# Patient Record
Sex: FEMALE | Race: WHITE | NOT HISPANIC OR LATINO | Employment: FULL TIME | URBAN - METROPOLITAN AREA
[De-identification: names, ages, dates, MRNs, and addresses within clinical notes are randomized per-mention and may not be internally consistent; named-entity substitution may affect disease eponyms.]

---

## 2017-01-09 ENCOUNTER — OFFICE VISIT (OUTPATIENT)
Dept: ONCOLOGY | Facility: CLINIC | Age: 52
End: 2017-01-09

## 2017-01-09 ENCOUNTER — LAB (OUTPATIENT)
Dept: LAB | Facility: HOSPITAL | Age: 52
End: 2017-01-09

## 2017-01-09 ENCOUNTER — CLINICAL SUPPORT (OUTPATIENT)
Dept: CARDIOLOGY | Facility: CLINIC | Age: 52
End: 2017-01-09

## 2017-01-09 ENCOUNTER — APPOINTMENT (OUTPATIENT)
Dept: GENERAL RADIOLOGY | Facility: HOSPITAL | Age: 52
End: 2017-01-09

## 2017-01-09 ENCOUNTER — INFUSION (OUTPATIENT)
Dept: ONCOLOGY | Facility: HOSPITAL | Age: 52
End: 2017-01-09

## 2017-01-09 VITALS
TEMPERATURE: 99 F | WEIGHT: 133.6 LBS | DIASTOLIC BLOOD PRESSURE: 62 MMHG | HEIGHT: 64 IN | RESPIRATION RATE: 16 BRPM | SYSTOLIC BLOOD PRESSURE: 114 MMHG | HEART RATE: 105 BPM | BODY MASS INDEX: 22.81 KG/M2 | OXYGEN SATURATION: 100 %

## 2017-01-09 DIAGNOSIS — D70.1 CHEMOTHERAPY-INDUCED NEUTROPENIA (HCC): ICD-10-CM

## 2017-01-09 DIAGNOSIS — C78.7 METASTATIC CANCER TO LIVER (HCC): ICD-10-CM

## 2017-01-09 DIAGNOSIS — C78.7 RECTAL CANCER METASTASIZED TO LIVER (HCC): ICD-10-CM

## 2017-01-09 DIAGNOSIS — C20 RECTAL CANCER METASTASIZED TO LIVER (HCC): ICD-10-CM

## 2017-01-09 DIAGNOSIS — I67.1 CEREBRAL ARTERIAL ANEURYSM: ICD-10-CM

## 2017-01-09 DIAGNOSIS — R06.02 SOB (SHORTNESS OF BREATH): ICD-10-CM

## 2017-01-09 DIAGNOSIS — E88.09 HYPOALBUMINEMIA: ICD-10-CM

## 2017-01-09 DIAGNOSIS — C18.7 MALIGNANT NEOPLASM OF SIGMOID COLON (HCC): ICD-10-CM

## 2017-01-09 DIAGNOSIS — T45.1X5A CHEMOTHERAPY-INDUCED NEUTROPENIA (HCC): ICD-10-CM

## 2017-01-09 DIAGNOSIS — J90 PLEURAL EFFUSION, BILATERAL: ICD-10-CM

## 2017-01-09 DIAGNOSIS — C18.7 MALIGNANT NEOPLASM OF SIGMOID COLON (HCC): Primary | ICD-10-CM

## 2017-01-09 LAB
ALBUMIN SERPL-MCNC: 2.7 G/DL (ref 3.5–5.2)
ALBUMIN/GLOB SERPL: 0.9 G/DL (ref 1.1–2.4)
ALP SERPL-CCNC: 101 U/L (ref 38–116)
ALT SERPL W P-5'-P-CCNC: 33 U/L (ref 0–33)
ANION GAP SERPL CALCULATED.3IONS-SCNC: 9.5 MMOL/L
AST SERPL-CCNC: 32 U/L (ref 0–32)
BASOPHILS # BLD AUTO: 0.05 10*3/MM3 (ref 0–0.1)
BASOPHILS NFR BLD AUTO: 0.4 % (ref 0–1.1)
BILIRUB SERPL-MCNC: 0.9 MG/DL (ref 0.1–1.2)
BILIRUB UR QL STRIP: NEGATIVE
BUN BLD-MCNC: 7 MG/DL (ref 6–20)
BUN/CREAT SERPL: 10.4 (ref 7.3–30)
CALCIUM SPEC-SCNC: 8.2 MG/DL (ref 8.5–10.2)
CEA SERPL-MCNC: 6.94 NG/ML
CHLORIDE SERPL-SCNC: 99 MMOL/L (ref 98–107)
CLARITY UR: ABNORMAL
CO2 SERPL-SCNC: 24.5 MMOL/L (ref 22–29)
COLOR UR: ABNORMAL
CREAT BLD-MCNC: 0.67 MG/DL (ref 0.6–1.1)
DEPRECATED RDW RBC AUTO: 56.4 FL (ref 37–49)
EOSINOPHIL # BLD AUTO: 0.34 10*3/MM3 (ref 0–0.36)
EOSINOPHIL NFR BLD AUTO: 2.8 % (ref 1–5)
ERYTHROCYTE [DISTWIDTH] IN BLOOD BY AUTOMATED COUNT: 16.9 % (ref 11.7–14.5)
GFR SERPL CREATININE-BSD FRML MDRD: 92 ML/MIN/1.73
GLOBULIN UR ELPH-MCNC: 2.9 GM/DL (ref 1.8–3.5)
GLUCOSE BLD-MCNC: 114 MG/DL (ref 74–124)
GLUCOSE UR STRIP-MCNC: NEGATIVE MG/DL
HCT VFR BLD AUTO: 29.2 % (ref 34–45)
HGB BLD-MCNC: 9.1 G/DL (ref 11.5–14.9)
HGB UR QL STRIP.AUTO: ABNORMAL
IMM GRANULOCYTES # BLD: 0.19 10*3/MM3 (ref 0–0.03)
IMM GRANULOCYTES NFR BLD: 1.6 % (ref 0–0.5)
KETONES UR QL STRIP: NEGATIVE
LEUKOCYTE ESTERASE UR QL STRIP.AUTO: ABNORMAL
LYMPHOCYTES # BLD AUTO: 1.03 10*3/MM3 (ref 1–3.5)
LYMPHOCYTES NFR BLD AUTO: 8.5 % (ref 20–49)
MCH RBC QN AUTO: 29.3 PG (ref 27–33)
MCHC RBC AUTO-ENTMCNC: 31.2 G/DL (ref 32–35)
MCV RBC AUTO: 93.9 FL (ref 83–97)
MONOCYTES # BLD AUTO: 0.83 10*3/MM3 (ref 0.25–0.8)
MONOCYTES NFR BLD AUTO: 6.9 % (ref 4–12)
NEUTROPHILS # BLD AUTO: 9.66 10*3/MM3 (ref 1.5–7)
NEUTROPHILS NFR BLD AUTO: 79.8 % (ref 39–75)
NITRITE UR QL STRIP: NEGATIVE
NRBC BLD MANUAL-RTO: 0 /100 WBC (ref 0–0)
PH UR STRIP.AUTO: 7 [PH] (ref 4.5–8)
PLATELET # BLD AUTO: 235 10*3/MM3 (ref 150–375)
PMV BLD AUTO: 9.3 FL (ref 8.9–12.1)
POTASSIUM BLD-SCNC: 3.9 MMOL/L (ref 3.5–4.7)
PROT SERPL-MCNC: 5.6 G/DL (ref 6.3–8)
PROT UR QL STRIP: NEGATIVE
RBC # BLD AUTO: 3.11 10*6/MM3 (ref 3.9–5)
SODIUM BLD-SCNC: 133 MMOL/L (ref 134–145)
SP GR UR STRIP: 1.01 (ref 1–1.03)
UROBILINOGEN UR QL STRIP: ABNORMAL
WBC NRBC COR # BLD: 12.1 10*3/MM3 (ref 4–10)

## 2017-01-09 PROCEDURE — 99215 OFFICE O/P EST HI 40 MIN: CPT | Performed by: INTERNAL MEDICINE

## 2017-01-09 PROCEDURE — 93005 ELECTROCARDIOGRAM TRACING: CPT | Performed by: INTERNAL MEDICINE

## 2017-01-09 PROCEDURE — 81003 URINALYSIS AUTO W/O SCOPE: CPT | Performed by: INTERNAL MEDICINE

## 2017-01-09 PROCEDURE — 93000 ELECTROCARDIOGRAM COMPLETE: CPT | Performed by: INTERNAL MEDICINE

## 2017-01-09 PROCEDURE — 36415 COLL VENOUS BLD VENIPUNCTURE: CPT | Performed by: INTERNAL MEDICINE

## 2017-01-09 PROCEDURE — 80053 COMPREHEN METABOLIC PANEL: CPT | Performed by: INTERNAL MEDICINE

## 2017-01-09 PROCEDURE — 85025 COMPLETE CBC W/AUTO DIFF WBC: CPT | Performed by: INTERNAL MEDICINE

## 2017-01-09 PROCEDURE — 96374 THER/PROPH/DIAG INJ IV PUSH: CPT | Performed by: INTERNAL MEDICINE

## 2017-01-09 PROCEDURE — 25010000002 FUROSEMIDE PER 20 MG: Performed by: INTERNAL MEDICINE

## 2017-01-09 PROCEDURE — 82378 CARCINOEMBRYONIC ANTIGEN: CPT | Performed by: INTERNAL MEDICINE

## 2017-01-09 PROCEDURE — 71020 HC CHEST PA AND LATERAL: CPT

## 2017-01-09 RX ORDER — POTASSIUM CHLORIDE 750 MG/1
10 TABLET, EXTENDED RELEASE ORAL 2 TIMES DAILY
Qty: 40 TABLET | Refills: 0 | Status: SHIPPED | OUTPATIENT
Start: 2017-01-09 | End: 2017-01-25 | Stop reason: SDDI

## 2017-01-09 RX ORDER — FUROSEMIDE 20 MG/1
20 TABLET ORAL DAILY
Qty: 20 TABLET | Refills: 0 | Status: SHIPPED | OUTPATIENT
Start: 2017-01-09 | End: 2017-01-25 | Stop reason: SDDI

## 2017-01-09 RX ORDER — FUROSEMIDE 10 MG/ML
30 INJECTION INTRAMUSCULAR; INTRAVENOUS ONCE
Status: COMPLETED | OUTPATIENT
Start: 2017-01-09 | End: 2017-01-09

## 2017-01-09 RX ADMIN — FUROSEMIDE 30 MG: 10 INJECTION, SOLUTION INTRAMUSCULAR; INTRAVENOUS at 12:55

## 2017-01-09 NOTE — MR AVS SNAPSHOT
Isabel Florentino   1/9/2017 3:00 PM   Appointment    Dept Phone:  [phone]   Encounter #:  [id number]    Provider:  ADAMA DUMONT   Department:  [facility]                Your Full Care Plan              Today's Medication Changes          These changes are accurate as of: 1/9/17 11:59 PM.  If you have any questions, ask your nurse or doctor.               New Medication(s)Ordered:     furosemide 20 MG tablet   Commonly known as:  LASIX   Take 1 tablet by mouth Daily. TAKE IT AT BREAKFAST   Started by:  Zheng Johnson MD       potassium chloride 10 MEQ CR tablet   Commonly known as:  K-DUR,KLOR-CON   Take 1 tablet by mouth 2 (Two) Times a Day.   Started by:  Zheng Johnson MD            Where to Get Your Medications      These medications were sent to [facility] - [place] - [address] AT [employer] [facility] [address] - [phone] PH - [phone] FX  [address], [place] [address]     Phone:  [phone]     furosemide 20 MG tablet    potassium chloride 10 MEQ CR tablet                  Your Updated Medication List          This list is accurate as of: 1/9/17 11:59 PM.  Always use your most recent med list.                Biotin 5000 MCG capsule       cholecalciferol 1000 UNITS tablet   Commonly known as:  VITAMIN D3       FINACEA 15 % foam   Generic drug:  Azelaic Acid       furosemide 20 MG tablet   Commonly known as:  LASIX   Take 1 tablet by mouth Daily. TAKE IT AT BREAKFAST       OLANZapine 5 MG tablet   Commonly known as:  zyPREXA   Take 1 tablet by mouth Every Night.       ondansetron 4 MG tablet   Commonly known as:  ZOFRAN   Take 1 tablet by mouth Every 8 (Eight) Hours As Needed for nausea or vomiting.       potassium chloride 10 MEQ CR tablet   Commonly known as:  K-DUR,KLOR-CON   Take 1 tablet by mouth 2 (Two) Times a Day.       prochlorperazine 10 MG tablet   Commonly  known as:  COMPAZINE   Take 1 tablet by mouth every 6 (six) hours as needed for nausea or vomiting for up to 60 doses.               Medications to be Given to You by a Medical Professional     Due       Frequency    (none) heparin injection 500 Units  Every 8 Hours PRN    (none) heparin injection 500 Units  Every 8 Hours PRN      Instructions     None    Patient Instructions History      Upcoming Appointments     Visit Type Date Time Department    FOLLOW UP 2 UNIT 1/13/2017  3:40 PM [facility]    LAB 1/13/2017  3:10 PM  [facility] CBC LAB [facility]    FOLLOW UP 2 UNIT 1/25/2017  2:20 PM [facility]    LAB 1/25/2017  1:50 PM [facility]      MyChart Signup     Our records indicate that you have declined [facility] Spirationt signup. If you would like to sign up for Spirationt, please email [email or web address] or call [phone] to obtain an activation code.             Other Info from Your Visit           Your Appointments     Jan 25, 2017  1:50 PM EST   Lab with LAB CHAIR [facility] ([place])    [address] [place] [address]   [phone]            Jan 25, 2017  2:20 PM EST   FOLLOW UP with Zheng Johnson MD   [facility] IN BLOOD DISORDERS AND CANCER ([facility])    [address] [place] [address] [phone]              Allergies     No Known Allergies      Vital Signs     Smoking Status                   Never Smoker

## 2017-01-09 NOTE — PROGRESS NOTES
Subjective .     REASONS FOR FOLLOW UP:  Metastatic rectal cancer to the liver.  Initiated chemotherapy with FOLFOX 6 9/1/2016.     HISTORY OF PRESENT ILLNESS:  The patient is a 51 y.o. year old female with the above-mentioned history, who presents today for follow up for continuation of her care.   Since the previous visit the patient went to the [facility] in [place] where she had extensive surgery to remove areas of metastasis in the right lobe of the liver, also a small resection apparently in the left lobe of the liver. The patient had no surgery whatsoever in her colorectal tumor because according to the , and we are guarded in information, all of the tumors in the liver had scarred out and no visible metastasis was evident. The patient stayed in the hospital for almost 14 days with complications including the need for transfusion. She received DVT prophylaxis in the form of Heparin. She came home with 20 pounds extra weight and she has been feeling very short of breath. The patient also has had palpitations in the absence of any chest pain. No cough, no wheezing, no pleurisy, no hemoptysis, no pleuritic pain. She has had a low-grade fever. The appetite has been minimal. No nausea/vomiting. Normal bowel activity, normal urination, urine normal color. She has had significant swelling in both lower extremities. Also she has had orthopnea. She feels very fatigued and tired. She has not had any angina or chest pain, neither palpitations, but according to her  her heart rate was as high as 150 while being at the [facility].                Past Medical History   Diagnosis Date   • Anemia    • Colon cancer      NEW DIAGNOSIS   • Fatigue      secondary to chemotherapy    [place]   • History of transfusion      1999 AFTER TUBAL RUPTURE   • Hyperlipidemia    • Liver cancer    • Middle cerebral artery aneurysm      Past Surgical History    Procedure Laterality Date   • Craniotomy  2004, 2005     Cerebral aneurysm   • Laparoscopy for ectopic pregnancy  09/1997   • Sinus surgery     • Liver surgery       MASS REMOVED   • Colonoscopy       MAY 2016   • Pr insj tunneled cvc w/o subq port/ age 3 yr/> Right 8/26/2016     Procedure: MEDIPORT PLACEMENT ;  Surgeon: Praful Holden MD;  Location: [facility] OR;  Service: Vascular     Medications: The current medication list was reviewed in the EMR.    ALLERGIES:   No Known Allergies    SOCIAL HISTORY:       Social History   Substance Use Topics   • Smoking status: Never Smoker   • Smokeless tobacco: Never Used   • Alcohol use 3.0 oz/week     5 Glasses of wine per week      Comment: Social     FAMILY HISTORY:  Family History   Problem Relation Age of Onset   • Cerebral aneurysm Mother    • COPD Father        REVIEW OF SYSTEMS:  PAIN: See VITAL SIGNS below.   GENERAL: No change in appetite or weight, no fevers, chills or sweats.   SKIN: No rashes or nonhealing lesions. Pale no jaundice  HEME/LYMPH: SIGNIFICANT anemia,NO easy bruising, bleeding or swollen nodes.   EYES: No vision changes or diplopia.   ENT: No tinnitus, hearing loss, gum bleeding, epistaxis, hoarseness or dysphagia.   RESPIRATORY: DRY cough, MARKEDshortness of breath,NO hemoptysis SOME wheezing.   CVS: No chest pain,SIGNIFICANT  palpitations,AND orthopnea,AND dyspnea on exertionNO PND.   GI: POSTSURGICAL abdominal pain, NO nausea, vomiting, constipation, diarrhea, melena or hematochezia.   : No dysuria or hematuria. No abnormal vaginal discharge or bleeding.   MUSCULOSKELETAL: No bone pain or joint stiffness. TREMENDOUS EDEMA BOTH LE, 20 LB WEIGHT GAIN IN WATER  NEUROLOGICAL: No dizziness, global weakness, loss of consciousness or seizures.    PSYCHIATRIC: No increased nervousness, mood changes or depression.     Objective    Vitals:    01/09/17 1132   BP: 114/62   Pulse: 105   Resp: 16   Temp: 99 °F (37.2 °C)   TempSrc: Oral   SpO2:  "100%   Weight: 133 lb 9.6 oz (60.6 kg)   Height: 64.17\" (163 cm)   PainSc:   2   PainLoc: Comment: pain from surgery     Current Status 1/9/2017   ECOG score 0      PHYSICAL EXAM:    GENERAL:  Well-developed, well-nourished in no acute distress.   SKIN:  Warm, dry without rashes, purpura or petechiae.  HEAD:  Normocephalic.  EYES:  Pupils equal, round and reactive to light.  EOMs intact.  Conjunctivae normal.  EARS:  Hearing intact.  NOSE:  Septum midline.  No excoriations or nasal discharge.  MOUTH:  Tongue is well-papillated; no stomatitis or ulcers.  Buccal dryness noted. Lips normal.  THROAT:  Oropharynx without lesions or exudates.  NECK:  Supple with good range of motion; no thyromegaly or masses, no JVD.  LYMPHATICS:  No cervical, supraclavicular, axillary or inguinal adenopathy.  CHEST:  Lungs DECREASED BASAL SOUNDS to auscultation. , NO CRAKLES OR RUBS,Good airflow.  Benign Mediport present in the right chest. Two tiny incisions on the right intercostal space without sutures, no erythema or edema.  CARDIAC:  RAPID rate and rhythm without murmurs, rubs or gallops. Normal S1,S2.  ABDOMEN:  Soft, nontender with no organomegaly or masses. Bowel sounds present. SURGICAL SITE WELL HEALED  EXTREMITIES:  No clubbing, cyanosis 3+ edema. BOTH LEGS, NO TENDERNESS OR PALPABLE CORDS OR ERYTHEMA  NEUROLOGICAL:  Cranial Nerves II-XII grossly intact.  No focal neurological deficits.  PSYCHIATRIC:  Normal affect and mood.        RECENT LABS:  Lab Results   Component Value Date    WBC 12.10 (H) 01/09/2017    HGB 9.1 (L) 01/09/2017    HCT 29.2 (L) 01/09/2017    MCV 93.9 01/09/2017     01/09/2017     Lab Results   Component Value Date    GLUCOSE 114 01/09/2017    BUN 7 01/09/2017    CREATININE 0.67 01/09/2017    EGFRIFNONA 92 01/09/2017    EGFRIFAFRI  09/15/2016      Comment:      <15 Indicative of kidney failure.    BCR 10.4 01/09/2017    CO2 24.5 01/09/2017    CALCIUM 8.2 (L) 01/09/2017    ALBUMIN 2.70 (L) 01/09/2017 "    LABIL2 0.9 (L) 01/09/2017    AST 32 01/09/2017    ALT 33 01/09/2017       Status:  Final result Visible to patient:  No (Not Released) Dx:  Malignant neoplasm of sigmoid colon Order: [id number]             Ref Range & Units 1:00 PM   4mo ago        Color, UA Yellow, Straw Dark Yellow (A) Yellow      Appearance, UA Clear Cloudy (A) Clear      pH, UA 4.5 - 8.0 7.0 6.0R      Specific Gravity, UA 1.002 - 1.030 1.015 1.020R      Glucose, UA Negative Negative Negative      Ketones, UA Negative Negative Negative      Bilirubin, UA Negative Negative Negative      Blood, UA Negative Trace (A) Negative      Protein, UA Negative Negative Negative      Leuk Esterase, UA Negative Trace (A) Moderate (2+) (A)      Nitrite, UA Negative Negative Negative      Urobilinogen, UA 0.2 - 1.0 E.U./dL 0.2 E.U./dL 0.2 E.U./dLR     Resulting Agency   CBC LAB  [facility]          Specimen Collected: 01/09/17  1:00 PM Last Resulted: 01/09/17  1:16 PM                   Assessment/Plan      1. Metastatic rectal cancer to the liver,  KRAS negative, BRAF negative, MSI stable.  Treatment plan decided upon after discussion with attending physician at the [facility] in [place], [place], Dr. Cannon phone number [phone].  She did undergo laparoscopic resection of 2 liver metastases in the left lobe 8/19/2016.  Eventual plans for right hepatectomy according to [facility] along with removal of the primary tumor in the rectum.  Initiation of chemotherapy with FOLFOX-6 9/1/2016.  Right portal vein embolization at [facility] 11/14/2016.   Upon review on 01/09/2017 the patient’s shortness of breath is very worrisome in regard to fluid accumulation in the pleural spaces and this goes along with the clinical examination today. The anasarca that the patient has also is worrisome in regard to albuminuria or hypoalbuminemia and obviously this is worrisome as well. The shortness of breath also in somebody who has had large surgery for cancer in spite of  heparin prophylaxis at the [facility] [place] [facility] makes me to wonder about the possibility of pulmonary emboli.     The patient has a significant degree of anemia and again low albumin level. Her liver enzymes actually look very normal otherwise. This includes the SGPT, SGOT and the alkaline phosphatase as well as the bilirubin.    RECOMMENDATIONS:  1.  I have advised the patient to proceed with a chest x-ray today. I have personally reviewed this and the patient has bilateral pleural effusions right bigger than left, normal cardiac size. No evidence of congestive heart failure. No peribronchial coffing, no Kerley lines.  2.  I have advised the patient to proceed with an EKG. This is done just to be sure that there is no indirect signs of pulmonary emboli in an EKG. I have personally reviewed this showing sinus tachycardia rate of 100 with normal MN, normal QRS, normal QT. No acute STT changes and nothing to suggest pulmonary emboli, right bundle branch block or S1Q3T3 pattern.    3.  I have advised the patient to have a urinalysis. I want to be sure that she does not have proteinuria.   4.  We will obtain records from the [facility] [place] [facility] in [place] in order to review the pathology, the surgical description and so forth.   5.  The patient’s anemia needs to be watched. I think a lot of this is dilutional. Hopefully the hemoglobin will improve spontaneously once that the patient’s diuresis takes place.   6.  For her anasarca I advised her to have foods that are low in salt, continue increasing protein in her diet and she will receive in the office today Lasix 30 mg IV. Starting tomorrow the patient will do daily weights and she will monitor to be sure that she does not lose more than a pound a day. She will take 20 mg of Lasix every morning along with 20 mEq of potassium every morning.  7.  We will monitor the patient back on clinical grounds this Friday and we will repeat the CBC and the chemistry profile.  8.  The patient will not  receive any formal chemotherapy until we have further assessment in regard to how things go after all the discussion that took place today.  9.  Finally the patient’s oxygenation on room air at rest is 99%; after 40 feet of exercise remains 99%.                           Zheng Johnson MD  11/17/2016

## 2017-01-10 ENCOUNTER — TELEPHONE (OUTPATIENT)
Dept: ONCOLOGY | Facility: HOSPITAL | Age: 52
End: 2017-01-10

## 2017-01-10 NOTE — TELEPHONE ENCOUNTER
----- Message from Deedee Walden sent at 1/10/2017  4:17 PM EST -----  Contact: [phone]   Pt is taking potassium and med lasix is not urinating as much as she was expecting is carrying fluid.

## 2017-01-10 NOTE — TELEPHONE ENCOUNTER
"Patient was concerned that she was not urinating \" a lot\" like she had done after the IV lasix that she received in the office yesterday.  I addressed her concerns and explained that she should continue her current dose of lasix tablets and that if she has any weight increase tomorrow to call us back. Otherwise she will see Dr. Johnson in the office on Friday. Patient V/U  "

## 2017-01-13 ENCOUNTER — LAB (OUTPATIENT)
Dept: LAB | Facility: HOSPITAL | Age: 52
End: 2017-01-13

## 2017-01-13 ENCOUNTER — OFFICE VISIT (OUTPATIENT)
Dept: ONCOLOGY | Facility: CLINIC | Age: 52
End: 2017-01-13

## 2017-01-13 VITALS
SYSTOLIC BLOOD PRESSURE: 132 MMHG | BODY MASS INDEX: 21.72 KG/M2 | HEIGHT: 64 IN | TEMPERATURE: 97.5 F | WEIGHT: 127.2 LBS | RESPIRATION RATE: 14 BRPM | HEART RATE: 82 BPM | OXYGEN SATURATION: 99 % | DIASTOLIC BLOOD PRESSURE: 78 MMHG

## 2017-01-13 DIAGNOSIS — J90 PLEURAL EFFUSION, BILATERAL: ICD-10-CM

## 2017-01-13 DIAGNOSIS — C18.7 MALIGNANT NEOPLASM OF SIGMOID COLON (HCC): Primary | ICD-10-CM

## 2017-01-13 DIAGNOSIS — T45.1X5A CHEMOTHERAPY-INDUCED NEUTROPENIA (HCC): ICD-10-CM

## 2017-01-13 DIAGNOSIS — D70.1 CHEMOTHERAPY-INDUCED NEUTROPENIA (HCC): ICD-10-CM

## 2017-01-13 DIAGNOSIS — E88.09 HYPOALBUMINEMIA: ICD-10-CM

## 2017-01-13 DIAGNOSIS — C18.7 MALIGNANT NEOPLASM OF SIGMOID COLON (HCC): ICD-10-CM

## 2017-01-13 DIAGNOSIS — C78.7 METASTATIC CANCER TO LIVER (HCC): ICD-10-CM

## 2017-01-13 DIAGNOSIS — C78.7 RECTAL CANCER METASTASIZED TO LIVER (HCC): ICD-10-CM

## 2017-01-13 DIAGNOSIS — C20 RECTAL CANCER METASTASIZED TO LIVER (HCC): ICD-10-CM

## 2017-01-13 LAB
ALBUMIN SERPL-MCNC: 3 G/DL (ref 3.5–5.2)
ALBUMIN/GLOB SERPL: 0.8 G/DL (ref 1.1–2.4)
ALP SERPL-CCNC: 120 U/L (ref 38–116)
ALT SERPL W P-5'-P-CCNC: 23 U/L (ref 0–33)
ANION GAP SERPL CALCULATED.3IONS-SCNC: 12.1 MMOL/L
AST SERPL-CCNC: 32 U/L (ref 0–32)
BASOPHILS # BLD AUTO: 0.06 10*3/MM3 (ref 0–0.1)
BASOPHILS NFR BLD AUTO: 0.7 % (ref 0–1.1)
BILIRUB SERPL-MCNC: 0.5 MG/DL (ref 0.1–1.2)
BUN BLD-MCNC: 9 MG/DL (ref 6–20)
BUN/CREAT SERPL: 14.5 (ref 7.3–30)
CALCIUM SPEC-SCNC: 8.7 MG/DL (ref 8.5–10.2)
CHLORIDE SERPL-SCNC: 101 MMOL/L (ref 98–107)
CO2 SERPL-SCNC: 23.9 MMOL/L (ref 22–29)
CREAT BLD-MCNC: 0.62 MG/DL (ref 0.6–1.1)
DEPRECATED RDW RBC AUTO: 57.1 FL (ref 37–49)
EOSINOPHIL # BLD AUTO: 0.32 10*3/MM3 (ref 0–0.36)
EOSINOPHIL NFR BLD AUTO: 3.7 % (ref 1–5)
ERYTHROCYTE [DISTWIDTH] IN BLOOD BY AUTOMATED COUNT: 16.5 % (ref 11.7–14.5)
GFR SERPL CREATININE-BSD FRML MDRD: 101 ML/MIN/1.73
GLOBULIN UR ELPH-MCNC: 3.6 GM/DL (ref 1.8–3.5)
GLUCOSE BLD-MCNC: 121 MG/DL (ref 74–124)
HCT VFR BLD AUTO: 32.4 % (ref 34–45)
HGB BLD-MCNC: 9.9 G/DL (ref 11.5–14.9)
IMM GRANULOCYTES # BLD: 0.05 10*3/MM3 (ref 0–0.03)
IMM GRANULOCYTES NFR BLD: 0.6 % (ref 0–0.5)
LYMPHOCYTES # BLD AUTO: 1.1 10*3/MM3 (ref 1–3.5)
LYMPHOCYTES NFR BLD AUTO: 12.8 % (ref 20–49)
MCH RBC QN AUTO: 28.9 PG (ref 27–33)
MCHC RBC AUTO-ENTMCNC: 30.6 G/DL (ref 32–35)
MCV RBC AUTO: 94.7 FL (ref 83–97)
MONOCYTES # BLD AUTO: 0.66 10*3/MM3 (ref 0.25–0.8)
MONOCYTES NFR BLD AUTO: 7.7 % (ref 4–12)
NEUTROPHILS # BLD AUTO: 6.4 10*3/MM3 (ref 1.5–7)
NEUTROPHILS NFR BLD AUTO: 74.5 % (ref 39–75)
NRBC BLD MANUAL-RTO: 0 /100 WBC (ref 0–0)
PLATELET # BLD AUTO: 323 10*3/MM3 (ref 150–375)
PMV BLD AUTO: 9.8 FL (ref 8.9–12.1)
POTASSIUM BLD-SCNC: 4 MMOL/L (ref 3.5–4.7)
PROT SERPL-MCNC: 6.6 G/DL (ref 6.3–8)
RBC # BLD AUTO: 3.42 10*6/MM3 (ref 3.9–5)
SODIUM BLD-SCNC: 137 MMOL/L (ref 134–145)
WBC NRBC COR # BLD: 8.59 10*3/MM3 (ref 4–10)

## 2017-01-13 PROCEDURE — 85025 COMPLETE CBC W/AUTO DIFF WBC: CPT | Performed by: INTERNAL MEDICINE

## 2017-01-13 PROCEDURE — 99213 OFFICE O/P EST LOW 20 MIN: CPT | Performed by: INTERNAL MEDICINE

## 2017-01-13 PROCEDURE — 80053 COMPREHEN METABOLIC PANEL: CPT | Performed by: INTERNAL MEDICINE

## 2017-01-13 PROCEDURE — 36415 COLL VENOUS BLD VENIPUNCTURE: CPT | Performed by: INTERNAL MEDICINE

## 2017-01-13 NOTE — PROGRESS NOTES
Subjective .     REASONS FOR FOLLOW UP:  Metastatic rectal cancer to the liver.  Initiated chemotherapy with FOLFOX 6 9/1/2016.     HISTORY OF PRESENT ILLNESS:  The patient is a 51 y.o. year old female with the above-mentioned history, who presents today for follow up for continuation of her care.   Since the previous visit the patient went to the [facility] [place] [facility] in [place] where she had extensive surgery to remove areas of metastasis in the right lobe of the liver, also a small resection apparently in the left lobe of the liver. The patient had no surgery whatsoever in her colorectal tumor because according to the , and we are guarded in information, all of the tumors in the liver had scarred out and no visible metastasis was evident. The patient stayed in the hospital for almost 14 days with complications including the need for transfusion. She received DVT prophylaxis in the form of Heparin. She came home with 20 pounds extra weight and she has been feeling very short of breath. The patient also has had palpitations in the absence of any chest pain. No cough, no wheezing, no pleurisy, no hemoptysis, no pleuritic pain. She has had a low-grade fever. The appetite has been minimal. No nausea/vomiting. Normal bowel activity, normal urination, urine normal color. She has had significant swelling in both lower extremities. Also she has had orthopnea. She feels very fatigued and tired. She has not had any angina or chest pain, neither palpitations, but according to her  her heart rate was as high as 150 while being at the [facility].       On 01/13/2017 after followup a few days ago, the patient has been able to lose 5 pounds. She is having lesser degree of shortness of breath. She has lesser degree of palpitations and the swelling of the lower extremities has substantially improved. On the other hand, she has felt very impatient because of how slow she is recovering. I reminded her that the patient  had an extensive surgery for her liver metastasis and she will not recover as fast as she recovered from the other operations before. On the other hand, she is not having any fever. Her appetite is acceptable. Bowel function is fine. Urination is fine. Her surgical site continues healing slowly. She has some pain requiring occasional Tylenol.               Past Medical History   Diagnosis Date   • Anemia    • Colon cancer      NEW DIAGNOSIS   • Fatigue      secondary to chemotherapy    • H/O foreign travel 03/2016     [facility], [place]   • History of transfusion      1999 AFTER TUBAL RUPTURE   • Hyperlipidemia    • Liver cancer    • Middle cerebral artery aneurysm      Past Surgical History   Procedure Laterality Date   • Craniotomy  2004, 2005     Cerebral aneurysm   • Laparoscopy for ectopic pregnancy  09/1997   • Sinus surgery     • Liver surgery       MASS REMOVED   • Colonoscopy       MAY 2016   • Pr insj tunneled cvc w/o subq port/ age 3 yr/> Right 8/26/2016     Procedure: MEDIPORT PLACEMENT ;  Surgeon: Praful Holden MD;  Location: [facility];  Service: Vascular     Medications: The current medication list was reviewed in the EMR.    ALLERGIES:   No Known Allergies    SOCIAL HISTORY:       Social History   Substance Use Topics   • Smoking status: Never Smoker   • Smokeless tobacco: Never Used   • Alcohol use 3.0 oz/week     5 Glasses of wine per week      Comment: Social     FAMILY HISTORY:  Family History   Problem Relation Age of Onset   • Cerebral aneurysm Mother    • COPD Father        REVIEW OF SYSTEMS:  PAIN: See VITAL SIGNS below.   GENERAL: No change in appetite or weight, no fevers, chills or sweats.   SKIN: No rashes or nonhealing lesions. Pale no jaundice  HEME/LYMPH: SIGNIFICANT anemia,NO easy bruising, bleeding or swollen nodes.   EYES: No vision changes or diplopia.   ENT: No tinnitus, hearing loss, gum bleeding, epistaxis, hoarseness or dysphagia.   RESPIRATORY: NO  cough, IMPROVED shortness of breath,NO hemoptysis NO wheezing.   CVS: No chest pain,NO  palpitations, orthopnea,AND dyspnea on exertion, NO PND.   GI: POSTSURGICAL abdominal pain, NO nausea, vomiting, constipation, diarrhea, melena or hematochezia.   : No dysuria or hematuria. No abnormal vaginal discharge or bleeding.   MUSCULOSKELETAL: No bone pain or joint stiffness. IMPROVING EDEMA BOTH LE.  NEUROLOGICAL: No dizziness, global weakness, loss of consciousness or seizures.    PSYCHIATRIC: No increased nervousness, mood changes or depression.     Objective    There were no vitals filed for this visit.  Current Status 1/9/2017   ECOG score 0      PHYSICAL EXAM:    GENERAL:  Well-developed, well-nourished in no acute distress.   SKIN:  Warm, dry without rashes, purpura or petechiae.  HEAD:  Normocephalic.  EYES:  Pupils equal, round and reactive to light.  EOMs intact.  Conjunctivae normal.  EARS:  Hearing intact.  NOSE:  Septum midline.  No excoriations or nasal discharge.  MOUTH:  Tongue is well-papillated; no stomatitis or ulcers.  Buccal dryness noted. Lips normal.  THROAT:  Oropharynx without lesions or exudates.  NECK:  Supple with good range of motion; no thyromegaly or masses, no JVD.  LYMPHATICS:  No cervical, supraclavicular, axillary or inguinal adenopathy.  CHEST:  Lungs DECREASED BASAL SOUNDS, BETTER SOUND LEFT HEMITHORAX BASE , MINIMAL RESIDUAL EFFUSION ON R, to auscultation. , NO CRAKLES OR RUBS,Good airflow.  Benign Mediport present in the right chest. Two tiny incisions on the right intercostal space without sutures, no erythema or edema.  CARDIAC:  NORMAL rate and rhythm without murmurs, rubs or gallops. Normal S1,S2.  ABDOMEN:  Soft, nontender with no organomegaly or masses. Bowel sounds present. SURGICAL SITE WELL HEALED  EXTREMITIES:  No clubbing, cyanosis 1+ edema. BOTH LEGS, NO TENDERNESS OR PALPABLE CORDS OR ERYTHEMA  NEUROLOGICAL:  Cranial Nerves II-XII grossly intact.  No focal neurological  deficits.  PSYCHIATRIC:  Normal affect and mood.        RECENT LABS:  Lab Results   Component Value Date    WBC 12.10 (H) 01/09/2017    HGB 9.1 (L) 01/09/2017    HCT 29.2 (L) 01/09/2017    MCV 93.9 01/09/2017     01/09/2017     Lab Results   Component Value Date    GLUCOSE 114 01/09/2017    BUN 7 01/09/2017    CREATININE 0.67 01/09/2017    EGFRIFNONA 92 01/09/2017    EGFRIFAFRI  09/15/2016      Comment:      <15 Indicative of kidney failure.    BCR 10.4 01/09/2017    CO2 24.5 01/09/2017    CALCIUM 8.2 (L) 01/09/2017    ALBUMIN 2.70 (L) 01/09/2017    LABIL2 0.9 (L) 01/09/2017    AST 32 01/09/2017    ALT 33 01/09/2017     Component      Latest Ref Rng 9/29/2016 11/3/2016 12/1/2016 1/9/2017   CEA      ng/mL 157.60 67.46 32.11 6.94           Assessment/Plan      1. Metastatic rectal cancer to the liver,  KRAS negative, BRAF negative, MSI stable.  Treatment plan decided upon after discussion with attending physician at the [facility] in [place], [place], Dr. Cannon phone number [phone].  She did undergo laparoscopic resection of 2 liver metastases in the left lobe 8/19/2016.  Eventual plans for right hepatectomy according to [facility] along with removal of the primary tumor in the rectum.  Initiation of chemotherapy with FOLFOX-6 9/1/2016.  [facility]    I got the opportunity to review all the 60 pages information from [facility]. Most importantly the pathological analysis of the liver obtained by us from 12/29/2016 documents liver, segment II, excision: Dense fibrosis associated with aggregates of macrophages, hyalinization and focal granulomatous inflammation with fibrinoid necrosis; negative for adenocarcinoma. Liver, right lobe, hepatectomy: Metastatic colonic adenocarcinoma forming a 2.2 cm mass with approximately 60% necrosis, status post neoadjuvant therapy. Extensive embolic material is present within vessels, associated with marked degree of  granuloma formation and surgical resection margins are negative for tumor. Background nonneoplastic liver demonstrates patchy portal-based inflammation, embolic material with histiocytic and giant cell reaction. No evidence of primary chronic liver disease. Gallbladder cholecystectomy with no diagnostic abnormalities.     I reviewed the urinalysis during the previous visit that disclosed no proteinuria.     I reviewed her CBC today that shows an improvement in the hemoglobin to 9.9, stabilization of the white count and platelet count.     ASSESSMENT:  This patient has cancer as above, extensive surgery at the [facility], removing the right lobe of the liver for a metastatic disease. She developed hypoalbuminemia with anasarca, pleural effusion, ascites and significant swelling in her lower extremities that has been improving after Lasix was initiated a few days ago along with some potassium. The patient wants to get better in question of a few days. I pointed out to her and her  that is not possible. This surgery was very extensive and it will take her at least another 3 or 4 weeks to feel USP decent. I would not be surprised if when she returns back in 2 weeks, the anasarca has improved and the hypoalbuminemia will resolve. The hypoalbuminemia is probably a result of lack of nutrition but most importantly, lack of synthetic function by the liver after removal of the right lobe. I expect that with liver regeneration this will get better.     Obviously, under the present circumstances, the patient is not a candidate to receive any further chemotherapy. I will discuss with Dr. Cannon at the [facility], the plan of any other treatment for her at this time.     Overall, the patient is doing better. I asked her to not be impatient, to take her time to take care of herself and to be sure that she is spiritual to herself, to her  and to her son. 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 Hopefully that will be the case. Upon return in 2 weeks, I also plan to measure CBC chemistry and repeat a CEA level.     Finally, I gave her the report of her CEA. That is almost down to normal and if we account for the half-life of the CEA, I would expect that by the time of return she will have a normal CEA level. We will see.                          Zheng Johnson MD  11/17/2016

## 2017-01-13 NOTE — PROGRESS NOTES
Procedure     ECG 12 Lead  Date/Time: 1/9/2017 1:32 PM  Performed by: WILIAM CHATTERJEE JR  Authorized by: SUHAIL FERGUSON   Comparison: not compared with previous ECG   Previous ECG: no previous ECG available  Rhythm: sinus rhythm  BPM: 97  Clinical impression: normal ECG

## 2017-01-19 ENCOUNTER — TELEPHONE (OUTPATIENT)
Dept: ONCOLOGY | Facility: HOSPITAL | Age: 52
End: 2017-01-19

## 2017-01-19 NOTE — TELEPHONE ENCOUNTER
----- Message from Deedee Walden sent at 1/19/2017  1:16 PM EST -----  Contact: [phone]    Pt calling about coming off med was taking for fluid buildup but she said she is back to normal should she stop taking?

## 2017-01-19 NOTE — TELEPHONE ENCOUNTER
Patient called to see if she could stop taking her Lasix as her weight has returned to normal.  Per Leonor LOMBARDI ok to stop taking this as well as her potassium which was checked at her last visit. Patient V/U

## 2017-01-25 ENCOUNTER — LAB (OUTPATIENT)
Dept: LAB | Facility: HOSPITAL | Age: 52
End: 2017-01-25

## 2017-01-25 ENCOUNTER — OFFICE VISIT (OUTPATIENT)
Dept: ONCOLOGY | Facility: CLINIC | Age: 52
End: 2017-01-25

## 2017-01-25 VITALS
SYSTOLIC BLOOD PRESSURE: 102 MMHG | DIASTOLIC BLOOD PRESSURE: 62 MMHG | BODY MASS INDEX: 19.53 KG/M2 | HEIGHT: 64 IN | RESPIRATION RATE: 16 BRPM | WEIGHT: 114.4 LBS | HEART RATE: 80 BPM | TEMPERATURE: 99.1 F

## 2017-01-25 DIAGNOSIS — C78.7 RECTAL CANCER METASTASIZED TO LIVER (HCC): Primary | ICD-10-CM

## 2017-01-25 DIAGNOSIS — J90 PLEURAL EFFUSION, BILATERAL: ICD-10-CM

## 2017-01-25 DIAGNOSIS — E88.09 HYPOALBUMINEMIA: ICD-10-CM

## 2017-01-25 DIAGNOSIS — C18.7 MALIGNANT NEOPLASM OF SIGMOID COLON (HCC): ICD-10-CM

## 2017-01-25 DIAGNOSIS — C20 RECTAL CANCER METASTASIZED TO LIVER (HCC): Primary | ICD-10-CM

## 2017-01-25 DIAGNOSIS — C78.7 METASTATIC CANCER TO LIVER (HCC): ICD-10-CM

## 2017-01-25 DIAGNOSIS — C78.7 RECTAL CANCER METASTASIZED TO LIVER (HCC): ICD-10-CM

## 2017-01-25 DIAGNOSIS — C20 RECTAL CANCER METASTASIZED TO LIVER (HCC): ICD-10-CM

## 2017-01-25 LAB
ALBUMIN SERPL-MCNC: 3.6 G/DL (ref 3.5–5.2)
ALBUMIN/GLOB SERPL: 1 G/DL (ref 1.1–2.4)
ALP SERPL-CCNC: 143 U/L (ref 38–116)
ALT SERPL W P-5'-P-CCNC: 15 U/L (ref 0–33)
ANION GAP SERPL CALCULATED.3IONS-SCNC: 12.7 MMOL/L
AST SERPL-CCNC: 28 U/L (ref 0–32)
BASOPHILS # BLD AUTO: 0.04 10*3/MM3 (ref 0–0.1)
BASOPHILS NFR BLD AUTO: 0.6 % (ref 0–1.1)
BILIRUB SERPL-MCNC: 0.3 MG/DL (ref 0.1–1.2)
BUN BLD-MCNC: 10 MG/DL (ref 6–20)
BUN/CREAT SERPL: 16.9 (ref 7.3–30)
CALCIUM SPEC-SCNC: 9.4 MG/DL (ref 8.5–10.2)
CEA SERPL-MCNC: 6.43 NG/ML
CHLORIDE SERPL-SCNC: 104 MMOL/L (ref 98–107)
CO2 SERPL-SCNC: 24.3 MMOL/L (ref 22–29)
CREAT BLD-MCNC: 0.59 MG/DL (ref 0.6–1.1)
DEPRECATED RDW RBC AUTO: 50.6 FL (ref 37–49)
EOSINOPHIL # BLD AUTO: 0.13 10*3/MM3 (ref 0–0.36)
EOSINOPHIL NFR BLD AUTO: 2 % (ref 1–5)
ERYTHROCYTE [DISTWIDTH] IN BLOOD BY AUTOMATED COUNT: 15.3 % (ref 11.7–14.5)
GFR SERPL CREATININE-BSD FRML MDRD: 107 ML/MIN/1.73
GLOBULIN UR ELPH-MCNC: 3.6 GM/DL (ref 1.8–3.5)
GLUCOSE BLD-MCNC: 102 MG/DL (ref 74–124)
HCT VFR BLD AUTO: 37.3 % (ref 34–45)
HGB BLD-MCNC: 11.2 G/DL (ref 11.5–14.9)
IMM GRANULOCYTES # BLD: 0.02 10*3/MM3 (ref 0–0.03)
IMM GRANULOCYTES NFR BLD: 0.3 % (ref 0–0.5)
LYMPHOCYTES # BLD AUTO: 1.32 10*3/MM3 (ref 1–3.5)
LYMPHOCYTES NFR BLD AUTO: 20.2 % (ref 20–49)
MCH RBC QN AUTO: 27.3 PG (ref 27–33)
MCHC RBC AUTO-ENTMCNC: 30 G/DL (ref 32–35)
MCV RBC AUTO: 91 FL (ref 83–97)
MONOCYTES # BLD AUTO: 0.66 10*3/MM3 (ref 0.25–0.8)
MONOCYTES NFR BLD AUTO: 10.1 % (ref 4–12)
NEUTROPHILS # BLD AUTO: 4.38 10*3/MM3 (ref 1.5–7)
NEUTROPHILS NFR BLD AUTO: 66.8 % (ref 39–75)
NRBC BLD MANUAL-RTO: 0 /100 WBC (ref 0–0)
PLATELET # BLD AUTO: 390 10*3/MM3 (ref 150–375)
PMV BLD AUTO: 9.2 FL (ref 8.9–12.1)
POTASSIUM BLD-SCNC: 3.8 MMOL/L (ref 3.5–4.7)
PROT SERPL-MCNC: 7.2 G/DL (ref 6.3–8)
RBC # BLD AUTO: 4.1 10*6/MM3 (ref 3.9–5)
SODIUM BLD-SCNC: 141 MMOL/L (ref 134–145)
WBC NRBC COR # BLD: 6.55 10*3/MM3 (ref 4–10)

## 2017-01-25 PROCEDURE — 80053 COMPREHEN METABOLIC PANEL: CPT | Performed by: INTERNAL MEDICINE

## 2017-01-25 PROCEDURE — 36415 COLL VENOUS BLD VENIPUNCTURE: CPT | Performed by: INTERNAL MEDICINE

## 2017-01-25 PROCEDURE — 99214 OFFICE O/P EST MOD 30 MIN: CPT | Performed by: INTERNAL MEDICINE

## 2017-01-25 PROCEDURE — 85025 COMPLETE CBC W/AUTO DIFF WBC: CPT | Performed by: INTERNAL MEDICINE

## 2017-01-25 PROCEDURE — 82378 CARCINOEMBRYONIC ANTIGEN: CPT | Performed by: INTERNAL MEDICINE

## 2017-01-25 RX ORDER — OLANZAPINE 5 MG/1
2.5 TABLET ORAL NIGHTLY
Qty: 30 TABLET | Refills: 3 | Status: SHIPPED | OUTPATIENT
Start: 2017-01-25 | End: 2017-02-10

## 2017-01-25 NOTE — PROGRESS NOTES
Subjective .     REASONS FOR FOLLOW UP:  Metastatic rectal cancer to the liver.  Initiated chemotherapy with FOLFOX 6 9/1/2016.     HISTORY OF PRESENT ILLNESS:  The patient is a 51 y.o. year old female with the above-mentioned history, who presents today for follow up for continuation of her care.   Since the previous visit the patient went to the [facility] in [place] where she had extensive surgery to remove areas of metastasis in the right lobe of the liver, also a small resection apparently in the left lobe of the liver. The patient had no surgery whatsoever in her colorectal tumor because according to the , and we are guarded in information, all of the tumors in the liver had scarred out and no visible metastasis was evident. The patient stayed in the hospital for almost 14 days with complications including the need for transfusion. She received DVT prophylaxis in the form of Heparin. She came home with 20 pounds extra weight and she has been feeling very short of breath. The patient also has had palpitations in the absence of any chest pain. No cough, no wheezing, no pleurisy, no hemoptysis, no pleuritic pain. She has had a low-grade fever. The appetite has been minimal. No nausea/vomiting. Normal bowel activity, normal urination, urine normal color. She has had significant swelling in both lower extremities. Also she has had orthopnea. She feels very fatigued and tired. She has not had any angina or chest pain, neither palpitations, but according to her  her heart rate was as high as 150 while being at the [facility].       On 01/13/2017 after followup a few days ago, the patient has been able to lose 5 pounds. She is having lesser degree of shortness of breath. She has lesser degree of palpitations and the swelling of the lower extremities has substantially improved. On the other hand, she has felt very impatient because of how slow she is recovering. I reminded her that the patient  had an extensive surgery for her liver metastasis and she will not recover as fast as she recovered from the other operations before. On the other hand, she is not having any fever. Her appetite is acceptable. Bowel function is fine. Urination is fine. Her surgical site continues healing slowly. She has some pain requiring occasional Tylenol.          On 01/25/2017 the patient has stopped taking the water medication and the potassium because she has reached normal weight and resolution of her anasarca. She still has an element of shortness of breath upon exercise. She is very anxious and nervous. She is not eating too much and she is not sleeping well. For these reasons Zyprexa will be given to her at a dose of 2.5 mg every night.    We are going to go ahead and schedule CT scans of the chest, abdomen and pelvis and review her back in 2 weeks to see when she will be able to resume her plan of chemotherapy.     I placed a phone call to Dr. Cannon at the [facility] in [place], [place].               Past Medical History   Diagnosis Date   • Anemia    • Colon cancer      NEW DIAGNOSIS   • Fatigue      secondary to chemotherapy    • H/O foreign travel 03/2016     [email or web address] [place], [place]   • History of transfusion      1999 AFTER TUBAL RUPTURE   • Hyperlipidemia    • Liver cancer    • Middle cerebral artery aneurysm      Past Surgical History   Procedure Laterality Date   • Craniotomy  2004, 2005     Cerebral aneurysm   • Laparoscopy for ectopic pregnancy  09/1997   • Sinus surgery     • Liver surgery       MASS REMOVED   • Colonoscopy       MAY 2016   • Pr insj tunneled cvc w/o subq port/ age 3 yr/> Right 8/26/2016     Procedure: MEDIPORT PLACEMENT ;  Surgeon: Praful Holden MD;  Location: [facility];  Service: Vascular     Medications: The current medication list was reviewed in the EMR.    ALLERGIES:   No Known Allergies    SOCIAL HISTORY:       Social History   Substance Use Topics   •  "Smoking status: Never Smoker   • Smokeless tobacco: Never Used   • Alcohol use 3.0 oz/week     5 Glasses of wine per week      Comment: Social     FAMILY HISTORY:  Family History   Problem Relation Age of Onset   • Cerebral aneurysm Mother    • COPD Father        REVIEW OF SYSTEMS:  PAIN: See VITAL SIGNS below.   GENERAL: No change in appetite or weight, no fevers, chills or sweats.   SKIN: No rashes or nonhealing lesions. Pale no jaundice  HEME/LYMPH: SIGNIFICANT anemia,NO easy bruising, bleeding or swollen nodes.   EYES: No vision changes or diplopia.   ENT: No tinnitus, hearing loss, gum bleeding, epistaxis, hoarseness or dysphagia.   RESPIRATORY: NO cough, IMPROVED shortness of breath,NO hemoptysis NO wheezing.   CVS: No chest pain,NO  palpitations, orthopnea,AND dyspnea on exertion, NO PND.   GI: POSTSURGICAL abdominal pain, NO nausea, vomiting, constipation, diarrhea, melena or hematochezia.   : No dysuria or hematuria. No abnormal vaginal discharge or bleeding.   MUSCULOSKELETAL: No bone pain or joint stiffness. IMPROVING EDEMA BOTH LE.  NEUROLOGICAL: No dizziness, global weakness, loss of consciousness or seizures.    PSYCHIATRIC: No increased nervousness, mood changes or depression.     Objective    Vitals:    01/25/17 1418   BP: 102/62   Pulse: 80   Resp: 16   Temp: 99.1 °F (37.3 °C)   Weight: 114 lb 6.4 oz (51.9 kg)   Height: 64.17\" (163 cm)   PainSc: 0-No pain     Current Status 1/25/2017   ECOG score 0      PHYSICAL EXAM:    GENERAL:  Well-developed, well-nourished in no acute distress.   SKIN:  Warm, dry without rashes, purpura or petechiae.  HEAD:  Normocephalic.  EYES:  Pupils equal, round and reactive to light.  EOMs intact.  Conjunctivae normal.  EARS:  Hearing intact.  NOSE:  Septum midline.  No excoriations or nasal discharge.  MOUTH:  Tongue is well-papillated; no stomatitis or ulcers.  Buccal dryness noted. Lips normal.  THROAT:  Oropharynx without lesions or exudates.  NECK:  Supple with " good range of motion; no thyromegaly or masses, no JVD.  LYMPHATICS:  No cervical, supraclavicular, axillary or inguinal adenopathy.  CHEST:  Lungs DECREASED BASAL SOUNDS, BETTER SOUND LEFT HEMITHORAX BASE , MINIMAL RESIDUAL EFFUSION ON R, to auscultation. , NO CRAKLES OR RUBS,Good airflow.  Benign Mediport present in the right chest. Two tiny incisions on the right intercostal space without sutures, no erythema or edema.  CARDIAC:  NORMAL rate and rhythm without murmurs, rubs or gallops. Normal S1,S2.  ABDOMEN:  Soft, nontender with no organomegaly or masses. Bowel sounds present. SURGICAL SITE WELL HEALED  EXTREMITIES:  No clubbing, cyanosis NO edema. NO TENDERNESS OR PALPABLE CORDS OR ERYTHEMA  NEUROLOGICAL:  Cranial Nerves II-XII grossly intact.  No focal neurological deficits.  PSYCHIATRIC:  Normal affect and mood.        RECENT LABS:  Lab Results   Component Value Date    WBC 6.55 01/25/2017    HGB 11.2 (L) 01/25/2017    HCT 37.3 01/25/2017    MCV 91.0 01/25/2017     (H) 01/25/2017     Lab Results   Component Value Date    GLUCOSE 121 01/13/2017    BUN 9 01/13/2017    CREATININE 0.62 01/13/2017    EGFRIFNONA 101 01/13/2017    EGFRIFAFRI  09/15/2016      Comment:      <15 Indicative of kidney failure.    BCR 14.5 01/13/2017    CO2 23.9 01/13/2017    CALCIUM 8.7 01/13/2017    ALBUMIN 3.00 (L) 01/13/2017    LABIL2 0.8 (L) 01/13/2017    AST 32 01/13/2017    ALT 23 01/13/2017     Component      Latest Ref Rng 9/29/2016 11/3/2016 12/1/2016 1/9/2017   CEA      ng/mL 157.60 67.46 32.11 6.94           Assessment/Plan      1. Metastatic rectal cancer to the liver,  KRAS negative, BRAF negative, MSI stable.  Treatment plan decided upon after discussion with attending physician at the [facility] in [place], [place], Dr. Cannon phone number [phone].  She did undergo laparoscopic resection of 2 liver metastases in the left lobe 8/19/2016.  Eventual plans for right hepatectomy according to [facility] along  with removal of the primary tumor in the rectum.  Initiation of chemotherapy with FOLFOX-6 9/1/2016.  Right portal vein embolization at [facility] 11/14/2016.    I got the opportunity to review all the 60 pages information from [facility]. Most importantly the pathological analysis of the liver obtained by us from 12/29/2016 documents liver, segment II, excision: Dense fibrosis associated with aggregates of macrophages, hyalinization and focal granulomatous inflammation with fibrinoid necrosis; negative for adenocarcinoma. Liver, right lobe, hepatectomy: Metastatic colonic adenocarcinoma forming a 2.2 cm mass with approximately 60% necrosis, status post neoadjuvant therapy. Extensive embolic material is present within vessels, associated with marked degree of granuloma formation and surgical resection margins are negative for tumor. Background nonneoplastic liver demonstrates patchy portal-based inflammation, embolic material with histiocytic and giant cell reaction. No evidence of primary chronic liver disease. Gallbladder cholecystectomy with no diagnostic abnormalities.     I reviewed the urinalysis during the previous visit that disclosed no proteinuria.     I reviewed her CBC today that shows an improvement in the hemoglobin to 11.2, stabilization of the white count and platelet count.          The patient continues experiencing anorexia and mild nausea and I advised her to initiate some Pepcid Complete twice a day. She is very worried, she is becoming depressed, she is crying and she is not sleeping well. For these reasons, Zyprexa 2.5 mg a day will be initiated.     I advised her that the resolution of her anasarca is a sign that the liver has resumed synthetic function with increased production of albumin and an improving oncotic pressure and that way the anasarca is gone. The minimal pleural effusion on the right side is representation of extensive liver surgery, not surprising.    The patient will have a  CT scan of the chest, abdomen and pelvis in a few days. I will review her back in 2 weeks and then we will be timing when to resume her FOLFOX. For now she is not going to return to work.     I encouraged her to be more social because she is ruminating nostalgia and worries 24 hours a day. We had a lengthy discussion about 25 minutes about these issues in particular.                            Zheng Johnson MD  11/17/2016

## 2017-01-26 ENCOUNTER — TELEPHONE (OUTPATIENT)
Dept: ONCOLOGY | Facility: CLINIC | Age: 52
End: 2017-01-26

## 2017-01-26 NOTE — TELEPHONE ENCOUNTER
PHONE WITH PT LABS ARE BETTER ALBUMIN 3.6 CEA DOWN ANOTHER .5 , NOTHING TO CHANGE, SHE FEELS WELL.

## 2017-02-06 ENCOUNTER — HOSPITAL ENCOUNTER (OUTPATIENT)
Dept: PET IMAGING | Facility: HOSPITAL | Age: 52
Discharge: HOME | End: 2017-02-06
Attending: INTERNAL MEDICINE | Admitting: INTERNAL MEDICINE

## 2017-02-06 ENCOUNTER — INFUSION (OUTPATIENT)
Dept: ONCOLOGY | Facility: HOSPITAL | Age: 52
End: 2017-02-06

## 2017-02-06 DIAGNOSIS — C78.7 METASTATIC CANCER TO LIVER (HCC): ICD-10-CM

## 2017-02-06 DIAGNOSIS — C20 RECTAL CANCER METASTASIZED TO LIVER (HCC): Primary | ICD-10-CM

## 2017-02-06 DIAGNOSIS — C18.7 MALIGNANT NEOPLASM OF SIGMOID COLON (HCC): ICD-10-CM

## 2017-02-06 DIAGNOSIS — J90 PLEURAL EFFUSION, BILATERAL: ICD-10-CM

## 2017-02-06 DIAGNOSIS — E88.09 HYPOALBUMINEMIA: ICD-10-CM

## 2017-02-06 DIAGNOSIS — C78.7 RECTAL CANCER METASTASIZED TO LIVER (HCC): Primary | ICD-10-CM

## 2017-02-06 DIAGNOSIS — C78.7 RECTAL CANCER METASTASIZED TO LIVER (HCC): ICD-10-CM

## 2017-02-06 DIAGNOSIS — C20 RECTAL CANCER METASTASIZED TO LIVER (HCC): ICD-10-CM

## 2017-02-06 LAB
ALBUMIN SERPL-MCNC: 3.9 G/DL (ref 3.5–5.2)
ALBUMIN/GLOB SERPL: 1.1 G/DL (ref 1.1–2.4)
ALP SERPL-CCNC: 120 U/L (ref 38–116)
ALT SERPL W P-5'-P-CCNC: 13 U/L (ref 0–33)
ANION GAP SERPL CALCULATED.3IONS-SCNC: 13.4 MMOL/L
AST SERPL-CCNC: 25 U/L (ref 0–32)
BASOPHILS # BLD AUTO: 0.05 10*3/MM3 (ref 0–0.1)
BASOPHILS NFR BLD AUTO: 0.8 % (ref 0–1.1)
BILIRUB SERPL-MCNC: 0.2 MG/DL (ref 0.1–1.2)
BUN BLD-MCNC: 9 MG/DL (ref 6–20)
BUN/CREAT SERPL: 16.1 (ref 7.3–30)
CALCIUM SPEC-SCNC: 9.6 MG/DL (ref 8.5–10.2)
CEA SERPL-MCNC: 7.02 NG/ML
CHLORIDE SERPL-SCNC: 104 MMOL/L (ref 98–107)
CO2 SERPL-SCNC: 22.6 MMOL/L (ref 22–29)
CREAT BLD-MCNC: 0.56 MG/DL (ref 0.6–1.1)
CREAT BLDA-MCNC: 0.5 MG/DL (ref 0.6–1.3)
DEPRECATED RDW RBC AUTO: 47.2 FL (ref 37–49)
EOSINOPHIL # BLD AUTO: 0.15 10*3/MM3 (ref 0–0.36)
EOSINOPHIL NFR BLD AUTO: 2.4 % (ref 1–5)
ERYTHROCYTE [DISTWIDTH] IN BLOOD BY AUTOMATED COUNT: 14.6 % (ref 11.7–14.5)
GFR SERPL CREATININE-BSD FRML MDRD: 113 ML/MIN/1.73
GLOBULIN UR ELPH-MCNC: 3.5 GM/DL (ref 1.8–3.5)
GLUCOSE BLD-MCNC: 97 MG/DL (ref 74–124)
HCT VFR BLD AUTO: 34.6 % (ref 34–45)
HGB BLD-MCNC: 10.6 G/DL (ref 11.5–14.9)
IMM GRANULOCYTES # BLD: 0.03 10*3/MM3 (ref 0–0.03)
IMM GRANULOCYTES NFR BLD: 0.5 % (ref 0–0.5)
LYMPHOCYTES # BLD AUTO: 1.74 10*3/MM3 (ref 1–3.5)
LYMPHOCYTES NFR BLD AUTO: 27.3 % (ref 20–49)
MCH RBC QN AUTO: 27 PG (ref 27–33)
MCHC RBC AUTO-ENTMCNC: 30.6 G/DL (ref 32–35)
MCV RBC AUTO: 88.3 FL (ref 83–97)
MONOCYTES # BLD AUTO: 0.61 10*3/MM3 (ref 0.25–0.8)
MONOCYTES NFR BLD AUTO: 9.6 % (ref 4–12)
NEUTROPHILS # BLD AUTO: 3.8 10*3/MM3 (ref 1.5–7)
NEUTROPHILS NFR BLD AUTO: 59.4 % (ref 39–75)
NRBC BLD MANUAL-RTO: 0 /100 WBC (ref 0–0)
PLATELET # BLD AUTO: 254 10*3/MM3 (ref 150–375)
PMV BLD AUTO: 9.5 FL (ref 8.9–12.1)
POTASSIUM BLD-SCNC: 4.1 MMOL/L (ref 3.5–4.7)
PROT SERPL-MCNC: 7.4 G/DL (ref 6.3–8)
RBC # BLD AUTO: 3.92 10*6/MM3 (ref 3.9–5)
SODIUM BLD-SCNC: 140 MMOL/L (ref 134–145)
WBC NRBC COR # BLD: 6.38 10*3/MM3 (ref 4–10)

## 2017-02-06 PROCEDURE — 82378 CARCINOEMBRYONIC ANTIGEN: CPT | Performed by: INTERNAL MEDICINE

## 2017-02-06 PROCEDURE — 25510000001 DIATRIZOATE MEGLUMINE & SODIUM PER 1 ML: Performed by: INTERNAL MEDICINE

## 2017-02-06 PROCEDURE — 82565 ASSAY OF CREATININE: CPT

## 2017-02-06 PROCEDURE — 74177 CT ABD & PELVIS W/CONTRAST: CPT

## 2017-02-06 PROCEDURE — 0 IOPAMIDOL 61 % SOLUTION: Performed by: INTERNAL MEDICINE

## 2017-02-06 PROCEDURE — 71260 CT THORAX DX C+: CPT

## 2017-02-06 RX ORDER — SODIUM CHLORIDE 0.9 % (FLUSH) 0.9 %
10 SYRINGE (ML) INJECTION AS NEEDED
Status: CANCELLED | OUTPATIENT
Start: 2017-02-06

## 2017-02-06 RX ORDER — SODIUM CHLORIDE 0.9 % (FLUSH) 0.9 %
10 SYRINGE (ML) INJECTION AS NEEDED
Status: DISCONTINUED | OUTPATIENT
Start: 2017-02-06 | End: 2017-02-06 | Stop reason: HOSPADM

## 2017-02-06 RX ADMIN — Medication 10 ML: at 13:22

## 2017-02-06 RX ADMIN — IOPAMIDOL 85 ML: 612 INJECTION, SOLUTION INTRAVENOUS at 13:50

## 2017-02-06 RX ADMIN — DIATRIZOATE MEGLUMINE AND DIATRIZOATE SODIUM 30 ML: 660; 100 LIQUID ORAL; RECTAL at 12:30

## 2017-02-10 ENCOUNTER — APPOINTMENT (OUTPATIENT)
Dept: LAB | Facility: HOSPITAL | Age: 52
End: 2017-02-10

## 2017-02-10 ENCOUNTER — OFFICE VISIT (OUTPATIENT)
Dept: ONCOLOGY | Facility: CLINIC | Age: 52
End: 2017-02-10

## 2017-02-10 VITALS
DIASTOLIC BLOOD PRESSURE: 78 MMHG | WEIGHT: 113.2 LBS | TEMPERATURE: 97.9 F | HEIGHT: 64 IN | HEART RATE: 88 BPM | BODY MASS INDEX: 19.33 KG/M2 | OXYGEN SATURATION: 100 % | RESPIRATION RATE: 12 BRPM | SYSTOLIC BLOOD PRESSURE: 132 MMHG

## 2017-02-10 DIAGNOSIS — C20 RECTAL CANCER METASTASIZED TO LIVER (HCC): ICD-10-CM

## 2017-02-10 DIAGNOSIS — J90 PLEURAL EFFUSION, BILATERAL: ICD-10-CM

## 2017-02-10 DIAGNOSIS — C78.7 METASTATIC CANCER TO LIVER (HCC): ICD-10-CM

## 2017-02-10 DIAGNOSIS — C18.7 MALIGNANT NEOPLASM OF SIGMOID COLON (HCC): Primary | ICD-10-CM

## 2017-02-10 DIAGNOSIS — C78.7 RECTAL CANCER METASTASIZED TO LIVER (HCC): ICD-10-CM

## 2017-02-10 PROCEDURE — G0463 HOSPITAL OUTPT CLINIC VISIT: HCPCS | Performed by: INTERNAL MEDICINE

## 2017-02-10 PROCEDURE — 99214 OFFICE O/P EST MOD 30 MIN: CPT | Performed by: INTERNAL MEDICINE

## 2017-02-10 RX ORDER — PALONOSETRON 0.05 MG/ML
0.25 INJECTION, SOLUTION INTRAVENOUS ONCE
Status: CANCELLED | OUTPATIENT
Start: 2017-02-16

## 2017-02-10 RX ORDER — DEXTROSE MONOHYDRATE 50 MG/ML
250 INJECTION, SOLUTION INTRAVENOUS ONCE
Status: CANCELLED | OUTPATIENT
Start: 2017-02-16

## 2017-02-10 RX ORDER — FLUOROURACIL 50 MG/ML
400 INJECTION, SOLUTION INTRAVENOUS ONCE
Status: CANCELLED | OUTPATIENT
Start: 2017-02-16

## 2017-02-10 NOTE — PROGRESS NOTES
Subjective .     REASONS FOR FOLLOW UP:  Metastatic rectal cancer to the liver.  Initiated chemotherapy with FOLFOX 6 9/1/2016.     HISTORY OF PRESENT ILLNESS:  The patient is a 51 y.o. year old female with the above-mentioned history, who presents today for follow up for continuation of her care.   Since the previous visit the patient went to the [facility] [place] [facility] in [place] where she had extensive surgery to remove areas of metastasis in the right lobe of the liver, also a small resection apparently in the left lobe of the liver. The patient had no surgery whatsoever in her colorectal tumor because according to the , and we are guarded in information, all of the tumors in the liver had scarred out and no visible metastasis was evident. The patient stayed in the hospital for almost 14 days with complications including the need for transfusion. She received DVT prophylaxis in the form of Heparin. She came home with 20 pounds extra weight and she has been feeling very short of breath. The patient also has had palpitations in the absence of any chest pain. No cough, no wheezing, no pleurisy, no hemoptysis, no pleuritic pain. She has had a low-grade fever. The appetite has been minimal. No nausea/vomiting. Normal bowel activity, normal urination, urine normal color. She has had significant swelling in both lower extremities. Also she has had orthopnea. She feels very fatigued and tired. She has not had any angina or chest pain, neither palpitations, but according to her  her heart rate was as high as 150 while being at the [facility] [place] [facility].        On 02/10/2017 the patient actually was feeling better. Her appetite has improved but not normalized and she was still having some degree of shortness of breath upon exercise in absence of pleuritic pain or fever. Her bowel activity has normalized. Urination has normalized and most importantly she has complete resolution of her swelling. The patient was no longer  taking diuretics. We discussed with her the findings in the CT scan described below that includes a persistent right pleural effusion that will require drainage before she proceeds with further chemotherapy. We will plan to resume chemotherapy with FOLFOX in a week once that the thoracentesis is performed. Also the chemotherapy will require adjustment in regard to the oxaliplatin given the fact that the patient is now starting to develop peripheral neuropathy grade 1 in her feet. The total oxaliplatin dose will be 110 mg. The dose of 5-FU and leucovorin will remain the same. Given these facts she will be ready to proceed. Also from the thoracentesis fluid, we will collect a protein, an LDH cytology and a Gram stain.             Past Medical History   Diagnosis Date   • Anemia    • Colon cancer      NEW DIAGNOSIS   • Fatigue      secondary to chemotherapy    • H/O foreign travel 03/2016     [facility] [place], [place]   • History of transfusion      1999 AFTER TUBAL RUPTURE   • Hyperlipidemia    • Liver cancer    • Middle cerebral artery aneurysm      Past Surgical History   Procedure Laterality Date   • Craniotomy  2004, 2005     Cerebral aneurysm   • Laparoscopy for ectopic pregnancy  09/1997   • Sinus surgery     • Liver surgery       MASS REMOVED   • Colonoscopy       MAY 2016   • Pr insj tunneled cvc w/o subq port/ age 3 yr/> Right 8/26/2016     Procedure: MEDIPORT PLACEMENT ;  Surgeon: Praful Holden MD;  Location: [facility] OR;  Service: Vascular     Medications: The current medication list was reviewed in the EMR.    ALLERGIES:   No Known Allergies    SOCIAL HISTORY:       Social History   Substance Use Topics   • Smoking status: Never Smoker   • Smokeless tobacco: Never Used   • Alcohol use 3.0 oz/week     5 Glasses of wine per week      Comment: Social     FAMILY HISTORY:  Family History   Problem Relation Age of Onset   • Cerebral aneurysm Mother    • COPD Father        REVIEW OF  SYSTEMS:  PAIN: See VITAL SIGNS below.   GENERAL: No change in appetite or weight, no fevers, chills or sweats.   SKIN: No rashes or nonhealing lesions. Pale no jaundice  HEME/LYMPH: SIGNIFICANT anemia,NO easy bruising, bleeding or swollen nodes.   EYES: No vision changes or diplopia.   ENT: No tinnitus, hearing loss, gum bleeding, epistaxis, hoarseness or dysphagia.   RESPIRATORY: NO cough, IMPROVED shortness of breath,NO hemoptysis NO wheezing.   CVS: No chest pain,NO  palpitations, orthopnea,AND dyspnea on exertion, NO PND.   GI: POSTSURGICAL abdominal pain, NO nausea, vomiting, constipation, diarrhea, melena or hematochezia.   : No dysuria or hematuria. No abnormal vaginal discharge or bleeding.   MUSCULOSKELETAL: No bone pain or joint stiffness. IMPROVING EDEMA BOTH LE.  NEUROLOGICAL: No dizziness, global weakness, loss of consciousness or seizures.    PSYCHIATRIC: No increased nervousness, mood changes or depression.     Objective    There were no vitals filed for this visit.  Current Status 1/25/2017   ECOG score 0      PHYSICAL EXAM:    GENERAL:  Well-developed, well-nourished in no acute distress.   SKIN:  Warm, dry without rashes, purpura or petechiae.  HEAD:  Normocephalic.  EYES:  Pupils equal, round and reactive to light.  EOMs intact.  Conjunctivae normal.  EARS:  Hearing intact.  NOSE:  Septum midline.  No excoriations or nasal discharge.  MOUTH:  Tongue is well-papillated; no stomatitis or ulcers.  Buccal dryness noted. Lips normal.  THROAT:  Oropharynx without lesions or exudates.  NECK:  Supple with good range of motion; no thyromegaly or masses, no JVD.  LYMPHATICS:  No cervical, supraclavicular, axillary or inguinal adenopathy.  CHEST:  Lungs DECREASED BASAL SOUNDS, BETTER SOUND LEFT HEMITHORAX BASE , MINIMAL RESIDUAL EFFUSION ON R, to auscultation. , NO CRAKLES OR RUBS,Good airflow.  Benign Mediport present in the right chest. Two tiny incisions on the right intercostal space without sutures,  no erythema or edema.  CARDIAC:  NORMAL rate and rhythm without murmurs, rubs or gallops. Normal S1,S2.  ABDOMEN:  Soft, nontender with no organomegaly or masses. Bowel sounds present. SURGICAL SITE WELL HEALED  EXTREMITIES:  No clubbing, cyanosis NO edema. NO TENDERNESS OR PALPABLE CORDS OR ERYTHEMA  NEUROLOGICAL:  Cranial Nerves II-XII grossly intact.  No focal neurological deficits.  PSYCHIATRIC:  Normal affect and mood.        RECENT LABS:  Lab Results   Component Value Date    WBC 6.38 02/06/2017    HGB 10.6 (L) 02/06/2017    HCT 34.6 02/06/2017    MCV 88.3 02/06/2017     02/06/2017     Lab Results   Component Value Date    GLUCOSE 97 02/06/2017    BUN 9 02/06/2017    CREATININE 0.50 (L) 02/06/2017    EGFRIFNONA 113 02/06/2017    EGFRIFAFRI  09/15/2016      Comment:      <15 Indicative of kidney failure.    BCR 16.1 02/06/2017    CO2 22.6 02/06/2017    CALCIUM 9.6 02/06/2017    ALBUMIN 3.90 02/06/2017    LABIL2 1.1 02/06/2017    AST 25 02/06/2017    ALT 13 02/06/2017                     Component      Latest Ref Rng 9/29/2016 11/3/2016 12/1/2016 1/9/2017 1/25/2017   CEA      ng/mL 157.60 67.46 32.11 6.94 6.43     Component      Latest Ref Rng 2/6/2017   CEA      ng/mL 7.02     CHEST, ABDOMEN, AND PELVIS WITH IV CONTRAST      HISTORY: 51-year-old female with metastatic rectal cancer.      TECHNIQUE: 3 mm images were obtained through the chest, abdomen, and  pelvis after the administration of IV contrast. Compared with compared  with previous PET/CT from 07/28/2016 as well as an outside facility CT  of the abdomen and pelvis from 07/21/2016.      FINDINGS:      CHEST: There is a new moderate-sized right pleural effusion which layers  posteriorly to the lung apex. There is compressive atelectatic change at  the right lower lobe. There are no suspicious pulmonary opacities or  nodules. There is no lymphadenopathy within the chest. Right Mediport  catheter tip is within the SVC.      ABDOMEN/PELVIS: There are  postsurgical changes following right  hepatectomy. There is an approximately 7 x 5 cm seroma or postoperative  fluid at the surgical bed and there is a tiny amount of perihepatic  fluid as well. There has been an interval cholecystectomy as well. There  is no biliary dilatation. Spleen, pancreas, adrenals, and kidneys appear  unremarkable. The primary malignancy at the rectosigmoid colon appears  less masslike, but is difficult to characterize as the rectosigmoid  colon is collapsed, image 119. No new bowel abnormality is seen. Uterus  and right adnexa appear unremarkable. There is a 1.8 cm left ovarian  cyst, unchanged. There is no free fluid or lymphadenopathy. There are  mild abdominal aortic atherosclerotic changes without aneurysmal  dilatation.      IMPRESSION:  1. There are postsurgical changes following right hepatectomy. The  approximately 7.5 cm fluid collection at the surgical bed likely  represents a seroma or resolving fluid collection. In addition to a tiny  amount of perihepatic fluid, there is a moderate-sized right pleural  effusion.  2. The primary malignancy at the rectosigmoid colon appears less  masslike, but is difficult to characterize as described above.  3. Stable 1.8 cm left ovarian cyst.      This report was finalized on 2/8/2017 11:49 AM by Dr. Lana Bautista MD.        Assessment/Plan      1. Metastatic rectal cancer to the liver,  KRAS negative, BRAF negative, MSI stable.  Treatment plan decided upon after discussion with attending physician at the [facility] in [place], [place], Dr. Cannon phone number [phone].  She did undergo laparoscopic resection of 2 liver metastases in the left lobe 8/19/2016.  Eventual plans for right hepatectomy according to [facility] along with removal of the primary tumor in the rectum.  Initiation of chemotherapy with FOLFOX-6 9/1/2016.  Right portal vein embolization at [facility] 11/14/2016.    I got the opportunity to review all the 60 pages  information from [facility]. Most importantly the pathological analysis of the liver obtained by us from 12/29/2016 documents liver, segment II, excision: Dense fibrosis associated with aggregates of macrophages, hyalinization and focal granulomatous inflammation with fibrinoid necrosis; negative for adenocarcinoma. Liver, right lobe, hepatectomy: Metastatic colonic adenocarcinoma forming a 2.2 cm mass with approximately 60% necrosis, status post neoadjuvant therapy. Extensive embolic material is present within vessels, associated with marked degree of granuloma formation and surgical resection margins are negative for tumor. Background nonneoplastic liver demonstrates patchy portal-based inflammation, embolic material with histiocytic and giant cell reaction. No evidence of primary chronic liver disease. Gallbladder cholecystectomy with no diagnostic abnormalities.      I discussed with the patient and  today the fact that her CT scan still documents residual right pleural effusion. There is no pulmonary metastasis. There is further regeneration of the liver. There is an area of fluid collection in the liver that measures close to 8 cm in size that has no implications. There is no ascites. The rectosigmoid tumor is not visible.     Her CEA level is 7.     Her albumin is 3.9. Her alkaline phosphatase is 120, not surprising with the fluid collection in the liver area. Her bilirubin is normal. Her liver enzymes are normal.     RECOMMENDATIONS:   1. The patient has been advised to proceed with further chemotherapy with FOLFOX for another 2 cycles, in other words 4 more treatments. The dose of oxaliplatin will be decreased to 110 given the fact that she has now developed peripheral neuropathy grade 1 in her feet. The dose of 5-FU and leucovorin will remain the same.   2. The patient will proceed with an ultrasound-guided thoracentesis on the right. Fluid will be sent for Gram stain culture as well as protein and  LDH.     I doubt that this fluid has any implications as per surgical change. Hopefully once that this is gone her breathing will improve and she will have less difficulty with her walking. Also, I do not want the fluid to become a reservoir for chemotherapy medicines neither.     Therefore, she will be ready to proceed. She will return in 1, 3 and 5 weeks for chemotherapy and we will see her back in 5 weeks.     The patient will have a chemotherapy treatment, a CBC and a CMP and she will have a repeated CEA level with the 3rd cycle of treatment.     In regard to her rectosigmoid tumor, I think eventually she will need to have a visualization of this in order to decide how we are going to proceed in the long run in this regard in regard to consideration of resection through laparoscopy.                           Zheng Johnson MD  11/17/2016

## 2017-02-13 ENCOUNTER — APPOINTMENT (OUTPATIENT)
Dept: ONCOLOGY | Facility: CLINIC | Age: 52
End: 2017-02-13

## 2017-02-13 ENCOUNTER — APPOINTMENT (OUTPATIENT)
Dept: LAB | Facility: HOSPITAL | Age: 52
End: 2017-02-13

## 2017-02-14 ENCOUNTER — HOSPITAL ENCOUNTER (OUTPATIENT)
Dept: ULTRASOUND IMAGING | Facility: HOSPITAL | Age: 52
Discharge: HOME | End: 2017-02-14
Attending: INTERNAL MEDICINE | Admitting: INTERNAL MEDICINE

## 2017-02-14 VITALS
HEART RATE: 82 BPM | TEMPERATURE: 98.1 F | BODY MASS INDEX: 18.33 KG/M2 | OXYGEN SATURATION: 100 % | HEIGHT: 65 IN | DIASTOLIC BLOOD PRESSURE: 68 MMHG | SYSTOLIC BLOOD PRESSURE: 113 MMHG | WEIGHT: 110 LBS | RESPIRATION RATE: 18 BRPM

## 2017-02-14 DIAGNOSIS — J90 PLEURAL EFFUSION, BILATERAL: ICD-10-CM

## 2017-02-14 DIAGNOSIS — C18.7 MALIGNANT NEOPLASM OF SIGMOID COLON (HCC): ICD-10-CM

## 2017-02-14 DIAGNOSIS — C78.7 METASTATIC CANCER TO LIVER (HCC): ICD-10-CM

## 2017-02-14 LAB
INR PPP: 1 (ref 0.8–1.2)
PROTHROMBIN TIME: 11.9 SECONDS (ref 12.8–15.2)

## 2017-02-14 PROCEDURE — 76604 US EXAM CHEST: CPT

## 2017-02-16 ENCOUNTER — INFUSION (OUTPATIENT)
Dept: ONCOLOGY | Facility: HOSPITAL | Age: 52
End: 2017-02-16

## 2017-02-16 VITALS
TEMPERATURE: 98.4 F | HEART RATE: 88 BPM | WEIGHT: 112.4 LBS | SYSTOLIC BLOOD PRESSURE: 106 MMHG | DIASTOLIC BLOOD PRESSURE: 68 MMHG | BODY MASS INDEX: 18.7 KG/M2

## 2017-02-16 DIAGNOSIS — C78.7 METASTATIC CANCER TO LIVER (HCC): ICD-10-CM

## 2017-02-16 DIAGNOSIS — C20 RECTAL CANCER METASTASIZED TO LIVER (HCC): ICD-10-CM

## 2017-02-16 DIAGNOSIS — C78.7 RECTAL CANCER METASTASIZED TO LIVER (HCC): ICD-10-CM

## 2017-02-16 DIAGNOSIS — C78.7 RECTAL CANCER METASTASIZED TO LIVER (HCC): Primary | ICD-10-CM

## 2017-02-16 DIAGNOSIS — C20 RECTAL CANCER METASTASIZED TO LIVER (HCC): Primary | ICD-10-CM

## 2017-02-16 LAB
ALBUMIN SERPL-MCNC: 3.9 G/DL (ref 3.5–5.2)
ALBUMIN/GLOB SERPL: 1.2 G/DL (ref 1.1–2.4)
ALP SERPL-CCNC: 98 U/L (ref 38–116)
ALT SERPL W P-5'-P-CCNC: 13 U/L (ref 0–33)
ANION GAP SERPL CALCULATED.3IONS-SCNC: 10.8 MMOL/L
AST SERPL-CCNC: 24 U/L (ref 0–32)
BASOPHILS # BLD AUTO: 0.03 10*3/MM3 (ref 0–0.1)
BASOPHILS NFR BLD AUTO: 0.6 % (ref 0–1.1)
BILIRUB SERPL-MCNC: 0.2 MG/DL (ref 0.1–1.2)
BUN BLD-MCNC: 10 MG/DL (ref 6–20)
BUN/CREAT SERPL: 17.2 (ref 7.3–30)
CALCIUM SPEC-SCNC: 9.5 MG/DL (ref 8.5–10.2)
CHLORIDE SERPL-SCNC: 103 MMOL/L (ref 98–107)
CO2 SERPL-SCNC: 26.2 MMOL/L (ref 22–29)
CREAT BLD-MCNC: 0.58 MG/DL (ref 0.6–1.1)
DEPRECATED RDW RBC AUTO: 47.5 FL (ref 37–49)
EOSINOPHIL # BLD AUTO: 0.09 10*3/MM3 (ref 0–0.36)
EOSINOPHIL NFR BLD AUTO: 1.9 % (ref 1–5)
ERYTHROCYTE [DISTWIDTH] IN BLOOD BY AUTOMATED COUNT: 14.9 % (ref 11.7–14.5)
GFR SERPL CREATININE-BSD FRML MDRD: 109 ML/MIN/1.73
GLOBULIN UR ELPH-MCNC: 3.2 GM/DL (ref 1.8–3.5)
GLUCOSE BLD-MCNC: 119 MG/DL (ref 74–124)
HCT VFR BLD AUTO: 33 % (ref 34–45)
HGB BLD-MCNC: 10.2 G/DL (ref 11.5–14.9)
IMM GRANULOCYTES # BLD: 0.01 10*3/MM3 (ref 0–0.03)
IMM GRANULOCYTES NFR BLD: 0.2 % (ref 0–0.5)
LYMPHOCYTES # BLD AUTO: 1.36 10*3/MM3 (ref 1–3.5)
LYMPHOCYTES NFR BLD AUTO: 28.6 % (ref 20–49)
MCH RBC QN AUTO: 26.8 PG (ref 27–33)
MCHC RBC AUTO-ENTMCNC: 30.9 G/DL (ref 32–35)
MCV RBC AUTO: 86.6 FL (ref 83–97)
MONOCYTES # BLD AUTO: 0.51 10*3/MM3 (ref 0.25–0.8)
MONOCYTES NFR BLD AUTO: 10.7 % (ref 4–12)
NEUTROPHILS # BLD AUTO: 2.75 10*3/MM3 (ref 1.5–7)
NEUTROPHILS NFR BLD AUTO: 58 % (ref 39–75)
NRBC BLD MANUAL-RTO: 0 /100 WBC (ref 0–0)
PLATELET # BLD AUTO: 181 10*3/MM3 (ref 150–375)
PMV BLD AUTO: 10.2 FL (ref 8.9–12.1)
POTASSIUM BLD-SCNC: 4 MMOL/L (ref 3.5–4.7)
PROT SERPL-MCNC: 7.1 G/DL (ref 6.3–8)
RBC # BLD AUTO: 3.81 10*6/MM3 (ref 3.9–5)
SODIUM BLD-SCNC: 140 MMOL/L (ref 134–145)
WBC NRBC COR # BLD: 4.75 10*3/MM3 (ref 4–10)

## 2017-02-16 PROCEDURE — 25010000002 FLUOROURACIL PER 500 MG: Performed by: INTERNAL MEDICINE

## 2017-02-16 PROCEDURE — 80053 COMPREHEN METABOLIC PANEL: CPT | Performed by: INTERNAL MEDICINE

## 2017-02-16 PROCEDURE — 25010000002 PALONOSETRON PER 25 MCG: Performed by: INTERNAL MEDICINE

## 2017-02-16 PROCEDURE — 25010000002 DEXAMETHASONE SODIUM PHOSPHATE 10 MG/ML SOLUTION 1 ML VIAL: Performed by: INTERNAL MEDICINE

## 2017-02-16 PROCEDURE — 25010000002 LEUCOVORIN CALCIUM PER 50 MG: Performed by: INTERNAL MEDICINE

## 2017-02-16 PROCEDURE — 96366 THER/PROPH/DIAG IV INF ADDON: CPT | Performed by: INTERNAL MEDICINE

## 2017-02-16 PROCEDURE — 25010000002 OXALIPLATIN PER 0.5 MG: Performed by: INTERNAL MEDICINE

## 2017-02-16 PROCEDURE — 85025 COMPLETE CBC W/AUTO DIFF WBC: CPT | Performed by: INTERNAL MEDICINE

## 2017-02-16 PROCEDURE — 96375 TX/PRO/DX INJ NEW DRUG ADDON: CPT | Performed by: INTERNAL MEDICINE

## 2017-02-16 PROCEDURE — 96416 CHEMO PROLONG INFUSE W/PUMP: CPT | Performed by: INTERNAL MEDICINE

## 2017-02-16 PROCEDURE — 96411 CHEMO IV PUSH ADDL DRUG: CPT | Performed by: INTERNAL MEDICINE

## 2017-02-16 PROCEDURE — 96415 CHEMO IV INFUSION ADDL HR: CPT | Performed by: INTERNAL MEDICINE

## 2017-02-16 PROCEDURE — 96368 THER/DIAG CONCURRENT INF: CPT | Performed by: INTERNAL MEDICINE

## 2017-02-16 PROCEDURE — 96413 CHEMO IV INFUSION 1 HR: CPT | Performed by: INTERNAL MEDICINE

## 2017-02-16 RX ORDER — PALONOSETRON 0.05 MG/ML
0.25 INJECTION, SOLUTION INTRAVENOUS ONCE
Status: COMPLETED | OUTPATIENT
Start: 2017-02-16 | End: 2017-02-16

## 2017-02-16 RX ORDER — DEXTROSE MONOHYDRATE 50 MG/ML
250 INJECTION, SOLUTION INTRAVENOUS ONCE
Status: COMPLETED | OUTPATIENT
Start: 2017-02-16 | End: 2017-02-16

## 2017-02-16 RX ORDER — FLUOROURACIL 50 MG/ML
400 INJECTION, SOLUTION INTRAVENOUS ONCE
Status: COMPLETED | OUTPATIENT
Start: 2017-02-16 | End: 2017-02-16

## 2017-02-16 RX ADMIN — LEUCOVORIN CALCIUM 630 MG: 350 INJECTION, POWDER, LYOPHILIZED, FOR SOLUTION INTRAMUSCULAR; INTRAVENOUS at 15:32

## 2017-02-16 RX ADMIN — OXALIPLATIN 110 MG: 5 INJECTION, SOLUTION, CONCENTRATE INTRAVENOUS at 15:32

## 2017-02-16 RX ADMIN — FLUOROURACIL 3790 MG: 50 INJECTION, SOLUTION INTRAVENOUS at 17:35

## 2017-02-16 RX ADMIN — PALONOSETRON HYDROCHLORIDE 0.25 MG: 0.25 INJECTION INTRAVENOUS at 15:11

## 2017-02-16 RX ADMIN — FLUOROURACIL 630 MG: 50 INJECTION, SOLUTION INTRAVENOUS at 17:34

## 2017-02-16 RX ADMIN — DEXTROSE MONOHYDRATE 250 ML: 5 INJECTION, SOLUTION INTRAVENOUS at 15:11

## 2017-02-16 RX ADMIN — DEXAMETHASONE SODIUM PHOSPHATE 12 MG: 10 INJECTION, SOLUTION INTRAMUSCULAR; INTRAVENOUS at 15:11

## 2017-02-17 ENCOUNTER — TELEPHONE (OUTPATIENT)
Dept: ONCOLOGY | Facility: CLINIC | Age: 52
End: 2017-02-17

## 2017-02-17 NOTE — TELEPHONE ENCOUNTER
----- Message from Sowmya Saunders RN sent at 2/17/2017  3:13 PM EST -----  Patient was here late yesterday, she needs a 3pm unhook on [address] for Saturday 2/18/17.

## 2017-02-18 ENCOUNTER — INFUSION (OUTPATIENT)
Dept: ONCOLOGY | Facility: HOSPITAL | Age: 52
End: 2017-02-18

## 2017-02-18 VITALS
RESPIRATION RATE: 16 BRPM | HEART RATE: 69 BPM | SYSTOLIC BLOOD PRESSURE: 95 MMHG | TEMPERATURE: 97.3 F | DIASTOLIC BLOOD PRESSURE: 57 MMHG

## 2017-02-18 DIAGNOSIS — C78.7 METASTATIC CANCER TO LIVER (HCC): ICD-10-CM

## 2017-02-18 DIAGNOSIS — C78.7 RECTAL CANCER METASTASIZED TO LIVER (HCC): ICD-10-CM

## 2017-02-18 DIAGNOSIS — C18.7 MALIGNANT NEOPLASM OF SIGMOID COLON (HCC): Primary | ICD-10-CM

## 2017-02-18 DIAGNOSIS — C20 RECTAL CANCER METASTASIZED TO LIVER (HCC): ICD-10-CM

## 2017-02-18 PROCEDURE — G0463 HOSPITAL OUTPT CLINIC VISIT: HCPCS

## 2017-02-18 PROCEDURE — 25010000002 HEPARIN FLUSH (PORCINE) 100 UNIT/ML SOLUTION: Performed by: INTERNAL MEDICINE

## 2017-02-18 RX ORDER — SODIUM CHLORIDE 0.9 % (FLUSH) 0.9 %
10 SYRINGE (ML) INJECTION AS NEEDED
Status: DISCONTINUED | OUTPATIENT
Start: 2017-02-18 | End: 2017-02-18 | Stop reason: HOSPADM

## 2017-02-18 RX ORDER — SODIUM CHLORIDE 0.9 % (FLUSH) 0.9 %
10 SYRINGE (ML) INJECTION AS NEEDED
Status: CANCELLED | OUTPATIENT
Start: 2017-02-18

## 2017-02-18 RX ADMIN — Medication 500 UNITS: at 15:26

## 2017-02-18 RX ADMIN — Medication 10 ML: at 15:26

## 2017-02-18 NOTE — PROGRESS NOTES
Patient arrived at outpatient treatment room on [address] to have 5fu home infusion unhooked.  Pt stated that she didn't think ball was empty.  All clamps unclamped,  It didn't look like much has been infused.  Dr Ernst examined ball.  Total volume 234ml.  Dose 3790mg . Infusion device 5ml/hr.  D/c'd 5fu flushed with saline, heparin. Good blood return obtained.  No swelling tenderness at port needle insertion site.  Will have pharmacy check volume in mago now under agu[place] and advise what that total is.  I will leave a message with Heidi for Monday and advised patient to call office on Monday also.

## 2017-02-19 NOTE — PROGRESS NOTES
2/19/17  Discussed with pharmacy and Dr. Ernst.  Will return 5-FU to office in the morning for evaluation.  Medication and tubing intact in chemotherapy bag in refrig.  Will communicate to charge RN for 2/20/17, the need for chemo to go to CBC office in the morning.

## 2017-02-20 ENCOUNTER — INFUSION (OUTPATIENT)
Dept: ONCOLOGY | Facility: HOSPITAL | Age: 52
End: 2017-02-20

## 2017-02-20 ENCOUNTER — DOCUMENTATION (OUTPATIENT)
Dept: ONCOLOGY | Facility: CLINIC | Age: 52
End: 2017-02-20

## 2017-02-20 ENCOUNTER — TELEPHONE (OUTPATIENT)
Dept: ONCOLOGY | Facility: HOSPITAL | Age: 52
End: 2017-02-20

## 2017-02-20 VITALS
DIASTOLIC BLOOD PRESSURE: 73 MMHG | SYSTOLIC BLOOD PRESSURE: 134 MMHG | WEIGHT: 112 LBS | BODY MASS INDEX: 18.64 KG/M2 | HEART RATE: 77 BPM | TEMPERATURE: 98.2 F

## 2017-02-20 DIAGNOSIS — C78.7 METASTATIC CANCER TO LIVER (HCC): ICD-10-CM

## 2017-02-20 DIAGNOSIS — C20 RECTAL CANCER METASTASIZED TO LIVER (HCC): Primary | ICD-10-CM

## 2017-02-20 DIAGNOSIS — C78.7 RECTAL CANCER METASTASIZED TO LIVER (HCC): Primary | ICD-10-CM

## 2017-02-20 PROCEDURE — 25010000002 FLUOROURACIL PER 500 MG: Performed by: NURSE PRACTITIONER

## 2017-02-20 RX ADMIN — FLUOROURACIL 3693 MG: 50 INJECTION, SOLUTION INTRAVENOUS at 16:37

## 2017-02-20 NOTE — TELEPHONE ENCOUNTER
----- Message from Deedee Walden sent at 2/20/2017 10:38 AM EST -----  Contact: [phone]   Calling about infusion she had some questions .having issues since.    Pt states she was here for chemo on 2/16. She was scheduled to be unhooked on 2/18. When she presented to T the chemo ball was still full. She is asking what she needs to do from here. Called [facility] pharmacy, spoke with Leonor (pharmacist). Informed Dr. Johnson, he states that pt needs to complete the dose. Leonor states she will walk the chemo over to our office. Informed Samaria (pharmacist) to let me know once she knows how much medication was left in there.     Samaria in pharmacy states the pt only infused 97.2mg. Dr. Johnson wants pt to get remainder of the dose (3692.8mg). Pt called and will come in today to get hooked back up. Message sent to scheduling to make wilmar Gordon notified to place order.

## 2017-02-20 NOTE — PROGRESS NOTES
Per telephone message from ESTRELLITA Qiu RN at [address] [place] Patient arrived at outpatient treatment room on [address] to have 5fu home infusion unhooked. Pt stated that she didn't think ball was empty. All clamps unclamped, It didn't look like much has been infused. Dr Ernst examined ball. Total volume 234ml. Dose 3790mg . Infusion device 5ml/hr. D/c'd 5fu flushed with saline, heparin. Good blood return obtained. No swelling tenderness at port needle insertion site. Per THIAGO Sullivan RN Dr Montes order patient to return to [facility] today and receive 5FU home health infusion amount that was not received. Per pharmacy the amount is 3692.8 mg. Spoke with Jana pharmacist and the amount was rounded to 3693 mg. Orders placed under Cycle 8 Day 5

## 2017-02-20 NOTE — PROGRESS NOTES
Pt presents for 5FU hookup  Port accessed without diff  exc blood return noted  Port flushes without diff  5FU elastameric ball hooked up  Both clamps unclamped  Pt instructed to call if any problems  Pt advised to return to office on Wed at 2:30   Understanding noted

## 2017-02-21 DIAGNOSIS — C78.7 METASTATIC CANCER TO LIVER (HCC): ICD-10-CM

## 2017-02-21 DIAGNOSIS — C20 RECTAL CANCER METASTASIZED TO LIVER (HCC): ICD-10-CM

## 2017-02-21 DIAGNOSIS — C78.7 RECTAL CANCER METASTASIZED TO LIVER (HCC): ICD-10-CM

## 2017-02-21 RX ORDER — PALONOSETRON 0.05 MG/ML
0.25 INJECTION, SOLUTION INTRAVENOUS ONCE
Status: CANCELLED | OUTPATIENT
Start: 2017-03-09

## 2017-02-21 RX ORDER — DEXTROSE MONOHYDRATE 50 MG/ML
250 INJECTION, SOLUTION INTRAVENOUS ONCE
Status: CANCELLED | OUTPATIENT
Start: 2017-03-09

## 2017-02-21 RX ORDER — FLUOROURACIL 50 MG/ML
400 INJECTION, SOLUTION INTRAVENOUS ONCE
Status: CANCELLED | OUTPATIENT
Start: 2017-03-09

## 2017-02-22 ENCOUNTER — INFUSION (OUTPATIENT)
Dept: ONCOLOGY | Facility: HOSPITAL | Age: 52
End: 2017-02-22

## 2017-02-22 ENCOUNTER — DOCUMENTATION (OUTPATIENT)
Dept: ONCOLOGY | Facility: CLINIC | Age: 52
End: 2017-02-22

## 2017-02-22 ENCOUNTER — APPOINTMENT (OUTPATIENT)
Dept: ONCOLOGY | Facility: HOSPITAL | Age: 52
End: 2017-02-22

## 2017-02-22 DIAGNOSIS — C18.7 MALIGNANT NEOPLASM OF SIGMOID COLON (HCC): Primary | ICD-10-CM

## 2017-02-22 DIAGNOSIS — C20 RECTAL CANCER METASTASIZED TO LIVER (HCC): ICD-10-CM

## 2017-02-22 DIAGNOSIS — C78.7 METASTATIC CANCER TO LIVER (HCC): ICD-10-CM

## 2017-02-22 DIAGNOSIS — C78.7 RECTAL CANCER METASTASIZED TO LIVER (HCC): ICD-10-CM

## 2017-02-22 LAB
BASOPHILS # BLD AUTO: 0.02 10*3/MM3 (ref 0–0.1)
BASOPHILS NFR BLD AUTO: 0.6 % (ref 0–1.1)
DEPRECATED RDW RBC AUTO: 46.1 FL (ref 37–49)
EOSINOPHIL # BLD AUTO: 0.14 10*3/MM3 (ref 0–0.36)
EOSINOPHIL NFR BLD AUTO: 3.9 % (ref 1–5)
ERYTHROCYTE [DISTWIDTH] IN BLOOD BY AUTOMATED COUNT: 14.9 % (ref 11.7–14.5)
HCT VFR BLD AUTO: 32.9 % (ref 34–45)
HGB BLD-MCNC: 10.3 G/DL (ref 11.5–14.9)
IMM GRANULOCYTES # BLD: 0.01 10*3/MM3 (ref 0–0.03)
IMM GRANULOCYTES NFR BLD: 0.3 % (ref 0–0.5)
LYMPHOCYTES # BLD AUTO: 1.01 10*3/MM3 (ref 1–3.5)
LYMPHOCYTES NFR BLD AUTO: 28 % (ref 20–49)
MCH RBC QN AUTO: 26.8 PG (ref 27–33)
MCHC RBC AUTO-ENTMCNC: 31.3 G/DL (ref 32–35)
MCV RBC AUTO: 85.7 FL (ref 83–97)
MONOCYTES # BLD AUTO: 0.28 10*3/MM3 (ref 0.25–0.8)
MONOCYTES NFR BLD AUTO: 7.8 % (ref 4–12)
NEUTROPHILS # BLD AUTO: 2.15 10*3/MM3 (ref 1.5–7)
NEUTROPHILS NFR BLD AUTO: 59.4 % (ref 39–75)
NRBC BLD MANUAL-RTO: 0 /100 WBC (ref 0–0)
PLATELET # BLD AUTO: 138 10*3/MM3 (ref 150–375)
PMV BLD AUTO: 10.6 FL (ref 8.9–12.1)
RBC # BLD AUTO: 3.84 10*6/MM3 (ref 3.9–5)
WBC NRBC COR # BLD: 3.61 10*3/MM3 (ref 4–10)

## 2017-02-22 PROCEDURE — 25010000002 HEPARIN FLUSH (PORCINE) 100 UNIT/ML SOLUTION: Performed by: INTERNAL MEDICINE

## 2017-02-22 PROCEDURE — 36415 COLL VENOUS BLD VENIPUNCTURE: CPT

## 2017-02-22 PROCEDURE — 96523 IRRIG DRUG DELIVERY DEVICE: CPT

## 2017-02-22 PROCEDURE — 85025 COMPLETE CBC W/AUTO DIFF WBC: CPT

## 2017-02-22 RX ORDER — SODIUM CHLORIDE 0.9 % (FLUSH) 0.9 %
10 SYRINGE (ML) INJECTION AS NEEDED
Status: CANCELLED | OUTPATIENT
Start: 2017-02-22

## 2017-02-22 RX ADMIN — SODIUM CHLORIDE, PRESERVATIVE FREE 500 UNITS: 5 INJECTION INTRAVENOUS at 13:30

## 2017-02-22 NOTE — PROGRESS NOTES
Patient here for cbc and 5fu disconnect.  Infusion of 5fu complete. Patient voices no c/o at this time.  Cbc drawn via port and taken to lab.

## 2017-02-22 NOTE — PROGRESS NOTES
Per Dr Johnson due to a problem with patient's 5 FU from last week patient will not receive chemotherapy next week on 3/2/17 but will follow up with Dr Johnson and treatment on 3/15/17. Message sent to appointment desk to cancel appointment for 3/2/17.

## 2017-02-27 ENCOUNTER — TELEPHONE (OUTPATIENT)
Dept: ONCOLOGY | Facility: HOSPITAL | Age: 52
End: 2017-02-27

## 2017-02-27 NOTE — TELEPHONE ENCOUNTER
Pt calling because she has a knot by her incision site. She noticed it 3-4 days ago. It is hard to the touch. She denies redness or pain to the site. Reviewed this with Dr. Johnson. Per Dr. Johnson, pt can come in this week to have it looked at by NP or we can look at it when she comes in on 3/9 to see Amparo Fritz NP. Informed pt and she said she will just wait to see Amparo. Advised her to call if her symptoms worsen. She v/u.

## 2017-02-27 NOTE — TELEPHONE ENCOUNTER
----- Message from Jennifer Clark sent at 2/27/2017  2:07 PM EST -----   Pt has a knot where she had her surgery.            [phone]

## 2017-03-02 ENCOUNTER — APPOINTMENT (OUTPATIENT)
Dept: ONCOLOGY | Facility: HOSPITAL | Age: 52
End: 2017-03-02

## 2017-03-06 PROBLEM — Z45.2 FITTING AND ADJUSTMENT OF VASCULAR CATHETER: Status: ACTIVE | Noted: 2017-03-06

## 2017-03-09 ENCOUNTER — INFUSION (OUTPATIENT)
Dept: ONCOLOGY | Facility: HOSPITAL | Age: 52
End: 2017-03-09

## 2017-03-09 ENCOUNTER — OFFICE VISIT (OUTPATIENT)
Dept: ONCOLOGY | Facility: CLINIC | Age: 52
End: 2017-03-09

## 2017-03-09 VITALS
WEIGHT: 111.2 LBS | DIASTOLIC BLOOD PRESSURE: 64 MMHG | HEART RATE: 101 BPM | HEIGHT: 64 IN | OXYGEN SATURATION: 97 % | SYSTOLIC BLOOD PRESSURE: 122 MMHG | BODY MASS INDEX: 18.98 KG/M2 | RESPIRATION RATE: 14 BRPM | TEMPERATURE: 98.1 F

## 2017-03-09 DIAGNOSIS — C20 RECTAL CANCER METASTASIZED TO LIVER (HCC): Primary | ICD-10-CM

## 2017-03-09 DIAGNOSIS — C78.7 METASTATIC CANCER TO LIVER (HCC): ICD-10-CM

## 2017-03-09 DIAGNOSIS — C18.7 MALIGNANT NEOPLASM OF SIGMOID COLON (HCC): Primary | ICD-10-CM

## 2017-03-09 DIAGNOSIS — C20 RECTAL CANCER METASTASIZED TO LIVER (HCC): ICD-10-CM

## 2017-03-09 DIAGNOSIS — C78.7 RECTAL CANCER METASTASIZED TO LIVER (HCC): ICD-10-CM

## 2017-03-09 DIAGNOSIS — C78.7 RECTAL CANCER METASTASIZED TO LIVER (HCC): Primary | ICD-10-CM

## 2017-03-09 DIAGNOSIS — C18.7 MALIGNANT NEOPLASM OF SIGMOID COLON (HCC): ICD-10-CM

## 2017-03-09 LAB
ALBUMIN SERPL-MCNC: 3.9 G/DL (ref 3.5–5.2)
ALBUMIN/GLOB SERPL: 1.3 G/DL (ref 1.1–2.4)
ALP SERPL-CCNC: 92 U/L (ref 38–116)
ALT SERPL W P-5'-P-CCNC: 16 U/L (ref 0–33)
ANION GAP SERPL CALCULATED.3IONS-SCNC: 10.8 MMOL/L
AST SERPL-CCNC: 28 U/L (ref 0–32)
BASOPHILS # BLD AUTO: 0.06 10*3/MM3 (ref 0–0.1)
BASOPHILS NFR BLD AUTO: 1.4 % (ref 0–1.1)
BILIRUB SERPL-MCNC: 0.3 MG/DL (ref 0.1–1.2)
BUN BLD-MCNC: 10 MG/DL (ref 6–20)
BUN/CREAT SERPL: 16.7 (ref 7.3–30)
CALCIUM SPEC-SCNC: 9.4 MG/DL (ref 8.5–10.2)
CEA SERPL-MCNC: 13.23 NG/ML
CHLORIDE SERPL-SCNC: 102 MMOL/L (ref 98–107)
CO2 SERPL-SCNC: 24.2 MMOL/L (ref 22–29)
CREAT BLD-MCNC: 0.6 MG/DL (ref 0.6–1.1)
DEPRECATED RDW RBC AUTO: 47.4 FL (ref 37–49)
EOSINOPHIL # BLD AUTO: 0.14 10*3/MM3 (ref 0–0.36)
EOSINOPHIL NFR BLD AUTO: 3.2 % (ref 1–5)
ERYTHROCYTE [DISTWIDTH] IN BLOOD BY AUTOMATED COUNT: 15.8 % (ref 11.7–14.5)
GFR SERPL CREATININE-BSD FRML MDRD: 105 ML/MIN/1.73
GLOBULIN UR ELPH-MCNC: 3 GM/DL (ref 1.8–3.5)
GLUCOSE BLD-MCNC: 156 MG/DL (ref 74–124)
HCT VFR BLD AUTO: 33.9 % (ref 34–45)
HGB BLD-MCNC: 10.7 G/DL (ref 11.5–14.9)
IMM GRANULOCYTES # BLD: 0.01 10*3/MM3 (ref 0–0.03)
IMM GRANULOCYTES NFR BLD: 0.2 % (ref 0–0.5)
LYMPHOCYTES # BLD AUTO: 1.61 10*3/MM3 (ref 1–3.5)
LYMPHOCYTES NFR BLD AUTO: 36.8 % (ref 20–49)
MCH RBC QN AUTO: 26.5 PG (ref 27–33)
MCHC RBC AUTO-ENTMCNC: 31.6 G/DL (ref 32–35)
MCV RBC AUTO: 83.9 FL (ref 83–97)
MONOCYTES # BLD AUTO: 0.47 10*3/MM3 (ref 0.25–0.8)
MONOCYTES NFR BLD AUTO: 10.8 % (ref 4–12)
NEUTROPHILS # BLD AUTO: 2.08 10*3/MM3 (ref 1.5–7)
NEUTROPHILS NFR BLD AUTO: 47.6 % (ref 39–75)
NRBC BLD MANUAL-RTO: 0 /100 WBC (ref 0–0)
PLATELET # BLD AUTO: 181 10*3/MM3 (ref 150–375)
PMV BLD AUTO: 9.8 FL (ref 8.9–12.1)
POTASSIUM BLD-SCNC: 4 MMOL/L (ref 3.5–4.7)
PROT SERPL-MCNC: 6.9 G/DL (ref 6.3–8)
RBC # BLD AUTO: 4.04 10*6/MM3 (ref 3.9–5)
SODIUM BLD-SCNC: 137 MMOL/L (ref 134–145)
WBC NRBC COR # BLD: 4.37 10*3/MM3 (ref 4–10)

## 2017-03-09 PROCEDURE — 80053 COMPREHEN METABOLIC PANEL: CPT | Performed by: INTERNAL MEDICINE

## 2017-03-09 PROCEDURE — 99212-NC PR NO CHARGE CBC OFFICE OUTPATIENT VISIT 10 MINUTES: Performed by: NURSE PRACTITIONER

## 2017-03-09 PROCEDURE — 36415 COLL VENOUS BLD VENIPUNCTURE: CPT | Performed by: INTERNAL MEDICINE

## 2017-03-09 PROCEDURE — 96416 CHEMO PROLONG INFUSE W/PUMP: CPT | Performed by: NURSE PRACTITIONER

## 2017-03-09 PROCEDURE — 25010000002 FLUOROURACIL PER 500 MG: Performed by: INTERNAL MEDICINE

## 2017-03-09 PROCEDURE — 96411 CHEMO IV PUSH ADDL DRUG: CPT | Performed by: NURSE PRACTITIONER

## 2017-03-09 PROCEDURE — 96368 THER/DIAG CONCURRENT INF: CPT | Performed by: NURSE PRACTITIONER

## 2017-03-09 PROCEDURE — 96375 TX/PRO/DX INJ NEW DRUG ADDON: CPT | Performed by: NURSE PRACTITIONER

## 2017-03-09 PROCEDURE — 25010000002 LEUCOVORIN CALCIUM PER 50 MG: Performed by: INTERNAL MEDICINE

## 2017-03-09 PROCEDURE — 82378 CARCINOEMBRYONIC ANTIGEN: CPT | Performed by: INTERNAL MEDICINE

## 2017-03-09 PROCEDURE — 96413 CHEMO IV INFUSION 1 HR: CPT | Performed by: NURSE PRACTITIONER

## 2017-03-09 PROCEDURE — 25010000002 OXALIPLATIN PER 0.5 MG: Performed by: INTERNAL MEDICINE

## 2017-03-09 PROCEDURE — 25010000002 DEXAMETHASONE PER 1 MG: Performed by: INTERNAL MEDICINE

## 2017-03-09 PROCEDURE — 85025 COMPLETE CBC W/AUTO DIFF WBC: CPT | Performed by: INTERNAL MEDICINE

## 2017-03-09 PROCEDURE — 25010000002 PALONOSETRON PER 25 MCG: Performed by: INTERNAL MEDICINE

## 2017-03-09 PROCEDURE — 96415 CHEMO IV INFUSION ADDL HR: CPT | Performed by: NURSE PRACTITIONER

## 2017-03-09 RX ORDER — DEXTROSE MONOHYDRATE 50 MG/ML
250 INJECTION, SOLUTION INTRAVENOUS ONCE
Status: COMPLETED | OUTPATIENT
Start: 2017-03-09 | End: 2017-03-09

## 2017-03-09 RX ORDER — FLUOROURACIL 50 MG/ML
400 INJECTION, SOLUTION INTRAVENOUS ONCE
Status: COMPLETED | OUTPATIENT
Start: 2017-03-09 | End: 2017-03-09

## 2017-03-09 RX ORDER — PALONOSETRON 0.05 MG/ML
0.25 INJECTION, SOLUTION INTRAVENOUS ONCE
Status: COMPLETED | OUTPATIENT
Start: 2017-03-09 | End: 2017-03-09

## 2017-03-09 RX ADMIN — FLUOROURACIL 3790 MG: 50 INJECTION, SOLUTION INTRAVENOUS at 16:20

## 2017-03-09 RX ADMIN — DEXTROSE MONOHYDRATE 250 ML: 50 INJECTION, SOLUTION INTRAVENOUS at 13:40

## 2017-03-09 RX ADMIN — LEUCOVORIN CALCIUM 630 MG: 350 INJECTION, POWDER, LYOPHILIZED, FOR SOLUTION INTRAMUSCULAR; INTRAVENOUS at 14:14

## 2017-03-09 RX ADMIN — DEXAMETHASONE SODIUM PHOSPHATE 12 MG: 10 INJECTION INTRAMUSCULAR; INTRAVENOUS at 13:47

## 2017-03-09 RX ADMIN — OXALIPLATIN 110 MG: 5 INJECTION, SOLUTION, CONCENTRATE INTRAVENOUS at 14:14

## 2017-03-09 RX ADMIN — PALONOSETRON HYDROCHLORIDE 0.25 MG: 0.25 INJECTION INTRAVENOUS at 13:45

## 2017-03-09 RX ADMIN — FLUOROURACIL 630 MG: 50 INJECTION, SOLUTION INTRAVENOUS at 16:13

## 2017-03-09 NOTE — PROGRESS NOTES
Subjective .     REASONS FOR FOLLOW UP:  Metastatic rectal cancer to the liver.  Initiated chemotherapy with FOLFOX-6 9/1/2016.     HISTORY OF PRESENT ILLNESS:  The patient is a 51 y.o. year old female with the above-mentioned history, who presents today due for cycle 9 of therapy.  She just recently resumed treatment after going to [facility] in [place] where she had extensive surgery to remove areas of metastasis in the right lobe of the liver and also a small resection in the left lobe of the liver.  She was hospitalized for 2 weeks and did well overall with the exception of a 20 pound weight gain related to fluid overload and related orthopnea and fatigue.  Thankfully she has recovered from all of this now.    Unfortunately with most recent cycle 8 her infusional ball malfunctioned, instilling very little if any of 5-FU drug.  This was discovered when she went to [facility] to be unhooked.  She then spoke with our office the following Monday and additional drug was dispensed via new infusional ball and therefore her treatment was delayed for this cycle to allow for potential drop in blood counts.    She returns today feeling well with no new concerns.  We reviewed her blood work which is appropriate for treatment.  She continues to be anemic but this is stable with Hemoglobin 10.7.  She did feel discomfort in her abdomen following most recent chemotherapy, almost like internal surgical areas were inflamed .  This has all calmed down now and she denies any pain presently.      Past Medical History   Diagnosis Date   • Anemia    • Colon cancer      NEW DIAGNOSIS   • Fatigue      secondary to chemotherapy    • H/O foreign travel 03/2016     [facility], [place]   • History of transfusion      1999 AFTER TUBAL RUPTURE   • Hyperlipidemia    • Liver cancer    • Middle cerebral artery aneurysm      Past Surgical History   Procedure Laterality Date   • Craniotomy  2004, 2005     Cerebral  "aneurysm   • Laparoscopy for ectopic pregnancy  09/1997   • Sinus surgery     • Liver surgery       MASS REMOVED   • Colonoscopy       MAY 2016   • Pr insj tunneled cvc w/o subq port/ age 3 yr/> Right 8/26/2016     Procedure: MEDIPORT PLACEMENT ;  Surgeon: Praful Holden MD;  Location: [facility] MAIN OR;  Service: Vascular     Medications: The current medication list was reviewed in the EMR.    ALLERGIES:   No Known Allergies    SOCIAL HISTORY:       Social History   Substance Use Topics   • Smoking status: Never Smoker   • Smokeless tobacco: Never Used   • Alcohol use 3.0 oz/week     5 Glasses of wine per week      Comment: Social     FAMILY HISTORY:  Family History   Problem Relation Age of Onset   • Cerebral aneurysm Mother    • COPD Father        REVIEW OF SYSTEMS:  PAIN: See VITAL SIGNS below.   GENERAL: No change in appetite or weight, no fevers, chills or  sweats.   SKIN: No rashes or nonhealing lesions. Pale no jaundice  HEME/LYMPH: SIGNIFICANT anemia,NO easy bruising, bleeding or  swollen nodes.   EYES: No vision changes or diplopia.   ENT: No tinnitus, hearing loss, gum bleeding, epistaxis, hoarseness or dysphagia.   RESPIRATORY: NO cough, IMPROVED shortness of breath,NO hemoptysis NO wheezing.   CVS: No chest pain,NO  palpitations, orthopnea,AND dyspnea on exertion, NO PND.   GI: POSTSURGICAL abdominal pain (see HPI), NO nausea, vomiting, constipation, diarrhea, melena or hematochezia.   : No dysuria or hematuria. No abnormal vaginal discharge or bleeding.   MUSCULOSKELETAL: No bone pain or joint stiffness. IMPROVING EDEMA BOTH LE.  NEUROLOGICAL: No dizziness, global weakness, loss of consciousness or seizures.    PSYCHIATRIC: No increased nervousness, mood changes or  depression.     Objective    Vitals:    03/09/17 1306   BP: 122/64   Pulse: 101   Resp: 14   Temp: 98.1 °F (36.7 °C)   TempSrc: Oral   SpO2: 97%   Weight: 111 lb 3.2 oz (50.4 kg)   Height: 64.17\" (163 cm)   PainSc: 0-No pain "     Current Status 3/9/2017   ECOG score 0      PHYSICAL EXAM:    GENERAL:  Well-developed, well-nourished in no acute distress.   SKIN:  Warm, dry without rashes, purpura or petechiae.  HEAD:  Normocephalic.  EYES:  Pupils equal, round and reactive to light.  EOMs intact.  Conjunctivae normal.  EARS:  Hearing intact.  NOSE:  Septum midline.  No excoriations or nasal discharge.  MOUTH:  Tongue is well-papillated; no stomatitis or ulcers.  Buccal dryness noted. Lips normal.  THROAT:  Oropharynx without lesions or exudates.  NECK:  Supple with good range of motion; no thyromegaly or masses, no JVD.  LYMPHATICS:  No cervical, supraclavicular, axillary or inguinal adenopathy.  CHEST:  Lungs clear to auscultation bilaterally.    CARDIAC:  NORMAL rate and rhythm without murmurs, rubs or gallops. Normal S1,S2.  ABDOMEN:  Soft, nontender with no organomegaly or masses. Bowel sounds present. SURGICAL SITE WELL HEALED  EXTREMITIES:  No clubbing, cyanosis NO edema. NO TENDERNESS OR PALPABLE CORDS OR ERYTHEMA  NEUROLOGICAL:  Cranial Nerves II-XII grossly intact.  No focal neurological deficits.  PSYCHIATRIC:  Normal affect and mood.        RECENT LABS:  Lab Results   Component Value Date    WBC 4.37 03/09/2017    HGB 10.7 (L) 03/09/2017    HCT 33.9 (L) 03/09/2017    MCV 83.9 03/09/2017     03/09/2017     Lab Results   Component Value Date    GLUCOSE 156 (H) 03/09/2017    BUN 10 03/09/2017    CREATININE 0.60 03/09/2017    EGFRIFNONA 105 03/09/2017    EGFRIFAFRI  09/15/2016      Comment:      <15 Indicative of kidney failure.    BCR 16.7 03/09/2017    CO2 24.2 03/09/2017    CALCIUM 9.4 03/09/2017    ALBUMIN 3.90 03/09/2017    LABIL2 1.3 03/09/2017    AST 28 03/09/2017    ALT 16 03/09/2017                     Component      Latest Ref Rng 9/29/2016 11/3/2016 12/1/2016 1/9/2017 1/25/2017   CEA      ng/mL 157.60 67.46 32.11 6.94 6.43     Component      Latest Ref Rng 2/6/2017   CEA      ng/mL 7.02         Assessment/Plan       1. Metastatic rectal cancer to the liver,  KRAS negative, BRAF negative, MSI stable.  Treatment plan decided upon after discussion with attending physician at the [facility] in [place], [place], Dr. Cannon phone number [phone].  She did undergo laparoscopic resection of 2 liver metastases in the left lobe 8/19/2016.  Eventual plans for right hepatectomy according to [facility] along with removal of the primary tumor in the rectum.  Initiation of chemotherapy with FOLFOX-6 9/1/2016.  Right portal vein embolization at [facility] [place] 11/14/2016.    Patient resumed FOLFOX-6 little over 2 weeks ago (see history of present illness regarding delayed infusion).  She is due today for cycle #9.  She will proceed today as scheduled.  Another CEA today is pending.      Due to her slightly low normal white blood cell count, I have asked her to come back in 1 week for CBC with RN review.  She will otherwise return in 2 weeks for M.D. follow-up and further chemotherapy.  We will to monitor her counts closely and add growth factor support if necessary.    PLAN:   1.  CEA pending today.  2.  Proceed with cycle #9 of FOLFOX-6.    3.  Return in 1 week for CBC with RN review.  4.  Return in 2 weeks for M.D. follow-up and cycle #10 of FOLFOX-6.   5.  Per Dr. Johnson, in regard to her rectosigmoid tumor, eventually she will need to have a visualization of this in order to decide how we are going to proceed in the long run in this regard in regard to consideration of resection through laparoscopy.           Carmel Fritz, APRN  11/17/2016

## 2017-03-11 ENCOUNTER — INFUSION (OUTPATIENT)
Dept: ONCOLOGY | Facility: HOSPITAL | Age: 52
End: 2017-03-11

## 2017-03-11 VITALS — TEMPERATURE: 98.5 F | DIASTOLIC BLOOD PRESSURE: 60 MMHG | HEART RATE: 87 BPM | SYSTOLIC BLOOD PRESSURE: 110 MMHG

## 2017-03-11 DIAGNOSIS — C18.7 MALIGNANT NEOPLASM OF SIGMOID COLON (HCC): ICD-10-CM

## 2017-03-11 PROCEDURE — 96523 IRRIG DRUG DELIVERY DEVICE: CPT

## 2017-03-11 PROCEDURE — 25010000002 HEPARIN FLUSH (PORCINE) 100 UNIT/ML SOLUTION

## 2017-03-11 RX ORDER — HEPARIN SODIUM (PORCINE) LOCK FLUSH IV SOLN 100 UNIT/ML 100 UNIT/ML
5 SOLUTION INTRAVENOUS EVERY 8 HOURS PRN
Status: DISCONTINUED | OUTPATIENT
Start: 2017-03-11 | End: 2017-03-13

## 2017-03-11 RX ADMIN — HEPARIN SODIUM (PORCINE) LOCK FLUSH IV SOLN 100 UNIT/ML 500 UNITS: 100 SOLUTION at 08:25

## 2017-03-11 RX ADMIN — Medication 500 UNITS: at 08:25

## 2017-03-11 NOTE — PROGRESS NOTES
The patient called late last night around 11 PM indicating that her 5-FU infusion finished very early.  She was due to have her 5-FU disconnected at 3 PM today.  Therefore, this was an approximately 16 hour difference. She did have her port disconnected early this morning at [address].  I did contact the patient this evening to inquire about any potential early or severe side effects from 5-FU given the shortened infusion time and the patient denies any significant issues apart from fatigue which is fairly normal for her.  She was cautioned regarding onset of mucositis, nausea vomiting, diarrhea and the need to contact us immediately if any of those issues occur.  I have also asked her to return to the office on Monday 3/13/17 for a nurse practitioner assessment and we will check laboratory studies as well with a CBC, CMP.

## 2017-03-13 ENCOUNTER — OFFICE VISIT (OUTPATIENT)
Dept: ONCOLOGY | Facility: CLINIC | Age: 52
End: 2017-03-13

## 2017-03-13 ENCOUNTER — LAB (OUTPATIENT)
Dept: LAB | Facility: HOSPITAL | Age: 52
End: 2017-03-13

## 2017-03-13 ENCOUNTER — TELEPHONE (OUTPATIENT)
Dept: ONCOLOGY | Facility: CLINIC | Age: 52
End: 2017-03-13

## 2017-03-13 VITALS
RESPIRATION RATE: 12 BRPM | BODY MASS INDEX: 18.85 KG/M2 | SYSTOLIC BLOOD PRESSURE: 110 MMHG | OXYGEN SATURATION: 100 % | DIASTOLIC BLOOD PRESSURE: 72 MMHG | TEMPERATURE: 98.2 F | WEIGHT: 110.4 LBS | HEIGHT: 64 IN | HEART RATE: 81 BPM

## 2017-03-13 DIAGNOSIS — C78.7 METASTATIC CANCER TO LIVER (HCC): ICD-10-CM

## 2017-03-13 DIAGNOSIS — C78.7 RECTAL CANCER METASTASIZED TO LIVER (HCC): ICD-10-CM

## 2017-03-13 DIAGNOSIS — C18.7 MALIGNANT NEOPLASM OF SIGMOID COLON (HCC): ICD-10-CM

## 2017-03-13 DIAGNOSIS — C20 RECTAL CANCER METASTASIZED TO LIVER (HCC): ICD-10-CM

## 2017-03-13 LAB
ALBUMIN SERPL-MCNC: 4.1 G/DL (ref 3.5–5.2)
ALBUMIN/GLOB SERPL: 1.5 G/DL (ref 1.1–2.4)
ALP SERPL-CCNC: 91 U/L (ref 38–116)
ALT SERPL W P-5'-P-CCNC: 21 U/L (ref 0–33)
ANION GAP SERPL CALCULATED.3IONS-SCNC: 9.3 MMOL/L
AST SERPL-CCNC: 25 U/L (ref 0–32)
BASOPHILS # BLD AUTO: 0.04 10*3/MM3 (ref 0–0.1)
BASOPHILS NFR BLD AUTO: 1.2 % (ref 0–1.1)
BILIRUB SERPL-MCNC: <0.2 MG/DL (ref 0.1–1.2)
BUN BLD-MCNC: 13 MG/DL (ref 6–20)
BUN/CREAT SERPL: 22.8 (ref 7.3–30)
CALCIUM SPEC-SCNC: 9.2 MG/DL (ref 8.5–10.2)
CHLORIDE SERPL-SCNC: 103 MMOL/L (ref 98–107)
CO2 SERPL-SCNC: 27.7 MMOL/L (ref 22–29)
CREAT BLD-MCNC: 0.57 MG/DL (ref 0.6–1.1)
DEPRECATED RDW RBC AUTO: 49.4 FL (ref 37–49)
EOSINOPHIL # BLD AUTO: 0.32 10*3/MM3 (ref 0–0.36)
EOSINOPHIL NFR BLD AUTO: 9.7 % (ref 1–5)
ERYTHROCYTE [DISTWIDTH] IN BLOOD BY AUTOMATED COUNT: 15.9 % (ref 11.7–14.5)
GFR SERPL CREATININE-BSD FRML MDRD: 111 ML/MIN/1.73
GLOBULIN UR ELPH-MCNC: 2.8 GM/DL (ref 1.8–3.5)
GLUCOSE BLD-MCNC: 87 MG/DL (ref 74–124)
HCT VFR BLD AUTO: 39 % (ref 34–45)
HGB BLD-MCNC: 11.8 G/DL (ref 11.5–14.9)
IMM GRANULOCYTES # BLD: 0 10*3/MM3 (ref 0–0.03)
IMM GRANULOCYTES NFR BLD: 0 % (ref 0–0.5)
LYMPHOCYTES # BLD AUTO: 1.11 10*3/MM3 (ref 1–3.5)
LYMPHOCYTES NFR BLD AUTO: 33.6 % (ref 20–49)
MCH RBC QN AUTO: 26.1 PG (ref 27–33)
MCHC RBC AUTO-ENTMCNC: 30.3 G/DL (ref 32–35)
MCV RBC AUTO: 86.3 FL (ref 83–97)
MONOCYTES # BLD AUTO: 0.15 10*3/MM3 (ref 0.25–0.8)
MONOCYTES NFR BLD AUTO: 4.5 % (ref 4–12)
NEUTROPHILS # BLD AUTO: 1.68 10*3/MM3 (ref 1.5–7)
NEUTROPHILS NFR BLD AUTO: 51 % (ref 39–75)
NRBC BLD MANUAL-RTO: 0 /100 WBC (ref 0–0)
PLATELET # BLD AUTO: 187 10*3/MM3 (ref 150–375)
PMV BLD AUTO: 10.2 FL (ref 8.9–12.1)
POTASSIUM BLD-SCNC: 3.9 MMOL/L (ref 3.5–4.7)
PROT SERPL-MCNC: 6.9 G/DL (ref 6.3–8)
RBC # BLD AUTO: 4.52 10*6/MM3 (ref 3.9–5)
SODIUM BLD-SCNC: 140 MMOL/L (ref 134–145)
WBC NRBC COR # BLD: 3.3 10*3/MM3 (ref 4–10)

## 2017-03-13 PROCEDURE — 99213 OFFICE O/P EST LOW 20 MIN: CPT | Performed by: NURSE PRACTITIONER

## 2017-03-13 PROCEDURE — 85025 COMPLETE CBC W/AUTO DIFF WBC: CPT | Performed by: INTERNAL MEDICINE

## 2017-03-13 PROCEDURE — 80053 COMPREHEN METABOLIC PANEL: CPT | Performed by: INTERNAL MEDICINE

## 2017-03-13 PROCEDURE — 36415 COLL VENOUS BLD VENIPUNCTURE: CPT | Performed by: INTERNAL MEDICINE

## 2017-03-13 RX ORDER — ONDANSETRON 4 MG/1
TABLET, FILM COATED ORAL
COMMUNITY
Start: 2017-03-10 | End: 2017-12-26

## 2017-03-13 NOTE — PROGRESS NOTES
Subjective .     REASONS FOR FOLLOW UP:  Metastatic rectal cancer to the liver.  Initiated chemotherapy with FOLFOX-6 9/1/2016.     HISTORY OF PRESENT ILLNESS:  The patient is a 51 y.o. year old female with the above-mentioned history, who presents today for toxicity check, most recently receiving cycle 9 3/9/2017.  She just recently resumed treatment after going to [facility] in [place] where she had extensive surgery to remove areas of metastasis in the right lobe of the liver and also a small resection in the left lobe of the liver.  She was hospitalized for 2 weeks and did well overall with the exception of a 20 pound weight gain related to fluid overload and related orthopnea and fatigue.  Thankfully she has recovered from all of this now.    Unfortunately, with cycle 8 of treatment, the continuous infusion ball did malfunction, not infusing any of the patient's continuous 5-FU.  Following disconnect, the patient was reconnected to the appropriate dosing of 5-FU which then administered correctly.  The patient returned for cycle 9 of treatment 3/10/2017.  She called the office on Friday night reporting her 5-FU infusion was complete, unfortunately, this was 16 hours earlier than expected.  Due to the rapid infusion of her 5-FU, she was brought in today for toxicity check.  Thankfully, at this time, she is tolerating treatment well with the exception of fatigue.  She does note her fatigue is worse than previous cycles.  She denies any stomatitis, diarrhea, nausea.  Thankfully, she is not neutropenic today.  The patient is quite anxious, and expresses concern regarding future 5-FU infusions, as she has now had malfunction with both cycle 8 and cycle 9 of her continuous 5-FU infusion ball.  The patient states she did not manipulate the ball in any way, sitting or sleeping on it, or applying additional pressure.  She also states this ball was not leaking.      Past Medical History   Diagnosis Date   • Anemia     • Colon cancer      NEW DIAGNOSIS   • Fatigue      secondary to chemotherapy    • H/O foreign travel 03/2016     [place], [place]   • History of transfusion      1999 AFTER TUBAL RUPTURE   • Hyperlipidemia    • Liver cancer    • Middle cerebral artery aneurysm      Past Surgical History   Procedure Laterality Date   • Craniotomy  2004, 2005     Cerebral aneurysm   • Laparoscopy for ectopic pregnancy  09/1997   • Sinus surgery     • Liver surgery       MASS REMOVED   • Colonoscopy       MAY 2016   • Pr insj tunneled cvc w/o subq port/ age 3 yr/> Right 8/26/2016     Procedure: MEDIPORT PLACEMENT ;  Surgeon: Praful Holden MD;  Location: [facility] OR;  Service: Vascular     Medications: The current medication list was reviewed in the EMR.    ALLERGIES:   No Known Allergies    SOCIAL HISTORY:       Social History   Substance Use Topics   • Smoking status: Never Smoker   • Smokeless tobacco: Never Used   • Alcohol use 3.0 oz/week     5 Glasses of wine per week      Comment: Social     FAMILY HISTORY:  Family History   Problem Relation Age of Onset   • Cerebral aneurysm Mother    • COPD Father      I have reviewed the patient's medical history in detail and updated the computerized patient record.    REVIEW OF SYSTEMS:  PAIN: See VITAL SIGNS below.   GENERAL: No change in appetite or weight, no fevers, chills or sweats. +significant fatigue  SKIN: No rashes or nonhealing lesions.   HEME/LYMPH: No anemia, easy bruising, bleeding or swollen nodes.   EYES: No vision changes or diplopia.   ENT: No tinnitus, hearing loss, gum bleeding, epistaxis, hoarseness or dysphagia.   RESPIRATORY: No cough, shortness of breath, hemoptysis or wheezing.   CVS: No chest pain, palpitations, orthopnea, dyspnea on exertion or PND.   GI: No abdominal pain, nausea, vomiting, constipation, diarrhea, melena or hematochezia.   : No dysuria or hematuria. No abnormal vaginal discharge or bleeding.   MUSCULOSKELETAL: No  "bone pain or joint stiffness.   NEUROLOGICAL: No dizziness, global weakness, loss of consciousness or seizures.    PSYCHIATRIC: No increased nervousness, mood changes or depression.     Objective    Vitals:    03/13/17 1506   BP: 110/72   Pulse: 81   Resp: 12   Temp: 98.2 °F (36.8 °C)   TempSrc: Oral   SpO2: 100%   Weight: 110 lb 6.4 oz (50.1 kg)   Height: 64.17\" (163 cm)   PainSc: 0-No pain     Current Status 3/13/2017   ECOG score 0      PHYSICAL EXAM:    GENERAL:  Well-developed, well-nourished in no acute distress.   SKIN:  Warm, dry without rashes, purpura or petechiae.  HEAD:  Normocephalic.  EYES:  Pupils equal, round.  Conjunctivae normal.  EARS:  Hearing intact.  NOSE:  Septum midline.  No excoriations or nasal discharge.  MOUTH:  Tongue is well-papillated; no stomatitis or ulcers.  Lips normal.  THROAT:  Oropharynx without lesions or exudates.  CHEST:  Lungs clear to auscultation. Good airflow.  CARDIAC:  Regular rate and rhythm without murmurs, rubs or gallops. Normal S1,S2.  EXTREMITIES:  No clubbing, cyanosis or edema.  NEUROLOGICAL: No focal neurological deficits.  PSYCHIATRIC:  Normal affect and mood, anxious.      RECENT LABS:  Lab Results   Component Value Date    WBC 3.30 (L) 03/13/2017    HGB 11.8 03/13/2017    HCT 39.0 03/13/2017    MCV 86.3 03/13/2017     03/13/2017     Lab Results   Component Value Date    GLUCOSE 87 03/13/2017    BUN 13 03/13/2017    CREATININE 0.57 (L) 03/13/2017    EGFRIFNONA 111 03/13/2017    EGFRIFAFRI  09/15/2016      Comment:      <15 Indicative of kidney failure.    BCR 22.8 03/13/2017    CO2 27.7 03/13/2017    CALCIUM 9.2 03/13/2017    ALBUMIN 4.10 03/13/2017    LABIL2 1.5 03/13/2017    AST 25 03/13/2017    ALT 21 03/13/2017     Lab Results   Component Value Date    CEA 13.23 03/09/2017       Assessment/Plan      1. Metastatic rectal cancer to the liver,  KRAS negative, BRAF negative, MSI stable.  Treatment plan decided upon after discussion with attending " physician at the [facility] [place] [facility] in [place], [place], Dr. Cannon phone number [phone].  She did undergo laparoscopic resection of 2 liver metastases in the left lobe 8/19/2016.  Eventual plans for right hepatectomy according to [facility] [place] [facility] along with removal of the primary tumor in the rectum.  Initiation of chemotherapy with FOLFOX-6 9/1/2016.  Right portal vein embolization at [facility] 11/14/2016.    Patient resumed FOLFOX-6  2/16/2017 following extensive surgery to remove liver metastasis which was performed at [facility]. She most recently received cycle 9 3/9/2017. CEA slightly increased to 13, compared to previous of 7, therefore we will repeat when she returns in 2 weeks for review by Dr. Johnson.    2. Continuous 5-FU ball infusion malfunction with cycle #8 and cycle 9.  With cycle 8, the infusion was incomplete, if infusing any 5-FU at all.  The patient at disconnect had a full chemotherapy ball.  5-FU was redosed.  With cycle #9, the patient's infusion was complete 16 hours to early.  At this time, she is not experiencing toxicity with the exception of fatigue.  We will continue to monitor closely with repeat blood counts on Thursday.  Dr. Johnson is aware of these malfunctions, as is Nona Alves, nurse manager.    PLAN:   1.  Return Thursday, 3/16/2017 for CBC with nurse review.  2.  We've extensively reviewed potential side effects and toxicities including nausea and stomatitis.  The patient was instructed to call if home anti-emetic is not effective.  3.  Return 3/23/2017 for review by Dr. Johnson, in anticipation of her 10th cycle of FOLFOX 6. We will repeat CEA at that time  4.  Per Dr. Johnson, in regard to her rectosigmoid tumor, eventually she will need to have a visualization of this in order to decide how we are going to proceed in the long run in this regard in regard to consideration of resection through laparoscopy.      Leonor Elmore, APRN  03/13/2017

## 2017-03-13 NOTE — TELEPHONE ENCOUNTER
----- Message from Scooter Gao Jr., MD sent at 3/11/2017  5:59 PM EST -----  Regarding: NP visit for toxicity check  The patient's 5-FU infusion finished approximately 16 hours early.  Please see my note in the chart from today.  On Monday 3/13/17 the patient will need a nurse practitioner visit for toxicity check with a CBC, CMP.

## 2017-03-16 ENCOUNTER — APPOINTMENT (OUTPATIENT)
Dept: LAB | Facility: HOSPITAL | Age: 52
End: 2017-03-16

## 2017-03-16 ENCOUNTER — LAB (OUTPATIENT)
Dept: LAB | Facility: HOSPITAL | Age: 52
End: 2017-03-16

## 2017-03-16 ENCOUNTER — APPOINTMENT (OUTPATIENT)
Dept: ONCOLOGY | Facility: HOSPITAL | Age: 52
End: 2017-03-16

## 2017-03-16 ENCOUNTER — CLINICAL SUPPORT (OUTPATIENT)
Dept: ONCOLOGY | Facility: HOSPITAL | Age: 52
End: 2017-03-16

## 2017-03-16 DIAGNOSIS — C78.7 RECTAL CANCER METASTASIZED TO LIVER (HCC): Primary | ICD-10-CM

## 2017-03-16 DIAGNOSIS — C20 RECTAL CANCER METASTASIZED TO LIVER (HCC): Primary | ICD-10-CM

## 2017-03-16 LAB
BASOPHILS # BLD AUTO: 0.02 10*3/MM3 (ref 0–0.1)
BASOPHILS NFR BLD AUTO: 0.5 % (ref 0–1.1)
DEPRECATED RDW RBC AUTO: 47.8 FL (ref 37–49)
EOSINOPHIL # BLD AUTO: 0.26 10*3/MM3 (ref 0–0.36)
EOSINOPHIL NFR BLD AUTO: 7 % (ref 1–5)
ERYTHROCYTE [DISTWIDTH] IN BLOOD BY AUTOMATED COUNT: 15.8 % (ref 11.7–14.5)
HCT VFR BLD AUTO: 33.3 % (ref 34–45)
HGB BLD-MCNC: 10.4 G/DL (ref 11.5–14.9)
IMM GRANULOCYTES # BLD: 0.02 10*3/MM3 (ref 0–0.03)
IMM GRANULOCYTES NFR BLD: 0.5 % (ref 0–0.5)
LYMPHOCYTES # BLD AUTO: 1.38 10*3/MM3 (ref 1–3.5)
LYMPHOCYTES NFR BLD AUTO: 37.1 % (ref 20–49)
MCH RBC QN AUTO: 26.2 PG (ref 27–33)
MCHC RBC AUTO-ENTMCNC: 31.2 G/DL (ref 32–35)
MCV RBC AUTO: 83.9 FL (ref 83–97)
MONOCYTES # BLD AUTO: 0.44 10*3/MM3 (ref 0.25–0.8)
MONOCYTES NFR BLD AUTO: 11.8 % (ref 4–12)
NEUTROPHILS # BLD AUTO: 1.6 10*3/MM3 (ref 1.5–7)
NEUTROPHILS NFR BLD AUTO: 43.1 % (ref 39–75)
NRBC BLD MANUAL-RTO: 0 /100 WBC (ref 0–0)
PLATELET # BLD AUTO: 126 10*3/MM3 (ref 150–375)
PMV BLD AUTO: 10.3 FL (ref 8.9–12.1)
RBC # BLD AUTO: 3.97 10*6/MM3 (ref 3.9–5)
WBC NRBC COR # BLD: 3.72 10*3/MM3 (ref 4–10)

## 2017-03-16 PROCEDURE — 36416 COLLJ CAPILLARY BLOOD SPEC: CPT | Performed by: INTERNAL MEDICINE

## 2017-03-16 PROCEDURE — 85025 COMPLETE CBC W/AUTO DIFF WBC: CPT | Performed by: INTERNAL MEDICINE

## 2017-03-16 NOTE — PROGRESS NOTES
Pt here today for CBC and RN review.  CBC reviewed with pt and copy given to pt.    Hgb today 10.4, Plts 126k, ANC 1.60.  Pt denies having any fevers, chills or aches.  Pt states that she feels fine.  She felt more fatigued with this last infusion but feels better today.  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  Instructed pt to stay away from cold objects especially with feet and hands.  Pt will be back next week to see MD.  Instructed pt to inform MD of increase neuropathy and instructed pt to take Bcomplex to help with neuropathy which she can get over the counter. Pt verbalized understanding.    Reviewed appts with pt.

## 2017-03-23 ENCOUNTER — INFUSION (OUTPATIENT)
Dept: ONCOLOGY | Facility: HOSPITAL | Age: 52
End: 2017-03-23

## 2017-03-23 ENCOUNTER — OFFICE VISIT (OUTPATIENT)
Dept: ONCOLOGY | Facility: CLINIC | Age: 52
End: 2017-03-23

## 2017-03-23 VITALS
WEIGHT: 112.2 LBS | SYSTOLIC BLOOD PRESSURE: 114 MMHG | HEIGHT: 64 IN | TEMPERATURE: 98.2 F | DIASTOLIC BLOOD PRESSURE: 78 MMHG | HEART RATE: 88 BPM | BODY MASS INDEX: 19.15 KG/M2

## 2017-03-23 DIAGNOSIS — C78.7 METASTATIC CANCER TO LIVER (HCC): ICD-10-CM

## 2017-03-23 DIAGNOSIS — C78.7 RECTAL CANCER METASTASIZED TO LIVER (HCC): Primary | ICD-10-CM

## 2017-03-23 DIAGNOSIS — Z45.2 FITTING AND ADJUSTMENT OF VASCULAR CATHETER: Primary | ICD-10-CM

## 2017-03-23 DIAGNOSIS — C18.7 MALIGNANT NEOPLASM OF SIGMOID COLON (HCC): ICD-10-CM

## 2017-03-23 DIAGNOSIS — Z45.2 FITTING AND ADJUSTMENT OF VASCULAR CATHETER: ICD-10-CM

## 2017-03-23 DIAGNOSIS — C20 RECTAL CANCER METASTASIZED TO LIVER (HCC): Primary | ICD-10-CM

## 2017-03-23 DIAGNOSIS — C78.7 RECTAL CANCER METASTASIZED TO LIVER (HCC): ICD-10-CM

## 2017-03-23 DIAGNOSIS — C20 RECTAL CANCER METASTASIZED TO LIVER (HCC): ICD-10-CM

## 2017-03-23 DIAGNOSIS — J90 PLEURAL EFFUSION, BILATERAL: ICD-10-CM

## 2017-03-23 LAB
ALBUMIN SERPL-MCNC: 3.9 G/DL (ref 3.5–5.2)
ALBUMIN/GLOB SERPL: 1.4 G/DL (ref 1.1–2.4)
ALP SERPL-CCNC: 84 U/L (ref 38–116)
ALT SERPL W P-5'-P-CCNC: 14 U/L (ref 0–33)
ANION GAP SERPL CALCULATED.3IONS-SCNC: 8.3 MMOL/L
AST SERPL-CCNC: 25 U/L (ref 0–32)
BASOPHILS # BLD AUTO: 0.03 10*3/MM3 (ref 0–0.1)
BASOPHILS NFR BLD AUTO: 1.1 % (ref 0–1.1)
BILIRUB SERPL-MCNC: 0.3 MG/DL (ref 0.1–1.2)
BUN BLD-MCNC: 12 MG/DL (ref 6–20)
BUN/CREAT SERPL: 21.4 (ref 7.3–30)
CALCIUM SPEC-SCNC: 9.5 MG/DL (ref 8.5–10.2)
CEA SERPL-MCNC: 16.24 NG/ML
CHLORIDE SERPL-SCNC: 106 MMOL/L (ref 98–107)
CO2 SERPL-SCNC: 25.7 MMOL/L (ref 22–29)
CREAT BLD-MCNC: 0.56 MG/DL (ref 0.6–1.1)
DEPRECATED RDW RBC AUTO: 51.5 FL (ref 37–49)
EOSINOPHIL # BLD AUTO: 0.12 10*3/MM3 (ref 0–0.36)
EOSINOPHIL NFR BLD AUTO: 4.5 % (ref 1–5)
ERYTHROCYTE [DISTWIDTH] IN BLOOD BY AUTOMATED COUNT: 17.1 % (ref 11.7–14.5)
GFR SERPL CREATININE-BSD FRML MDRD: 113 ML/MIN/1.73
GLOBULIN UR ELPH-MCNC: 2.7 GM/DL (ref 1.8–3.5)
GLUCOSE BLD-MCNC: 98 MG/DL (ref 74–124)
HCT VFR BLD AUTO: 33 % (ref 34–45)
HGB BLD-MCNC: 10.3 G/DL (ref 11.5–14.9)
IMM GRANULOCYTES # BLD: 0 10*3/MM3 (ref 0–0.03)
IMM GRANULOCYTES NFR BLD: 0 % (ref 0–0.5)
LYMPHOCYTES # BLD AUTO: 0.97 10*3/MM3 (ref 1–3.5)
LYMPHOCYTES NFR BLD AUTO: 36.2 % (ref 20–49)
MCH RBC QN AUTO: 26.5 PG (ref 27–33)
MCHC RBC AUTO-ENTMCNC: 31.2 G/DL (ref 32–35)
MCV RBC AUTO: 84.8 FL (ref 83–97)
MONOCYTES # BLD AUTO: 0.55 10*3/MM3 (ref 0.25–0.8)
MONOCYTES NFR BLD AUTO: 20.5 % (ref 4–12)
NEUTROPHILS # BLD AUTO: 1.01 10*3/MM3 (ref 1.5–7)
NEUTROPHILS NFR BLD AUTO: 37.7 % (ref 39–75)
NRBC BLD MANUAL-RTO: 0 /100 WBC (ref 0–0)
PLATELET # BLD AUTO: 130 10*3/MM3 (ref 150–375)
PMV BLD AUTO: 9.5 FL (ref 8.9–12.1)
POTASSIUM BLD-SCNC: 4.3 MMOL/L (ref 3.5–4.7)
PROT SERPL-MCNC: 6.6 G/DL (ref 6.3–8)
RBC # BLD AUTO: 3.89 10*6/MM3 (ref 3.9–5)
SODIUM BLD-SCNC: 140 MMOL/L (ref 134–145)
WBC NRBC COR # BLD: 2.68 10*3/MM3 (ref 4–10)

## 2017-03-23 PROCEDURE — 25010000002 HEPARIN FLUSH (PORCINE) 100 UNIT/ML SOLUTION: Performed by: INTERNAL MEDICINE

## 2017-03-23 PROCEDURE — 80053 COMPREHEN METABOLIC PANEL: CPT | Performed by: NURSE PRACTITIONER

## 2017-03-23 PROCEDURE — 99214 OFFICE O/P EST MOD 30 MIN: CPT | Performed by: INTERNAL MEDICINE

## 2017-03-23 PROCEDURE — 25010000002 TBO-FILGRASTIM 300 MCG/0.5ML SOLUTION PREFILLED SYRINGE: Performed by: INTERNAL MEDICINE

## 2017-03-23 PROCEDURE — 96372 THER/PROPH/DIAG INJ SC/IM: CPT | Performed by: INTERNAL MEDICINE

## 2017-03-23 PROCEDURE — 82378 CARCINOEMBRYONIC ANTIGEN: CPT | Performed by: NURSE PRACTITIONER

## 2017-03-23 PROCEDURE — 85025 COMPLETE CBC W/AUTO DIFF WBC: CPT | Performed by: NURSE PRACTITIONER

## 2017-03-23 RX ORDER — SODIUM CHLORIDE 0.9 % (FLUSH) 0.9 %
10 SYRINGE (ML) INJECTION AS NEEDED
Status: CANCELLED | OUTPATIENT
Start: 2017-03-23

## 2017-03-23 RX ORDER — SODIUM CHLORIDE 0.9 % (FLUSH) 0.9 %
10 SYRINGE (ML) INJECTION AS NEEDED
Status: DISCONTINUED | OUTPATIENT
Start: 2017-03-23 | End: 2017-03-23 | Stop reason: HOSPADM

## 2017-03-23 RX ADMIN — TBO-FILGRASTIM 300 MCG: 300 INJECTION, SOLUTION SUBCUTANEOUS at 09:16

## 2017-03-23 RX ADMIN — SODIUM CHLORIDE, PRESERVATIVE FREE 500 UNITS: 5 INJECTION INTRAVENOUS at 09:01

## 2017-03-23 RX ADMIN — Medication 10 ML: at 09:00

## 2017-03-23 NOTE — PROGRESS NOTES
Subjective .     REASONS FOR FOLLOW UP:  Metastatic SIGMOID UPPER rectal cancer to the liver.  Initiated chemotherapy with FOLFOX-6 9/1/2016.     HISTORY OF PRESENT ILLNESS:  The patient is a 51 y.o. year old female with the above-mentioned history, who presents today due for cycle 9 of therapy.  She just recently resumed treatment after going to [facility] in [place] where she had extensive surgery to remove areas of metastasis in the right lobe of the liver and also a small resection in the left lobe of the liver.  She was hospitalized for 2 weeks and did well overall with the exception of a 20 pound weight gain related to fluid overload and related orthopnea and fatigue.  Thankfully she has recovered from all of this now.     Today the patient has a good level of energy, normal appetite, stable weight, no abdominal pain, no jaundice, no nausea, no vomiting, no diarrhea, minimal constipation after chemotherapy completion. She has no cough, no shortness of breath, no pleuritic pain. She has not had any swelling in her lower extremities. No fevers, no chills.              Past Medical History:   Diagnosis Date   • Anemia    • Colon cancer     NEW DIAGNOSIS   • Fatigue     secondary to chemotherapy    • H/O foreign travel 03/2016    [facility]   • History of transfusion     1999 AFTER TUBAL RUPTURE   • Hyperlipidemia    • Liver cancer    • Middle cerebral artery aneurysm      Past Surgical History:   Procedure Laterality Date   • COLONOSCOPY      MAY 2016   • CRANIOTOMY  2004, 2005    Cerebral aneurysm   • LAPAROSCOPY FOR ECTOPIC PREGNANCY  09/1997   • LIVER SURGERY      MASS REMOVED   • [place] INSJ TUNNELED CVC W/O SUBQ PORT/ AGE 3 YR/> Right 8/26/2016    Procedure: MEDIPORT PLACEMENT ;  Surgeon: Praful Holden MD;  Location: [facility];  Service: Vascular   • SINUS SURGERY       Medications: The current medication list was reviewed in the EMR.    ALLERGIES:   No Known  Allergies    SOCIAL HISTORY:       Social History   Substance Use Topics   • Smoking status: Never Smoker   • Smokeless tobacco: Never Used   • Alcohol use 3.0 oz/week     5 Glasses of wine per week      Comment: Social     FAMILY HISTORY:  Family History   Problem Relation Age of Onset   • Cerebral aneurysm Mother    • COPD Father        REVIEW OF SYSTEMS:  PAIN: See VITAL SIGNS below.   GENERAL: No change in appetite or weight, no fevers, chills or  sweats.   SKIN: No rashes or nonhealing lesions. Pale no jaundice  HEME/LYMPH: SIGNIFICANT anemia,NO easy bruising, bleeding or  swollen nodes.   EYES: No vision changes or diplopia.   ENT: No tinnitus, hearing loss, gum bleeding, epistaxis, hoarseness or dysphagia.   RESPIRATORY: NO cough, IMPROVED shortness of breath,NO hemoptysis NO wheezing.   CVS: No chest pain,NO  palpitations, orthopnea,AND dyspnea on exertion, NO PND.   GI: POSTSURGICAL abdominal pain (see HPI), NO nausea, vomiting, constipation, diarrhea, melena or hematochezia.   : No dysuria or hematuria. No abnormal vaginal discharge or bleeding.   MUSCULOSKELETAL: No bone pain or joint stiffness. RESOLVED EDEMA BOTH LE.  NEUROLOGICAL: No dizziness, global weakness, loss of consciousness or seizures.    PSYCHIATRIC: No increased nervousness, mood changes or  depression.     Objective    There were no vitals filed for this visit.  Current Status 3/13/2017   ECOG score 0      PHYSICAL EXAM:    GENERAL:  Well-developed, well-nourished in no acute distress.   SKIN:  Warm, dry without rashes, purpura or petechiae.  HEAD:  Normocephalic.  EYES:  Pupils equal, round and reactive to light.  EOMs intact.  Conjunctivae normal.  EARS:  Hearing intact.  NOSE:  Septum midline.  No excoriations or nasal discharge.  MOUTH:  Tongue is well-papillated; no stomatitis or ulcers.  Buccal dryness noted. Lips normal.  THROAT:  Oropharynx without lesions or exudates.  NECK:  Supple with good range of motion; no thyromegaly or  masses, no JVD.  LYMPHATICS:  No cervical, supraclavicular, axillary or inguinal adenopathy.  CHEST:  Lungs clear to auscultation bilaterally.    CARDIAC:  NORMAL rate and rhythm without murmurs, rubs or gallops. Normal S1,S2.  ABDOMEN:  Soft, nontender with no organomegaly or masses. Bowel sounds present. SURGICAL SITE WELL HEALED  EXTREMITIES:  No clubbing, cyanosis NO edema. NO TENDERNESS OR PALPABLE CORDS OR ERYTHEMA  NEUROLOGICAL:  Cranial Nerves II-XII grossly intact.  No focal neurological deficits.  PSYCHIATRIC:  Normal affect and mood.        RECENT LABS:  Lab Results   Component Value Date    WBC 2.68 (L) 03/23/2017    HGB 10.3 (L) 03/23/2017    HCT 33.0 (L) 03/23/2017    MCV 84.8 03/23/2017     (L) 03/23/2017     Lab Results   Component Value Date    GLUCOSE 87 03/13/2017    BUN 13 03/13/2017    CREATININE 0.57 (L) 03/13/2017    EGFRIFNONA 111 03/13/2017    EGFRIFAFRI  09/15/2016      Comment:      <15 Indicative of kidney failure.    BCR 22.8 03/13/2017    CO2 27.7 03/13/2017    CALCIUM 9.2 03/13/2017    ALBUMIN 4.10 03/13/2017    LABIL2 1.5 03/13/2017    AST 25 03/13/2017    ALT 21 03/13/2017     Component      Latest Ref Rng & Units 1/25/2017 2/6/2017 3/9/2017   CEA      ng/mL 6.43 7.02 13.23           Assessment/Plan      1. Metastatic rectal cancer to the liver,  KRAS negative, BRAF negative, MSI stable.     The patient has been treated with FOLFOX chemotherapy regimen and she has interruptions related to her surgery at [facility] around 12/2016. Later on we had malfunction in regard to the delivering system for 5FU. On one occasion no 5FU was not infused and second occasion was infused too fast. Today the patient has neutropenia with no fever. ANC 1000, low hemoglobin, minor low drop in the platelet count but most worrisome is the persistent rising of her CEA level to 13.2 on 03/09/2017. Clinically the patient continues having in my opinion a small pleural effusion on the right side,  otherwise she remains asymptomatic and the physical exam does not give me any light in regard to what the nature of the CEA elevation could be. Given the combination of neutropenia and the rising in the CEA, I would prefer for this patient to have the followin. We will put her chemotherapy FOLFOX on hold today.  2. She will receive 1 injection of Granix 300 mcg sq.  3. The patient will proceed with a CBC and CMP in 1 week.  4. She will be called at home with the report of the CEA level today.  5. She will proceed with a PET scan in days to be reviewed back in a week.  6. If the CEA level continues rising I think the next option will be administration of chemotherapy with FOLFIRI given the fact that now she is starting to develop permanent neuropathy in her feet. No neuropathy in her hands. Not only will this be a reason to switch but also raising the CEA level. Also raises the question in regard to the need to have another scope to figure out the status of her rectosigmoid tumor. So far she has no manifestations of this at this point.           Zheng Johnson MD  2016

## 2017-03-27 ENCOUNTER — HOSPITAL ENCOUNTER (OUTPATIENT)
Dept: PET IMAGING | Facility: HOSPITAL | Age: 52
Discharge: HOME | End: 2017-03-27
Attending: INTERNAL MEDICINE | Admitting: INTERNAL MEDICINE

## 2017-03-27 ENCOUNTER — HOSPITAL ENCOUNTER (OUTPATIENT)
Dept: PET IMAGING | Facility: HOSPITAL | Age: 52
Discharge: HOME | End: 2017-03-27
Attending: INTERNAL MEDICINE

## 2017-03-27 DIAGNOSIS — C18.7 MALIGNANT NEOPLASM OF SIGMOID COLON (HCC): ICD-10-CM

## 2017-03-27 DIAGNOSIS — C78.7 METASTATIC CANCER TO LIVER (HCC): ICD-10-CM

## 2017-03-27 DIAGNOSIS — J90 PLEURAL EFFUSION, BILATERAL: ICD-10-CM

## 2017-03-27 DIAGNOSIS — C20 RECTAL CANCER METASTASIZED TO LIVER (HCC): ICD-10-CM

## 2017-03-27 DIAGNOSIS — C78.7 RECTAL CANCER METASTASIZED TO LIVER (HCC): ICD-10-CM

## 2017-03-27 PROCEDURE — 0 FLUDEOXYGLUCOSE F18 SOLUTION: Performed by: INTERNAL MEDICINE

## 2017-03-27 PROCEDURE — 78815 PET IMAGE W/CT SKULL-THIGH: CPT

## 2017-03-27 PROCEDURE — A9552 F18 FDG: HCPCS | Performed by: INTERNAL MEDICINE

## 2017-03-27 RX ADMIN — FLUDEOXYGLUCOSE F18 1 DOSE: 300 INJECTION INTRAVENOUS at 12:45

## 2017-03-28 ENCOUNTER — TELEPHONE (OUTPATIENT)
Dept: ONCOLOGY | Facility: CLINIC | Age: 52
End: 2017-03-28

## 2017-03-30 ENCOUNTER — INFUSION (OUTPATIENT)
Dept: ONCOLOGY | Facility: HOSPITAL | Age: 52
End: 2017-03-30

## 2017-03-30 ENCOUNTER — OFFICE VISIT (OUTPATIENT)
Dept: ONCOLOGY | Facility: CLINIC | Age: 52
End: 2017-03-30

## 2017-03-30 ENCOUNTER — TELEPHONE (OUTPATIENT)
Dept: ONCOLOGY | Facility: CLINIC | Age: 52
End: 2017-03-30

## 2017-03-30 VITALS
DIASTOLIC BLOOD PRESSURE: 56 MMHG | TEMPERATURE: 98.5 F | HEART RATE: 90 BPM | OXYGEN SATURATION: 100 % | RESPIRATION RATE: 16 BRPM | SYSTOLIC BLOOD PRESSURE: 100 MMHG | WEIGHT: 111.4 LBS | BODY MASS INDEX: 19.02 KG/M2 | HEIGHT: 64 IN

## 2017-03-30 DIAGNOSIS — C78.7 RECTAL CANCER METASTASIZED TO LIVER (HCC): Primary | ICD-10-CM

## 2017-03-30 DIAGNOSIS — Z45.2 FITTING AND ADJUSTMENT OF VASCULAR CATHETER: ICD-10-CM

## 2017-03-30 DIAGNOSIS — C20 RECTAL CANCER METASTASIZED TO LIVER (HCC): Primary | ICD-10-CM

## 2017-03-30 DIAGNOSIS — C78.7 METASTATIC CANCER TO LIVER (HCC): ICD-10-CM

## 2017-03-30 DIAGNOSIS — C18.7 MALIGNANT NEOPLASM OF SIGMOID COLON (HCC): ICD-10-CM

## 2017-03-30 DIAGNOSIS — Z45.2 FITTING AND ADJUSTMENT OF VASCULAR CATHETER: Primary | ICD-10-CM

## 2017-03-30 DIAGNOSIS — J90 PLEURAL EFFUSION, BILATERAL: ICD-10-CM

## 2017-03-30 LAB
ALBUMIN SERPL-MCNC: 3.8 G/DL (ref 3.5–5.2)
ALBUMIN/GLOB SERPL: 1.4 G/DL (ref 1.1–2.4)
ALP SERPL-CCNC: 98 U/L (ref 38–116)
ALT SERPL W P-5'-P-CCNC: 15 U/L (ref 0–33)
ANION GAP SERPL CALCULATED.3IONS-SCNC: 13.9 MMOL/L
AST SERPL-CCNC: 25 U/L (ref 0–32)
BASOPHILS # BLD AUTO: 0.04 10*3/MM3 (ref 0–0.1)
BASOPHILS NFR BLD AUTO: 0.9 % (ref 0–1.1)
BILIRUB SERPL-MCNC: 0.2 MG/DL (ref 0.1–1.2)
BUN BLD-MCNC: 7 MG/DL (ref 6–20)
BUN/CREAT SERPL: 12.7 (ref 7.3–30)
CALCIUM SPEC-SCNC: 9.1 MG/DL (ref 8.5–10.2)
CHLORIDE SERPL-SCNC: 106 MMOL/L (ref 98–107)
CO2 SERPL-SCNC: 22.1 MMOL/L (ref 22–29)
CREAT BLD-MCNC: 0.55 MG/DL (ref 0.6–1.1)
DEPRECATED RDW RBC AUTO: 55.8 FL (ref 37–49)
EOSINOPHIL # BLD AUTO: 0.15 10*3/MM3 (ref 0–0.36)
EOSINOPHIL NFR BLD AUTO: 3.5 % (ref 1–5)
ERYTHROCYTE [DISTWIDTH] IN BLOOD BY AUTOMATED COUNT: 18.2 % (ref 11.7–14.5)
GFR SERPL CREATININE-BSD FRML MDRD: 116 ML/MIN/1.73
GLOBULIN UR ELPH-MCNC: 2.7 GM/DL (ref 1.8–3.5)
GLUCOSE BLD-MCNC: 132 MG/DL (ref 74–124)
HCT VFR BLD AUTO: 36.5 % (ref 34–45)
HGB BLD-MCNC: 11.5 G/DL (ref 11.5–14.9)
HOLD SPECIMEN: NORMAL
IMM GRANULOCYTES # BLD: 0.17 10*3/MM3 (ref 0–0.03)
IMM GRANULOCYTES NFR BLD: 4 % (ref 0–0.5)
LYMPHOCYTES # BLD AUTO: 1.5 10*3/MM3 (ref 1–3.5)
LYMPHOCYTES NFR BLD AUTO: 35.3 % (ref 20–49)
MCH RBC QN AUTO: 26.9 PG (ref 27–33)
MCHC RBC AUTO-ENTMCNC: 31.5 G/DL (ref 32–35)
MCV RBC AUTO: 85.5 FL (ref 83–97)
MONOCYTES # BLD AUTO: 1.19 10*3/MM3 (ref 0.25–0.8)
MONOCYTES NFR BLD AUTO: 28 % (ref 4–12)
NEUTROPHILS # BLD AUTO: 1.2 10*3/MM3 (ref 1.5–7)
NEUTROPHILS NFR BLD AUTO: 28.3 % (ref 39–75)
NRBC BLD MANUAL-RTO: 0 /100 WBC (ref 0–0)
PLATELET # BLD AUTO: 168 10*3/MM3 (ref 150–375)
PMV BLD AUTO: 9.9 FL (ref 8.9–12.1)
POTASSIUM BLD-SCNC: 3.9 MMOL/L (ref 3.5–4.7)
PROT SERPL-MCNC: 6.5 G/DL (ref 6.3–8)
RBC # BLD AUTO: 4.27 10*6/MM3 (ref 3.9–5)
SODIUM BLD-SCNC: 142 MMOL/L (ref 134–145)
WBC NRBC COR # BLD: 4.25 10*3/MM3 (ref 4–10)

## 2017-03-30 PROCEDURE — 80053 COMPREHEN METABOLIC PANEL: CPT | Performed by: INTERNAL MEDICINE

## 2017-03-30 PROCEDURE — 99214 OFFICE O/P EST MOD 30 MIN: CPT | Performed by: INTERNAL MEDICINE

## 2017-03-30 PROCEDURE — 25010000002 HEPARIN FLUSH (PORCINE) 100 UNIT/ML SOLUTION: Performed by: INTERNAL MEDICINE

## 2017-03-30 PROCEDURE — 85025 COMPLETE CBC W/AUTO DIFF WBC: CPT | Performed by: INTERNAL MEDICINE

## 2017-03-30 PROCEDURE — 96523 IRRIG DRUG DELIVERY DEVICE: CPT | Performed by: INTERNAL MEDICINE

## 2017-03-30 RX ORDER — SODIUM CHLORIDE 0.9 % (FLUSH) 0.9 %
10 SYRINGE (ML) INJECTION AS NEEDED
Status: CANCELLED | OUTPATIENT
Start: 2017-03-30

## 2017-03-30 RX ORDER — SODIUM CHLORIDE 0.9 % (FLUSH) 0.9 %
10 SYRINGE (ML) INJECTION AS NEEDED
Status: DISCONTINUED | OUTPATIENT
Start: 2017-03-30 | End: 2017-03-30 | Stop reason: HOSPADM

## 2017-03-30 RX ADMIN — Medication 10 ML: at 09:58

## 2017-03-30 RX ADMIN — SODIUM CHLORIDE, PRESERVATIVE FREE 500 UNITS: 5 INJECTION INTRAVENOUS at 09:58

## 2017-03-30 NOTE — TELEPHONE ENCOUNTER
----- Message from Sowmya Junior sent at 3/30/2017  1:21 PM EDT -----   Pt wants to know what MD's thoughts are about her having colon surgery here locally instead at [place]      [phone]    Patient saw Dr. Johnson today and wants to know what his opinion is of her having surgery locally rather than at [place].  She also wants to know if he has a recommendation of a local surgeon.  If he thinks [place] is the best option, she will do that.  Message sent to Dr. Johnson, since he is already gone for the day.

## 2017-03-30 NOTE — PROGRESS NOTES
Subjective .     REASONS FOR FOLLOW UP:  Metastatic SIGMOID UPPER rectal cancer to the liver.  Initiated chemotherapy with FOLFOX-6 9/1/2016.     HISTORY OF PRESENT ILLNESS:  The patient is a 51 y.o. year old female with the above-mentioned history,   The patient presents today to the office for further followup. Overall the patient is doing relatively well. She has recovered from neutropenia and in the interim she has undergone a PET scan because we have not been able to explain the persistent elevation of her CEA level. The PET scan has documented significant uptake in the tumor in the lower sigmoid with no uptake in the lungs or liver or any other site in her body. Obviously this raises the question if the patient needs to proceed at this time with removal of the primary tumor to minimize any potential for further seeding. The patient is asymptomatic in regard to the rectal tumor per se with no alteration in bowel activity, no passage of blood in the stool, no explosions, no abdominal pain, no bloating and no modification in her bowel habits. Her appetite is good, her energy level is back to normal and she has not had any mucositis. She has an element of fatigue associated with her last chemotherapy. She has some minimal sensory neuropathy in her feet grade 1 that does not amount to anything, neither interferes with normal performance.            Past Medical History:   Diagnosis Date   • Anemia    • Colon cancer     NEW DIAGNOSIS   • Fatigue     secondary to chemotherapy    • H/O foreign travel 03/2016    [facility]   • History of transfusion     1999 AFTER TUBAL RUPTURE   • Hyperlipidemia    • Liver cancer    • Middle cerebral artery aneurysm      Past Surgical History:   Procedure Laterality Date   • COLONOSCOPY      MAY 2016   • CRANIOTOMY  2004, 2005    Cerebral aneurysm   • LAPAROSCOPY FOR ECTOPIC PREGNANCY  09/1997   • LIVER SURGERY      MASS REMOVED   • [place] INSJ TUNNELED CVC W/O SUBQ  "PORT/ AGE 3 YR/> Right 8/26/2016    Procedure: MEDIPORT PLACEMENT ;  Surgeon: Praful Holden MD;  Location: [facility] OR;  Service: Vascular   • SINUS SURGERY       Medications: The current medication list was reviewed in the EMR.    ALLERGIES:   No Known Allergies    SOCIAL HISTORY:       Social History   Substance Use Topics   • Smoking status: Never Smoker   • Smokeless tobacco: Never Used   • Alcohol use 3.0 oz/week     5 Glasses of wine per week      Comment: Social     FAMILY HISTORY:  Family History   Problem Relation Age of Onset   • Cerebral aneurysm Mother    • COPD Father        REVIEW OF SYSTEMS:  PAIN: See VITAL SIGNS below.   GENERAL: No change in appetite or weight, no fevers, chills or  sweats.   SKIN: No rashes or nonhealing lesions. Pale no jaundice  HEME/LYMPH: SIGNIFICANT anemia,NO easy bruising, bleeding or  swollen nodes.   EYES: No vision changes or diplopia.   ENT: No tinnitus, hearing loss, gum bleeding, epistaxis, hoarseness or dysphagia.   RESPIRATORY: NO cough, IMPROVED shortness of breath,NO hemoptysis NO wheezing.   CVS: No chest pain,NO  palpitations, orthopnea,AND dyspnea on exertion, NO PND.   GI: POSTSURGICAL abdominal pain (see HPI), NO nausea, vomiting, constipation, diarrhea, melena or hematochezia.   : No dysuria or hematuria. No abnormal vaginal discharge or bleeding.   MUSCULOSKELETAL: No bone pain or joint stiffness. RESOLVED EDEMA BOTH LE.  NEUROLOGICAL: No dizziness, global weakness, loss of consciousness or seizures.    PSYCHIATRIC: No increased nervousness, mood changes or  depression.     Objective    Vitals:    03/30/17 0920   BP: 100/56   Pulse: 90   Resp: 16   Temp: 98.5 °F (36.9 °C)   TempSrc: Oral   SpO2: 100%   Weight: 111 lb 6.4 oz (50.5 kg)   Height: 64.17\" (163 cm)   PainSc: 0-No pain     Current Status 3/30/2017   ECOG score 0      PHYSICAL EXAM:    GENERAL:  Well-developed, well-nourished in no acute distress.   SKIN:  Warm, dry without rashes, " purpura or petechiae.  HEAD:  Normocephalic.  EYES:  Pupils equal, round and reactive to light.  EOMs intact.  Conjunctivae normal.  EARS:  Hearing intact.  NOSE:  Septum midline.  No excoriations or nasal discharge.  MOUTH:  Tongue is well-papillated; no stomatitis or ulcers.  Buccal dryness noted. Lips normal.  THROAT:  Oropharynx without lesions or exudates.  NECK:  Supple with good range of motion; no thyromegaly or masses, no JVD.  LYMPHATICS:  No cervical, supraclavicular, axillary or inguinal adenopathy.  CHEST:  Lungs clear to auscultation bilaterally.    CARDIAC:  NORMAL rate and rhythm without murmurs, rubs or gallops. Normal S1,S2.  ABDOMEN:  Soft, nontender with no organomegaly or masses. Bowel sounds present  .EXTREMITIES:  No clubbing, cyanosis NO edema.   NEUROLOGICAL:  Cranial Nerves II-XII grossly intact.  No focal neurological deficits.  PSYCHIATRIC:  Normal affect and mood.        RECENT LABS:  Lab Results   Component Value Date    WBC 4.25 03/30/2017    HGB 11.5 03/30/2017    HCT 36.5 03/30/2017    MCV 85.5 03/30/2017     03/30/2017     Lab Results   Component Value Date    GLUCOSE 98 03/23/2017    BUN 12 03/23/2017    CREATININE 0.56 (L) 03/23/2017    EGFRIFNONA 113 03/23/2017    EGFRIFAFRI  09/15/2016      Comment:      <15 Indicative of kidney failure.    BCR 21.4 03/23/2017    CO2 25.7 03/23/2017    CALCIUM 9.5 03/23/2017    ALBUMIN 3.90 03/23/2017    LABIL2 1.4 03/23/2017    AST 25 03/23/2017    ALT 14 03/23/2017     FDG PET SCAN FROM SKULL BASE TO MID THIGH WITH PET-CT FUSION      HISTORY: 51-year-old female with history of metastatic rectosigmoid  cancer to the liver, recent liver metastectomy, chemotherapy, rising CEA  level      TECHNIQUE: Serum glucose level is measured at 71 mg/dl at 12:42 PM. 6.7  mCi of F-18 FDG was administered at 12:45 PM and PET scanning was  initiated at 2:08 PM. Concurrent noncontrasted CT imaging was performed  over the same region for the purposes of  anatomic localization and  attenuation correction.      COMPARISON: Correlation is made with contrasted CT imaging of the chest,  abdomen pelvis 02/06/17. PET/CT imaging 07/28/2016 is reviewed.      FINDINGS: The previously noted hypermetabolic rectosigmoid tumor is  reidentified and appears to have decreased in bulk. The lesion however  remains significantly metabolically active. Maximal internal SUV is  measured at 15.3, essentially unchanged from previous imaging. There is  otherwise physiologic bowel activity. No new metabolically active lymph  nodes are identified within the abdomen or pelvis. There is anticipated  postoperative uptake at site of right and left liver resections. There  is some low-level activity along a right upper quadrant oblique  incision.      There is otherwise physiologic distribution of the radiopharmaceutical.  No new areas of abnormal uptake are identified to suggest new metastatic  disease. Limited CT imaging demonstrates a right-sided Mediport  terminating in the SVC. Previous CT imaging of 02/06/2017 demonstrated a  moderate size right pleural effusion which has since resolved. A fluid  collection within the superior right lobe of the liver at site of  resection is unchanged in size or configuration.      IMPRESSION:  Metabolically active tumor at the rectosigmoid level is  reidentified and appears to have decreased slightly in bulk although  remains fairly intensely metabolically active. There is essentially no  interval change in degree of uptake within this lesion compared with  prior PET/CT imaging of 07/28/2016. There is anticipated postoperative  uptake from right and left lobe liver resections. No new evidence for  metastatic disease is present. A right pleural effusion is resolved. A  fluid collection at site of right lobe liver resection is unchanged in  size or configuration.      This report was finalized on 3/28/2017 11:52 AM by Dr. Billy Alex MD.      Component       Latest Ref Rng & Units 12/1/2016 1/9/2017 1/25/2017 2/6/2017   CEA      ng/mL 32.11 6.94 6.43 7.02     Component      Latest Ref Rng & Units 3/9/2017 3/23/2017   CEA      ng/mL 13.23 16.24       Assessment/Plan      1. Metastatic rectal cancer to the liver,  KRAS negative, BRAF negative, MSI stable.     The patient has been treated with FOLFOX chemotherapy regimen and she has interruptions related to her surgery at [facility] around 12/2016. Later on we had malfunction in regard to the delivering system for 5FU. On one occasion no 5FU was not infused and second occasion was infused too fast. Today the patient has neutropenia with no fever. ANC 1000, low hemoglobin, minor low drop in the platelet count but most worrisome is the persistent rising of her CEA level to 13.2 on 03/09/2017.   I discussed with the patient and  today 03/30/2017 the fact that her tumor marker continues rising as stated above to 16.24 on 03/23/2017 with a PET scan that shows no areas of abnormal uptake in the liver, lymph nodes, lungs, bones but significant uptake in the lower sigmoid tumor. The SUV activity here is as high as it used to be when the patient was first diagnosed.     Given the fact that she has recovered from neutropenia her anemia is improving, and her platelet count has improved I advised the patient to postpone any further chemotherapy and probably discontinue FOLFOX altogether at this time. Not only is the treatment not effective to control the primary tumor she is starting to develop significant hematological toxicity and a grade 1 peripheral neuropathy.     I showed them the PET scan report, I showed them the PET scan pictures, and I showed them the tumor markers. I do believe that she needs to proceed with removal of the tumor in the colon and she wants [facility] to do this. Also she probably will require a new MRI of the liver when she goes to that facility. I gave her the disk for her PET scan and the CT  scans from December along with the tumor markers. We will go ahead and make a phone call to [facility] in [place] to proceed with appointment.     Tentative appointment to come back and see me in a month with a CBC, CMP, and CEA level.                  Zheng Johnson MD  11/17/2016

## 2017-03-31 ENCOUNTER — TELEPHONE (OUTPATIENT)
Dept: ONCOLOGY | Facility: CLINIC | Age: 52
End: 2017-03-31

## 2017-03-31 NOTE — TELEPHONE ENCOUNTER
Per Dr. Johnson, call pt and advise her that he spoke with Dr. Brenna Bautista and she can see pt on 4/12 or 4/14 and schedule her for surgery. Or she could go to the [facility]. He is fine with either option. Informed pt of this. She is still waiting to hear from [facility] and will call us back once she makes a decision. Will await pt's call.

## 2017-04-03 ENCOUNTER — TELEPHONE (OUTPATIENT)
Dept: ONCOLOGY | Facility: CLINIC | Age: 52
End: 2017-04-03

## 2017-04-03 ENCOUNTER — TELEPHONE (OUTPATIENT)
Dept: GENERAL RADIOLOGY | Facility: HOSPITAL | Age: 52
End: 2017-04-03

## 2017-04-03 DIAGNOSIS — C18.7 MALIGNANT NEOPLASM OF SIGMOID COLON (HCC): Primary | ICD-10-CM

## 2017-04-03 NOTE — TELEPHONE ENCOUNTER
Spoke with patient's . Previous message was incorrect. They have not decided to go with Dr Bautista, they actually wanted Dr Johnson opinion on Dr Schuler in [place]. They have heard good things about her. Informed them Dr Johnson is rounding this week but I will send him an email asking. I will get back to them once he responds. They v/u

## 2017-04-03 NOTE — TELEPHONE ENCOUNTER
----- Message from Leyla Caba RN sent at 4/3/2017  3:38 PM EDT -----  Dr Johnson spoke to Dr Brenna Bautista and apparently she told him she could see patient on 4/12 or 4/14 for surgery or prepare surgery if patient decided not to go to [facility]. Patient has decided not to go and wants Dr Bautista to put in. Can you call office and make sure this gets set up. I will put in referral orders.

## 2017-04-03 NOTE — TELEPHONE ENCOUNTER
----- Message from Deedee Walden sent at 4/3/2017  3:29 PM EDT -----  Contact: [phone] Amos   Calling about possibly having surgery done here in town on colon instead of having to travel to [facility]. With Dr. Bautista ?

## 2017-04-03 NOTE — TELEPHONE ENCOUNTER
Attempted to call patient back. No answer. LVM for them to call us back. Will go ahead and place referral for Dr Brenna Bautista and have scheduling call to get appt set up as discussed with Dr Johnson

## 2017-04-04 ENCOUNTER — TELEPHONE (OUTPATIENT)
Dept: ONCOLOGY | Facility: CLINIC | Age: 52
End: 2017-04-04

## 2017-04-04 ENCOUNTER — TELEPHONE (OUTPATIENT)
Dept: GENERAL RADIOLOGY | Facility: HOSPITAL | Age: 52
End: 2017-04-04

## 2017-04-04 DIAGNOSIS — C18.7 MALIGNANT NEOPLASM OF SIGMOID COLON (HCC): Primary | ICD-10-CM

## 2017-04-04 DIAGNOSIS — C20 RECTAL CANCER METASTASIZED TO LIVER (HCC): ICD-10-CM

## 2017-04-04 DIAGNOSIS — C78.7 RECTAL CANCER METASTASIZED TO LIVER (HCC): ICD-10-CM

## 2017-04-04 NOTE — TELEPHONE ENCOUNTER
Spoke with patient's  and let him know Dr Johnson is ok with patient going to Dr Schuler. Referral order placed.

## 2017-04-04 NOTE — TELEPHONE ENCOUNTER
Patient calling back again to see if Dr Johnson had responded. New inbasket message sent to Dr Johnson about Dr Schuler. Will let patient know once we have heard from him

## 2017-04-04 NOTE — TELEPHONE ENCOUNTER
Referral closed for Dr Bautista. We will place a new referral if patient decided to go with Dr Bautista. They are currently considering another physician that they have had experience with in the past.

## 2017-04-04 NOTE — TELEPHONE ENCOUNTER
----- Message from Deedee Walden sent at 4/4/2017  3:27 PM EDT -----  Contact: remy [phone]   Asking about progress with Dr. Schuelr has questions from yesterday's discussion.

## 2017-04-04 NOTE — TELEPHONE ENCOUNTER
----- Message from Deedee Walden sent at 4/4/2017  3:13 PM EDT -----  Contact: [phone] Sydney Bautista    Asking if pt wants to have surgery still with Dr. Bautista ? If so they can work pt in .

## 2017-04-04 NOTE — TELEPHONE ENCOUNTER
----- Message from Zheng Johnson MD sent at 4/4/2017  3:38 PM EDT -----  OK FOR ANIL   ----- Message -----     From: Leyla Caba RN     Sent: 4/4/2017   3:18 PM       To: Zheng Johnson MD    Pt calling to see if you know Dr Schuler -colorectal surgeon. They are considering going to her in [place] and want your opinion?   I know you already suggested Dr Bauitsta to them, however they think they would rather use Dr Schuler.

## 2017-04-04 NOTE — TELEPHONE ENCOUNTER
----- Message from Leyla Caba RN sent at 4/4/2017  3:45 PM EDT -----  Order for referral to Dr Ganga jacob. Please contact them to set up for patient. Pt would like to get scheduled asap and things have been postponed with them trying to make a decision on physician.

## 2017-04-13 ENCOUNTER — TELEPHONE (OUTPATIENT)
Dept: ONCOLOGY | Facility: CLINIC | Age: 52
End: 2017-04-13

## 2017-04-13 NOTE — TELEPHONE ENCOUNTER
----- Message from Jennifer Clark sent at 4/13/2017  9:27 AM EDT -----  Contact:    Pt  is calling to see if the doctor spoke to dr. Aguilar.    [facility]    [phone]      Patient states that they MD is trying to get a hold of Dr. Johnson, but Dr. Johnson is out of the office this week on vacation.  Explained that the MD could call the office phone number and ask to speak with MD who is covering for Dr. Johnson or he could wait until Monday to call for Dr. Johnson.  Patient v/u.

## 2017-04-14 ENCOUNTER — HOSPITAL ENCOUNTER (OUTPATIENT)
Dept: LAB | Facility: HOSPITAL | Age: 52
Setting detail: SPECIMEN
Discharge: HOME | End: 2017-04-14

## 2017-04-26 ENCOUNTER — TELEPHONE (OUTPATIENT)
Dept: ONCOLOGY | Facility: CLINIC | Age: 52
End: 2017-04-26

## 2017-04-26 NOTE — TELEPHONE ENCOUNTER
----- Message from Mabel Bill sent at 4/26/2017 10:18 AM EDT -----  Regarding: see note below  Per Dr Johnson please check and see if patient is coming 4/27 maybe at [facility]

## 2017-04-27 ENCOUNTER — LAB (OUTPATIENT)
Dept: LAB | Facility: HOSPITAL | Age: 52
End: 2017-04-27

## 2017-04-27 ENCOUNTER — OFFICE VISIT (OUTPATIENT)
Dept: ONCOLOGY | Facility: CLINIC | Age: 52
End: 2017-04-27

## 2017-04-27 VITALS
OXYGEN SATURATION: 100 % | WEIGHT: 109.9 LBS | RESPIRATION RATE: 16 BRPM | HEIGHT: 64 IN | SYSTOLIC BLOOD PRESSURE: 110 MMHG | TEMPERATURE: 98.1 F | DIASTOLIC BLOOD PRESSURE: 76 MMHG | HEART RATE: 84 BPM | BODY MASS INDEX: 18.76 KG/M2

## 2017-04-27 DIAGNOSIS — Z45.2 FITTING AND ADJUSTMENT OF VASCULAR CATHETER: ICD-10-CM

## 2017-04-27 DIAGNOSIS — C78.7 METASTATIC CANCER TO LIVER (HCC): ICD-10-CM

## 2017-04-27 DIAGNOSIS — C20 RECTAL CANCER METASTASIZED TO LIVER (HCC): Primary | ICD-10-CM

## 2017-04-27 DIAGNOSIS — C18.7 MALIGNANT NEOPLASM OF SIGMOID COLON (HCC): ICD-10-CM

## 2017-04-27 DIAGNOSIS — C78.7 RECTAL CANCER METASTASIZED TO LIVER (HCC): Primary | ICD-10-CM

## 2017-04-27 PROCEDURE — 99214 OFFICE O/P EST MOD 30 MIN: CPT | Performed by: INTERNAL MEDICINE

## 2017-04-27 NOTE — PROGRESS NOTES
Subjective .     REASONS FOR FOLLOW UP:  Metastatic SIGMOID UPPER rectal cancer to the liver.  Initiated chemotherapy with FOLFOX-6 9/1/2016.     HISTORY OF PRESENT ILLNESS:  The patient is a 51 y.o. year old female with the above-mentioned history,   The patient presents today to the office for further followup.    Since the previous visit the patient has finally decided to go to the [facility] to proceed with her surgery for her low sigmoid high rectal cancer. She went to [place] last week on Tuesday, she had surgery last Friday and she came home this Tuesday. She had a partial colectomy along with an ileostomy and she has a drain tube in the left lower quadrant of her abdomen. So far the patient has not had any fevers or chills. She has pain and discomfort from the drainage tube but she has not had any other new issues. Her appetite is variable. She is drinking liquids okay and her ileostomy is working well. She has no fevers, no chills. No abdominal distention. Urination is ongoing with normal volume, no hematuria and she has no rectal passage of any material at this time. She has no swelling in her lower extremities. She has no cough, no shortness of breath. Her port has not produced any difficulties or problems.    I had the opportunity to talk on the telephone also with Dr. Schuler who saw the patient for a second opinion before she went to [place].                   Past Medical History:   Diagnosis Date   • Anemia    • Colon cancer     NEW DIAGNOSIS   • Fatigue     secondary to chemotherapy    • H/O foreign travel 03/2016    formerly [facility], [place]   • History of transfusion     1999 AFTER TUBAL RUPTURE   • Hyperlipidemia    • Liver cancer    • Middle cerebral artery aneurysm      Past Surgical History:   Procedure Laterality Date   • COLONOSCOPY      MAY 2016   • CRANIOTOMY  2004, 2005    Cerebral aneurysm   • LAPAROSCOPY FOR ECTOPIC PREGNANCY  09/1997   • LIVER SURGERY      MASS REMOVED   • [place] INSJ  "TUNNELED CVC W/O SUBQ PORT/ AGE 3 YR/> Right 8/26/2016    Procedure: MEDIPORT PLACEMENT ;  Surgeon: Praful Holden MD;  Location: [facility];  Service: Vascular   • SINUS SURGERY       Medications: The current medication list was reviewed in the EMR.    ALLERGIES:   No Known Allergies    SOCIAL HISTORY:       Social History   Substance Use Topics   • Smoking status: Never Smoker   • Smokeless tobacco: Never Used   • Alcohol use 3.0 oz/week     5 Glasses of wine per week      Comment: Social     FAMILY HISTORY:  Family History   Problem Relation Age of Onset   • Cerebral aneurysm Mother    • COPD Father        REVIEW OF SYSTEMS:  PAIN: See VITAL SIGNS below.   GENERAL: No change in appetite or weight, no fevers, chills or  sweats.   SKIN: No rashes or nonhealing lesions. Pale no jaundice  HEME/LYMPH: SIGNIFICANT anemia,NO easy bruising, bleeding or  swollen nodes.   EYES: No vision changes or diplopia.   ENT: No tinnitus, hearing loss, gum bleeding, epistaxis, hoarseness or dysphagia.   RESPIRATORY: NO cough, IMPROVED shortness of breath,NO hemoptysis NO wheezing.   CVS: No chest pain,NO  palpitations, orthopnea,AND dyspnea on exertion, NO PND.   GI: POSTSURGICAL abdominal pain (see HPI), NO nausea, vomiting, constipation, diarrhea, melena or hematochezia.   : No dysuria or hematuria. No abnormal vaginal discharge or bleeding.   MUSCULOSKELETAL: No bone pain or joint stiffness..  NEUROLOGICAL: No dizziness, global weakness, loss of consciousness or seizures.    PSYCHIATRIC: No increased nervousness, mood changes or  depression.     Objective    Vitals:    04/27/17 1100   BP: 110/76   Pulse: 84   Resp: 16   Temp: 98.1 °F (36.7 °C)   SpO2: 100%   Weight: 109 lb 14.4 oz (49.9 kg)   Height: 64.17\" (163 cm)   PainSc: 1  Comment: pain from drain     Current Status 4/27/2017   ECOG score 1      PHYSICAL EXAM:    GENERAL:  Well-developed, well-nourished in no acute distress.   SKIN:  Warm, dry without rashes, " purpura or petechiae.  HEAD:  Normocephalic.  EYES:  Pupils equal, round and reactive to light.  EOMs intact.  Conjunctivae normal.  EARS:  Hearing intact.  NOSE:  Septum midline.  No excoriations or nasal discharge.  MOUTH:  Tongue is well-papillated; no stomatitis or ulcers.  Buccal dryness noted. Lips normal.  THROAT:  Oropharynx without lesions or exudates.  NECK:  Supple with good range of motion; no thyromegaly or masses, no JVD.  LYMPHATICS:  No cervical, supraclavicular, axillary or inguinal adenopathy.  CHEST:  Lungs clear to auscultation bilaterally.    CARDIAC:  NORMAL rate and rhythm without murmurs, rubs or gallops. Normal S1,S2.  ABDOMEN:  Soft, nontender with no organomegaly or masses. Bowel sounds present. Ileostomy rlq, drain tube llq, no distention , minimal pain, good bs  .EXTREMITIES:  No clubbing, cyanosis NO edema.   NEUROLOGICAL:  Cranial Nerves II-XII grossly intact.  No focal neurological deficits.  PSYCHIATRIC:  Normal affect and mood.        RECENT LABS:  Lab Results   Component Value Date    WBC 4.25 03/30/2017    HGB 11.5 03/30/2017    HCT 36.5 03/30/2017    MCV 85.5 03/30/2017     03/30/2017     Lab Results   Component Value Date    GLUCOSE 132 (H) 03/30/2017    BUN 7 03/30/2017    CREATININE 0.55 (L) 03/30/2017    EGFRIFNONA 116 03/30/2017    EGFRIFAFRI  09/15/2016      Comment:      <15 Indicative of kidney failure.    BCR 12.7 03/30/2017    CO2 22.1 03/30/2017    CALCIUM 9.1 03/30/2017    ALBUMIN 3.80 03/30/2017    LABIL2 1.4 03/30/2017    AST 25 03/30/2017    ALT 15 03/30/2017           Assessment/Plan      1. Metastatic rectal cancer to the liver,  KRAS negative, BRAF negative, MSI stable, NRAS negative.   The patient has been treated with FOLFOX chemotherapy regimen and she has interruptions related to her surgery at [facility] around 12/2016.   During the previous visit given the fact that the PET scan documented that the only site of disease activity was in the  rectosigmoid area we advised the patient to proceed with surgery. She was dubious about having the surgery in [place]. She was seen by Dr. Schuler with whom I have spoken on the telephone this week. She saw the patient and she thought because the tumor was still attached to surrounding tissues that she was going to need to have an ileostomy. The patient seems to not like to hear that kind of thing and she went to [place] last Tuesday and she proceeded with laparotomy and removal of the colon on Friday. She had an ileostomy and she had a drainage tube in the left lower quadrant. She returned to [place] this Tuesday 2 days ago. So far she has not had any complications from the surgery, no infections. The ileostomy is working well. The drain tube is giving her difficulties draining close to 100 cc a day. She was told by the surgeons that once that the fluid drainage by the tube is less than 75 cc a day this could be removed and I easily could perform this in the office.    On the other hand she wants to have the reversal of her ileostomy here in [place] instead of going back to [place]. She has an appointment to go back to [place] at some point in May. Also while in [place] she had a new MRI scan of the liver. We are in the process of finding the reports of this and also finding the reports of the pathological specimen from the colon.    Obviously under the present circumstances and being post-surgical less than a week the patient is not going to entertain any form of treatment for now. I made her an appointment to come back and see me in 3 weeks with a CBC, CMP, and CEA level and again will obtain the pathology report.    I will contact Dr. Schuler for the patient to see her and eventually to plan reversal of her ileostomy.    The patient was advised in regard to keep proper hydration depending on the amount of the output of the ileostomy. She is not requiring any pain medicine. She will give me a call next week and  once the drain decreases in the left lower quadrant I will be glad to remove this, which will be very simple.                  Zheng Johnson MD  11/17/2016

## 2017-05-01 ENCOUNTER — APPOINTMENT (OUTPATIENT)
Dept: LAB | Facility: HOSPITAL | Age: 52
End: 2017-05-01

## 2017-05-01 ENCOUNTER — OFFICE VISIT (OUTPATIENT)
Dept: ONCOLOGY | Facility: CLINIC | Age: 52
End: 2017-05-01

## 2017-05-01 VITALS
TEMPERATURE: 97.6 F | OXYGEN SATURATION: 100 % | WEIGHT: 110 LBS | RESPIRATION RATE: 16 BRPM | BODY MASS INDEX: 18.78 KG/M2 | HEART RATE: 80 BPM | SYSTOLIC BLOOD PRESSURE: 120 MMHG | HEIGHT: 64 IN | DIASTOLIC BLOOD PRESSURE: 64 MMHG

## 2017-05-01 DIAGNOSIS — C78.7 RECTAL CANCER METASTASIZED TO LIVER (HCC): Primary | ICD-10-CM

## 2017-05-01 DIAGNOSIS — C78.7 METASTATIC CANCER TO LIVER (HCC): ICD-10-CM

## 2017-05-01 DIAGNOSIS — C20 RECTAL CANCER METASTASIZED TO LIVER (HCC): Primary | ICD-10-CM

## 2017-05-01 PROCEDURE — G0463 HOSPITAL OUTPT CLINIC VISIT: HCPCS | Performed by: INTERNAL MEDICINE

## 2017-05-01 PROCEDURE — 99214 OFFICE O/P EST MOD 30 MIN: CPT | Performed by: INTERNAL MEDICINE

## 2017-05-16 ENCOUNTER — LAB (OUTPATIENT)
Dept: OTHER | Facility: HOSPITAL | Age: 52
End: 2017-05-16

## 2017-05-16 ENCOUNTER — INFUSION (OUTPATIENT)
Dept: ONCOLOGY | Facility: HOSPITAL | Age: 52
End: 2017-05-16

## 2017-05-16 ENCOUNTER — OFFICE VISIT (OUTPATIENT)
Dept: ONCOLOGY | Facility: CLINIC | Age: 52
End: 2017-05-16

## 2017-05-16 VITALS
WEIGHT: 113.6 LBS | BODY MASS INDEX: 19.39 KG/M2 | OXYGEN SATURATION: 100 % | SYSTOLIC BLOOD PRESSURE: 115 MMHG | TEMPERATURE: 98.6 F | RESPIRATION RATE: 16 BRPM | HEART RATE: 99 BPM | HEIGHT: 64 IN | DIASTOLIC BLOOD PRESSURE: 70 MMHG

## 2017-05-16 DIAGNOSIS — C78.7 RECTAL CANCER METASTASIZED TO LIVER (HCC): ICD-10-CM

## 2017-05-16 DIAGNOSIS — C20 RECTAL CANCER METASTASIZED TO LIVER (HCC): Primary | ICD-10-CM

## 2017-05-16 DIAGNOSIS — C20 RECTAL CANCER METASTASIZED TO LIVER (HCC): ICD-10-CM

## 2017-05-16 DIAGNOSIS — C18.7 MALIGNANT NEOPLASM OF SIGMOID COLON (HCC): ICD-10-CM

## 2017-05-16 DIAGNOSIS — Z45.2 FITTING AND ADJUSTMENT OF VASCULAR CATHETER: Primary | ICD-10-CM

## 2017-05-16 DIAGNOSIS — Z45.2 FITTING AND ADJUSTMENT OF VASCULAR CATHETER: ICD-10-CM

## 2017-05-16 DIAGNOSIS — C78.7 RECTAL CANCER METASTASIZED TO LIVER (HCC): Primary | ICD-10-CM

## 2017-05-16 DIAGNOSIS — C78.7 METASTATIC CANCER TO LIVER (HCC): ICD-10-CM

## 2017-05-16 LAB
ALBUMIN SERPL-MCNC: 4 G/DL (ref 3.5–5.2)
ALBUMIN/GLOB SERPL: 1.3 G/DL
ALP SERPL-CCNC: 98 U/L (ref 39–117)
ALT SERPL W P-5'-P-CCNC: 22 U/L (ref 1–33)
ANION GAP SERPL CALCULATED.3IONS-SCNC: 11.7 MMOL/L
AST SERPL-CCNC: 33 U/L (ref 1–32)
BASOPHILS # BLD AUTO: 0.04 10*3/MM3 (ref 0–0.2)
BASOPHILS NFR BLD AUTO: 0.6 % (ref 0–1.5)
BILIRUB SERPL-MCNC: 0.2 MG/DL (ref 0.1–1.2)
BUN BLD-MCNC: 11 MG/DL (ref 6–20)
BUN/CREAT SERPL: 16.7 (ref 7–25)
CALCIUM SPEC-SCNC: 9.8 MG/DL (ref 8.6–10.5)
CEA SERPL-MCNC: 3.75 NG/ML
CHLORIDE SERPL-SCNC: 103 MMOL/L (ref 98–107)
CO2 SERPL-SCNC: 27.3 MMOL/L (ref 22–29)
CREAT BLD-MCNC: 0.66 MG/DL (ref 0.57–1)
DEPRECATED RDW RBC AUTO: 50.5 FL (ref 37–54)
EOSINOPHIL # BLD AUTO: 0.26 10*3/MM3 (ref 0–0.7)
EOSINOPHIL NFR BLD AUTO: 3.8 % (ref 0.3–6.2)
ERYTHROCYTE [DISTWIDTH] IN BLOOD BY AUTOMATED COUNT: 15.9 % (ref 11.7–13)
GFR SERPL CREATININE-BSD FRML MDRD: 94 ML/MIN/1.73
GLOBULIN UR ELPH-MCNC: 3 GM/DL
GLUCOSE BLD-MCNC: 134 MG/DL (ref 65–99)
HCT VFR BLD AUTO: 32.7 % (ref 35.6–45.5)
HGB BLD-MCNC: 10.6 G/DL (ref 11.9–15.5)
IMM GRANULOCYTES # BLD: 0.02 10*3/MM3 (ref 0–0.03)
IMM GRANULOCYTES NFR BLD: 0.3 % (ref 0–0.5)
LYMPHOCYTES # BLD AUTO: 1.72 10*3/MM3 (ref 0.9–4.8)
LYMPHOCYTES NFR BLD AUTO: 25.2 % (ref 19.6–45.3)
MCH RBC QN AUTO: 27.9 PG (ref 26.9–32)
MCHC RBC AUTO-ENTMCNC: 32.4 G/DL (ref 32.4–36.3)
MCV RBC AUTO: 86.1 FL (ref 80.5–98.2)
MONOCYTES # BLD AUTO: 0.54 10*3/MM3 (ref 0.2–1.2)
MONOCYTES NFR BLD AUTO: 7.9 % (ref 5–12)
NEUTROPHILS # BLD AUTO: 4.24 10*3/MM3 (ref 1.9–8.1)
NEUTROPHILS NFR BLD AUTO: 62.2 % (ref 42.7–76)
NRBC BLD MANUAL-RTO: 0 /100 WBC (ref 0–0)
PLATELET # BLD AUTO: 260 10*3/MM3 (ref 140–500)
PMV BLD AUTO: 9.1 FL (ref 6–12)
POTASSIUM BLD-SCNC: 3.9 MMOL/L (ref 3.5–5.2)
PROT SERPL-MCNC: 7 G/DL (ref 6–8.5)
RBC # BLD AUTO: 3.8 10*6/MM3 (ref 3.9–5.2)
SODIUM BLD-SCNC: 142 MMOL/L (ref 136–145)
WBC NRBC COR # BLD: 6.82 10*3/MM3 (ref 4.5–10.7)

## 2017-05-16 PROCEDURE — 96523 IRRIG DRUG DELIVERY DEVICE: CPT | Performed by: INTERNAL MEDICINE

## 2017-05-16 PROCEDURE — 99213 OFFICE O/P EST LOW 20 MIN: CPT | Performed by: INTERNAL MEDICINE

## 2017-05-16 PROCEDURE — 36415 COLL VENOUS BLD VENIPUNCTURE: CPT

## 2017-05-16 PROCEDURE — 80053 COMPREHEN METABOLIC PANEL: CPT | Performed by: INTERNAL MEDICINE

## 2017-05-16 PROCEDURE — 25010000002 HEPARIN FLUSH (PORCINE) 100 UNIT/ML SOLUTION: Performed by: INTERNAL MEDICINE

## 2017-05-16 PROCEDURE — 85025 COMPLETE CBC W/AUTO DIFF WBC: CPT | Performed by: INTERNAL MEDICINE

## 2017-05-16 PROCEDURE — 82378 CARCINOEMBRYONIC ANTIGEN: CPT | Performed by: INTERNAL MEDICINE

## 2017-05-16 RX ORDER — DEXMETHYLPHENIDATE HYDROCHLORIDE 15 MG/1
CAPSULE, EXTENDED RELEASE ORAL
Refills: 0 | COMMUNITY
Start: 2017-05-10 | End: 2018-10-05 | Stop reason: DRUGHIGH

## 2017-05-16 RX ORDER — SODIUM CHLORIDE 0.9 % (FLUSH) 0.9 %
10 SYRINGE (ML) INJECTION AS NEEDED
Status: CANCELLED | OUTPATIENT
Start: 2017-05-16

## 2017-05-16 RX ORDER — SODIUM CHLORIDE 0.9 % (FLUSH) 0.9 %
10 SYRINGE (ML) INJECTION AS NEEDED
Status: DISCONTINUED | OUTPATIENT
Start: 2017-05-16 | End: 2017-05-16 | Stop reason: HOSPADM

## 2017-05-16 RX ADMIN — Medication 20 ML: at 15:08

## 2017-05-16 RX ADMIN — SODIUM CHLORIDE, PRESERVATIVE FREE 500 UNITS: 5 INJECTION INTRAVENOUS at 15:08

## 2017-05-23 ENCOUNTER — TELEPHONE (OUTPATIENT)
Dept: ONCOLOGY | Facility: CLINIC | Age: 52
End: 2017-05-23

## 2017-05-24 ENCOUNTER — TELEPHONE (OUTPATIENT)
Dept: ONCOLOGY | Facility: CLINIC | Age: 52
End: 2017-05-24

## 2017-05-25 ENCOUNTER — TELEPHONE (OUTPATIENT)
Dept: ONCOLOGY | Facility: CLINIC | Age: 52
End: 2017-05-25

## 2017-05-26 ENCOUNTER — TELEPHONE (OUTPATIENT)
Dept: ONCOLOGY | Facility: CLINIC | Age: 52
End: 2017-05-26

## 2017-05-31 ENCOUNTER — OFFICE VISIT (OUTPATIENT)
Dept: ONCOLOGY | Facility: CLINIC | Age: 52
End: 2017-05-31

## 2017-05-31 ENCOUNTER — INFUSION (OUTPATIENT)
Dept: ONCOLOGY | Facility: HOSPITAL | Age: 52
End: 2017-05-31

## 2017-05-31 VITALS
HEART RATE: 92 BPM | DIASTOLIC BLOOD PRESSURE: 70 MMHG | RESPIRATION RATE: 16 BRPM | SYSTOLIC BLOOD PRESSURE: 112 MMHG | WEIGHT: 112.2 LBS | HEIGHT: 64 IN | TEMPERATURE: 97.7 F | BODY MASS INDEX: 19.15 KG/M2

## 2017-05-31 DIAGNOSIS — C78.7 RECTAL CANCER METASTASIZED TO LIVER (HCC): Primary | ICD-10-CM

## 2017-05-31 DIAGNOSIS — C78.7 METASTATIC CANCER TO LIVER (HCC): ICD-10-CM

## 2017-05-31 DIAGNOSIS — Z45.2 FITTING AND ADJUSTMENT OF VASCULAR CATHETER: ICD-10-CM

## 2017-05-31 DIAGNOSIS — C20 RECTAL CANCER METASTASIZED TO LIVER (HCC): ICD-10-CM

## 2017-05-31 DIAGNOSIS — C78.7 RECTAL CANCER METASTASIZED TO LIVER (HCC): ICD-10-CM

## 2017-05-31 DIAGNOSIS — C20 RECTAL CANCER METASTASIZED TO LIVER (HCC): Primary | ICD-10-CM

## 2017-05-31 LAB
ALBUMIN SERPL-MCNC: 3.9 G/DL (ref 3.5–5.2)
ALBUMIN/GLOB SERPL: 1.3 G/DL (ref 1.1–2.4)
ALP SERPL-CCNC: 102 U/L (ref 38–116)
ALT SERPL W P-5'-P-CCNC: 20 U/L (ref 0–33)
ANION GAP SERPL CALCULATED.3IONS-SCNC: 13.7 MMOL/L
AST SERPL-CCNC: 29 U/L (ref 0–32)
BASOPHILS # BLD AUTO: 0.04 10*3/MM3 (ref 0–0.1)
BASOPHILS NFR BLD AUTO: 0.5 % (ref 0–1.1)
BILIRUB SERPL-MCNC: 0.3 MG/DL (ref 0.1–1.2)
BUN BLD-MCNC: 8 MG/DL (ref 6–20)
BUN/CREAT SERPL: 13.1 (ref 7.3–30)
CALCIUM SPEC-SCNC: 9.4 MG/DL (ref 8.5–10.2)
CEA SERPL-MCNC: 2.84 NG/ML
CHLORIDE SERPL-SCNC: 102 MMOL/L (ref 98–107)
CO2 SERPL-SCNC: 23.3 MMOL/L (ref 22–29)
CREAT BLD-MCNC: 0.61 MG/DL (ref 0.6–1.1)
DEPRECATED RDW RBC AUTO: 45.9 FL (ref 37–49)
EOSINOPHIL # BLD AUTO: 0.17 10*3/MM3 (ref 0–0.36)
EOSINOPHIL NFR BLD AUTO: 2.1 % (ref 1–5)
ERYTHROCYTE [DISTWIDTH] IN BLOOD BY AUTOMATED COUNT: 14.4 % (ref 11.7–14.5)
GFR SERPL CREATININE-BSD FRML MDRD: 103 ML/MIN/1.73
GLOBULIN UR ELPH-MCNC: 3 GM/DL (ref 1.8–3.5)
GLUCOSE BLD-MCNC: 115 MG/DL (ref 74–124)
HCT VFR BLD AUTO: 36.5 % (ref 34–45)
HGB BLD-MCNC: 11.8 G/DL (ref 11.5–14.9)
IMM GRANULOCYTES # BLD: 0.03 10*3/MM3 (ref 0–0.03)
IMM GRANULOCYTES NFR BLD: 0.4 % (ref 0–0.5)
LYMPHOCYTES # BLD AUTO: 1.49 10*3/MM3 (ref 1–3.5)
LYMPHOCYTES NFR BLD AUTO: 18.1 % (ref 20–49)
MCH RBC QN AUTO: 28.6 PG (ref 27–33)
MCHC RBC AUTO-ENTMCNC: 32.3 G/DL (ref 32–35)
MCV RBC AUTO: 88.6 FL (ref 83–97)
MONOCYTES # BLD AUTO: 0.62 10*3/MM3 (ref 0.25–0.8)
MONOCYTES NFR BLD AUTO: 7.5 % (ref 4–12)
NEUTROPHILS # BLD AUTO: 5.89 10*3/MM3 (ref 1.5–7)
NEUTROPHILS NFR BLD AUTO: 71.4 % (ref 39–75)
NRBC BLD MANUAL-RTO: 0 /100 WBC (ref 0–0)
PLATELET # BLD AUTO: 228 10*3/MM3 (ref 150–375)
PMV BLD AUTO: 9.4 FL (ref 8.9–12.1)
POTASSIUM BLD-SCNC: 4.1 MMOL/L (ref 3.5–4.7)
PROT SERPL-MCNC: 6.9 G/DL (ref 6.3–8)
RBC # BLD AUTO: 4.12 10*6/MM3 (ref 3.9–5)
SODIUM BLD-SCNC: 139 MMOL/L (ref 134–145)
WBC NRBC COR # BLD: 8.24 10*3/MM3 (ref 4–10)

## 2017-05-31 PROCEDURE — 96368 THER/DIAG CONCURRENT INF: CPT | Performed by: INTERNAL MEDICINE

## 2017-05-31 PROCEDURE — 96375 TX/PRO/DX INJ NEW DRUG ADDON: CPT | Performed by: INTERNAL MEDICINE

## 2017-05-31 PROCEDURE — 96416 CHEMO PROLONG INFUSE W/PUMP: CPT | Performed by: INTERNAL MEDICINE

## 2017-05-31 PROCEDURE — 80053 COMPREHEN METABOLIC PANEL: CPT

## 2017-05-31 PROCEDURE — 25010000002 FLUOROURACIL PER 500 MG: Performed by: INTERNAL MEDICINE

## 2017-05-31 PROCEDURE — 25010000002 LEUCOVORIN CALCIUM PER 50 MG: Performed by: INTERNAL MEDICINE

## 2017-05-31 PROCEDURE — 82378 CARCINOEMBRYONIC ANTIGEN: CPT | Performed by: INTERNAL MEDICINE

## 2017-05-31 PROCEDURE — 96413 CHEMO IV INFUSION 1 HR: CPT | Performed by: INTERNAL MEDICINE

## 2017-05-31 PROCEDURE — 25010000002 PALONOSETRON PER 25 MCG: Performed by: INTERNAL MEDICINE

## 2017-05-31 PROCEDURE — 25010000002 DEXAMETHASONE SODIUM PHOSPHATE 10 MG/ML SOLUTION 1 ML VIAL: Performed by: INTERNAL MEDICINE

## 2017-05-31 PROCEDURE — 85025 COMPLETE CBC W/AUTO DIFF WBC: CPT

## 2017-05-31 PROCEDURE — 25010000002 OXALIPLATIN PER 0.5 MG: Performed by: INTERNAL MEDICINE

## 2017-05-31 PROCEDURE — 99214 OFFICE O/P EST MOD 30 MIN: CPT | Performed by: INTERNAL MEDICINE

## 2017-05-31 PROCEDURE — 96411 CHEMO IV PUSH ADDL DRUG: CPT | Performed by: INTERNAL MEDICINE

## 2017-05-31 PROCEDURE — 96415 CHEMO IV INFUSION ADDL HR: CPT | Performed by: INTERNAL MEDICINE

## 2017-05-31 RX ORDER — DEXTROSE MONOHYDRATE 50 MG/ML
250 INJECTION, SOLUTION INTRAVENOUS ONCE
Status: CANCELLED | OUTPATIENT
Start: 2017-05-31

## 2017-05-31 RX ORDER — DEXTROSE MONOHYDRATE 50 MG/ML
250 INJECTION, SOLUTION INTRAVENOUS ONCE
Status: COMPLETED | OUTPATIENT
Start: 2017-05-31 | End: 2017-05-31

## 2017-05-31 RX ORDER — PALONOSETRON 0.05 MG/ML
0.25 INJECTION, SOLUTION INTRAVENOUS ONCE
Status: COMPLETED | OUTPATIENT
Start: 2017-05-31 | End: 2017-05-31

## 2017-05-31 RX ORDER — FLUOROURACIL 50 MG/ML
400 INJECTION, SOLUTION INTRAVENOUS ONCE
Status: CANCELLED | OUTPATIENT
Start: 2017-05-31

## 2017-05-31 RX ORDER — PALONOSETRON 0.05 MG/ML
0.25 INJECTION, SOLUTION INTRAVENOUS ONCE
Status: CANCELLED | OUTPATIENT
Start: 2017-05-31

## 2017-05-31 RX ORDER — FLUOROURACIL 50 MG/ML
400 INJECTION, SOLUTION INTRAVENOUS ONCE
Status: COMPLETED | OUTPATIENT
Start: 2017-05-31 | End: 2017-05-31

## 2017-05-31 RX ADMIN — DEXTROSE MONOHYDRATE 250 ML: 5 INJECTION, SOLUTION INTRAVENOUS at 11:52

## 2017-05-31 RX ADMIN — OXALIPLATIN 110 MG: 5 INJECTION, SOLUTION, CONCENTRATE INTRAVENOUS at 12:23

## 2017-05-31 RX ADMIN — FLUOROURACIL 3790 MG: 50 INJECTION, SOLUTION INTRAVENOUS at 14:21

## 2017-05-31 RX ADMIN — FLUOROURACIL 630 MG: 50 INJECTION, SOLUTION INTRAVENOUS at 14:21

## 2017-05-31 RX ADMIN — PALONOSETRON HYDROCHLORIDE 0.25 MG: 0.25 INJECTION INTRAVENOUS at 11:52

## 2017-05-31 RX ADMIN — LEUCOVORIN CALCIUM 630 MG: 350 INJECTION, POWDER, LYOPHILIZED, FOR SOLUTION INTRAMUSCULAR; INTRAVENOUS at 12:23

## 2017-05-31 RX ADMIN — DEXAMETHASONE SODIUM PHOSPHATE 12 MG: 10 INJECTION, SOLUTION INTRAMUSCULAR; INTRAVENOUS at 11:52

## 2017-06-01 ENCOUNTER — APPOINTMENT (OUTPATIENT)
Dept: ONCOLOGY | Facility: HOSPITAL | Age: 52
End: 2017-06-01

## 2017-06-01 ENCOUNTER — DOCUMENTATION (OUTPATIENT)
Dept: ONCOLOGY | Facility: CLINIC | Age: 52
End: 2017-06-01

## 2017-06-01 ENCOUNTER — APPOINTMENT (OUTPATIENT)
Dept: ONCOLOGY | Facility: CLINIC | Age: 52
End: 2017-06-01

## 2017-06-01 NOTE — PROGRESS NOTES
Pt presents to office concerned that her 5FU CIIV ball was infusing too fast.  Ball appeared to be half way thru.  Had Linda in pharm look at ball.  Her opinion was that it was infusing correctly.  Pt advised to observe and come in if she had any concerns at all.  Port site wnl.  Understanding noted

## 2017-06-02 ENCOUNTER — INFUSION (OUTPATIENT)
Dept: ONCOLOGY | Facility: HOSPITAL | Age: 52
End: 2017-06-02

## 2017-06-02 VITALS — BODY MASS INDEX: 19.57 KG/M2 | WEIGHT: 114.6 LBS

## 2017-06-02 DIAGNOSIS — Z45.2 FITTING AND ADJUSTMENT OF VASCULAR CATHETER: Primary | ICD-10-CM

## 2017-06-02 PROCEDURE — 96523 IRRIG DRUG DELIVERY DEVICE: CPT | Performed by: INTERNAL MEDICINE

## 2017-06-02 PROCEDURE — 25010000002 HEPARIN FLUSH (PORCINE) 100 UNIT/ML SOLUTION: Performed by: INTERNAL MEDICINE

## 2017-06-02 RX ORDER — SODIUM CHLORIDE 0.9 % (FLUSH) 0.9 %
10 SYRINGE (ML) INJECTION AS NEEDED
Status: CANCELLED | OUTPATIENT
Start: 2017-06-02

## 2017-06-02 RX ORDER — SODIUM CHLORIDE 0.9 % (FLUSH) 0.9 %
10 SYRINGE (ML) INJECTION AS NEEDED
Status: DISCONTINUED | OUTPATIENT
Start: 2017-06-02 | End: 2017-06-02 | Stop reason: HOSPADM

## 2017-06-02 RX ADMIN — Medication 10 ML: at 11:15

## 2017-06-02 RX ADMIN — SODIUM CHLORIDE, PRESERVATIVE FREE 500 UNITS: 5 INJECTION INTRAVENOUS at 11:15

## 2017-06-06 ENCOUNTER — DOCUMENTATION (OUTPATIENT)
Dept: ONCOLOGY | Facility: CLINIC | Age: 52
End: 2017-06-06

## 2017-06-06 NOTE — CODE DOCUMENTATION
Called patient regarding her status due to 5FU infusion ball going in prior to estimated time, she states she is concerned as to why this is occurring to her, RN questioned her regarding daily activity, patient states she does shower and leaves the Infusion ball on the outside of shower, she worked outside at times, but not is direct sunlight as she should not do that due to chemotherapy. There is a flow restrictor taped to the patient's skin (as that is needed for best results), advised her to keep this regulator dry and out of the shower water. She states she thinks she feels worse when this occurs, her symptoms are battling nausea, dizziness, don't want to get out of bed and the slightest feel that she might develop mouth sores, she denies mouth sores and diarrhea (most common side effects of 5FU). Advised her also that with any chemo, the more she receives the more the body notices it, and generally recovers over the next couple weeks. She advised RN that she would let her  know of our conversion. Advised her I would contact [facility] or [facility] for suggestions. Suggestion from a Pharmacist from [facility] was to not tape the regulator and have her wear it on the outside of her clothes as long as the temperatures outside are warmer.

## 2017-06-12 ENCOUNTER — DOCUMENTATION (OUTPATIENT)
Dept: ONCOLOGY | Facility: CLINIC | Age: 52
End: 2017-06-12

## 2017-06-12 ENCOUNTER — OFFICE VISIT (OUTPATIENT)
Dept: ONCOLOGY | Facility: CLINIC | Age: 52
End: 2017-06-12

## 2017-06-12 ENCOUNTER — INFUSION (OUTPATIENT)
Dept: ONCOLOGY | Facility: HOSPITAL | Age: 52
End: 2017-06-12

## 2017-06-12 VITALS
HEIGHT: 64 IN | TEMPERATURE: 99 F | HEART RATE: 86 BPM | DIASTOLIC BLOOD PRESSURE: 70 MMHG | BODY MASS INDEX: 19.53 KG/M2 | WEIGHT: 114.4 LBS | RESPIRATION RATE: 16 BRPM | SYSTOLIC BLOOD PRESSURE: 112 MMHG

## 2017-06-12 DIAGNOSIS — Z45.2 FITTING AND ADJUSTMENT OF VASCULAR CATHETER: ICD-10-CM

## 2017-06-12 DIAGNOSIS — C78.7 METASTATIC CANCER TO LIVER (HCC): ICD-10-CM

## 2017-06-12 DIAGNOSIS — C78.7 RECTAL CANCER METASTASIZED TO LIVER (HCC): Primary | ICD-10-CM

## 2017-06-12 DIAGNOSIS — C20 RECTAL CANCER METASTASIZED TO LIVER (HCC): Primary | ICD-10-CM

## 2017-06-12 LAB
ALBUMIN SERPL-MCNC: 3.5 G/DL (ref 3.5–5.2)
ALBUMIN/GLOB SERPL: 1.2 G/DL (ref 1.1–2.4)
ALP SERPL-CCNC: 107 U/L (ref 38–116)
ALT SERPL W P-5'-P-CCNC: 16 U/L (ref 0–33)
ANION GAP SERPL CALCULATED.3IONS-SCNC: 12.3 MMOL/L
AST SERPL-CCNC: 24 U/L (ref 0–32)
BASOPHILS # BLD AUTO: 0.03 10*3/MM3 (ref 0–0.1)
BASOPHILS NFR BLD AUTO: 0.4 % (ref 0–1.1)
BILIRUB SERPL-MCNC: 0.2 MG/DL (ref 0.1–1.2)
BUN BLD-MCNC: 10 MG/DL (ref 6–20)
BUN/CREAT SERPL: 17.9 (ref 7.3–30)
CALCIUM SPEC-SCNC: 9 MG/DL (ref 8.5–10.2)
CHLORIDE SERPL-SCNC: 102 MMOL/L (ref 98–107)
CO2 SERPL-SCNC: 24.7 MMOL/L (ref 22–29)
CREAT BLD-MCNC: 0.56 MG/DL (ref 0.6–1.1)
DEPRECATED RDW RBC AUTO: 41.6 FL (ref 37–49)
EOSINOPHIL # BLD AUTO: 0.1 10*3/MM3 (ref 0–0.36)
EOSINOPHIL NFR BLD AUTO: 1.3 % (ref 1–5)
ERYTHROCYTE [DISTWIDTH] IN BLOOD BY AUTOMATED COUNT: 13.2 % (ref 11.7–14.5)
GFR SERPL CREATININE-BSD FRML MDRD: 113 ML/MIN/1.73
GLOBULIN UR ELPH-MCNC: 3 GM/DL (ref 1.8–3.5)
GLUCOSE BLD-MCNC: 145 MG/DL (ref 74–124)
HCT VFR BLD AUTO: 33.5 % (ref 34–45)
HGB BLD-MCNC: 11.1 G/DL (ref 11.5–14.9)
IMM GRANULOCYTES # BLD: 0.02 10*3/MM3 (ref 0–0.03)
IMM GRANULOCYTES NFR BLD: 0.3 % (ref 0–0.5)
LYMPHOCYTES # BLD AUTO: 1.23 10*3/MM3 (ref 1–3.5)
LYMPHOCYTES NFR BLD AUTO: 16.6 % (ref 20–49)
MCH RBC QN AUTO: 29.1 PG (ref 27–33)
MCHC RBC AUTO-ENTMCNC: 33.1 G/DL (ref 32–35)
MCV RBC AUTO: 87.9 FL (ref 83–97)
MONOCYTES # BLD AUTO: 0.57 10*3/MM3 (ref 0.25–0.8)
MONOCYTES NFR BLD AUTO: 7.7 % (ref 4–12)
NEUTROPHILS # BLD AUTO: 5.48 10*3/MM3 (ref 1.5–7)
NEUTROPHILS NFR BLD AUTO: 73.7 % (ref 39–75)
NRBC BLD MANUAL-RTO: 0 /100 WBC (ref 0–0)
PLATELET # BLD AUTO: 215 10*3/MM3 (ref 150–375)
PMV BLD AUTO: 9.1 FL (ref 8.9–12.1)
POTASSIUM BLD-SCNC: 3.9 MMOL/L (ref 3.5–4.7)
PROT SERPL-MCNC: 6.5 G/DL (ref 6.3–8)
RBC # BLD AUTO: 3.81 10*6/MM3 (ref 3.9–5)
SODIUM BLD-SCNC: 139 MMOL/L (ref 134–145)
WBC NRBC COR # BLD: 7.43 10*3/MM3 (ref 4–10)

## 2017-06-12 PROCEDURE — 25010000002 HEPARIN FLUSH (PORCINE) 100 UNIT/ML SOLUTION: Performed by: INTERNAL MEDICINE

## 2017-06-12 PROCEDURE — 85025 COMPLETE CBC W/AUTO DIFF WBC: CPT | Performed by: INTERNAL MEDICINE

## 2017-06-12 PROCEDURE — 80053 COMPREHEN METABOLIC PANEL: CPT | Performed by: INTERNAL MEDICINE

## 2017-06-12 PROCEDURE — 36415 COLL VENOUS BLD VENIPUNCTURE: CPT | Performed by: INTERNAL MEDICINE

## 2017-06-12 PROCEDURE — 99214 OFFICE O/P EST MOD 30 MIN: CPT | Performed by: INTERNAL MEDICINE

## 2017-06-12 PROCEDURE — 96523 IRRIG DRUG DELIVERY DEVICE: CPT | Performed by: INTERNAL MEDICINE

## 2017-06-12 PROCEDURE — 82378 CARCINOEMBRYONIC ANTIGEN: CPT | Performed by: INTERNAL MEDICINE

## 2017-06-12 RX ORDER — SODIUM CHLORIDE 0.9 % (FLUSH) 0.9 %
10 SYRINGE (ML) INJECTION AS NEEDED
Status: DISCONTINUED | OUTPATIENT
Start: 2017-06-12 | End: 2017-06-12 | Stop reason: HOSPADM

## 2017-06-12 RX ORDER — SODIUM CHLORIDE 0.9 % (FLUSH) 0.9 %
10 SYRINGE (ML) INJECTION AS NEEDED
Status: CANCELLED | OUTPATIENT
Start: 2017-06-12

## 2017-06-12 RX ORDER — CAPECITABINE 500 MG/1
TABLET, FILM COATED ORAL
Qty: 98 TABLET | Refills: 1 | Status: SHIPPED | OUTPATIENT
Start: 2017-06-12 | End: 2017-09-08 | Stop reason: SDUPTHER

## 2017-06-12 RX ORDER — AMOXICILLIN 500 MG/1
CAPSULE ORAL
COMMUNITY
Start: 2017-06-09 | End: 2017-07-17

## 2017-06-12 RX ADMIN — SODIUM CHLORIDE, PRESERVATIVE FREE 500 UNITS: 5 INJECTION INTRAVENOUS at 14:32

## 2017-06-12 RX ADMIN — Medication 10 ML: at 14:31

## 2017-06-12 NOTE — PROGRESS NOTES
"Rec call from Haley at the scheduling desk about \"pills\" for pt. I asked that Haley notify pt that I will investiate then call pt.   At the time of the call, I was not aware of any pills for pt. I have since rec a staff message from Dr Johnson-See below    MD Erika Guerra        Cc: Nona Alves RN                     She needs to start xeloda next Monday  2000 mg in am 1500 mg in pm 7 days on 7 days off, delay oxalolaltin for next week and iv every 2 weeks thereafter, no more 5 fu  And leucovorin       I attempted to go speak with pt but she had already left the scheduling desk. I will work on getting this for pt.    Andrzej DURAN submitted to [employer] via [employer]  "

## 2017-06-12 NOTE — PROGRESS NOTES
Subjective .     REASONS FOR FOLLOW UP:  Metastatic SIGMOID UPPER rectal cancer to the liver.  Initiated chemotherapy with FOLFOX-6 9/1/2016.     HISTORY OF PRESENT ILLNESS:  The patient is a 51 y.o. year old female with the above-mentioned history,      The patient is here today to discuss options of therapy in regard to an incident that the patient developed during the infusion of the FOLFOX regimen where the bulb that was supposed to deliver the 5FU was infused very quickly. This triggered complications in the patient including vertigo because of high concentration of the 5FU penetrating to the central nervous system. Also the patient had developed some mucositis, some nausea and some increased diarrhea. Most of these symptoms have subsided now. The patient also has developed a hernia of the small intestine into her ileostomy bag and a lot of large portion of her small intestine is hanging out into her abdominal wall inside of her ostomy bag. This is not pain neither is bleeding but is an inconvenience for the patient. She also has been seen by Dr. Schuler in preparation for reversal of her ileostomy at some point on 07/11/2017.     Please review the changes in her chemotherapy regimen based on the information available about the rapid infuser issue that we have discussed.                     Past Medical History:   Diagnosis Date   • Anemia    • Colon cancer     NEW DIAGNOSIS   • Fatigue     secondary to chemotherapy    • H/O foreign travel 03/2016    [facility] [place], [place]   • Hepatic metastases 10/2016    partial right hepatectomy [facility] 10/16   • History of transfusion     1999 AFTER TUBAL RUPTURE   • Hyperlipidemia    • Liver cancer    • Middle cerebral artery aneurysm    • Status post partial colectomy 04/2017    [facility]     Past Surgical History:   Procedure Laterality Date   • COLONOSCOPY      MAY 2016   • CRANIOTOMY  2004, 2005    Cerebral aneurysm   • LAPAROSCOPY FOR ECTOPIC  "PREGNANCY  09/1997   • LIVER SURGERY      MASS REMOVED   • CT INSJ TUNNELED CVC W/O SUBQ PORT/ AGE 3 YR/> Right 8/26/2016    Procedure: MEDIPORT PLACEMENT ;  Surgeon: Praful Holden MD;  Location: [facility] OR;  Service: Vascular   • SINUS SURGERY       Medications: The current medication list was reviewed in the EMR.    ALLERGIES:   No Known Allergies    SOCIAL HISTORY:       Social History   Substance Use Topics   • Smoking status: Never Smoker   • Smokeless tobacco: Never Used   • Alcohol use 3.0 oz/week     5 Glasses of wine per week      Comment: Social     FAMILY HISTORY:  Family History   Problem Relation Age of Onset   • Cerebral aneurysm Mother    • COPD Father        REVIEW OF SYSTEMS:  PAIN: See VITAL SIGNS below.   GENERAL: No change in appetite or weight, no fevers, chills or  sweats.   SKIN: No rashes or nonhealing lesions. Pale no jaundice  HEME/LYMPH: no anemia,NO easy bruising, bleeding or  swollen nodes.   EYES: No vision changes or diplopia.   ENT: No tinnitus, hearing loss, gum bleeding, epistaxis, hoarseness or dysphagia.   RESPIRATORY: NO cough,  shortness of breath,NO hemoptysis NO wheezing.   CVS: No chest pain,NO  palpitations, orthopnea, dyspnea on exertion, .   GI: POSTSURGICAL abdominal pain (see HPI), NO nausea, vomiting, constipation, diarrhea, melena or hematochezia.   : No dysuria or hematuria. No abnormal vaginal discharge or bleeding.   MUSCULOSKELETAL: No bone pain or joint stiffness..  NEUROLOGICAL: No dizziness, global weakness, loss of consciousness or seizures.    PSYCHIATRIC: No increased nervousness, mood changes or  depression.     Objective    Vitals:    06/12/17 1433   BP: 112/70   Pulse: 86   Resp: 16   Temp: 99 °F (37.2 °C)   Weight: 114 lb 6.4 oz (51.9 kg)   Height: 64.17\" (163 cm)   PainSc: 0-No pain     Current Status 5/31/2017   ECOG score 0      PHYSICAL EXAM:    GENERAL:  Well-developed, well-nourished in no acute distress.   SKIN:  Warm, dry without " rashes, purpura or petechiae.  HEAD:  Normocephalic.  EYES:  Pupils equal, round and reactive to light.  EOMs intact.  Conjunctivae normal.  EARS:  Hearing intact.  NOSE:  Septum midline.  No excoriations or nasal discharge.  MOUTH:  Tongue is well-papillated; no stomatitis or ulcers.  Buccal dryness noted. Lips normal.  THROAT:  Oropharynx without lesions or exudates.  NECK:  Supple with good range of motion; no thyromegaly or masses, no JVD.  LYMPHATICS:  No cervical, supraclavicular, axillary or inguinal adenopathy.  CHEST:  Lungs clear to auscultation bilaterally.    CARDIAC:  NORMAL rate and rhythm without murmurs, rubs or gallops. Normal S1,S2.  ABDOMEN:  Soft, nontender with no organomegaly or masses. Bowel sounds present. Ileostomy rlq, no distention , minimal pain, good bs, no rebound or distention.  .EXTREMITIES:  No clubbing, cyanosis NO edema.   NEUROLOGICAL:  Cranial Nerves II-XII grossly intact.  No focal neurological deficits.  PSYCHIATRIC:  Normal affect and mood.        RECENT LABS:  Lab Results   Component Value Date    WBC 7.43 06/12/2017    HGB 11.1 (L) 06/12/2017    HCT 33.5 (L) 06/12/2017    MCV 87.9 06/12/2017     06/12/2017     Lab Results   Component Value Date    GLUCOSE 115 05/31/2017    BUN 8 05/31/2017    CREATININE 0.61 05/31/2017    EGFRIFNONA 103 05/31/2017    EGFRIFAFRI  09/15/2016      Comment:      <15 Indicative of kidney failure.    BCR 13.1 05/31/2017    CO2 23.3 05/31/2017    CALCIUM 9.4 05/31/2017    ALBUMIN 3.90 05/31/2017    LABIL2 1.3 05/31/2017    AST 29 05/31/2017    ALT 20 05/31/2017       Component      Latest Ref Rng & Units 2/6/2017 3/9/2017 3/23/2017 5/16/2017   CEA      ng/mL 7.02 13.23 16.24 3.75       Assessment/Plan      1. Metastatic rectal cancer to the liver,  KRAS negative, BRAF negative, MSI stable, NRAS negative.      After the previous visit we discussed with the patient and  and after talking with 3 different physicians about her case how to  proceed with further therapy after she had the rectal tumor removed. We advised her to continue FOLFOX chemotherapy and she developed a problem of very rapid infusion of 5FU in question of 30 hours. This triggered significant vertigo, sensation of nausea, mucositis, diffuse abdominal pain and she felt last weekend that she had the worst hangover that she ever had not because she was drinking alcohol just because of the effect of the rapid infusion of the medication. This is the second episode that she has had with this. Even though we have reviewed the technical capabilities of the device and all of the consequences in regard to the infusion we believe that she will continue having problems if we continue using this and in the long run we can produce a major toxicity in her. Given this fact we have advised the patient the followin. We will discontinue the FOLFOX regimen at this time.  2. The patient will initiate oxaliplatin at the dose that she was receiving before every 2 weeks with next dose planned for next week.   3. The patient will take 2000 mg of Xeloda in the morning and 1500 mg of Xeloda in the evening for 7 days on, 7 days off and she will take the Xeloda the week that she will receive the oxaliplatin.   4. Side effects of Xeloda include photosensitivity, mucositis, diarrhea, anemia, leukopenia and thrombocytopenia among others.  5. The patient has developed an extrusion of the ileum through the ileostomy opening in the abdominal wall. I worked with this anatomical structure for almost 20 minutes to try to revert this back into the abdominal cavity and once that it goes back into the abdominal cavity any kind of effort pushes it out again. This per se has no functional implications neither is producing pain but it is very bothersome for the patient because she feels that she has a piece of the intestine hanging out and she feels uncomfortable with this. There is no solution to tell her the truth in  this regard besides waiting for the time of the reversal of her ileostomy that will happen on 07/11/2017 by Dr. Schuler. Therefore the patient is aware of this issue. Therefore we will go ahead and make an appointment for her to return and continue her treatment in 1 week and hopefully she will be able to initiate her Xeloda at this dose this coming Monday. Obviously this will obviate the need for the infuser that obviously will be discontinued altogether.                   Zheng Johnson MD

## 2017-06-13 LAB — CEA SERPL-MCNC: 2.46 NG/ML

## 2017-06-13 NOTE — PROGRESS NOTES
Dr Johnson saw patient on 6/12/17 due to previous problems with 5FU infusor, will change to oral Xeloda (copy and paste message from Dr. Johnson: She needs to start xeloda next Monday  2000 mg in am 1500 mg in pm 7 days on 7 days off, delay oxalolaltin for next week and iv every 2 weeks thereafter, no more 5 fu  And leucovorin).   5FU and Leucovorin removed from treatment plan. Xeloda prescription printed for Erika to order from pharmacy,

## 2017-06-14 ENCOUNTER — APPOINTMENT (OUTPATIENT)
Dept: ONCOLOGY | Facility: HOSPITAL | Age: 52
End: 2017-06-14

## 2017-06-14 ENCOUNTER — DOCUMENTATION (OUTPATIENT)
Dept: ONCOLOGY | Facility: CLINIC | Age: 52
End: 2017-06-14

## 2017-06-14 NOTE — PROGRESS NOTES
[employer] [phone] was contacted to check the status of Capecitabine PA that was submitted on 6/12/17. I spoke to Slava and they have rec the request and it is still in the review process. The process can take up to 72 hours from the time they rec the request.

## 2017-06-15 ENCOUNTER — DOCUMENTATION (OUTPATIENT)
Dept: ONCOLOGY | Facility: CLINIC | Age: 52
End: 2017-06-15

## 2017-06-15 NOTE — PROGRESS NOTES
I contacted [facility] [phone] to check the capecitabine pa status again. I spoke to Deedee who first stated that she didn't see any PA but when I told her what the automated status check relayed to me she stated she did see the PA and it is in processing. She was going to transfer me to the specialty PA dept. I was transferred and put back in the loop to check status. I was again given the automated status check. I selected the option to speak to a rep and was connected with Opal. I inquired on the PA status and she took the information needed to access the account. She stated it is in review status. She stated when it is completed on line it is not put in for urgent request but she stated I was able to do an urgent request verbally. I opted for this and once the information was given to her, she stated the plan is requesting brand be dispensed and for the brand, a PA was not required. I expressed my frustration as I could have been told this earlier or even at the time of PA submission. I inquired on what pts copay would be and she gave me an estimate based on Tiering. Xeloda is a tier 1 with the plan and Tier 1 copays are $25 through mail order. I will be submitting the Rx (for brand) to [facility] [employer] [facility].

## 2017-06-19 ENCOUNTER — APPOINTMENT (OUTPATIENT)
Dept: ONCOLOGY | Facility: HOSPITAL | Age: 52
End: 2017-06-19

## 2017-06-19 ENCOUNTER — TELEPHONE (OUTPATIENT)
Dept: ONCOLOGY | Facility: CLINIC | Age: 52
End: 2017-06-19

## 2017-06-19 RX ORDER — DEXTROSE MONOHYDRATE 50 MG/ML
250 INJECTION, SOLUTION INTRAVENOUS ONCE
Status: CANCELLED | OUTPATIENT
Start: 2017-07-25

## 2017-06-19 RX ORDER — PALONOSETRON 0.05 MG/ML
0.25 INJECTION, SOLUTION INTRAVENOUS ONCE
Status: CANCELLED | OUTPATIENT
Start: 2017-07-25

## 2017-06-20 ENCOUNTER — TELEPHONE (OUTPATIENT)
Dept: ONCOLOGY | Facility: CLINIC | Age: 52
End: 2017-06-20

## 2017-06-20 NOTE — TELEPHONE ENCOUNTER
Pt. States she missed dr. Johnson call earlier.  Hasn't heard back from the surgeon about doing the reversal surgery.  Didn't get her treatment yesterday.  Needs to s/w dr. Johnson.  Staff message sent to him to call pt.

## 2017-06-21 ENCOUNTER — TELEPHONE (OUTPATIENT)
Dept: ONCOLOGY | Facility: CLINIC | Age: 52
End: 2017-06-21

## 2017-06-21 NOTE — TELEPHONE ENCOUNTER
PT WILL HAVE THE CORRECTION AND REVERSAL OF ILEOSTOMY Monday NEXT WEEK, I ASKED HER TO HOLD ANY CHEMOTHERAPY FOR 3 WEEKS UNTIL HEALING TAKES PLACE, SHE GOT THE XELODA, SHE IS NOT TAKING IT AS INSTRUCTED

## 2017-06-28 ENCOUNTER — APPOINTMENT (OUTPATIENT)
Dept: ONCOLOGY | Facility: HOSPITAL | Age: 52
End: 2017-06-28

## 2017-06-30 ENCOUNTER — DOCUMENTATION (OUTPATIENT)
Dept: ONCOLOGY | Facility: CLINIC | Age: 52
End: 2017-06-30

## 2017-06-30 NOTE — PROGRESS NOTES
I attempted to call the patient earlier to discuss treatment plan.  After review of Dr. Johnson most recent notes, it is my understanding she was scheduled to undergo ileostomy reversal on Monday, 6/26/2017.  We will delay the resumption of chemotherapy including Xeloda and oxaliplatin approximately 3 weeks after surgery to allow recovery.  At this time, the patient is scheduled for Monday, 7/3/2017, though this appointment should be canceled, as it is not adequate time to recover from surgery. I did leave a voicemail for the patient to call me back.

## 2017-07-03 ENCOUNTER — APPOINTMENT (OUTPATIENT)
Dept: ONCOLOGY | Facility: HOSPITAL | Age: 52
End: 2017-07-03

## 2017-07-03 ENCOUNTER — APPOINTMENT (OUTPATIENT)
Dept: ONCOLOGY | Facility: CLINIC | Age: 52
End: 2017-07-03

## 2017-07-11 ENCOUNTER — TELEPHONE (OUTPATIENT)
Dept: ONCOLOGY | Facility: CLINIC | Age: 52
End: 2017-07-11

## 2017-07-11 NOTE — TELEPHONE ENCOUNTER
Patient left a voicemail for triage RN asking to speak with Tonya LOMBARDI.  Attempted to return call but no answer, left voicemail that Tonya NP off today and that I would ask her to call tomorrow if possible but to please return call to triage RN if this was an urgent matter.

## 2017-07-12 ENCOUNTER — APPOINTMENT (OUTPATIENT)
Dept: ONCOLOGY | Facility: HOSPITAL | Age: 52
End: 2017-07-12

## 2017-07-12 ENCOUNTER — APPOINTMENT (OUTPATIENT)
Dept: LAB | Facility: HOSPITAL | Age: 52
End: 2017-07-12

## 2017-07-12 ENCOUNTER — APPOINTMENT (OUTPATIENT)
Dept: ONCOLOGY | Facility: CLINIC | Age: 52
End: 2017-07-12

## 2017-07-17 ENCOUNTER — INFUSION (OUTPATIENT)
Dept: ONCOLOGY | Facility: HOSPITAL | Age: 52
End: 2017-07-17

## 2017-07-17 ENCOUNTER — TELEPHONE (OUTPATIENT)
Dept: ONCOLOGY | Facility: CLINIC | Age: 52
End: 2017-07-17

## 2017-07-17 ENCOUNTER — APPOINTMENT (OUTPATIENT)
Dept: ONCOLOGY | Facility: HOSPITAL | Age: 52
End: 2017-07-17

## 2017-07-17 ENCOUNTER — OFFICE VISIT (OUTPATIENT)
Dept: ONCOLOGY | Facility: CLINIC | Age: 52
End: 2017-07-17

## 2017-07-17 VITALS
TEMPERATURE: 98 F | WEIGHT: 114.7 LBS | HEIGHT: 64 IN | OXYGEN SATURATION: 100 % | BODY MASS INDEX: 19.58 KG/M2 | DIASTOLIC BLOOD PRESSURE: 62 MMHG | RESPIRATION RATE: 14 BRPM | SYSTOLIC BLOOD PRESSURE: 110 MMHG | HEART RATE: 72 BPM

## 2017-07-17 DIAGNOSIS — C20 RECTAL CANCER METASTASIZED TO LIVER (HCC): Primary | ICD-10-CM

## 2017-07-17 DIAGNOSIS — C78.7 METASTATIC CANCER TO LIVER (HCC): ICD-10-CM

## 2017-07-17 DIAGNOSIS — C78.7 RECTAL CANCER METASTASIZED TO LIVER (HCC): Primary | ICD-10-CM

## 2017-07-17 DIAGNOSIS — G62.0 NEUROPATHY DUE TO CHEMOTHERAPEUTIC DRUG (HCC): ICD-10-CM

## 2017-07-17 DIAGNOSIS — T45.1X5A NEUROPATHY DUE TO CHEMOTHERAPEUTIC DRUG (HCC): ICD-10-CM

## 2017-07-17 DIAGNOSIS — Z45.2 FITTING AND ADJUSTMENT OF VASCULAR CATHETER: ICD-10-CM

## 2017-07-17 LAB
ALBUMIN SERPL-MCNC: 3.9 G/DL (ref 3.5–5.2)
ALBUMIN/GLOB SERPL: 1.3 G/DL (ref 1.1–2.4)
ALP SERPL-CCNC: 77 U/L (ref 38–116)
ALT SERPL W P-5'-P-CCNC: 14 U/L (ref 0–33)
ANION GAP SERPL CALCULATED.3IONS-SCNC: 11.5 MMOL/L
AST SERPL-CCNC: 23 U/L (ref 0–32)
BASOPHILS # BLD AUTO: 0.02 10*3/MM3 (ref 0–0.1)
BASOPHILS NFR BLD AUTO: 0.4 % (ref 0–1.1)
BILIRUB SERPL-MCNC: 0.4 MG/DL (ref 0.1–1.2)
BUN BLD-MCNC: 11 MG/DL (ref 6–20)
BUN/CREAT SERPL: 17.5 (ref 7.3–30)
CALCIUM SPEC-SCNC: 9.5 MG/DL (ref 8.5–10.2)
CEA SERPL-MCNC: 2.07 NG/ML
CHLORIDE SERPL-SCNC: 107 MMOL/L (ref 98–107)
CO2 SERPL-SCNC: 23.5 MMOL/L (ref 22–29)
CREAT BLD-MCNC: 0.63 MG/DL (ref 0.6–1.1)
DEPRECATED RDW RBC AUTO: 47.4 FL (ref 37–49)
EOSINOPHIL # BLD AUTO: 0.28 10*3/MM3 (ref 0–0.36)
EOSINOPHIL NFR BLD AUTO: 5.4 % (ref 1–5)
ERYTHROCYTE [DISTWIDTH] IN BLOOD BY AUTOMATED COUNT: 14.4 % (ref 11.7–14.5)
GFR SERPL CREATININE-BSD FRML MDRD: 99 ML/MIN/1.73
GLOBULIN UR ELPH-MCNC: 3.1 GM/DL (ref 1.8–3.5)
GLUCOSE BLD-MCNC: 108 MG/DL (ref 74–124)
HCT VFR BLD AUTO: 34.7 % (ref 34–45)
HGB BLD-MCNC: 10.8 G/DL (ref 11.5–14.9)
IMM GRANULOCYTES # BLD: 0.01 10*3/MM3 (ref 0–0.03)
IMM GRANULOCYTES NFR BLD: 0.2 % (ref 0–0.5)
LYMPHOCYTES # BLD AUTO: 1.47 10*3/MM3 (ref 1–3.5)
LYMPHOCYTES NFR BLD AUTO: 28.5 % (ref 20–49)
MCH RBC QN AUTO: 27.9 PG (ref 27–33)
MCHC RBC AUTO-ENTMCNC: 31.1 G/DL (ref 32–35)
MCV RBC AUTO: 89.7 FL (ref 83–97)
MONOCYTES # BLD AUTO: 0.55 10*3/MM3 (ref 0.25–0.8)
MONOCYTES NFR BLD AUTO: 10.7 % (ref 4–12)
NEUTROPHILS # BLD AUTO: 2.82 10*3/MM3 (ref 1.5–7)
NEUTROPHILS NFR BLD AUTO: 54.8 % (ref 39–75)
NRBC BLD MANUAL-RTO: 0 /100 WBC (ref 0–0)
PLATELET # BLD AUTO: 225 10*3/MM3 (ref 150–375)
PMV BLD AUTO: 9.9 FL (ref 8.9–12.1)
POTASSIUM BLD-SCNC: 4 MMOL/L (ref 3.5–4.7)
PROT SERPL-MCNC: 7 G/DL (ref 6.3–8)
RBC # BLD AUTO: 3.87 10*6/MM3 (ref 3.9–5)
SODIUM BLD-SCNC: 142 MMOL/L (ref 134–145)
WBC NRBC COR # BLD: 5.15 10*3/MM3 (ref 4–10)

## 2017-07-17 PROCEDURE — 80053 COMPREHEN METABOLIC PANEL: CPT | Performed by: NURSE PRACTITIONER

## 2017-07-17 PROCEDURE — 99213 OFFICE O/P EST LOW 20 MIN: CPT | Performed by: NURSE PRACTITIONER

## 2017-07-17 PROCEDURE — 85025 COMPLETE CBC W/AUTO DIFF WBC: CPT | Performed by: NURSE PRACTITIONER

## 2017-07-17 PROCEDURE — 25010000002 HEPARIN FLUSH (PORCINE) 100 UNIT/ML SOLUTION: Performed by: INTERNAL MEDICINE

## 2017-07-17 PROCEDURE — 82378 CARCINOEMBRYONIC ANTIGEN: CPT | Performed by: INTERNAL MEDICINE

## 2017-07-17 PROCEDURE — 96523 IRRIG DRUG DELIVERY DEVICE: CPT | Performed by: NURSE PRACTITIONER

## 2017-07-17 RX ORDER — SODIUM CHLORIDE 0.9 % (FLUSH) 0.9 %
10 SYRINGE (ML) INJECTION AS NEEDED
Status: CANCELLED | OUTPATIENT
Start: 2017-07-17

## 2017-07-17 RX ORDER — SODIUM CHLORIDE 0.9 % (FLUSH) 0.9 %
10 SYRINGE (ML) INJECTION AS NEEDED
Status: DISCONTINUED | OUTPATIENT
Start: 2017-07-17 | End: 2017-07-17 | Stop reason: HOSPADM

## 2017-07-17 RX ADMIN — SODIUM CHLORIDE, PRESERVATIVE FREE 500 UNITS: 5 INJECTION INTRAVENOUS at 09:16

## 2017-07-17 RX ADMIN — Medication 10 ML: at 09:16

## 2017-07-17 NOTE — PROGRESS NOTES
Subjective .     REASONS FOR FOLLOW UP:  Metastatic SIGMOID UPPER rectal cancer to the liver.  Initiated chemotherapy with FOLFOX-6 9/1/2016.     HISTORY OF PRESENT ILLNESS:  The patient is a 51 y.o. year old female with the above-mentioned history, here today for scheduled follow-up, and lab review.  3 weeks ago, Monday, 6/26/2017, the patient underwent ileostomy reversal by Dr. Schuler.  Prior to surgery, plans were made to discontinue FOLFOX, and proceed with Xeloda and oxaliplatin.  The patient is previously struggled with infusional 5-FU, infusing to fast, or not at all.  It was felt best to discontinue infusions and proceed with Xeloda rather.  This plan was delayed, as the patient was having significant pain.  Surgery was successful, the patient continues to recover well.  She reports immediately following surgery her bowels were loose.  Thankfully, they have the slowed.  She does require an occasional stool softener.  She denies further abdominal pain.  She continues to recover in regards to her energy and appetite.  She is scheduled to follow-up with Dr. Schuler Thursday.  She denies any blood in her stool.  She denies any new pain.    Past Medical History:   Diagnosis Date   • Anemia    • Colon cancer     NEW DIAGNOSIS   • Fatigue     secondary to chemotherapy    • H/O foreign travel 03/2016    [facility], [place]   • Hepatic metastases 10/2016    partial right hepatectomy [place] 10/16   • History of transfusion     1999 AFTER TUBAL RUPTURE   • Hyperlipidemia    • Liver cancer    • Middle cerebral artery aneurysm    • Status post partial colectomy 04/2017    [facility]     Past Surgical History:   Procedure Laterality Date   • COLONOSCOPY      MAY 2016   • CRANIOTOMY  2004, 2005    Cerebral aneurysm   • LAPAROSCOPY FOR ECTOPIC PREGNANCY  09/1997   • LIVER SURGERY      MASS REMOVED   • [place] INSJ TUNNELED CVC W/O SUBQ PORT/ AGE 3 YR/> Right 8/26/2016    Procedure: MEDIPORT  "PLACEMENT ;  Surgeon: Praful Holden MD;  Location: [facility];  Service: Vascular   • SINUS SURGERY       Medications: The current medication list was reviewed in the EMR.    ALLERGIES:   No Known Allergies    SOCIAL HISTORY:       Social History   Substance Use Topics   • Smoking status: Never Smoker   • Smokeless tobacco: Never Used   • Alcohol use 3.0 oz/week     5 Glasses of wine per week      Comment: Social     FAMILY HISTORY:  Family History   Problem Relation Age of Onset   • Cerebral aneurysm Mother    • COPD Father      I have reviewed the patient's medical history in detail and updated the computerized patient record.    REVIEW OF SYSTEMS:  PAIN: See VITAL SIGNS below.   GENERAL: No change in appetite or weight, no fevers, chills or  sweats.   SKIN: No rashes or nonhealing lesions. Pale no jaundice  HEME/LYMPH: no anemia,No easy bruising, bleeding or  swollen nodes.   EYES: No vision changes or diplopia.   ENT: No tinnitus, hearing loss, gum bleeding, epistaxis, hoarseness or dysphagia.   RESPIRATORY: No cough,  shortness of breath,No hemoptysis No wheezing.   CVS: No chest pain, no palpitations, orthopnea, dyspnea on exertion, .   GI: No nausea, vomiting, constipation, diarrhea, melena or hematochezia.   : No dysuria or hematuria. No abnormal vaginal discharge or bleeding.   MUSCULOSKELETAL: No bone pain or joint stiffness..  NEUROLOGICAL: No dizziness, global weakness, loss of consciousness or seizures.    PSYCHIATRIC: No increased nervousness, mood changes or  depression.     Objective    Vitals:    07/17/17 0926   BP: 110/62   Pulse: 72   Resp: 14   Temp: 98 °F (36.7 °C)   TempSrc: Oral   SpO2: 100%   Weight: 114 lb 11.2 oz (52 kg)   Height: 64.17\" (163 cm)   PainSc: 0-No pain     Current Status 7/17/2017   ECOG score 0      PHYSICAL EXAM:    GENERAL:  Well-developed, well-nourished in no acute distress.   SKIN:  Warm, dry without rashes, purpura or petechiae.  HEAD:  " Normocephalic.  EYES:  Pupils equal, round and reactive to light.  EOMs intact.  Conjunctivae normal.  EARS:  Hearing intact.  NOSE:  Septum midline.  No excoriations or nasal discharge.  CHEST:  Lungs clear to auscultation bilaterally.    CARDIAC:  NORMAL rate and rhythm without murmurs, rubs or gallops. Normal S1,S2.  ABDOMEN:  Soft, nontender with no organomegaly or masses. Bowel sounds present.  Surgical scar is well healed.   EXTREMITIES:  No clubbing, cyanosis no edema.   NEUROLOGICAL:  Cranial Nerves II-XII grossly intact.  No focal neurological deficits.  PSYCHIATRIC:  Normal affect and mood.      RECENT LABS:  Lab Results   Component Value Date    WBC 5.15 07/17/2017    HGB 10.8 (L) 07/17/2017    HCT 34.7 07/17/2017    MCV 89.7 07/17/2017     07/17/2017     Lab Results   Component Value Date    GLUCOSE 108 07/17/2017    BUN 11 07/17/2017    CREATININE 0.63 07/17/2017    EGFRIFNONA 99 07/17/2017    EGFRIFAFRI  09/15/2016      Comment:      <15 Indicative of kidney failure.    BCR 17.5 07/17/2017    CO2 23.5 07/17/2017    CALCIUM 9.5 07/17/2017    ALBUMIN 3.90 07/17/2017    LABIL2 1.3 07/17/2017    AST 23 07/17/2017    ALT 14 07/17/2017     CEA Pending.    Assessment/Plan      1. Metastatic rectal cancer to the liver,  KRAS negative, BRAF negative, MSI stable, NRAS negative. Most recently treated with FOLFOX.  Plans to discontinue infusional 5-FU switching to oral Xeloda, 2000 g in the morning, 1500 mg at night, 7 days on and 7 days off.  Initiation of Xeloda was delayed to allow for reversal of ileostomy.  The patient underwent surgery 6/26/2017.  We will resume Xeloda today, with oxaliplatin infusion on Thursday, 7/20/2017.    2.  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  Previously dosed reduced in December 2016.  The patient did note improvement in her neuropathy during her treatment break and surgery.  We will dose reduce oxaliplatin to 100 mg in hopes she tolerates this better.    PLAN:  1. Initiate  Xeloda today, 2000mg in the morning, 1500mg at night, 7 days on, 7 days off.  2. Follow up with Dr. Schuler Thursday, 7/20/2017 for surgical follow up.  3. Return Thursday for Oxaliplatin infusion. Dose reduction to 100mg due to previous neuropathy.  4. Return 2 weeks from Thursday for MD review with Dr. Johnson, and next Oxaliplatin infusion.   5. CEA pending.    Leonor Elmore, APRN  07/17/2017

## 2017-07-20 ENCOUNTER — APPOINTMENT (OUTPATIENT)
Dept: ONCOLOGY | Facility: HOSPITAL | Age: 52
End: 2017-07-20

## 2017-07-20 NOTE — PROGRESS NOTES
The patient called the office today.  She did follow up with her surgeon earlier this morning, who wishes to delay the initiation of oxaliplatin a few additional days to allow further recovery.  She was originally scheduled to resume oxaliplatin today, 7/20/2017.  Instead, he wishes her to resume on Tuesday, 7/25/2017.  Her infusion schedule will be adjusted to accommodate.  Along with oxaliplatin, the patient is to take Xeloda 7 days on, 7 days off.  In anticipation of treatment today, she did initiate her Xeloda on Monday, 7/17/2017.  Seeing that we will not treat her today, she was injected to discontinue Xeloda the remainder of this week.  She will resume Xeloda at current dosing on Tuesday, 7/25/2017.  She will take this medication 7 days on, 7 days off as previously instructed.  She verbalized understanding.  Scheduling notified of the change.

## 2017-07-24 DIAGNOSIS — C78.7 RECTAL CANCER METASTASIZED TO LIVER (HCC): Primary | ICD-10-CM

## 2017-07-24 DIAGNOSIS — C20 RECTAL CANCER METASTASIZED TO LIVER (HCC): Primary | ICD-10-CM

## 2017-07-25 ENCOUNTER — INFUSION (OUTPATIENT)
Dept: ONCOLOGY | Facility: HOSPITAL | Age: 52
End: 2017-07-25

## 2017-07-25 VITALS
BODY MASS INDEX: 20.01 KG/M2 | SYSTOLIC BLOOD PRESSURE: 124 MMHG | HEART RATE: 86 BPM | WEIGHT: 117.2 LBS | DIASTOLIC BLOOD PRESSURE: 83 MMHG

## 2017-07-25 DIAGNOSIS — C20 RECTAL CANCER METASTASIZED TO LIVER (HCC): ICD-10-CM

## 2017-07-25 DIAGNOSIS — C78.7 METASTATIC CANCER TO LIVER (HCC): ICD-10-CM

## 2017-07-25 DIAGNOSIS — C78.7 RECTAL CANCER METASTASIZED TO LIVER (HCC): Primary | ICD-10-CM

## 2017-07-25 DIAGNOSIS — C78.7 RECTAL CANCER METASTASIZED TO LIVER (HCC): ICD-10-CM

## 2017-07-25 DIAGNOSIS — C20 RECTAL CANCER METASTASIZED TO LIVER (HCC): Primary | ICD-10-CM

## 2017-07-25 LAB
ALBUMIN SERPL-MCNC: 3.8 G/DL (ref 3.5–5.2)
ALBUMIN/GLOB SERPL: 1.4 G/DL (ref 1.1–2.4)
ALP SERPL-CCNC: 76 U/L (ref 38–116)
ALT SERPL W P-5'-P-CCNC: 12 U/L (ref 0–33)
ANION GAP SERPL CALCULATED.3IONS-SCNC: 12.4 MMOL/L
AST SERPL-CCNC: 26 U/L (ref 0–32)
BASOPHILS # BLD AUTO: 0.03 10*3/MM3 (ref 0–0.1)
BASOPHILS NFR BLD AUTO: 0.5 % (ref 0–1.1)
BILIRUB SERPL-MCNC: 0.2 MG/DL (ref 0.1–1.2)
BUN BLD-MCNC: 11 MG/DL (ref 6–20)
BUN/CREAT SERPL: 17.5 (ref 7.3–30)
CALCIUM SPEC-SCNC: 9 MG/DL (ref 8.5–10.2)
CHLORIDE SERPL-SCNC: 104 MMOL/L (ref 98–107)
CO2 SERPL-SCNC: 24.6 MMOL/L (ref 22–29)
CREAT BLD-MCNC: 0.63 MG/DL (ref 0.6–1.1)
DEPRECATED RDW RBC AUTO: 47.2 FL (ref 37–49)
EOSINOPHIL # BLD AUTO: 0.12 10*3/MM3 (ref 0–0.36)
EOSINOPHIL NFR BLD AUTO: 2 % (ref 1–5)
ERYTHROCYTE [DISTWIDTH] IN BLOOD BY AUTOMATED COUNT: 14.6 % (ref 11.7–14.5)
GFR SERPL CREATININE-BSD FRML MDRD: 99 ML/MIN/1.73
GLOBULIN UR ELPH-MCNC: 2.7 GM/DL (ref 1.8–3.5)
GLUCOSE BLD-MCNC: 112 MG/DL (ref 74–124)
HCT VFR BLD AUTO: 34.8 % (ref 34–45)
HGB BLD-MCNC: 11 G/DL (ref 11.5–14.9)
IMM GRANULOCYTES # BLD: 0.01 10*3/MM3 (ref 0–0.03)
IMM GRANULOCYTES NFR BLD: 0.2 % (ref 0–0.5)
LYMPHOCYTES # BLD AUTO: 1.39 10*3/MM3 (ref 1–3.5)
LYMPHOCYTES NFR BLD AUTO: 23.4 % (ref 20–49)
MCH RBC QN AUTO: 28.3 PG (ref 27–33)
MCHC RBC AUTO-ENTMCNC: 31.6 G/DL (ref 32–35)
MCV RBC AUTO: 89.5 FL (ref 83–97)
MONOCYTES # BLD AUTO: 0.59 10*3/MM3 (ref 0.25–0.8)
MONOCYTES NFR BLD AUTO: 9.9 % (ref 4–12)
NEUTROPHILS # BLD AUTO: 3.79 10*3/MM3 (ref 1.5–7)
NEUTROPHILS NFR BLD AUTO: 64 % (ref 39–75)
NRBC BLD MANUAL-RTO: 0 /100 WBC (ref 0–0)
PLATELET # BLD AUTO: 224 10*3/MM3 (ref 150–375)
PMV BLD AUTO: 10.2 FL (ref 8.9–12.1)
POTASSIUM BLD-SCNC: 4.2 MMOL/L (ref 3.5–4.7)
PROT SERPL-MCNC: 6.5 G/DL (ref 6.3–8)
RBC # BLD AUTO: 3.89 10*6/MM3 (ref 3.9–5)
SODIUM BLD-SCNC: 141 MMOL/L (ref 134–145)
WBC NRBC COR # BLD: 5.93 10*3/MM3 (ref 4–10)

## 2017-07-25 PROCEDURE — 96415 CHEMO IV INFUSION ADDL HR: CPT | Performed by: INTERNAL MEDICINE

## 2017-07-25 PROCEDURE — 96413 CHEMO IV INFUSION 1 HR: CPT | Performed by: INTERNAL MEDICINE

## 2017-07-25 PROCEDURE — 85025 COMPLETE CBC W/AUTO DIFF WBC: CPT | Performed by: INTERNAL MEDICINE

## 2017-07-25 PROCEDURE — 80053 COMPREHEN METABOLIC PANEL: CPT | Performed by: INTERNAL MEDICINE

## 2017-07-25 PROCEDURE — 96375 TX/PRO/DX INJ NEW DRUG ADDON: CPT | Performed by: INTERNAL MEDICINE

## 2017-07-25 PROCEDURE — 25010000002 PALONOSETRON PER 25 MCG: Performed by: INTERNAL MEDICINE

## 2017-07-25 PROCEDURE — 25010000002 DEXAMETHASONE PER 1 MG: Performed by: INTERNAL MEDICINE

## 2017-07-25 PROCEDURE — 36415 COLL VENOUS BLD VENIPUNCTURE: CPT | Performed by: INTERNAL MEDICINE

## 2017-07-25 PROCEDURE — 25010000002 OXALIPLATIN PER 0.5 MG: Performed by: NURSE PRACTITIONER

## 2017-07-25 RX ORDER — PALONOSETRON 0.05 MG/ML
0.25 INJECTION, SOLUTION INTRAVENOUS ONCE
Status: COMPLETED | OUTPATIENT
Start: 2017-07-25 | End: 2017-07-25

## 2017-07-25 RX ORDER — DEXTROSE MONOHYDRATE 50 MG/ML
250 INJECTION, SOLUTION INTRAVENOUS ONCE
Status: COMPLETED | OUTPATIENT
Start: 2017-07-25 | End: 2017-07-25

## 2017-07-25 RX ADMIN — DEXTROSE MONOHYDRATE 250 ML: 5 INJECTION, SOLUTION INTRAVENOUS at 13:43

## 2017-07-25 RX ADMIN — PALONOSETRON HYDROCHLORIDE 0.25 MG: 0.25 INJECTION INTRAVENOUS at 13:43

## 2017-07-25 RX ADMIN — DEXAMETHASONE SODIUM PHOSPHATE 12 MG: 10 INJECTION INTRAMUSCULAR; INTRAVENOUS at 13:43

## 2017-07-25 RX ADMIN — OXALIPLATIN 100 MG: 100 INJECTION, SOLUTION, CONCENTRATE INTRAVENOUS at 14:05

## 2017-07-26 ENCOUNTER — TELEPHONE (OUTPATIENT)
Dept: ONCOLOGY | Facility: HOSPITAL | Age: 52
End: 2017-07-26

## 2017-07-26 NOTE — TELEPHONE ENCOUNTER
Pt calling to see what CEA level was from yesterdays labs. Informed her that only CBC and CMP were drawn yesterday. Reviewed those labs with her along with CEA on 7/17/17. Pt V/U.

## 2017-07-31 DIAGNOSIS — I67.1 CEREBRAL ANEURYSM, NONRUPTURED: Primary | ICD-10-CM

## 2017-08-02 ENCOUNTER — APPOINTMENT (OUTPATIENT)
Dept: ONCOLOGY | Facility: HOSPITAL | Age: 52
End: 2017-08-02

## 2017-08-02 ENCOUNTER — APPOINTMENT (OUTPATIENT)
Dept: ONCOLOGY | Facility: CLINIC | Age: 52
End: 2017-08-02

## 2017-08-03 ENCOUNTER — TELEPHONE (OUTPATIENT)
Dept: ONCOLOGY | Facility: CLINIC | Age: 52
End: 2017-08-03

## 2017-08-03 DIAGNOSIS — C78.7 METASTATIC CANCER TO LIVER (HCC): ICD-10-CM

## 2017-08-03 DIAGNOSIS — C78.7 RECTAL CANCER METASTASIZED TO LIVER (HCC): ICD-10-CM

## 2017-08-03 DIAGNOSIS — C20 RECTAL CANCER METASTASIZED TO LIVER (HCC): ICD-10-CM

## 2017-08-03 NOTE — TELEPHONE ENCOUNTER
Patient calling about when to restart her xeloda.  Reviewed with Tonya LOMBARDI, patient should wait until 8-8 before starting to make sure her counts are stable.  Patient v/u.

## 2017-08-08 ENCOUNTER — INFUSION (OUTPATIENT)
Dept: ONCOLOGY | Facility: HOSPITAL | Age: 52
End: 2017-08-08

## 2017-08-08 ENCOUNTER — OFFICE VISIT (OUTPATIENT)
Dept: ONCOLOGY | Facility: CLINIC | Age: 52
End: 2017-08-08

## 2017-08-08 VITALS
BODY MASS INDEX: 20.18 KG/M2 | TEMPERATURE: 97.7 F | SYSTOLIC BLOOD PRESSURE: 118 MMHG | DIASTOLIC BLOOD PRESSURE: 72 MMHG | WEIGHT: 118.2 LBS | HEIGHT: 64 IN | RESPIRATION RATE: 12 BRPM | HEART RATE: 82 BPM | OXYGEN SATURATION: 100 %

## 2017-08-08 DIAGNOSIS — D63.0 ANEMIA IN NEOPLASTIC DISEASE: ICD-10-CM

## 2017-08-08 DIAGNOSIS — C78.7 METASTATIC CANCER TO LIVER (HCC): ICD-10-CM

## 2017-08-08 DIAGNOSIS — C78.7 RECTAL CANCER METASTASIZED TO LIVER (HCC): Primary | ICD-10-CM

## 2017-08-08 DIAGNOSIS — G62.0 PERIPHERAL NEUROPATHY DUE TO CHEMOTHERAPY (HCC): ICD-10-CM

## 2017-08-08 DIAGNOSIS — T45.1X5A PERIPHERAL NEUROPATHY DUE TO CHEMOTHERAPY (HCC): ICD-10-CM

## 2017-08-08 DIAGNOSIS — C20 RECTAL CANCER METASTASIZED TO LIVER (HCC): Primary | ICD-10-CM

## 2017-08-08 DIAGNOSIS — Z45.2 FITTING AND ADJUSTMENT OF VASCULAR CATHETER: ICD-10-CM

## 2017-08-08 LAB
ALBUMIN SERPL-MCNC: 3.7 G/DL (ref 3.5–5.2)
ALBUMIN/GLOB SERPL: 1.4 G/DL (ref 1.1–2.4)
ALP SERPL-CCNC: 71 U/L (ref 38–116)
ALT SERPL W P-5'-P-CCNC: 12 U/L (ref 0–33)
ANION GAP SERPL CALCULATED.3IONS-SCNC: 14.2 MMOL/L
AST SERPL-CCNC: 24 U/L (ref 0–32)
BASOPHILS # BLD AUTO: 0.04 10*3/MM3 (ref 0–0.1)
BASOPHILS NFR BLD AUTO: 0.8 % (ref 0–1.1)
BILIRUB SERPL-MCNC: 0.2 MG/DL (ref 0.1–1.2)
BUN BLD-MCNC: 10 MG/DL (ref 6–20)
BUN/CREAT SERPL: 18.2 (ref 7.3–30)
CALCIUM SPEC-SCNC: 8.9 MG/DL (ref 8.5–10.2)
CHLORIDE SERPL-SCNC: 103 MMOL/L (ref 98–107)
CO2 SERPL-SCNC: 23.8 MMOL/L (ref 22–29)
CREAT BLD-MCNC: 0.55 MG/DL (ref 0.6–1.1)
DEPRECATED RDW RBC AUTO: 49.1 FL (ref 37–49)
EOSINOPHIL # BLD AUTO: 0.13 10*3/MM3 (ref 0–0.36)
EOSINOPHIL NFR BLD AUTO: 2.6 % (ref 1–5)
ERYTHROCYTE [DISTWIDTH] IN BLOOD BY AUTOMATED COUNT: 16 % (ref 11.7–14.5)
FERRITIN SERPL-MCNC: 34.9 NG/ML (ref 11–207)
FOLATE SERPL-MCNC: 19.53 NG/ML (ref 4.78–24.2)
GFR SERPL CREATININE-BSD FRML MDRD: 116 ML/MIN/1.73
GLOBULIN UR ELPH-MCNC: 2.6 GM/DL (ref 1.8–3.5)
GLUCOSE BLD-MCNC: 106 MG/DL (ref 74–124)
HCT VFR BLD AUTO: 32.8 % (ref 34–45)
HGB BLD-MCNC: 10.4 G/DL (ref 11.5–14.9)
HOLD SPECIMEN: NORMAL
IMM GRANULOCYTES # BLD: 0.02 10*3/MM3 (ref 0–0.03)
IMM GRANULOCYTES NFR BLD: 0.4 % (ref 0–0.5)
IRON 24H UR-MRATE: 59 MCG/DL (ref 37–145)
IRON SATN MFR SERPL: 16 % (ref 14–48)
LYMPHOCYTES # BLD AUTO: 1.33 10*3/MM3 (ref 1–3.5)
LYMPHOCYTES NFR BLD AUTO: 26.7 % (ref 20–49)
MCH RBC QN AUTO: 28.3 PG (ref 27–33)
MCHC RBC AUTO-ENTMCNC: 31.7 G/DL (ref 32–35)
MCV RBC AUTO: 89.4 FL (ref 83–97)
MONOCYTES # BLD AUTO: 0.83 10*3/MM3 (ref 0.25–0.8)
MONOCYTES NFR BLD AUTO: 16.7 % (ref 4–12)
NEUTROPHILS # BLD AUTO: 2.63 10*3/MM3 (ref 1.5–7)
NEUTROPHILS NFR BLD AUTO: 52.8 % (ref 39–75)
NRBC BLD MANUAL-RTO: 0 /100 WBC (ref 0–0)
PLATELET # BLD AUTO: 209 10*3/MM3 (ref 150–375)
PMV BLD AUTO: 9.7 FL (ref 8.9–12.1)
POTASSIUM BLD-SCNC: 4.2 MMOL/L (ref 3.5–4.7)
PROT SERPL-MCNC: 6.3 G/DL (ref 6.3–8)
RBC # BLD AUTO: 3.67 10*6/MM3 (ref 3.9–5)
SODIUM BLD-SCNC: 141 MMOL/L (ref 134–145)
TIBC SERPL-MCNC: 368 MCG/DL (ref 249–505)
TRANSFERRIN SERPL-MCNC: 263 MG/DL (ref 200–360)
VIT B12 BLD-MCNC: 571 PG/ML (ref 250–999)
WBC NRBC COR # BLD: 4.98 10*3/MM3 (ref 4–10)

## 2017-08-08 PROCEDURE — 84466 ASSAY OF TRANSFERRIN: CPT | Performed by: INTERNAL MEDICINE

## 2017-08-08 PROCEDURE — 82607 VITAMIN B-12: CPT

## 2017-08-08 PROCEDURE — 99214 OFFICE O/P EST MOD 30 MIN: CPT | Performed by: INTERNAL MEDICINE

## 2017-08-08 PROCEDURE — 25010000002 OXALIPLATIN PER 0.5 MG: Performed by: INTERNAL MEDICINE

## 2017-08-08 PROCEDURE — 96415 CHEMO IV INFUSION ADDL HR: CPT | Performed by: INTERNAL MEDICINE

## 2017-08-08 PROCEDURE — 96413 CHEMO IV INFUSION 1 HR: CPT | Performed by: INTERNAL MEDICINE

## 2017-08-08 PROCEDURE — 80053 COMPREHEN METABOLIC PANEL: CPT | Performed by: INTERNAL MEDICINE

## 2017-08-08 PROCEDURE — 82746 ASSAY OF FOLIC ACID SERUM: CPT

## 2017-08-08 PROCEDURE — 25010000002 DEXAMETHASONE PER 1 MG: Performed by: INTERNAL MEDICINE

## 2017-08-08 PROCEDURE — 83540 ASSAY OF IRON: CPT | Performed by: INTERNAL MEDICINE

## 2017-08-08 PROCEDURE — 85025 COMPLETE CBC W/AUTO DIFF WBC: CPT | Performed by: INTERNAL MEDICINE

## 2017-08-08 PROCEDURE — 96375 TX/PRO/DX INJ NEW DRUG ADDON: CPT | Performed by: INTERNAL MEDICINE

## 2017-08-08 PROCEDURE — 36415 COLL VENOUS BLD VENIPUNCTURE: CPT | Performed by: INTERNAL MEDICINE

## 2017-08-08 PROCEDURE — 82728 ASSAY OF FERRITIN: CPT | Performed by: INTERNAL MEDICINE

## 2017-08-08 PROCEDURE — 25010000002 PALONOSETRON PER 25 MCG: Performed by: INTERNAL MEDICINE

## 2017-08-08 RX ORDER — PALONOSETRON 0.05 MG/ML
0.25 INJECTION, SOLUTION INTRAVENOUS ONCE
Status: CANCELLED | OUTPATIENT
Start: 2017-08-08

## 2017-08-08 RX ORDER — DEXTROSE MONOHYDRATE 50 MG/ML
250 INJECTION, SOLUTION INTRAVENOUS ONCE
Status: COMPLETED | OUTPATIENT
Start: 2017-08-08 | End: 2017-08-08

## 2017-08-08 RX ORDER — PALONOSETRON 0.05 MG/ML
0.25 INJECTION, SOLUTION INTRAVENOUS ONCE
Status: COMPLETED | OUTPATIENT
Start: 2017-08-08 | End: 2017-08-08

## 2017-08-08 RX ORDER — DEXTROSE MONOHYDRATE 50 MG/ML
250 INJECTION, SOLUTION INTRAVENOUS ONCE
Status: CANCELLED | OUTPATIENT
Start: 2017-08-08

## 2017-08-08 RX ADMIN — DEXTROSE MONOHYDRATE 250 ML: 5 INJECTION, SOLUTION INTRAVENOUS at 09:08

## 2017-08-08 RX ADMIN — DEXAMETHASONE SODIUM PHOSPHATE 12 MG: 10 INJECTION INTRAMUSCULAR; INTRAVENOUS at 09:11

## 2017-08-08 RX ADMIN — OXALIPLATIN 100 MG: 100 INJECTION, SOLUTION, CONCENTRATE INTRAVENOUS at 09:33

## 2017-08-08 RX ADMIN — PALONOSETRON HYDROCHLORIDE 0.25 MG: 0.25 INJECTION INTRAVENOUS at 09:09

## 2017-08-08 NOTE — PROGRESS NOTES
Subjective .     REASONS FOR FOLLOW UP:  Metastatic SIGMOID UPPER rectal cancer to the liver.  Initiated chemotherapy with FOLFOX-6 9/1/2016.     HISTORY OF PRESENT ILLNESS:  The patient is a 51 y.o. year old female with the above-mentioned history,        This patient is here today to continue her plan of take adjuvant chemotherapy after she has had rectal cancer removed at the [facility].  At this time she is undergoing chemotherapy with oxaliplatin on every 2 weeks and Xeloda 1 week on 1 week off taken this a lot of the week that she receives oxaloplatinum.  Far.  The patient has require adjustment in the dose of the abnormal medication given the development of grade 1 peripheral neuropathy in her feet that has no functional consequences to her.  She still has sciatica after the closure.  Ileostomy by general surgery a significant difficulty with identification sometimes loose sometimes constipation about slowly she things that she is recovery.  Her appetite and weight are stable she has normal urination and she has no cancer-related pain with abnormal performance is status 0.  She denies any cough or sputum production or shortness of breath.  She has not had any side effects of the Xeloda so far light mucositis diarrhea.  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              Past Medical History:   Diagnosis Date   • Anemia    • Colon cancer     NEW DIAGNOSIS   • Fatigue     secondary to chemotherapy    • H/O foreign travel 03/2016    [place], [place]   • Hepatic metastases 10/2016    partial right hepatectomy [place] 10/16   • History of transfusion     1999 AFTER TUBAL RUPTURE   • Hyperlipidemia    • Liver cancer    • Middle cerebral artery aneurysm    • Status post partial colectomy 04/2017    [facility]     Past Surgical History:   Procedure Laterality Date   • COLONOSCOPY       MAY 2016   • CRANIOTOMY  2004, 2005    Cerebral aneurysm   • LAPAROSCOPY FOR ECTOPIC PREGNANCY  09/1997   • LIVER SURGERY      MASS REMOVED   • CA INSJ TUNNELED CVC W/O SUBQ PORT/ AGE 3 YR/> Right 8/26/2016    Procedure: MEDIPORT PLACEMENT ;  Surgeon: Praful Holden MD;  Location: [facility] MAIN OR;  Service: Vascular   • SINUS SURGERY       Medications: The current medication list was reviewed in the EMR.    ALLERGIES:   No Known Allergies    SOCIAL HISTORY:       Social History   Substance Use Topics   • Smoking status: Never Smoker   • Smokeless tobacco: Never Used   • Alcohol use 3.0 oz/week     5 Glasses of wine per week      Comment: Social     FAMILY HISTORY:  Family History   Problem Relation Age of Onset   • Cerebral aneurysm Mother    • COPD Father        REVIEW OF SYSTEMS:  PAIN: See VITAL SIGNS below.   GENERAL: No change in appetite or weight, no fevers, chills or  sweats.   SKIN: No rashes or nonhealing lesions. Pale no jaundice  HEME/LYMPH: no anemia,NO easy bruising, bleeding or  swollen nodes.   EYES: No vision changes or diplopia.   ENT: No tinnitus, hearing loss, gum bleeding, epistaxis, hoarseness or dysphagia.   RESPIRATORY: NO cough,  shortness of breath,NO hemoptysis NO wheezing.   CVS: No chest pain,NO  palpitations, orthopnea, dyspnea on exertion, .   GI: POSTSURGICAL abdominal pain (see HPI), NO nausea, vomiting, constipation, diarrhea, melena or hematochezia.   : No dysuria or hematuria. No abnormal vaginal discharge or bleeding.   MUSCULOSKELETAL: No bone pain or joint stiffness..  NEUROLOGICAL: No dizziness, global weakness, loss of consciousness or seizures.    PSYCHIATRIC: No increased nervousness, mood changes or  depression.     Objective    There were no vitals filed for this visit.  Current Status 7/17/2017   ECOG score 0      PHYSICAL EXAM:    GENERAL:  Well-developed, well-nourished in no acute distress.   SKIN:  Warm, dry without rashes, purpura or petechiae.  HEAD:   Normocephalic.  EYES:  Pupils equal, round and reactive to light.  EOMs intact.  Conjunctivae normal.  EARS:  Hearing intact.  NOSE:  Septum midline.  No excoriations or nasal discharge.  MOUTH:  Tongue is well-papillated; no stomatitis or ulcers.  Buccal dryness noted. Lips normal.  THROAT:  Oropharynx without lesions or exudates.  NECK:  Supple with good range of motion; no thyromegaly or masses, no JVD.  LYMPHATICS:  No cervical, supraclavicular, axillary or inguinal adenopathy.  CHEST:  Lungs clear to auscultation bilaterally.    CARDIAC:  NORMAL rate and rhythm without murmurs, rubs or gallops. Normal S1,S2.  ABDOMEN:  Soft, nontender with no organomegaly or masses. Bowel sounds present. Ileostomy scar rlq, no distention , minimal pain, good bs, no rebound or distention.  .EXTREMITIES:  No clubbing, cyanosis NO edema.   NEUROLOGICAL:  Cranial Nerves II-XII grossly intact.  No focal neurological deficits.grade 1 neuropathy feet.  PSYCHIATRIC:  Normal affect and mood.        RECENT LABS:  Lab Results   Component Value Date    WBC 5.93 07/25/2017    HGB 11.0 (L) 07/25/2017    HCT 34.8 07/25/2017    MCV 89.5 07/25/2017     07/25/2017     Lab Results   Component Value Date    GLUCOSE 112 07/25/2017    BUN 11 07/25/2017    CREATININE 0.63 07/25/2017    EGFRIFNONA 99 07/25/2017    EGFRIFAFRI  09/15/2016      Comment:      <15 Indicative of kidney failure.    BCR 17.5 07/25/2017    CO2 24.6 07/25/2017    CALCIUM 9.0 07/25/2017    ALBUMIN 3.80 07/25/2017    LABIL2 1.4 07/25/2017    AST 26 07/25/2017    ALT 12 07/25/2017       Component      Latest Ref Rng & Units 5/31/2017 6/12/2017 7/17/2017           9:53 AM  2:06 PM  8:58 AM   CEA      ng/mL 2.84 2.46 2.07         Assessment/Plan      1. Metastatic rectal cancer to the liver,  KRAS negative, BRAF negative, MSI stable, NRAS negative.      The patient has had closure of her ileostomy A and O on the weekend before 4 July she had a delay in regarding initiation of  her adjuvant therapy a but now she is completing her second cycle this week.  The raises the question for how much longer she needs to continue doing these and I advised her to continue at least to complete her to a more cycles besides the one today and reassess her disease at that time.  I also asked her to strongly consider long-term Xeloda therapy given the high risk of recurrence that she has given her liver metastases that even though have been removed this still makes me to worry and to wonder.  He has developed a grade 1 peripheral neuropathy in his feet that that has no functional consequences .  He is now permanent and is not getting any worse.  Now she has developed also component of anemia  is related to her chemotherapy treatment.  Her diet is appropriate she has gained weight and she has no obvious passage of blood in the stools on the genitourinary tract.  Me the need for mammogram pelvic examination and a CT angiogram of the brain given his history of cerebral aneurysm and I advised her that we can do all these things at the same time including a PET scan and a CT scan angiogram of the brain at that point.  I now I don't in think she needs to have a mammogram and I don't think patient is to pursue pelvic examination.  He never had any abnormalities in Pap smear and she had never abnormalities in her mammograms or self   Breast examination.  Therefore we will proceed with  therapy for her today continued Xeloda at the same dose and the oxaliplatin, the same dose with the same sequence of events. if Abnormality is found in her laboratory parameters today that included a ferritin item TIBC B12 and folic acid levels she will be called at home.  Discussed with her this stability of her CEA level at 2.07 on 17 July.  It is a good sign that her cancer is now finally in remission.  Seen by a nurse practitioner in 2 weeks come back for the next infusion into in 4 weeks and I will see her back in 6  prasanna.             Zheng Johnson MD

## 2017-08-10 ENCOUNTER — TELEPHONE (OUTPATIENT)
Dept: NEUROSURGERY | Facility: CLINIC | Age: 52
End: 2017-08-10

## 2017-08-10 NOTE — TELEPHONE ENCOUNTER
Patient is due 4Y recall for aneurysm with repeat CTA head. Letter mailed on 7-31, order in. Old records scanned. Old imaging ready to load.     Please contact patient to follow-up - see if she is ready to schedule.

## 2017-08-10 NOTE — TELEPHONE ENCOUNTER
Pt is under continuing care with Oncologist Dr Zheng Pisano @ [facility].  She will be finishing her last round of chemo and he intends to order a CT in about a month and has stated he will add a head CT to the order so she will only have to go once for this.  I advised Pt that as soon as that is scheduled to call our office so that we can schedule her recall FU with our office after the imaging is done.    If this is not okay, please call to let her know. [phone]

## 2017-08-14 RX ORDER — CHLORAL HYDRATE 500 MG
1000 CAPSULE ORAL
COMMUNITY
End: 2019-07-26

## 2017-08-22 ENCOUNTER — APPOINTMENT (OUTPATIENT)
Dept: ONCOLOGY | Facility: HOSPITAL | Age: 52
End: 2017-08-22

## 2017-08-22 ENCOUNTER — APPOINTMENT (OUTPATIENT)
Dept: ONCOLOGY | Facility: CLINIC | Age: 52
End: 2017-08-22

## 2017-08-24 ENCOUNTER — INFUSION (OUTPATIENT)
Dept: ONCOLOGY | Facility: HOSPITAL | Age: 52
End: 2017-08-24

## 2017-08-24 ENCOUNTER — OFFICE VISIT (OUTPATIENT)
Dept: ONCOLOGY | Facility: CLINIC | Age: 52
End: 2017-08-24

## 2017-08-24 VITALS
SYSTOLIC BLOOD PRESSURE: 122 MMHG | DIASTOLIC BLOOD PRESSURE: 60 MMHG | HEIGHT: 64 IN | WEIGHT: 116.6 LBS | BODY MASS INDEX: 19.91 KG/M2 | TEMPERATURE: 97.9 F | RESPIRATION RATE: 16 BRPM | HEART RATE: 72 BPM | OXYGEN SATURATION: 100 %

## 2017-08-24 DIAGNOSIS — T45.1X5A PERIPHERAL NEUROPATHY DUE TO CHEMOTHERAPY (HCC): ICD-10-CM

## 2017-08-24 DIAGNOSIS — C78.7 METASTATIC CANCER TO LIVER (HCC): ICD-10-CM

## 2017-08-24 DIAGNOSIS — C78.7 RECTAL CANCER METASTASIZED TO LIVER (HCC): Primary | ICD-10-CM

## 2017-08-24 DIAGNOSIS — G62.0 PERIPHERAL NEUROPATHY DUE TO CHEMOTHERAPY (HCC): ICD-10-CM

## 2017-08-24 DIAGNOSIS — C20 RECTAL CANCER METASTASIZED TO LIVER (HCC): Primary | ICD-10-CM

## 2017-08-24 LAB
ALBUMIN SERPL-MCNC: 4.1 G/DL (ref 3.5–5.2)
ALBUMIN/GLOB SERPL: 1.6 G/DL (ref 1.1–2.4)
ALP SERPL-CCNC: 74 U/L (ref 38–116)
ALT SERPL W P-5'-P-CCNC: 17 U/L (ref 0–33)
ANION GAP SERPL CALCULATED.3IONS-SCNC: 11.6 MMOL/L
AST SERPL-CCNC: 31 U/L (ref 0–32)
BASOPHILS # BLD AUTO: 0.02 10*3/MM3 (ref 0–0.1)
BASOPHILS NFR BLD AUTO: 0.4 % (ref 0–1.1)
BILIRUB SERPL-MCNC: 0.4 MG/DL (ref 0.1–1.2)
BUN BLD-MCNC: 14 MG/DL (ref 6–20)
BUN/CREAT SERPL: 23.7 (ref 7.3–30)
CALCIUM SPEC-SCNC: 9.3 MG/DL (ref 8.5–10.2)
CHLORIDE SERPL-SCNC: 105 MMOL/L (ref 98–107)
CO2 SERPL-SCNC: 26.4 MMOL/L (ref 22–29)
CREAT BLD-MCNC: 0.59 MG/DL (ref 0.6–1.1)
DEPRECATED RDW RBC AUTO: 52.2 FL (ref 37–49)
EOSINOPHIL # BLD AUTO: 0.14 10*3/MM3 (ref 0–0.36)
EOSINOPHIL NFR BLD AUTO: 2.9 % (ref 1–5)
ERYTHROCYTE [DISTWIDTH] IN BLOOD BY AUTOMATED COUNT: 17.5 % (ref 11.7–14.5)
GFR SERPL CREATININE-BSD FRML MDRD: 107 ML/MIN/1.73
GLOBULIN UR ELPH-MCNC: 2.6 GM/DL (ref 1.8–3.5)
GLUCOSE BLD-MCNC: 99 MG/DL (ref 74–124)
HCT VFR BLD AUTO: 33.1 % (ref 34–45)
HGB BLD-MCNC: 10.8 G/DL (ref 11.5–14.9)
HOLD SPECIMEN: NORMAL
IMM GRANULOCYTES # BLD: 0.02 10*3/MM3 (ref 0–0.03)
IMM GRANULOCYTES NFR BLD: 0.4 % (ref 0–0.5)
LYMPHOCYTES # BLD AUTO: 0.97 10*3/MM3 (ref 1–3.5)
LYMPHOCYTES NFR BLD AUTO: 20 % (ref 20–49)
MCH RBC QN AUTO: 28.6 PG (ref 27–33)
MCHC RBC AUTO-ENTMCNC: 32.6 G/DL (ref 32–35)
MCV RBC AUTO: 87.8 FL (ref 83–97)
MONOCYTES # BLD AUTO: 0.64 10*3/MM3 (ref 0.25–0.8)
MONOCYTES NFR BLD AUTO: 13.2 % (ref 4–12)
NEUTROPHILS # BLD AUTO: 3.07 10*3/MM3 (ref 1.5–7)
NEUTROPHILS NFR BLD AUTO: 63.1 % (ref 39–75)
NRBC BLD MANUAL-RTO: 0 /100 WBC (ref 0–0)
PLATELET # BLD AUTO: 174 10*3/MM3 (ref 150–375)
PMV BLD AUTO: 9.9 FL (ref 8.9–12.1)
POTASSIUM BLD-SCNC: 4.2 MMOL/L (ref 3.5–4.7)
PROT SERPL-MCNC: 6.7 G/DL (ref 6.3–8)
RBC # BLD AUTO: 3.77 10*6/MM3 (ref 3.9–5)
SODIUM BLD-SCNC: 143 MMOL/L (ref 134–145)
WBC NRBC COR # BLD: 4.86 10*3/MM3 (ref 4–10)

## 2017-08-24 PROCEDURE — 96415 CHEMO IV INFUSION ADDL HR: CPT | Performed by: NURSE PRACTITIONER

## 2017-08-24 PROCEDURE — 25010000002 PALONOSETRON PER 25 MCG: Performed by: NURSE PRACTITIONER

## 2017-08-24 PROCEDURE — 25010000002 OXALIPLATIN PER 0.5 MG: Performed by: NURSE PRACTITIONER

## 2017-08-24 PROCEDURE — 96413 CHEMO IV INFUSION 1 HR: CPT | Performed by: NURSE PRACTITIONER

## 2017-08-24 PROCEDURE — 25010000002 DEXAMETHASONE PER 1 MG: Performed by: NURSE PRACTITIONER

## 2017-08-24 PROCEDURE — 80053 COMPREHEN METABOLIC PANEL: CPT

## 2017-08-24 PROCEDURE — 85025 COMPLETE CBC W/AUTO DIFF WBC: CPT

## 2017-08-24 PROCEDURE — 99212-NC PR NO CHARGE CBC OFFICE OUTPATIENT VISIT 10 MINUTES: Performed by: NURSE PRACTITIONER

## 2017-08-24 PROCEDURE — 96375 TX/PRO/DX INJ NEW DRUG ADDON: CPT | Performed by: NURSE PRACTITIONER

## 2017-08-24 RX ORDER — DEXTROSE MONOHYDRATE 50 MG/ML
250 INJECTION, SOLUTION INTRAVENOUS ONCE
Status: CANCELLED | OUTPATIENT
Start: 2017-08-24

## 2017-08-24 RX ORDER — PALONOSETRON 0.05 MG/ML
0.25 INJECTION, SOLUTION INTRAVENOUS ONCE
Status: COMPLETED | OUTPATIENT
Start: 2017-08-24 | End: 2017-08-24

## 2017-08-24 RX ORDER — DEXTROSE MONOHYDRATE 50 MG/ML
250 INJECTION, SOLUTION INTRAVENOUS ONCE
Status: COMPLETED | OUTPATIENT
Start: 2017-08-24 | End: 2017-08-24

## 2017-08-24 RX ORDER — PALONOSETRON 0.05 MG/ML
0.25 INJECTION, SOLUTION INTRAVENOUS ONCE
Status: CANCELLED | OUTPATIENT
Start: 2017-08-24

## 2017-08-24 RX ADMIN — OXALIPLATIN 100 MG: 100 INJECTION, SOLUTION INTRAVENOUS at 14:28

## 2017-08-24 RX ADMIN — DEXAMETHASONE SODIUM PHOSPHATE 12 MG: 10 INJECTION INTRAMUSCULAR; INTRAVENOUS at 14:06

## 2017-08-24 RX ADMIN — PALONOSETRON HYDROCHLORIDE 0.25 MG: 0.25 INJECTION INTRAVENOUS at 14:06

## 2017-08-24 RX ADMIN — DEXTROSE MONOHYDRATE 250 ML: 5 INJECTION, SOLUTION INTRAVENOUS at 14:06

## 2017-08-24 NOTE — PROGRESS NOTES
REASONS FOR FOLLOW UP:  Metastatic SIGMOID UPPER rectal cancer to the liver.      HISTORY OF PRESENT ILLNESS:  The patient is a 51 y.o. year old female with the above-mentioned history, here today for scheduled follow-up, and due for cycle 14 of CapOX therapy.  Specifically she is taking Xeloda 2000 mg in the morning, 1500 mg in the evening for 7 days on, 7 days off.  Her oxaliplatin dose has also been reduced to 100 mg IV each treatment due to previous neuropathy issues.  Her neuropathy remains stable.    The patient is a  and just recently went back to school and is thus experiencing more fatigue from working long days and continuing on chemotherapy.    She states today that she is under the impression she will receive a total of 15 cycles, thus 2 more, before completion.  She will likely undergo repeat scans through the [facility] as they have been dictating this piece.    She denies other concerns today.    Past Medical History:   Diagnosis Date   • ADD (attention deficit disorder)    • Anemia    • Colon cancer     NEW DIAGNOSIS   • Fatigue     secondary to chemotherapy    • H/O foreign travel 03/2016    [place], [place]   • Hepatic metastases 10/2016    partial right hepatectomy [place] 10/16   • History of transfusion     1999 AFTER TUBAL RUPTURE   • Hyperlipidemia    • Liver cancer    • Middle cerebral artery aneurysm    • Status post partial colectomy 04/2017    [facility]     Past Surgical History:   Procedure Laterality Date   • COLONOSCOPY      MAY 2016   • CRANIOTOMY  2004, 2005    Cerebral aneurysm   • LAPAROSCOPY FOR ECTOPIC PREGNANCY  09/1997   • LIVER SURGERY      MASS REMOVED   • [place] INSJ TUNNELED CVC W/O SUBQ PORT/ AGE 3 YR/> Right 8/26/2016    Procedure: MEDIPORT PLACEMENT ;  Surgeon: Praful Holden MD;  Location: [facility];  Service: Vascular   • SINUS SURGERY       Medications: The current medication list was reviewed in the  "EMR.    ALLERGIES:   No Known Allergies    SOCIAL HISTORY:       Social History   Substance Use Topics   • Smoking status: Never Smoker   • Smokeless tobacco: Never Used   • Alcohol use 3.0 oz/week     5 Glasses of wine per week      Comment: Social     FAMILY HISTORY:  Family History   Problem Relation Age of Onset   • Cerebral aneurysm Mother    • COPD Father      I have reviewed the patient's medical history in detail and updated the computerized patient record.    REVIEW OF SYSTEMS:  PAIN: See VITAL SIGNS below.   GENERAL: See HPI. No change in appetite or weight, no  fevers, chills or sweats.   SKIN: No rashes or nonhealing lesions. Pale no jaundice  HEME/LYMPH: no anemia,No easy bruising, bleeding or  swollen nodes.   EYES: No vision changes or diplopia.   ENT: No tinnitus, hearing loss, gum bleeding, epistaxis, hoarseness or dysphagia.   RESPIRATORY: No cough,  shortness of breath,No hemoptysis No wheezing.   CVS: No chest pain, no palpitations, orthopnea, dyspnea on exertion, .   GI: See HPI. No nausea, vomiting, constipation, diarrhea, melena or hematochezia.   : No dysuria or hematuria. No abnormal vaginal discharge or bleeding.   MUSCULOSKELETAL: No bone pain or joint stiffness..  NEUROLOGICAL: No dizziness, global weakness, loss of consciousness or seizures.    PSYCHIATRIC: No increased nervousness, mood changes or  depression.     Objective    Vitals:    08/24/17 1324   BP: 122/60   Pulse: 72   Resp: 16   Temp: 97.9 °F (36.6 °C)   SpO2: 100%   Weight: 116 lb 9.6 oz (52.9 kg)   Height: 64.17\" (163 cm)   PainSc: 0-No pain     Current Status 8/24/2017   ECOG score 0      PHYSICAL EXAM:    GENERAL:  Well-developed, well-nourished in no acute distress.   SKIN:  Warm, dry without rashes, purpura or petechiae.  HEAD:  Normocephalic.  EYES:  Pupils equal, round and reactive to light.  EOMs intact.  Conjunctivae normal.  EARS:  Hearing intact.  NOSE:  Septum midline.  No excoriations or nasal " discharge.  CHEST:  Lungs clear to auscultation bilaterally.    CARDIAC:  NORMAL rate and rhythm without murmurs, rubs or gallops. Normal S1,S2.  ABDOMEN:  Soft, nontender with no organomegaly or masses. Bowel sounds present.  Surgical scars well healed.   EXTREMITIES:  No clubbing, cyanosis no edema.   NEUROLOGICAL:  Cranial Nerves II-XII grossly intact.  No focal neurological deficits.  PSYCHIATRIC:  Normal affect and mood.      RECENT LABS:  Lab Results   Component Value Date    WBC 4.86 08/24/2017    HGB 10.8 (L) 08/24/2017    HCT 33.1 (L) 08/24/2017    MCV 87.8 08/24/2017     08/24/2017     Lab Results   Component Value Date    GLUCOSE 99 08/24/2017    BUN 14 08/24/2017    CREATININE 0.59 (L) 08/24/2017    EGFRIFNONA 107 08/24/2017    EGFRIFAFRI  09/15/2016      Comment:      <15 Indicative of kidney failure.    BCR 23.7 08/24/2017    CO2 26.4 08/24/2017    CALCIUM 9.3 08/24/2017    ALBUMIN 4.10 08/24/2017    LABIL2 1.6 08/24/2017    AST 31 08/24/2017    ALT 17 08/24/2017     CEA Pending.    Assessment/Plan      1. Metastatic rectal cancer to the liver,  KRAS negative, BRAF negative, MSI stable, NRAS negative. Surgically disease free after liver resection, primary rectal tumor removal, and now recent reversal of ileostomy in July (thus treatment break through [place] [id number]). Now continuing on CapOX with Xeloda, 2000 g in the morning, 1500 mg at night, 7 days on and 7 days off.  She is due for cycle 13 CapOX today. She is scheduled to return in 2 weeks for treatment only and in 4 weeks for f/u with Dr. Johnson and what she expects to be her final cycle of CapOX #15. She expects after that to undergo repeat scans, likely through [facility]. CEA remains normal.    2.  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  Previously dosed reduced in December 2016.  The patient did note improvement in her neuropathy during her treatment break and surgery.  We have now reduced her oxaliplatin to 100 mg.    PLAN:  1. Proceed with  cycle 13 CapOX today, specifically Oxaliplatin 100mg IV and Xeloda 2000mg in the morning, 1500mg at night, 7 days on, 7 days off.  2. Return in 2 weeks for cycle 14 therapy.  3. Return in 4 weeks for MD f/u, CEA level, CBC, CMP, and C15 of therapy. Again, the patient expects this to be her last chemo treatment. She also expects f/u scans, likely to be done at [place], thereafter.     Carmel Fritz, APRN  07/17/2017

## 2017-08-30 ENCOUNTER — TELEPHONE (OUTPATIENT)
Dept: GENERAL RADIOLOGY | Facility: HOSPITAL | Age: 52
End: 2017-08-30

## 2017-08-30 ENCOUNTER — TELEPHONE (OUTPATIENT)
Dept: ONCOLOGY | Facility: CLINIC | Age: 52
End: 2017-08-30

## 2017-08-30 NOTE — TELEPHONE ENCOUNTER
Looks like she is to follow-up with oncology 9-20-17. While the last oncology note indicates that further imaging (pertaining to what they are treating) will be done a [facility], she still needs FU here with repeat CTA head as previously noted.     Please contact patient to see if we can arrange these important follow-up (after her visit with Dr Johnson).

## 2017-08-30 NOTE — TELEPHONE ENCOUNTER
Pt called stated that with her last treatment on 8/24 she has developed more problems.  Pt states that she has lost feeling in her toes but this is not new for her but she now has tingling in pads of feet and this is painful for pt and feels like she is walking on rocks.  Pt stated that her fingers feel swollen and are throbbing and are causing her discomfort.  Pt is concerned because she is scheduled for treatment on 9/5.    Reviewed above symptoms with Leonor OTTO and orders received for pt to take a Bcomplex once a day and add pt to see NP on 9/5.      Called pt and reviewed with her above information and pt stated that she was on a B complex but had stopped taking it but will restart taking med.  Instructed her that scheduling will call her to add NP visit to her already scheduled appt on 9/5 and she will be assessed.  Pt v/u.     Note sent to scheduling to add pt to NP schedule for 9/5 and call pt with updated times.

## 2017-08-30 NOTE — TELEPHONE ENCOUNTER
----- Message from Kimberlyn Amezquita RN sent at 8/30/2017  4:28 PM EDT -----  Please add pt to see NP on 9/5.  Place pt on Amparo Fritz's schedule because she has seen pt before.  Pt is already on schedule for CBC and Chemo infusion. Keep these scheduled but pt will need to see NP after CBC drawn.  Please call pt and let her know if times have changed.

## 2017-09-05 ENCOUNTER — APPOINTMENT (OUTPATIENT)
Dept: ONCOLOGY | Facility: HOSPITAL | Age: 52
End: 2017-09-05

## 2017-09-07 ENCOUNTER — INFUSION (OUTPATIENT)
Dept: ONCOLOGY | Facility: HOSPITAL | Age: 52
End: 2017-09-07

## 2017-09-07 ENCOUNTER — OFFICE VISIT (OUTPATIENT)
Dept: ONCOLOGY | Facility: CLINIC | Age: 52
End: 2017-09-07

## 2017-09-07 ENCOUNTER — APPOINTMENT (OUTPATIENT)
Dept: ONCOLOGY | Facility: HOSPITAL | Age: 52
End: 2017-09-07

## 2017-09-07 VITALS
SYSTOLIC BLOOD PRESSURE: 120 MMHG | HEIGHT: 64 IN | BODY MASS INDEX: 19.46 KG/M2 | DIASTOLIC BLOOD PRESSURE: 72 MMHG | WEIGHT: 114 LBS | HEART RATE: 94 BPM | RESPIRATION RATE: 16 BRPM | OXYGEN SATURATION: 100 % | TEMPERATURE: 98.4 F

## 2017-09-07 DIAGNOSIS — Z45.2 FITTING AND ADJUSTMENT OF VASCULAR CATHETER: ICD-10-CM

## 2017-09-07 DIAGNOSIS — C20 RECTAL CANCER METASTASIZED TO LIVER (HCC): Primary | ICD-10-CM

## 2017-09-07 DIAGNOSIS — G62.0 PERIPHERAL NEUROPATHY DUE TO CHEMOTHERAPY (HCC): ICD-10-CM

## 2017-09-07 DIAGNOSIS — C78.7 RECTAL CANCER METASTASIZED TO LIVER (HCC): Primary | ICD-10-CM

## 2017-09-07 DIAGNOSIS — C78.7 METASTATIC CANCER TO LIVER (HCC): ICD-10-CM

## 2017-09-07 DIAGNOSIS — T45.1X5A PERIPHERAL NEUROPATHY DUE TO CHEMOTHERAPY (HCC): ICD-10-CM

## 2017-09-07 LAB
ALBUMIN SERPL-MCNC: 3.8 G/DL (ref 3.5–5.2)
ALBUMIN/GLOB SERPL: 1.4 G/DL (ref 1.1–2.4)
ALP SERPL-CCNC: 81 U/L (ref 38–116)
ALT SERPL W P-5'-P-CCNC: 30 U/L (ref 0–33)
ANION GAP SERPL CALCULATED.3IONS-SCNC: 12.8 MMOL/L
AST SERPL-CCNC: 40 U/L (ref 0–32)
BASOPHILS # BLD AUTO: 0.04 10*3/MM3 (ref 0–0.1)
BASOPHILS NFR BLD AUTO: 0.7 % (ref 0–1.1)
BILIRUB SERPL-MCNC: 0.5 MG/DL (ref 0.1–1.2)
BUN BLD-MCNC: 11 MG/DL (ref 6–20)
BUN/CREAT SERPL: 18.3 (ref 7.3–30)
CALCIUM SPEC-SCNC: 9.4 MG/DL (ref 8.5–10.2)
CEA SERPL-MCNC: 2.16 NG/ML
CHLORIDE SERPL-SCNC: 102 MMOL/L (ref 98–107)
CO2 SERPL-SCNC: 25.2 MMOL/L (ref 22–29)
CREAT BLD-MCNC: 0.6 MG/DL (ref 0.6–1.1)
DEPRECATED RDW RBC AUTO: 52.7 FL (ref 37–49)
EOSINOPHIL # BLD AUTO: 0.13 10*3/MM3 (ref 0–0.36)
EOSINOPHIL NFR BLD AUTO: 2.3 % (ref 1–5)
ERYTHROCYTE [DISTWIDTH] IN BLOOD BY AUTOMATED COUNT: 17.4 % (ref 11.7–14.5)
GFR SERPL CREATININE-BSD FRML MDRD: 105 ML/MIN/1.73
GLOBULIN UR ELPH-MCNC: 2.7 GM/DL (ref 1.8–3.5)
GLUCOSE BLD-MCNC: 107 MG/DL (ref 74–124)
HCT VFR BLD AUTO: 30.4 % (ref 34–45)
HGB BLD-MCNC: 10.2 G/DL (ref 11.5–14.9)
IMM GRANULOCYTES # BLD: 0.02 10*3/MM3 (ref 0–0.03)
IMM GRANULOCYTES NFR BLD: 0.4 % (ref 0–0.5)
LYMPHOCYTES # BLD AUTO: 1.29 10*3/MM3 (ref 1–3.5)
LYMPHOCYTES NFR BLD AUTO: 22.9 % (ref 20–49)
MCH RBC QN AUTO: 28.7 PG (ref 27–33)
MCHC RBC AUTO-ENTMCNC: 33.6 G/DL (ref 32–35)
MCV RBC AUTO: 85.6 FL (ref 83–97)
MONOCYTES # BLD AUTO: 0.7 10*3/MM3 (ref 0.25–0.8)
MONOCYTES NFR BLD AUTO: 12.4 % (ref 4–12)
NEUTROPHILS # BLD AUTO: 3.46 10*3/MM3 (ref 1.5–7)
NEUTROPHILS NFR BLD AUTO: 61.3 % (ref 39–75)
NRBC BLD MANUAL-RTO: 0 /100 WBC (ref 0–0)
PLATELET # BLD AUTO: 155 10*3/MM3 (ref 150–375)
PMV BLD AUTO: 9.4 FL (ref 8.9–12.1)
POTASSIUM BLD-SCNC: 3.1 MMOL/L (ref 3.5–4.7)
PROT SERPL-MCNC: 6.5 G/DL (ref 6.3–8)
RBC # BLD AUTO: 3.55 10*6/MM3 (ref 3.9–5)
SODIUM BLD-SCNC: 140 MMOL/L (ref 134–145)
WBC NRBC COR # BLD: 5.64 10*3/MM3 (ref 4–10)

## 2017-09-07 PROCEDURE — 85025 COMPLETE CBC W/AUTO DIFF WBC: CPT

## 2017-09-07 PROCEDURE — 80053 COMPREHEN METABOLIC PANEL: CPT | Performed by: INTERNAL MEDICINE

## 2017-09-07 PROCEDURE — 96523 IRRIG DRUG DELIVERY DEVICE: CPT | Performed by: NURSE PRACTITIONER

## 2017-09-07 PROCEDURE — 99214 OFFICE O/P EST MOD 30 MIN: CPT | Performed by: NURSE PRACTITIONER

## 2017-09-07 PROCEDURE — 82378 CARCINOEMBRYONIC ANTIGEN: CPT | Performed by: INTERNAL MEDICINE

## 2017-09-07 PROCEDURE — 25010000002 HEPARIN FLUSH (PORCINE) 100 UNIT/ML SOLUTION: Performed by: INTERNAL MEDICINE

## 2017-09-07 PROCEDURE — 36415 COLL VENOUS BLD VENIPUNCTURE: CPT | Performed by: INTERNAL MEDICINE

## 2017-09-07 RX ORDER — SODIUM CHLORIDE 0.9 % (FLUSH) 0.9 %
10 SYRINGE (ML) INJECTION AS NEEDED
Status: DISCONTINUED | OUTPATIENT
Start: 2017-09-07 | End: 2021-04-16

## 2017-09-07 RX ORDER — SODIUM CHLORIDE 0.9 % (FLUSH) 0.9 %
10 SYRINGE (ML) INJECTION AS NEEDED
Status: CANCELLED | OUTPATIENT
Start: 2017-09-07

## 2017-09-07 RX ADMIN — Medication 10 ML: at 14:37

## 2017-09-07 RX ADMIN — SODIUM CHLORIDE, PRESERVATIVE FREE 500 UNITS: 5 INJECTION INTRAVENOUS at 14:38

## 2017-09-07 NOTE — PROGRESS NOTES
"  REASONS FOR FOLLOW UP:  Metastatic SIGMOID UPPER rectal cancer to the liver, resected, with KIRIT.    HISTORY OF PRESENT ILLNESS:  The patient is a 51 y.o. year old female with the above-mentioned history, here today for follow-up, and due for cycle 14 of CapOX therapy.  Specifically she is taking Xeloda 2000 mg in the morning, 1500 mg in the evening for 7 days on, 7 days off.  Her oxaliplatin dose has been reduced a total of 30% since initiating therapy last year due to neuropathy.    Unfortunately the patient is noting more difficulty after her last treatment, noting her fingers felt like \"sausages\" and the bottoms of her feet feel like there is tape stuck to them.  She feels the neuropathy is definitely worse.  The patient also had more trouble with feeling completely wiped out and even loopy at times.  This was out of character for her typically after treatment    I discussed this with Dr. Torre in Dr. Johnson's absence in considering this is to be the patient's final scheduled chemotherapy, we will drop oxaliplatin for this final cycle and have her take Xeloda alone.    The patient is ecstatic at this and thankful to be done with IV therapy.  She did have questions about several medications as well as long-term follow-up.  Otherwise she denies further concerns today.    Past Medical History:   Diagnosis Date   • ADD (attention deficit disorder)    • Anemia    • Colon cancer     NEW DIAGNOSIS   • Fatigue     secondary to chemotherapy    • H/O foreign travel 03/2016    [facility] [place]   • Hepatic metastases 10/2016    partial right hepatectomy [place] 10/16   • History of transfusion     1999 AFTER TUBAL RUPTURE   • Hyperlipidemia    • Liver cancer    • Middle cerebral artery aneurysm    • Status post partial colectomy 04/2017    [facility]     Past Surgical History:   Procedure Laterality Date   • COLONOSCOPY      MAY 2016   • CRANIOTOMY  2004, 2005    Cerebral aneurysm   • LAPAROSCOPY " "FOR ECTOPIC PREGNANCY  09/1997   • LIVER SURGERY      MASS REMOVED   • [place] INSJ TUNNELED CVC W/O SUBQ PORT/ AGE 3 YR/> Right 8/26/2016    Procedure: MEDIPORT PLACEMENT ;  Surgeon: Praful Holden MD;  Location: [facility];  Service: Vascular   • SINUS SURGERY       Medications: The current medication list was reviewed in the EMR.    ALLERGIES:   No Known Allergies    SOCIAL HISTORY:       Social History   Substance Use Topics   • Smoking status: Never Smoker   • Smokeless tobacco: Never Used   • Alcohol use 3.0 oz/week     5 Glasses of wine per week      Comment: Social     FAMILY HISTORY:  Family History   Problem Relation Age of Onset   • Cerebral aneurysm Mother    • COPD Father      I have reviewed the patient's medical history in detail and updated the computerized patient record.    REVIEW OF SYSTEMS:  PAIN: See VITAL SIGNS below.   GENERAL: See HPI. No change in appetite or weight, no  fevers, chills or sweats.   SKIN: No rashes or nonhealing lesions. Pale no jaundice  HEME/LYMPH: no anemia,No easy bruising, bleeding or  swollen nodes.   EYES: No vision changes or diplopia.   ENT: No tinnitus, hearing loss, gum bleeding, epistaxis, hoarseness or dysphagia.   RESPIRATORY: No cough,  shortness of breath,No hemoptysis No wheezing.   CVS: No chest pain, no palpitations, orthopnea, dyspnea on exertion, .   GI: See HPI. No nausea, vomiting, constipation, diarrhea, melena or hematochezia.   : No dysuria or hematuria. No abnormal vaginal discharge or bleeding.   MUSCULOSKELETAL: No bone pain or joint stiffness..  NEUROLOGICAL: See history of present illness.    PSYCHIATRIC: No increased nervousness, mood changes or  depression.     Objective    Vitals:    09/07/17 1400   BP: 120/72   Pulse: 94   Resp: 16   Temp: 98.4 °F (36.9 °C)   SpO2: 100%   Weight: 114 lb (51.7 kg)   Height: 64.17\" (163 cm)   PainSc: 0-No pain     Current Status 9/7/2017   ECOG score 0      PHYSICAL EXAM:    GENERAL:  " Well-developed, well-nourished in no acute distress.   SKIN:  Warm, dry without rashes, purpura or petechiae.  HEAD:  Normocephalic.  EYES:  Pupils equal, round and reactive to light.  EOMs intact.  Conjunctivae normal.  EARS:  Hearing intact.  NOSE:  Septum midline.  No excoriations or nasal discharge.  CHEST:  Lungs clear to auscultation bilaterally.    CARDIAC:  NORMAL rate and rhythm without murmurs, rubs or gallops. Normal S1,S2.  ABDOMEN:  Soft, nontender with no organomegaly or masses. Bowel sounds present.  Surgical scars well healed.   EXTREMITIES:  No clubbing, cyanosis no edema.   NEUROLOGICAL:  Cranial Nerves II-XII grossly intact.  No focal neurological deficits.  PSYCHIATRIC:  Normal affect and mood.      RECENT LABS:  Lab Results   Component Value Date    WBC 5.64 09/07/2017    HGB 10.2 (L) 09/07/2017    HCT 30.4 (L) 09/07/2017    MCV 85.6 09/07/2017     09/07/2017     Lab Results   Component Value Date    GLUCOSE 107 09/07/2017    BUN 11 09/07/2017    CREATININE 0.60 09/07/2017    EGFRIFNONA 105 09/07/2017    EGFRIFAFRI  09/15/2016      Comment:      <15 Indicative of kidney failure.    BCR 18.3 09/07/2017    CO2 25.2 09/07/2017    CALCIUM 9.4 09/07/2017    ALBUMIN 3.80 09/07/2017    LABIL2 1.4 09/07/2017    AST 40 (H) 09/07/2017    ALT 30 09/07/2017     CEA Pending.    Assessment/Plan      1. Metastatic rectal cancer to the liver,  KRAS negative, BRAF negative, MSI stable, NRAS negative. Surgically disease free after liver resection, primary rectal tumor removal, and now recent reversal of ileostomy in July (thus treatment break through Summer [address]0463). Now continuing on CapOX with Xeloda, 2000 g in the morning, 1500 mg at night, 7 days on and 7 days off.  She is due for cycle 14 CapOX today.    However the patient is having worsening neuropathy and considering we have [place] reduce her oxaliplatin 30% over the course of therapy, I discussed her case with Dr. Torre.  We will discontinue  oxaliplatin at this time and have the patient just take her dose of Xeloda for this cycle.    She will otherwise follow up with Dr. Johnson in 2 weeks for general review.  The patient is not expecting to receive any further IV chemotherapy.  She states that she is expecting follow-up scans at [facility] most likely. CEA has been normal, pending today.  She is also asking about when her Mediport might come out.  I encouraged her to discuss all this with Dr. Johnson in follow-up.    2.  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  As noted, we will discontinue Oxaliplatin at this time. She has received a total of 13 cycles of this drug, at full dose for 7 cycles, 20% reduced for another 3 cycles, and another 10% reduction for 3 more cycles.     PLAN:  1. Patient will take just Xeloda 2000mg in the morning, 1500mg at night, 7 days on, 7 days off.  2. Return in 2 weeks for MD f/u, CBC, CMP, and general review. Again, the patient expects that today is her last treatment cycle. She also expects f/u scans, likely to be done at [place], thereafter.     Carmel Fritz, APRN  07/17/2017

## 2017-09-08 ENCOUNTER — DOCUMENTATION (OUTPATIENT)
Dept: ONCOLOGY | Facility: CLINIC | Age: 52
End: 2017-09-08

## 2017-09-08 RX ORDER — CAPECITABINE 500 MG/1
TABLET, FILM COATED ORAL
Qty: 98 TABLET | Refills: 2 | Status: SHIPPED | OUTPATIENT
Start: 2017-09-08 | End: 2017-09-20 | Stop reason: DRUGHIGH

## 2017-09-08 NOTE — PROGRESS NOTES
Rec VM from [facility] asking for new rx for pts Xeloda. Per 9/7/17 office note from Amparo HARO, NP-Pt will take Xeloda 2000 mg in the AM and 1500 mg in the PM for 7 days on then 7 days off.    I have escribed a new rx to [facility]

## 2017-09-13 ENCOUNTER — TELEPHONE (OUTPATIENT)
Dept: ONCOLOGY | Facility: HOSPITAL | Age: 52
End: 2017-09-13

## 2017-09-13 NOTE — TELEPHONE ENCOUNTER
Pt calling back. Spoke with her she states that her neuropathy pain is worse than it has ever been today. Both feet are painful and even her fingertips. She also states that they are red. Spoke with Dr. Johnson and per his order. Pt to stop taking the Xeloda. Use a lot of cream and biofreeze. Informed pt and she V/U.

## 2017-09-13 NOTE — TELEPHONE ENCOUNTER
Pt left message of triage VM. Stating that she is only on Xeloda now (D/c'd oxaliplatin) and she is having really bad neuropathy in her feet. Its the worse it has ever been. Attempted to call pt back. L/M to call us back.

## 2017-09-18 DIAGNOSIS — C20 RECTAL CANCER METASTASIZED TO LIVER (HCC): Primary | ICD-10-CM

## 2017-09-18 DIAGNOSIS — C78.7 RECTAL CANCER METASTASIZED TO LIVER (HCC): Primary | ICD-10-CM

## 2017-09-20 ENCOUNTER — OFFICE VISIT (OUTPATIENT)
Dept: ONCOLOGY | Facility: CLINIC | Age: 52
End: 2017-09-20

## 2017-09-20 ENCOUNTER — DOCUMENTATION (OUTPATIENT)
Dept: ONCOLOGY | Facility: CLINIC | Age: 52
End: 2017-09-20

## 2017-09-20 ENCOUNTER — APPOINTMENT (OUTPATIENT)
Dept: ONCOLOGY | Facility: HOSPITAL | Age: 52
End: 2017-09-20

## 2017-09-20 ENCOUNTER — INFUSION (OUTPATIENT)
Dept: ONCOLOGY | Facility: HOSPITAL | Age: 52
End: 2017-09-20

## 2017-09-20 VITALS
TEMPERATURE: 98.5 F | RESPIRATION RATE: 18 BRPM | HEART RATE: 82 BPM | OXYGEN SATURATION: 100 % | HEIGHT: 64 IN | BODY MASS INDEX: 19.77 KG/M2 | WEIGHT: 115.8 LBS | DIASTOLIC BLOOD PRESSURE: 62 MMHG | SYSTOLIC BLOOD PRESSURE: 118 MMHG

## 2017-09-20 DIAGNOSIS — C78.7 METASTATIC CANCER TO LIVER (HCC): ICD-10-CM

## 2017-09-20 DIAGNOSIS — Z45.2 FITTING AND ADJUSTMENT OF VASCULAR CATHETER: ICD-10-CM

## 2017-09-20 DIAGNOSIS — D63.0 ANEMIA IN NEOPLASTIC DISEASE: ICD-10-CM

## 2017-09-20 DIAGNOSIS — C78.7 RECTAL CANCER METASTASIZED TO LIVER (HCC): Primary | ICD-10-CM

## 2017-09-20 DIAGNOSIS — G62.0 PERIPHERAL NEUROPATHY DUE TO CHEMOTHERAPY (HCC): ICD-10-CM

## 2017-09-20 DIAGNOSIS — C20 RECTAL CANCER METASTASIZED TO LIVER (HCC): Primary | ICD-10-CM

## 2017-09-20 DIAGNOSIS — T45.1X5A PERIPHERAL NEUROPATHY DUE TO CHEMOTHERAPY (HCC): ICD-10-CM

## 2017-09-20 LAB
ALBUMIN SERPL-MCNC: 3.9 G/DL (ref 3.5–5.2)
ALBUMIN/GLOB SERPL: 1.3 G/DL (ref 1.1–2.4)
ALP SERPL-CCNC: 86 U/L (ref 38–116)
ALT SERPL W P-5'-P-CCNC: 14 U/L (ref 0–33)
ANION GAP SERPL CALCULATED.3IONS-SCNC: 12.8 MMOL/L
AST SERPL-CCNC: 28 U/L (ref 0–32)
BASOPHILS # BLD AUTO: 0.03 10*3/MM3 (ref 0–0.1)
BASOPHILS NFR BLD AUTO: 0.6 % (ref 0–1.1)
BILIRUB SERPL-MCNC: 0.4 MG/DL (ref 0.1–1.2)
BUN BLD-MCNC: 12 MG/DL (ref 6–20)
BUN/CREAT SERPL: 19.7 (ref 7.3–30)
CALCIUM SPEC-SCNC: 9.3 MG/DL (ref 8.5–10.2)
CHLORIDE SERPL-SCNC: 106 MMOL/L (ref 98–107)
CO2 SERPL-SCNC: 23.2 MMOL/L (ref 22–29)
CREAT BLD-MCNC: 0.61 MG/DL (ref 0.6–1.1)
DEPRECATED RDW RBC AUTO: 61.1 FL (ref 37–49)
EOSINOPHIL # BLD AUTO: 0.23 10*3/MM3 (ref 0–0.36)
EOSINOPHIL NFR BLD AUTO: 4.9 % (ref 1–5)
ERYTHROCYTE [DISTWIDTH] IN BLOOD BY AUTOMATED COUNT: 19.3 % (ref 11.7–14.5)
GFR SERPL CREATININE-BSD FRML MDRD: 102 ML/MIN/1.73
GLOBULIN UR ELPH-MCNC: 2.9 GM/DL (ref 1.8–3.5)
GLUCOSE BLD-MCNC: 114 MG/DL (ref 74–124)
HCT VFR BLD AUTO: 35 % (ref 34–45)
HGB BLD-MCNC: 11.4 G/DL (ref 11.5–14.9)
HOLD SPECIMEN: NORMAL
IMM GRANULOCYTES # BLD: 0.03 10*3/MM3 (ref 0–0.03)
IMM GRANULOCYTES NFR BLD: 0.6 % (ref 0–0.5)
LYMPHOCYTES # BLD AUTO: 1.25 10*3/MM3 (ref 1–3.5)
LYMPHOCYTES NFR BLD AUTO: 26.4 % (ref 20–49)
MCH RBC QN AUTO: 29.8 PG (ref 27–33)
MCHC RBC AUTO-ENTMCNC: 32.6 G/DL (ref 32–35)
MCV RBC AUTO: 91.4 FL (ref 83–97)
MONOCYTES # BLD AUTO: 0.72 10*3/MM3 (ref 0.25–0.8)
MONOCYTES NFR BLD AUTO: 15.2 % (ref 4–12)
NEUTROPHILS # BLD AUTO: 2.47 10*3/MM3 (ref 1.5–7)
NEUTROPHILS NFR BLD AUTO: 52.3 % (ref 39–75)
NRBC BLD MANUAL-RTO: 0 /100 WBC (ref 0–0)
PLATELET # BLD AUTO: 208 10*3/MM3 (ref 150–375)
PMV BLD AUTO: 9.6 FL (ref 8.9–12.1)
POTASSIUM BLD-SCNC: 4.4 MMOL/L (ref 3.5–4.7)
PROT SERPL-MCNC: 6.8 G/DL (ref 6.3–8)
RBC # BLD AUTO: 3.83 10*6/MM3 (ref 3.9–5)
SODIUM BLD-SCNC: 142 MMOL/L (ref 134–145)
WBC NRBC COR # BLD: 4.73 10*3/MM3 (ref 4–10)

## 2017-09-20 PROCEDURE — 99213 OFFICE O/P EST LOW 20 MIN: CPT | Performed by: INTERNAL MEDICINE

## 2017-09-20 PROCEDURE — 85025 COMPLETE CBC W/AUTO DIFF WBC: CPT | Performed by: INTERNAL MEDICINE

## 2017-09-20 PROCEDURE — 80053 COMPREHEN METABOLIC PANEL: CPT | Performed by: INTERNAL MEDICINE

## 2017-09-20 PROCEDURE — 96523 IRRIG DRUG DELIVERY DEVICE: CPT | Performed by: INTERNAL MEDICINE

## 2017-09-20 PROCEDURE — 25010000002 HEPARIN FLUSH (PORCINE) 100 UNIT/ML SOLUTION: Performed by: INTERNAL MEDICINE

## 2017-09-20 RX ORDER — SODIUM CHLORIDE 0.9 % (FLUSH) 0.9 %
10 SYRINGE (ML) INJECTION AS NEEDED
Status: DISCONTINUED | OUTPATIENT
Start: 2017-09-20 | End: 2017-09-20 | Stop reason: HOSPADM

## 2017-09-20 RX ORDER — CAPECITABINE 500 MG/1
TABLET, FILM COATED ORAL
Qty: 70 TABLET | Refills: 3 | Status: SHIPPED | OUTPATIENT
Start: 2017-09-20 | End: 2017-12-26

## 2017-09-20 RX ORDER — SODIUM CHLORIDE 0.9 % (FLUSH) 0.9 %
10 SYRINGE (ML) INJECTION AS NEEDED
Status: CANCELLED | OUTPATIENT
Start: 2017-09-20

## 2017-09-20 RX ADMIN — SODIUM CHLORIDE, PRESERVATIVE FREE 500 UNITS: 5 INJECTION INTRAVENOUS at 07:56

## 2017-09-20 RX ADMIN — Medication 10 ML: at 07:56

## 2017-09-20 NOTE — PROGRESS NOTES
Subjective .     REASONS FOR FOLLOW UP:  Metastatic SIGMOID UPPER rectal cancer to the liver.  Initiated chemotherapy with FOLFOX-6 9/1/2016.     HISTORY OF PRESENT ILLNESS:  The patient is a 52 y.o. year old female with the above-mentioned history,        This patient is here today after she called the office last week stating that she has continued with the Xeloda and she developed significant chest pressures, mucositis, red hands with tenderness in her fingers and feet. She was having profuse diarrhea.  She was advised to stop the Xeloda at that point and the symptoms then have disappeared and improved altogether.  Obviously, the patient has continued having symptoms related to peripheral neuropathy induced by Eloxatin and this medicine also was discontinued from her care plan before.  The question has been raised in regard how to continue her treatment in the background of metastatic cancer to the liver and removal of her rectosigmoid tumor.  Obviously, we would like for her to remain on Xeloda and what I have suggested to her today is hold off on more treatment for 2 more weeks, then adjust the dose of Xeloda to 3 tablets in the morning and 2 in the evening for 7 days and if she develops any other problems I think we will be done with this medicine as well.  I suggested to pursue a PET scan in 4 weeks.      Overall, the patient as stated feels better, her appetite is good, her weight is stable, her bowel function has returned to normality, she has no cancer-related pain and her neuropathy in her fingers and toes remains ongoing with no pain at this time, no difficulty walking, and no alteration in actions or ability to function with her hands.               Past Medical History:   Diagnosis Date   • ADD (attention deficit disorder)    • Anemia    • Colon cancer     NEW DIAGNOSIS   • Fatigue     secondary to chemotherapy    • H/O foreign travel 03/2016    [place]   • Hepatic metastases  10/2016    partial right hepatectomy [place] 10/16   • History of transfusion     1999 AFTER TUBAL RUPTURE   • Hyperlipidemia    • Liver cancer    • Middle cerebral artery aneurysm    • Status post partial colectomy 04/2017    [facility]     Past Surgical History:   Procedure Laterality Date   • COLONOSCOPY      MAY 2016   • CRANIOTOMY  2004, 2005    Cerebral aneurysm   • LAPAROSCOPY FOR ECTOPIC PREGNANCY  09/1997   • LIVER SURGERY      MASS REMOVED   • [place] INSJ TUNNELED CVC W/O SUBQ PORT/ AGE 3 YR/> Right 8/26/2016    Procedure: MEDIPORT PLACEMENT ;  Surgeon: Praful Holden MD;  Location: [facility];  Service: Vascular   • SINUS SURGERY       Medications: The current medication list was reviewed in the EMR.    ALLERGIES:   No Known Allergies    SOCIAL HISTORY:       Social History   Substance Use Topics   • Smoking status: Never Smoker   • Smokeless tobacco: Never Used   • Alcohol use 3.0 oz/week     5 Glasses of wine per week      Comment: Social     FAMILY HISTORY:  Family History   Problem Relation Age of Onset   • Cerebral aneurysm Mother    • COPD Father        REVIEW OF SYSTEMS:  PAIN: See VITAL SIGNS below.   GENERAL: No change in appetite or weight, no fevers, chills or  sweats.   SKIN: No rashes or nonhealing lesions. Pale no jaundice  HEME/LYMPH: no anemia,NO easy bruising, bleeding or  swollen nodes.   EYES: No vision changes or diplopia.   ENT: No tinnitus, hearing loss, gum bleeding, epistaxis, hoarseness or dysphagia.   RESPIRATORY: NO cough,  shortness of breath,NO hemoptysis NO wheezing.   CVS: No chest pain,NO  palpitations, orthopnea, dyspnea on exertion, .   GI:  NO nausea, vomiting, constipation, stated diarrhea, melena or hematochezia.   : No dysuria or hematuria. No abnormal vaginal discharge or bleeding.   MUSCULOSKELETAL: No bone pain or joint stiffness..hand redness feet redness  NEUROLOGICAL: No dizziness, global weakness, loss of consciousness or seizures.  "Neuropathy grade I tips of fingers and toes   PSYCHIATRIC: No increased nervousness, mood changes or  depression.     Objective    Vitals:    09/20/17 0759   BP: 118/62   Pulse: 82   Resp: 18   Temp: 98.5 °F (36.9 °C)   TempSrc: Oral   SpO2: 100%   Weight: 115 lb 12.8 oz (52.5 kg)   Height: 64.17\" (163 cm)   PainSc: 0-No pain     Current Status 9/20/2017   ECOG score 0      PHYSICAL EXAM:    GENERAL:  Well-developed, well-nourished in no acute distress.   SKIN:  Warm, dry without rashes, purpura or petechiae.  HEAD:  Normocephalic.  EYES:  Pupils equal, round and reactive to light.  EOMs intact.  Conjunctivae normal.  EARS:  Hearing intact.  NOSE:  Septum midline.  No excoriations or nasal discharge.  MOUTH:  Tongue is well-papillated; no stomatitis or ulcers.  Buccal dryness noted. Lips normal.  THROAT:  Oropharynx without lesions or exudates.  NECK:  Supple with good range of motion; no thyromegaly or masses, no JVD.  LYMPHATICS:  No cervical, supraclavicular, axillary or inguinal adenopathy.  CHEST:  Lungs clear to auscultation bilaterally.    CARDIAC:  NORMAL rate and rhythm without murmurs, rubs or gallops. Normal S1,S2.  ABDOMEN:  Soft, nontender with no organomegaly or masses. Bowel sounds present. Ileostomy scar rlq, no distention , minimal pain, good bs, no rebound or distention.  .EXTREMITIES:  No clubbing, cyanosis NO edema. No palmoplantar erythema  NEUROLOGICAL:  Cranial Nerves II-XII grossly intact.  No focal neurological deficits.grade 1 neuropathy feet and tips of fingers, no motor deficit  PSYCHIATRIC:  Normal affect and mood.        RECENT LABS:  Lab Results   Component Value Date    WBC 4.73 09/20/2017    HGB 11.4 (L) 09/20/2017    HCT 35.0 09/20/2017    MCV 91.4 09/20/2017     09/20/2017     Lab Results   Component Value Date    GLUCOSE 107 09/07/2017    BUN 11 09/07/2017    CREATININE 0.60 09/07/2017    EGFRIFNONA 105 09/07/2017    EGFRIFAFRI  09/15/2016      Comment:      <15 Indicative " of kidney failure.    BCR 18.3 09/07/2017    CO2 25.2 09/07/2017    CALCIUM 9.4 09/07/2017    ALBUMIN 3.80 09/07/2017    LABIL2 1.4 09/07/2017    AST 40 (H) 09/07/2017    ALT 30 09/07/2017     Component      Latest Ref Rng & Units 3/23/2017 5/16/2017 5/31/2017 6/12/2017           8:14 AM  2:22 PM  9:53 AM  2:06 PM   CEA      ng/mL 16.24 3.75 2.84 2.46     Component      Latest Ref Rng & Units 7/17/2017 9/7/2017           8:58 AM  1:48 PM   CEA      ng/mL 2.07 2.16         Assessment/Plan      1. Metastatic rectal cancer to the liver,  KRAS negative, BRAF negative, MSI stable, NRAS negative.      The patient was undergoing adjuvant single-agent therapy with Xeloda and taking 4 tablets in the morning and 3 in the evening and the dose had to be stopped last week with profuse diarrhea, palmar/plantar erythema, chest pressure, and mucositis.  All these symptoms are improved.  The Xeloda made her peripheral neuropathy symptoms in the tips of her fingers and toes worse.  Now she is back to baseline neuropathy.  She feels better overall.  The hematological parameters are stable, her CEA level is stable, and physical examination is otherwise unchanged with the exception of her neuropathy.  She has not developed any abdominal issues otherwise and her scars are healing fine from multiple surgeries in the abdominal wall.  No hernia formation.      RECOMMENDATIONS:  1.  Patient will remain on B Complex vitamin 1 tablet twice a day to see if this has an impact on her peripheral neuropathy symptoms.  2.  She will take 2 weeks holiday from Xeloda and thereafter she will start the medicine at a dose of 3 tablets in the morning and 2 in the evening 7 days on, 7 days off.  If she has any further problems with the Xeloda I think we are done with this medicine as well and her chemotherapy plan we will discontinue altogether.    3.  I asked her to have a PET scan in 4 weeks along with a CEA level, CBC, CMP and tumor markers.    4.  I  will review her back I 5 weeks.    5.  Patient will call if she has any other issues of concerns.              Zheng Johnson MD

## 2017-09-20 NOTE — PROGRESS NOTES
Staff message rec from Dr Johnson about pts Xeloda. See below.    MD Erika Guerra                     She will restart xeloda in 2 weeks 3 in am 2 in pm for 7 days on 7 days off tk       New rx was escribed to [facility] to reflect new dosing.

## 2017-10-19 ENCOUNTER — APPOINTMENT (OUTPATIENT)
Dept: PET IMAGING | Facility: HOSPITAL | Age: 52
End: 2017-10-19
Attending: INTERNAL MEDICINE

## 2017-10-19 ENCOUNTER — HOSPITAL ENCOUNTER (OUTPATIENT)
Dept: PET IMAGING | Facility: HOSPITAL | Age: 52
Discharge: HOME | End: 2017-10-19
Attending: INTERNAL MEDICINE

## 2017-10-23 ENCOUNTER — TELEPHONE (OUTPATIENT)
Dept: GENERAL RADIOLOGY | Facility: HOSPITAL | Age: 52
End: 2017-10-23

## 2017-10-23 ENCOUNTER — APPOINTMENT (OUTPATIENT)
Dept: PET IMAGING | Facility: HOSPITAL | Age: 52
End: 2017-10-23
Attending: INTERNAL MEDICINE

## 2017-10-23 ENCOUNTER — HOSPITAL ENCOUNTER (OUTPATIENT)
Dept: PET IMAGING | Facility: HOSPITAL | Age: 52
End: 2017-10-23
Attending: INTERNAL MEDICINE

## 2017-10-23 NOTE — TELEPHONE ENCOUNTER
----- Message from Eduard Vidal sent at 10/23/2017  9:20 AM EDT -----  Regarding: PET No show  Patient was a no show for her PET today.  I called late Friday to verify apptment but only got her voicemail.

## 2017-10-24 ENCOUNTER — OFFICE VISIT (OUTPATIENT)
Dept: OBSTETRICS AND GYNECOLOGY | Facility: CLINIC | Age: 52
End: 2017-10-24

## 2017-10-24 VITALS — DIASTOLIC BLOOD PRESSURE: 70 MMHG | SYSTOLIC BLOOD PRESSURE: 120 MMHG

## 2017-10-24 DIAGNOSIS — N95.2 VAGINITIS, ATROPHIC: Primary | ICD-10-CM

## 2017-10-24 PROCEDURE — 99212 OFFICE O/P EST SF 10 MIN: CPT | Performed by: OBSTETRICS & GYNECOLOGY

## 2017-10-24 RX ORDER — ESTRADIOL 0.1 MG/G
CREAM VAGINAL
Qty: 42.5 G | Refills: 3 | Status: SHIPPED | OUTPATIENT
Start: 2017-10-24 | End: 2019-06-25 | Stop reason: SDUPTHER

## 2017-10-24 NOTE — PROGRESS NOTES
Subjective   Isabel Florentino is a 52 y.o. female here today to discuss painful intercourse..     History of Present Illness-patient has gone through menopause and most annoying symptom at present is painful intercourse and vaginal dryness.    The following portions of the patient's history were reviewed and updated as appropriate: allergies, current medications, past family history, past medical history, past social history, past surgical history and problem list.    Review of Systems   Constitutional: Negative.    Gastrointestinal: Negative.    Genitourinary:        Vaginal dryness and pain with intercourse.   Neurological: Negative.    Psychiatric/Behavioral: Negative.        Objective   Physical Exam   Constitutional: She is oriented to person, place, and time. She appears well-developed and well-nourished.   HENT:   Head: Normocephalic and atraumatic.   Eyes: Pupils are equal, round, and reactive to light.   Pulmonary/Chest: Effort normal.   Neurological: She is alert and oriented to person, place, and time. She has normal reflexes.   Psychiatric: She has a normal mood and affect. Her behavior is normal. Judgment and thought content normal.   Nursing note and vitals reviewed.      Assessment/Plan   Isabel was seen today for gynecologic exam and menopause.    Diagnoses and all orders for this visit:    Vaginitis, atrophic    Other orders  -     estradiol (ESTRACE VAGINAL) 0.1 MG/GM vaginal cream; Amount the size of a blueberry at  Bedtime 2 times per week.       Treatment options discussed and a sample and prescription given for Estrace vaginal cream.

## 2017-10-26 ENCOUNTER — APPOINTMENT (OUTPATIENT)
Dept: ONCOLOGY | Facility: HOSPITAL | Age: 52
End: 2017-10-26

## 2017-10-26 ENCOUNTER — APPOINTMENT (OUTPATIENT)
Dept: ONCOLOGY | Facility: CLINIC | Age: 52
End: 2017-10-26

## 2017-11-09 ENCOUNTER — TELEPHONE (OUTPATIENT)
Dept: ONCOLOGY | Facility: HOSPITAL | Age: 52
End: 2017-11-09

## 2017-11-09 RX ORDER — SODIUM CHLORIDE 0.9 % (FLUSH) 0.9 %
10 SYRINGE (ML) INJECTION AS NEEDED
Status: CANCELLED | OUTPATIENT
Start: 2017-11-10

## 2017-11-09 NOTE — TELEPHONE ENCOUNTER
rec'd voicemail from pt asking about when she should have port flushed.  She stated she has appt in December to see Dr Johnson and also for port flush.  I returned her call and got her voicemail.  I left message stating if it has been more than 6 weeks since last flushed it would be good to have port flushed soon.  Asked if she would call back for appt if she needs to have flush sooner.

## 2017-11-10 ENCOUNTER — INFUSION (OUTPATIENT)
Dept: ONCOLOGY | Facility: HOSPITAL | Age: 52
End: 2017-11-10

## 2017-11-10 DIAGNOSIS — Z45.2 FITTING AND ADJUSTMENT OF VASCULAR CATHETER: ICD-10-CM

## 2017-11-10 DIAGNOSIS — C78.7 RECTAL CANCER METASTASIZED TO LIVER (HCC): Primary | ICD-10-CM

## 2017-11-10 DIAGNOSIS — D63.0 ANEMIA IN NEOPLASTIC DISEASE: ICD-10-CM

## 2017-11-10 DIAGNOSIS — C78.7 METASTATIC CANCER TO LIVER (HCC): ICD-10-CM

## 2017-11-10 DIAGNOSIS — C20 RECTAL CANCER METASTASIZED TO LIVER (HCC): Primary | ICD-10-CM

## 2017-11-10 LAB
ALBUMIN SERPL-MCNC: 4.1 G/DL (ref 3.5–5.2)
ALBUMIN/GLOB SERPL: 1.4 G/DL (ref 1.1–2.4)
ALP SERPL-CCNC: 75 U/L (ref 38–116)
ALT SERPL W P-5'-P-CCNC: 14 U/L (ref 0–33)
ANION GAP SERPL CALCULATED.3IONS-SCNC: 10.6 MMOL/L
AST SERPL-CCNC: 27 U/L (ref 0–32)
BASOPHILS # BLD AUTO: 0.03 10*3/MM3 (ref 0–0.1)
BASOPHILS NFR BLD AUTO: 0.4 % (ref 0–1.1)
BILIRUB SERPL-MCNC: 0.2 MG/DL (ref 0.1–1.2)
BUN BLD-MCNC: 14 MG/DL (ref 6–20)
BUN/CREAT SERPL: 20 (ref 7.3–30)
CALCIUM SPEC-SCNC: 9.5 MG/DL (ref 8.5–10.2)
CEA SERPL-MCNC: 1.79 NG/ML
CHLORIDE SERPL-SCNC: 104 MMOL/L (ref 98–107)
CO2 SERPL-SCNC: 24.4 MMOL/L (ref 22–29)
CREAT BLD-MCNC: 0.7 MG/DL (ref 0.6–1.1)
DEPRECATED RDW RBC AUTO: 58.3 FL (ref 37–49)
EOSINOPHIL # BLD AUTO: 0.11 10*3/MM3 (ref 0–0.36)
EOSINOPHIL NFR BLD AUTO: 1.5 % (ref 1–5)
ERYTHROCYTE [DISTWIDTH] IN BLOOD BY AUTOMATED COUNT: 16.9 % (ref 11.7–14.5)
GFR SERPL CREATININE-BSD FRML MDRD: 87 ML/MIN/1.73
GLOBULIN UR ELPH-MCNC: 2.9 GM/DL (ref 1.8–3.5)
GLUCOSE BLD-MCNC: 109 MG/DL (ref 74–124)
HCT VFR BLD AUTO: 34.4 % (ref 34–45)
HGB BLD-MCNC: 11.2 G/DL (ref 11.5–14.9)
IMM GRANULOCYTES # BLD: 0.03 10*3/MM3 (ref 0–0.03)
IMM GRANULOCYTES NFR BLD: 0.4 % (ref 0–0.5)
LYMPHOCYTES # BLD AUTO: 1.44 10*3/MM3 (ref 1–3.5)
LYMPHOCYTES NFR BLD AUTO: 19.3 % (ref 20–49)
MCH RBC QN AUTO: 31.4 PG (ref 27–33)
MCHC RBC AUTO-ENTMCNC: 32.6 G/DL (ref 32–35)
MCV RBC AUTO: 96.4 FL (ref 83–97)
MONOCYTES # BLD AUTO: 0.89 10*3/MM3 (ref 0.25–0.8)
MONOCYTES NFR BLD AUTO: 11.9 % (ref 4–12)
NEUTROPHILS # BLD AUTO: 4.98 10*3/MM3 (ref 1.5–7)
NEUTROPHILS NFR BLD AUTO: 66.5 % (ref 39–75)
NRBC BLD MANUAL-RTO: 0 /100 WBC (ref 0–0)
PLATELET # BLD AUTO: 186 10*3/MM3 (ref 150–375)
PMV BLD AUTO: 9.7 FL (ref 8.9–12.1)
POTASSIUM BLD-SCNC: 4.2 MMOL/L (ref 3.5–4.7)
PROT SERPL-MCNC: 7 G/DL (ref 6.3–8)
RBC # BLD AUTO: 3.57 10*6/MM3 (ref 3.9–5)
SODIUM BLD-SCNC: 139 MMOL/L (ref 134–145)
WBC NRBC COR # BLD: 7.48 10*3/MM3 (ref 4–10)

## 2017-11-10 PROCEDURE — 85025 COMPLETE CBC W/AUTO DIFF WBC: CPT | Performed by: INTERNAL MEDICINE

## 2017-11-10 PROCEDURE — 96523 IRRIG DRUG DELIVERY DEVICE: CPT | Performed by: INTERNAL MEDICINE

## 2017-11-10 PROCEDURE — 36415 COLL VENOUS BLD VENIPUNCTURE: CPT | Performed by: INTERNAL MEDICINE

## 2017-11-10 PROCEDURE — 80053 COMPREHEN METABOLIC PANEL: CPT | Performed by: INTERNAL MEDICINE

## 2017-11-10 PROCEDURE — 25010000002 HEPARIN FLUSH (PORCINE) 100 UNIT/ML SOLUTION: Performed by: INTERNAL MEDICINE

## 2017-11-10 PROCEDURE — 82378 CARCINOEMBRYONIC ANTIGEN: CPT | Performed by: INTERNAL MEDICINE

## 2017-11-10 RX ORDER — SODIUM CHLORIDE 0.9 % (FLUSH) 0.9 %
10 SYRINGE (ML) INJECTION AS NEEDED
Status: CANCELLED | OUTPATIENT
Start: 2017-11-10

## 2017-11-10 RX ORDER — SODIUM CHLORIDE 0.9 % (FLUSH) 0.9 %
10 SYRINGE (ML) INJECTION AS NEEDED
Status: DISCONTINUED | OUTPATIENT
Start: 2017-11-10 | End: 2017-11-10 | Stop reason: HOSPADM

## 2017-11-10 RX ADMIN — Medication 10 ML: at 16:25

## 2017-11-10 RX ADMIN — SODIUM CHLORIDE, PRESERVATIVE FREE 500 UNITS: 5 INJECTION INTRAVENOUS at 16:26

## 2017-11-13 ENCOUNTER — TELEPHONE (OUTPATIENT)
Dept: ONCOLOGY | Facility: HOSPITAL | Age: 52
End: 2017-11-13

## 2017-12-06 ENCOUNTER — TELEPHONE (OUTPATIENT)
Dept: ONCOLOGY | Facility: HOSPITAL | Age: 52
End: 2017-12-06

## 2017-12-06 NOTE — TELEPHONE ENCOUNTER
Pt calling from [facility] saying they do not have orders to do a PET scan. She states she is getting CTs and MRIs. Per Dr. Johnson note pt was supposed to have PET scan in November but pt states she always has all the scans done at [place] while she is there. Informed pt that I can fax Dr. Johnson last office note stating he recommends a PET scan. Pt states she will ask the nurse's at the facility to see what she needs to do.

## 2017-12-08 ENCOUNTER — APPOINTMENT (OUTPATIENT)
Dept: ONCOLOGY | Facility: HOSPITAL | Age: 52
End: 2017-12-08

## 2017-12-08 ENCOUNTER — APPOINTMENT (OUTPATIENT)
Dept: ONCOLOGY | Facility: CLINIC | Age: 52
End: 2017-12-08

## 2017-12-20 ENCOUNTER — TELEPHONE (OUTPATIENT)
Dept: ONCOLOGY | Facility: HOSPITAL | Age: 52
End: 2017-12-20

## 2017-12-20 NOTE — TELEPHONE ENCOUNTER
Patient called stating that her oncologist at [facility] took her off her Xeloda. States there was nothing in her body so therefor she didn't need to be on Xeloda. Also they did a CT scan and MRI while there. Pt wondering why a PET scan wasn't done like  ordered. Pt wondering if she should really be off xeloda since  is the one who orders it, and also if he wants a PET scan to go along with MRI/CT or if those are enough. Msg sent to . Reviewed with Leonor LOMBARDI also. She agrees  needs to answer this but would have pt stay off Xeloda for now since [facility] told her to. Pt v/u with these recommendations and will wait for  response when he is back next week.

## 2017-12-26 ENCOUNTER — TELEPHONE (OUTPATIENT)
Dept: ONCOLOGY | Facility: HOSPITAL | Age: 52
End: 2017-12-26

## 2017-12-26 ENCOUNTER — OFFICE VISIT (OUTPATIENT)
Dept: OBSTETRICS AND GYNECOLOGY | Facility: CLINIC | Age: 52
End: 2017-12-26

## 2017-12-26 VITALS
BODY MASS INDEX: 21.68 KG/M2 | SYSTOLIC BLOOD PRESSURE: 110 MMHG | DIASTOLIC BLOOD PRESSURE: 60 MMHG | WEIGHT: 127 LBS | HEIGHT: 64 IN

## 2017-12-26 DIAGNOSIS — Z01.419 WELL WOMAN EXAM WITH ROUTINE GYNECOLOGICAL EXAM: Primary | ICD-10-CM

## 2017-12-26 PROCEDURE — 99396 PREV VISIT EST AGE 40-64: CPT | Performed by: OBSTETRICS & GYNECOLOGY

## 2017-12-26 NOTE — PROGRESS NOTES
Drew Florentino is a 52 y.o. female is here today as a self referral for annual.    History of Present Illness-here today for annual exam and checkup.    The following portions of the patient's history were reviewed and updated as appropriate: allergies, current medications, past family history, past medical history, past social history, past surgical history and problem list.    Review of Systems   Constitutional: Negative.    HENT: Negative.    Eyes: Negative.    Respiratory: Negative.    Cardiovascular: Negative.    Gastrointestinal: Negative.    Endocrine: Negative.    Genitourinary: Negative.    Musculoskeletal: Negative.    Skin: Negative.    Allergic/Immunologic: Negative.    Neurological: Negative.    Hematological: Negative.    Psychiatric/Behavioral: Negative.        Objective   Physical Exam   Constitutional: She is oriented to person, place, and time. She appears well-developed and well-nourished.   HENT:   Head: Normocephalic and atraumatic.   Nose: Nose normal.   Eyes: Conjunctivae and EOM are normal. Pupils are equal, round, and reactive to light.   Neck: Normal range of motion. Neck supple. No thyromegaly present.   Cardiovascular: Normal rate, regular rhythm, normal heart sounds and intact distal pulses.  Exam reveals no gallop.    No murmur heard.  Pulmonary/Chest: Effort normal and breath sounds normal. No respiratory distress. She has no wheezes. She exhibits no mass, no tenderness, no swelling and no retraction. Right breast exhibits no inverted nipple, no mass, no nipple discharge, no skin change and no tenderness. Left breast exhibits no inverted nipple, no mass, no nipple discharge, no skin change and no tenderness.   Abdominal: Soft. Bowel sounds are normal. She exhibits no distension and no mass. There is no tenderness.   Genitourinary: Rectum normal, vagina normal and uterus normal. There is no rash, tenderness, lesion or injury on the right labia. There is no rash,  tenderness, lesion or injury on the left labia. Uterus is not enlarged and not tender. Cervix exhibits no motion tenderness and no discharge. Right adnexum displays no mass, no tenderness and no fullness. Left adnexum displays no mass, no tenderness and no fullness.   Musculoskeletal: Normal range of motion. She exhibits no edema, tenderness or deformity.   Neurological: She is alert and oriented to person, place, and time.   Skin: Skin is warm and dry.   Psychiatric: She has a normal mood and affect. Her behavior is normal. Judgment and thought content normal.   Nursing note and vitals reviewed.        Assessment/Plan   Problems Addressed this Visit     None      Visit Diagnoses     Well woman exam with routine gynecological exam    -  Primary    Relevant Orders    IGP,rfx Aptima HPV All Pth - ThinPrep Vial, Cervix      Pap smear and mammogram today.

## 2017-12-26 NOTE — TELEPHONE ENCOUNTER
----- Message from Zheng Johnson MD sent at 12/26/2017  1:12 PM EST -----  Mri and ct are ok she must have fatuma with me at some point next week  ----- Message -----     From: Salud Dennis RN     Sent: 12/20/2017   3:24 PM       To: Zheng Johnson MD    Patient called stating that she had scans done at [facility], but was MRI and CT. No PET was done. She was wondering if you still wanted PET scan and why they didn't do that since you wanted it. Also, they took her off her Xeloda. Said she had nothing in her body and did not need to be on it. So she is currently off and wanted your opinion on if she should really be stopping or or not. She knows you arent back until next week and I reviewed with Leonor LOMBARDI who said for her to cont to stay off until we hear from you. Thanks!        Called and notified pt. msg sent to scheduling to get her appnt moved to next week.

## 2017-12-27 ENCOUNTER — HOSPITAL ENCOUNTER (OUTPATIENT)
Dept: CT IMAGING | Facility: HOSPITAL | Age: 52
Discharge: HOME | End: 2017-12-27
Admitting: NURSE PRACTITIONER

## 2017-12-27 DIAGNOSIS — I67.1 CEREBRAL ANEURYSM, NONRUPTURED: ICD-10-CM

## 2017-12-27 LAB
CONV .: NORMAL
CREAT BLDA-MCNC: 0.5 MG/DL (ref 0.6–1.3)
CYTOLOGIST CVX/VAG CYTO: NORMAL
CYTOLOGY CVX/VAG DOC THIN PREP: NORMAL
DX ICD CODE: NORMAL
HIV 1 & 2 AB SER-IMP: NORMAL
OTHER STN SPEC: NORMAL
PATH REPORT.FINAL DX SPEC: NORMAL
STAT OF ADQ CVX/VAG CYTO-IMP: NORMAL

## 2017-12-27 PROCEDURE — 82565 ASSAY OF CREATININE: CPT

## 2017-12-27 PROCEDURE — 0 IOPAMIDOL 61 % SOLUTION: Performed by: NURSE PRACTITIONER

## 2017-12-27 PROCEDURE — 70496 CT ANGIOGRAPHY HEAD: CPT

## 2017-12-27 RX ADMIN — IOPAMIDOL 95 ML: 612 INJECTION, SOLUTION INTRAVENOUS at 13:37

## 2017-12-28 ENCOUNTER — TELEPHONE (OUTPATIENT)
Dept: GENERAL RADIOLOGY | Facility: HOSPITAL | Age: 52
End: 2017-12-28

## 2017-12-28 NOTE — TELEPHONE ENCOUNTER
----- Message from Salud Dennis RN sent at 12/26/2017  1:21 PM EST -----  Zheng Johnson MD  [facility]                   Mri and ct are ok she must have fatuma with me at some point next week      #i already talked to pt about mri and CT. Must see Alex next week#

## 2018-01-04 ENCOUNTER — OFFICE VISIT (OUTPATIENT)
Dept: ONCOLOGY | Facility: CLINIC | Age: 53
End: 2018-01-04

## 2018-01-04 ENCOUNTER — LAB (OUTPATIENT)
Dept: LAB | Facility: HOSPITAL | Age: 53
End: 2018-01-04

## 2018-01-04 VITALS
TEMPERATURE: 98.2 F | OXYGEN SATURATION: 99 % | BODY MASS INDEX: 22.26 KG/M2 | HEIGHT: 64 IN | WEIGHT: 130.4 LBS | SYSTOLIC BLOOD PRESSURE: 124 MMHG | HEART RATE: 76 BPM | DIASTOLIC BLOOD PRESSURE: 74 MMHG | RESPIRATION RATE: 12 BRPM

## 2018-01-04 DIAGNOSIS — C78.7 RECTAL CANCER METASTASIZED TO LIVER (HCC): Primary | ICD-10-CM

## 2018-01-04 DIAGNOSIS — D63.0 ANEMIA IN NEOPLASTIC DISEASE: ICD-10-CM

## 2018-01-04 DIAGNOSIS — C78.7 METASTATIC CANCER TO LIVER (HCC): ICD-10-CM

## 2018-01-04 DIAGNOSIS — C20 RECTAL CANCER METASTASIZED TO LIVER (HCC): Primary | ICD-10-CM

## 2018-01-04 LAB
BASOPHILS # BLD AUTO: 0.05 10*3/MM3 (ref 0–0.1)
BASOPHILS NFR BLD AUTO: 0.9 % (ref 0–1.1)
DEPRECATED RDW RBC AUTO: 48.9 FL (ref 37–49)
EOSINOPHIL # BLD AUTO: 0.15 10*3/MM3 (ref 0–0.36)
EOSINOPHIL NFR BLD AUTO: 2.6 % (ref 1–5)
ERYTHROCYTE [DISTWIDTH] IN BLOOD BY AUTOMATED COUNT: 14.3 % (ref 11.7–14.5)
HCT VFR BLD AUTO: 40.9 % (ref 34–45)
HGB BLD-MCNC: 13.7 G/DL (ref 11.5–14.9)
IMM GRANULOCYTES # BLD: 0.02 10*3/MM3 (ref 0–0.03)
IMM GRANULOCYTES NFR BLD: 0.3 % (ref 0–0.5)
LYMPHOCYTES # BLD AUTO: 1.59 10*3/MM3 (ref 1–3.5)
LYMPHOCYTES NFR BLD AUTO: 27.2 % (ref 20–49)
MCH RBC QN AUTO: 31.4 PG (ref 27–33)
MCHC RBC AUTO-ENTMCNC: 33.5 G/DL (ref 32–35)
MCV RBC AUTO: 93.8 FL (ref 83–97)
MONOCYTES # BLD AUTO: 0.86 10*3/MM3 (ref 0.25–0.8)
MONOCYTES NFR BLD AUTO: 14.7 % (ref 4–12)
NEUTROPHILS # BLD AUTO: 3.17 10*3/MM3 (ref 1.5–7)
NEUTROPHILS NFR BLD AUTO: 54.3 % (ref 39–75)
NRBC BLD MANUAL-RTO: 0 /100 WBC (ref 0–0)
PLATELET # BLD AUTO: 202 10*3/MM3 (ref 150–375)
PMV BLD AUTO: 10.1 FL (ref 8.9–12.1)
RBC # BLD AUTO: 4.36 10*6/MM3 (ref 3.9–5)
WBC NRBC COR # BLD: 5.84 10*3/MM3 (ref 4–10)

## 2018-01-04 PROCEDURE — 36416 COLLJ CAPILLARY BLOOD SPEC: CPT | Performed by: INTERNAL MEDICINE

## 2018-01-04 PROCEDURE — 85025 COMPLETE CBC W/AUTO DIFF WBC: CPT | Performed by: INTERNAL MEDICINE

## 2018-01-04 PROCEDURE — 99214 OFFICE O/P EST MOD 30 MIN: CPT | Performed by: INTERNAL MEDICINE

## 2018-01-04 NOTE — PROGRESS NOTES
Subjective .     REASONS FOR FOLLOW UP:  Metastatic SIGMOID UPPER rectal cancer to the liver.  Initiated chemotherapy with FOLFOX-6 9/1/2016.     HISTORY OF PRESENT ILLNESS:  The patient is a 52 y.o. year old female with the above-mentioned history,She is here today after 3 months absence from the office when she was at that time receiving therapy with Xeloda adjuvantly after completion of multiple surgeries in the background of upper rectal sigmoid cancer metastatic to the liver. The patient had at least 2 liver surgeries to remove metastasis and ablation and the primary surgery in the colorectal area to remove the primary tumor. Xeloda was causing significant side effects including palmar plantar erythema, diarrhea, mucositis and she finally decided in September that she was done with the medication. Since then the patient has been at the [facility] and information has been requested in regard what is found. The patient states that she has been feeling fine. She has not had any problems with appetite or activity, normal bowel activity, normal urination, no abdominal pain, no skeletal pain, no cardiorespiratory symptomatology. She still has minimal residual peripheral neuropathy grade 1 from FOLFOX chemotherapy that is not giving her any functional limitations. The patient is back into normal activities at home and at work with no limitations.                      Past Medical History:   Diagnosis Date   • ADD (attention deficit disorder)    • Anemia    • Colon cancer     NEW DIAGNOSIS   • Drug therapy    • Fatigue     secondary to chemotherapy    • H/O foreign travel 03/2016    [place], [place]   • Hepatic metastases 10/2016    partial right hepatectomy [facility] 10/16   • History of transfusion     1999 AFTER TUBAL RUPTURE   • Hyperlipidemia    • Liver cancer    • Middle cerebral artery aneurysm    • Status post partial colectomy 04/2017    [facility]     Past Surgical History:    Procedure Laterality Date   • AUGMENTATION MAMMAPLASTY     • COLONOSCOPY      MAY 2016   • CRANIOTOMY  2004, 2005    Cerebral aneurysm   • LAPAROSCOPY FOR ECTOPIC PREGNANCY  09/1997   • LIVER SURGERY      MASS REMOVED   • CT INSJ TUNNELED CVC W/O SUBQ PORT/ AGE 3 YR/> Right 8/26/2016    Procedure: MEDIPORT PLACEMENT ;  Surgeon: Praful Holden MD;  Location: [facility];  Service: Vascular   • SINUS SURGERY       Medications: The current medication list was reviewed in the EMR.    ALLERGIES:   No Known Allergies    SOCIAL HISTORY:       Social History   Substance Use Topics   • Smoking status: Never Smoker   • Smokeless tobacco: Never Used   • Alcohol use 3.0 oz/week     5 Glasses of wine per week      Comment: Social     FAMILY HISTORY:  Family History   Problem Relation Age of Onset   • Cerebral aneurysm Mother    • COPD Father        REVIEW OF SYSTEMS:  PAIN: See VITAL SIGNS below.   GENERAL: No change in appetite or weight, no fevers, chills or  sweats.   SKIN: No rashes or nonhealing lesions. Pale no jaundice  HEME/LYMPH: no anemia,NO easy bruising, bleeding or  swollen nodes.   EYES: No vision changes or diplopia.   ENT: No tinnitus, hearing loss, gum bleeding, epistaxis, hoarseness or dysphagia.   RESPIRATORY: NO cough,  shortness of breath,NO hemoptysis NO wheezing.   CVS: No chest pain,NO  palpitations, orthopnea, dyspnea on exertion, .   GI:  NO nausea, vomiting, constipation, stated diarrhea, melena or hematochezia.   : No dysuria or hematuria. No abnormal vaginal discharge or bleeding.   MUSCULOSKELETAL: No bone pain or joint stiffness..hand redness feet redness  NEUROLOGICAL: No dizziness, global weakness, loss of consciousness or seizures. Neuropathy grade I tips of fingers and toes   PSYCHIATRIC: No increased nervousness, mood changes or  depression.     Objective    Vitals:    01/04/18 1048   BP: 124/74   Pulse: 76   Resp: 12   Temp: 98.2 °F (36.8 °C)   TempSrc: Oral   SpO2: 99%  "  Weight: 59.1 kg (130 lb 6.4 oz)   Height: 163 cm (64.17\")   PainSc: 0-No pain     Current Status 1/4/2018   ECOG score 0      PHYSICAL EXAM:    GENERAL:  Well-developed, well-nourished in no acute distress.   SKIN:  Warm, dry without rashes, purpura or petechiae.  HEAD:  Normocephalic.  EYES:  Pupils equal, round and reactive to light.  EOMs intact.  Conjunctivae normal.  EARS:  Hearing intact.  NOSE:  Septum midline.  No excoriations or nasal discharge.  MOUTH:  Tongue is well-papillated; no stomatitis or ulcers.  Buccal dryness noted. Lips normal.  THROAT:  Oropharynx without lesions or exudates.  NECK:  Supple with good range of motion; no thyromegaly or masses, no JVD.  LYMPHATICS:  No cervical, supraclavicular, axillary or inguinal adenopathy.  CHEST:  Lungs clear to auscultation bilaterally.    CARDIAC:  NORMAL rate and rhythm without murmurs, rubs or gallops. Normal S1,S2.  ABDOMEN:  Soft, nontender with no organomegaly or masses. Bowel sounds present. Ileostomy scar rlq, no distention , minimal pain, good bs, no rebound or distention.  .EXTREMITIES:  No clubbing, cyanosis NO edema. No palmoplantar erythema  NEUROLOGICAL:  Cranial Nerves II-XII grossly intact.  No focal neurological deficits.grade 1 neuropathy feet and tips of fingers, no motor deficit  PSYCHIATRIC:  Normal affect and mood.        RECENT LABS:  Lab Results   Component Value Date    WBC 5.84 01/04/2018    HGB 13.7 01/04/2018    HCT 40.9 01/04/2018    MCV 93.8 01/04/2018     01/04/2018     Lab Results   Component Value Date    GLUCOSE 109 11/10/2017    BUN 14 11/10/2017    CREATININE 0.50 (L) 12/27/2017    EGFRIFNONA 87 11/10/2017    EGFRIFAFRI  09/15/2016      Comment:      <15 Indicative of kidney failure.    BCR 20.0 11/10/2017    CO2 24.4 11/10/2017    CALCIUM 9.5 11/10/2017    ALBUMIN 4.10 11/10/2017    LABIL2 1.4 11/10/2017    AST 27 11/10/2017    ALT 14 11/10/2017           Assessment/Plan      1. Metastatic rectal sigmoid " cancer to the liver,  KRAS negative, BRAF negative, MSI stable, NRAS negative. The patient has undergone neoadjuvant chemotherapy with FOLFOX-6 and she subsequently underwent 2 different surgeries for her liver metastasis one including resection and another one including ablation. Also she underwent final removal of the primary tumor that was still PET active after completion of FOLFOX chemotherapy. Subsequently the patient had an ileostomy that was closed and she was placed on Xeloda chronically that she took in September, October, November of 2017 when she discontinued on her own because of side effects. Since then the patient has been seen at the [facility] and according to her she is likely cancer free at this time.     The patient looks terrific today still with a grade 1 peripheral neuropathy in her hands that has no functional limitations on her. Her physical exam is otherwise unrevealing. She discussed with the findings at the [facility] in [place] including a negative MRI scan of the liver, normal CEA level and a negative CT scan of the chest. She was advised to have a colonoscopy at some point this year and I agree with her that approach.     The patient has also recovered her white count, her hemoglobin and her platelets. She has no leftover side effects of Xeloda. She is back to exercise and I asked her to remain on her vitamin D. The question will be if she wants to take aspirin given her history of cerebral aneurysm. I feel concerned about that even though I have reviewed the angiogram that has been done recently on her brain and actually the coding and the therapy for this has been successful.     In regard to diet for the future I advised her to have a plant based diet as much as she can. She has been very healthy in this regard rich in vegetables, less red meat and more fish. I also advised her to continue physical activity and don't let herself get heavier.     I will review her back in 3  months. She will require port flush in 6 weeks and 3 months. Port was flushed a few days ago for her CT angiogram of the brain.    The patient will have a return appointment to [facility] at some point in March and we will review the scans that will be done at that institution at that point.                 Zheng Johnson MD

## 2018-02-01 ENCOUNTER — OFFICE VISIT (OUTPATIENT)
Dept: NEUROSURGERY | Facility: CLINIC | Age: 53
End: 2018-02-01

## 2018-02-01 VITALS
SYSTOLIC BLOOD PRESSURE: 120 MMHG | WEIGHT: 125 LBS | DIASTOLIC BLOOD PRESSURE: 74 MMHG | RESPIRATION RATE: 18 BRPM | HEART RATE: 78 BPM | BODY MASS INDEX: 20.83 KG/M2 | HEIGHT: 65 IN

## 2018-02-01 DIAGNOSIS — I67.1 BALLOON LIKE SWELLING OF AN ARTERY OF THE BRAIN: ICD-10-CM

## 2018-02-01 DIAGNOSIS — I67.1 CEREBRAL ARTERIAL ANEURYSM: Primary | ICD-10-CM

## 2018-02-01 PROCEDURE — 99203 OFFICE O/P NEW LOW 30 MIN: CPT | Performed by: NURSE PRACTITIONER

## 2018-02-01 RX ORDER — MULTIPLE VITAMINS W/ MINERALS TAB 9MG-400MCG
1 TAB ORAL DAILY
COMMUNITY

## 2018-02-01 RX ORDER — CAPECITABINE 500 MG/1
TABLET, FILM COATED ORAL
COMMUNITY
End: 2018-10-05

## 2018-02-01 NOTE — PROGRESS NOTES
"Subjective   Patient ID: Isabel Florentino is a 52 y.o. female is here today for follow-up aneurysm. Patient presents unaccompanied.     History of Present Illness    She returns to the office today for ongoing follow-up with known history of cerebral aneurysms.  She has undergone CTA head imaging at [facility] on December 27.      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 Cardona in 2004.  Attempted embolization of right ICA superior hypophyseal aneurysm in December 2004 and followed by balloon-assisted embolization of this aneurysm on April 2005.  She underwent cerebral angiogram in June 2010 that showed small residual of the basilar artery aneurysm and 100% obliteration of the right ICA and left MCA aneurysms.  At that time, the angiogram also showed a small, stable right ICA intracavernous aneurysm.    Since her last office visit, she has been followed by hematology/oncology for colon cancer with metastasis to the liver.  She had all of her surgeries at the [facility] and continues to go up there every 3 months for lab work.  She is now feeling doing well.  She did have an ileostomy and this was reversed.  She reports peripheral neuropathy in the feet and in hands, which she is trying to treat conservatively with B complex vitamins.  The neuropathy has improved with the use of yoga and daily exercise.    She denies any other neuro changes including double and blurry vision, motor deficits, speech changes or any other problems.  She also denies headaches.    She presents unaccompanied.      /74 (BP Location: Left arm, Patient Position: Sitting)  Pulse 78  Resp 18  Ht 165.1 cm (65\")  Wt 56.7 kg (125 lb)  BMI 20.8 kg/m2      The following portions of the patient's history were reviewed and updated as appropriate: allergies, current medications, past family history, past medical history, past social history, " past surgical history and problem list.    Review of Systems   HENT: Negative for tinnitus.    Eyes: Negative for visual disturbance.   Musculoskeletal: Positive for gait problem (occ'l, due to chemo).   Neurological: Negative for dizziness, seizures, syncope, weakness, numbness and headaches.   Psychiatric/Behavioral: Negative for confusion and decreased concentration.       Objective   Physical Exam   Constitutional: She is oriented to person, place, and time. Vital signs are normal. She appears well-developed and well-nourished. She is cooperative.   Very pleasant, well-appearing female, looks much younger than stated age   HENT:   Head: Normocephalic and atraumatic.   Eyes: EOM are normal.   Neck: Neck supple.   Pulmonary/Chest: Effort normal.   Abdominal: Soft.   Musculoskeletal: Normal range of motion. She exhibits no tenderness.   WEINSTEIN well   Neurological: She is alert and oriented to person, place, and time. She has normal strength. She is not disoriented. She displays normal reflexes. No cranial nerve deficit or sensory deficit. Coordination and gait normal. GCS eye subscore is 4. GCS verbal subscore is 5. GCS motor subscore is 6.   Cranial nerves II through XII grossly intact  Stable upright gait and station     Skin: Skin is warm and dry.   Psychiatric: She has a normal mood and affect. Her behavior is normal. Judgment and thought content normal.   Vitals reviewed.    Neurologic Exam     Mental Status   Oriented to person, place, and time.     Cranial Nerves     CN III, IV, VI   Extraocular motions are normal.     Motor Exam     Strength   Strength 5/5 throughout.       Assessment/Plan   Independent Review of Radiographic Studies:    CTA from [facility] dated December 27, 2017 reveals stable, small cavernous ICA aneurysm on the right projecting inferior laterally that is unchanged.      Also reveals evidence of previously clipped distal basilar artery aneurysm interval left MCA bifurcation  aneurysm.  Status post coiling of a superior hypophyseal aneurysm on the right with no evidence of recurrence.  As reviewed and discussed with Dr. Rowland.    Medical Decision Making:    She returns to the office today for 4 year follow-up with known history of multiple cerebral angiograms.  Exam as noted above, no red flags.  Follow-up CTA head imaging reveals stable findings with regard to the previously treated aneurysm as well as an untreated, very small, right cavernous aneurysm that is unchangedWhen compared to prior imaging.  Clinically she is stable from our standpoint.  She has been through a lot over the last couple of years having undergone multiple surgeries for metastatic colon cancer including liver resections.  She has also been on multiple rounds of chemotherapy.  Thankfully, she is currently in remission.  She continues with every 3 month lab work at the [facility] for close, continued surveillance.      We will have her return to the office in 5 years with CTA imaging of the head for continued aneurysm follow-up.  She is to call our office at anytime with any questions or concerns.      Plan: Return to office in 5 years with CTA head imaging     Isabel was seen today for cerebral aneurysm.    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

## 2018-02-15 ENCOUNTER — INFUSION (OUTPATIENT)
Dept: ONCOLOGY | Facility: HOSPITAL | Age: 53
End: 2018-02-15

## 2018-02-15 ENCOUNTER — APPOINTMENT (OUTPATIENT)
Dept: ONCOLOGY | Facility: HOSPITAL | Age: 53
End: 2018-02-15

## 2018-02-15 DIAGNOSIS — Z45.2 FITTING AND ADJUSTMENT OF VASCULAR CATHETER: Primary | ICD-10-CM

## 2018-02-15 PROCEDURE — 25010000002 HEPARIN FLUSH (PORCINE) 100 UNIT/ML SOLUTION: Performed by: INTERNAL MEDICINE

## 2018-02-15 PROCEDURE — 96523 IRRIG DRUG DELIVERY DEVICE: CPT | Performed by: INTERNAL MEDICINE

## 2018-02-15 RX ORDER — SODIUM CHLORIDE 0.9 % (FLUSH) 0.9 %
10 SYRINGE (ML) INJECTION AS NEEDED
Status: DISCONTINUED | OUTPATIENT
Start: 2018-02-15 | End: 2018-02-15 | Stop reason: HOSPADM

## 2018-02-15 RX ORDER — SODIUM CHLORIDE 0.9 % (FLUSH) 0.9 %
10 SYRINGE (ML) INJECTION AS NEEDED
Status: CANCELLED | OUTPATIENT
Start: 2018-02-15

## 2018-02-15 RX ADMIN — Medication 10 ML: at 16:25

## 2018-02-15 RX ADMIN — SODIUM CHLORIDE, PRESERVATIVE FREE 500 UNITS: 5 INJECTION INTRAVENOUS at 16:25

## 2018-03-28 ENCOUNTER — APPOINTMENT (OUTPATIENT)
Dept: ONCOLOGY | Facility: HOSPITAL | Age: 53
End: 2018-03-28

## 2018-03-28 ENCOUNTER — APPOINTMENT (OUTPATIENT)
Dept: ONCOLOGY | Facility: CLINIC | Age: 53
End: 2018-03-28

## 2018-03-30 ENCOUNTER — TELEPHONE (OUTPATIENT)
Dept: ONCOLOGY | Facility: CLINIC | Age: 53
End: 2018-03-30

## 2018-03-30 ENCOUNTER — INFUSION (OUTPATIENT)
Dept: ONCOLOGY | Facility: HOSPITAL | Age: 53
End: 2018-03-30

## 2018-03-30 DIAGNOSIS — C78.7 METASTATIC CANCER TO LIVER (HCC): ICD-10-CM

## 2018-03-30 DIAGNOSIS — C20 RECTAL CANCER METASTASIZED TO LIVER (HCC): Primary | ICD-10-CM

## 2018-03-30 DIAGNOSIS — Z45.2 FITTING AND ADJUSTMENT OF VASCULAR CATHETER: ICD-10-CM

## 2018-03-30 DIAGNOSIS — C78.7 RECTAL CANCER METASTASIZED TO LIVER (HCC): Primary | ICD-10-CM

## 2018-03-30 LAB
ALBUMIN SERPL-MCNC: 4.4 G/DL (ref 3.5–5.2)
ALBUMIN/GLOB SERPL: 1.5 G/DL (ref 1.1–2.4)
ALP SERPL-CCNC: 69 U/L (ref 38–116)
ALT SERPL W P-5'-P-CCNC: 17 U/L (ref 0–33)
ANION GAP SERPL CALCULATED.3IONS-SCNC: 11.6 MMOL/L
AST SERPL-CCNC: 28 U/L (ref 0–32)
BASOPHILS # BLD AUTO: 0.05 10*3/MM3 (ref 0–0.1)
BASOPHILS NFR BLD AUTO: 0.7 % (ref 0–1.1)
BILIRUB SERPL-MCNC: 0.3 MG/DL (ref 0.1–1.2)
BUN BLD-MCNC: 17 MG/DL (ref 6–20)
BUN/CREAT SERPL: 26.2 (ref 7.3–30)
CALCIUM SPEC-SCNC: 9.9 MG/DL (ref 8.5–10.2)
CEA SERPL-MCNC: 2.02 NG/ML
CHLORIDE SERPL-SCNC: 104 MMOL/L (ref 98–107)
CO2 SERPL-SCNC: 25.4 MMOL/L (ref 22–29)
CREAT BLD-MCNC: 0.65 MG/DL (ref 0.6–1.1)
DEPRECATED RDW RBC AUTO: 42.5 FL (ref 37–49)
EOSINOPHIL # BLD AUTO: 0.1 10*3/MM3 (ref 0–0.36)
EOSINOPHIL NFR BLD AUTO: 1.5 % (ref 1–5)
ERYTHROCYTE [DISTWIDTH] IN BLOOD BY AUTOMATED COUNT: 13 % (ref 11.7–14.5)
GFR SERPL CREATININE-BSD FRML MDRD: 95 ML/MIN/1.73
GLOBULIN UR ELPH-MCNC: 3 GM/DL (ref 1.8–3.5)
GLUCOSE BLD-MCNC: 99 MG/DL (ref 74–124)
HCT VFR BLD AUTO: 41.9 % (ref 34–45)
HGB BLD-MCNC: 13.8 G/DL (ref 11.5–14.9)
IMM GRANULOCYTES # BLD: 0.02 10*3/MM3 (ref 0–0.03)
IMM GRANULOCYTES NFR BLD: 0.3 % (ref 0–0.5)
LYMPHOCYTES # BLD AUTO: 1.78 10*3/MM3 (ref 1–3.5)
LYMPHOCYTES NFR BLD AUTO: 26 % (ref 20–49)
MCH RBC QN AUTO: 29.5 PG (ref 27–33)
MCHC RBC AUTO-ENTMCNC: 32.9 G/DL (ref 32–35)
MCV RBC AUTO: 89.5 FL (ref 83–97)
MONOCYTES # BLD AUTO: 0.8 10*3/MM3 (ref 0.25–0.8)
MONOCYTES NFR BLD AUTO: 11.7 % (ref 4–12)
NEUTROPHILS # BLD AUTO: 4.1 10*3/MM3 (ref 1.5–7)
NEUTROPHILS NFR BLD AUTO: 59.8 % (ref 39–75)
NRBC BLD MANUAL-RTO: 0 /100 WBC (ref 0–0)
PLATELET # BLD AUTO: 211 10*3/MM3 (ref 150–375)
PMV BLD AUTO: 10.1 FL (ref 8.9–12.1)
POTASSIUM BLD-SCNC: 4.2 MMOL/L (ref 3.5–4.7)
PROT SERPL-MCNC: 7.4 G/DL (ref 6.3–8)
RBC # BLD AUTO: 4.68 10*6/MM3 (ref 3.9–5)
SODIUM BLD-SCNC: 141 MMOL/L (ref 134–145)
WBC NRBC COR # BLD: 6.85 10*3/MM3 (ref 4–10)

## 2018-03-30 PROCEDURE — 80053 COMPREHEN METABOLIC PANEL: CPT | Performed by: INTERNAL MEDICINE

## 2018-03-30 PROCEDURE — 82378 CARCINOEMBRYONIC ANTIGEN: CPT | Performed by: INTERNAL MEDICINE

## 2018-03-30 PROCEDURE — 36415 COLL VENOUS BLD VENIPUNCTURE: CPT | Performed by: INTERNAL MEDICINE

## 2018-03-30 PROCEDURE — 25010000002 HEPARIN FLUSH (PORCINE) 100 UNIT/ML SOLUTION: Performed by: INTERNAL MEDICINE

## 2018-03-30 PROCEDURE — 96523 IRRIG DRUG DELIVERY DEVICE: CPT | Performed by: INTERNAL MEDICINE

## 2018-03-30 PROCEDURE — 85025 COMPLETE CBC W/AUTO DIFF WBC: CPT | Performed by: INTERNAL MEDICINE

## 2018-03-30 RX ORDER — SODIUM CHLORIDE 0.9 % (FLUSH) 0.9 %
10 SYRINGE (ML) INJECTION AS NEEDED
Status: DISCONTINUED | OUTPATIENT
Start: 2018-03-30 | End: 2018-03-30 | Stop reason: HOSPADM

## 2018-03-30 RX ORDER — SODIUM CHLORIDE 0.9 % (FLUSH) 0.9 %
10 SYRINGE (ML) INJECTION AS NEEDED
Status: CANCELLED | OUTPATIENT
Start: 2018-03-30

## 2018-03-30 RX ADMIN — Medication 10 ML: at 11:01

## 2018-03-30 RX ADMIN — SODIUM CHLORIDE, PRESERVATIVE FREE 500 UNITS: 5 INJECTION INTRAVENOUS at 11:02

## 2018-03-30 NOTE — TELEPHONE ENCOUNTER
Patient calling for lab results. Reviewed CEA levels with patient. This is up slightly from previous labs. Inbasket sent to Dr Johnson to make him aware and see if he has anything to add. Pt v/u

## 2018-04-02 ENCOUNTER — TELEPHONE (OUTPATIENT)
Dept: ONCOLOGY | Facility: HOSPITAL | Age: 53
End: 2018-04-02

## 2018-04-02 NOTE — TELEPHONE ENCOUNTER
Patient is calling back after talking to Leyla JOHNSON on Friday.  Leyla told her she was going to talk to Dr. Johnson about her labs.  Explained that Leyla is off today and Dr. Johnson is rounding in the hospital.  She would still like to talk to Leyla once she returns to see what Dr. Johnson had to say.

## 2018-04-05 ENCOUNTER — DOCUMENTATION (OUTPATIENT)
Dept: ONCOLOGY | Facility: HOSPITAL | Age: 53
End: 2018-04-05

## 2018-04-05 NOTE — PROGRESS NOTES
Message left for patient to call office regarding need for CT scans prior to next visit with Dr. Johnson.

## 2018-04-05 NOTE — PROGRESS NOTES
Patient called and informed that Dr. Johnson does want scans done before her next apt.  Patient states that she is going to [place] next week and will get scans done then.

## 2018-04-23 DIAGNOSIS — C78.7 RECTAL CANCER METASTASIZED TO LIVER (HCC): Primary | ICD-10-CM

## 2018-04-23 DIAGNOSIS — C20 RECTAL CANCER METASTASIZED TO LIVER (HCC): Primary | ICD-10-CM

## 2018-04-25 ENCOUNTER — INFUSION (OUTPATIENT)
Dept: ONCOLOGY | Facility: HOSPITAL | Age: 53
End: 2018-04-25

## 2018-04-25 ENCOUNTER — OFFICE VISIT (OUTPATIENT)
Dept: ONCOLOGY | Facility: CLINIC | Age: 53
End: 2018-04-25

## 2018-04-25 VITALS
SYSTOLIC BLOOD PRESSURE: 110 MMHG | HEIGHT: 64 IN | BODY MASS INDEX: 21.07 KG/M2 | OXYGEN SATURATION: 100 % | DIASTOLIC BLOOD PRESSURE: 70 MMHG | WEIGHT: 123.4 LBS | HEART RATE: 86 BPM | RESPIRATION RATE: 16 BRPM | TEMPERATURE: 98.8 F

## 2018-04-25 DIAGNOSIS — C78.7 RECTAL CANCER METASTASIZED TO LIVER (HCC): Primary | ICD-10-CM

## 2018-04-25 DIAGNOSIS — C78.7 METASTATIC CANCER TO LIVER (HCC): ICD-10-CM

## 2018-04-25 DIAGNOSIS — C78.7 RECTAL CANCER METASTASIZED TO LIVER (HCC): ICD-10-CM

## 2018-04-25 DIAGNOSIS — G62.0 PERIPHERAL NEUROPATHY DUE TO CHEMOTHERAPY (HCC): ICD-10-CM

## 2018-04-25 DIAGNOSIS — Z45.2 FITTING AND ADJUSTMENT OF VASCULAR CATHETER: Primary | ICD-10-CM

## 2018-04-25 DIAGNOSIS — C20 RECTAL CANCER METASTASIZED TO LIVER (HCC): Primary | ICD-10-CM

## 2018-04-25 DIAGNOSIS — T45.1X5A PERIPHERAL NEUROPATHY DUE TO CHEMOTHERAPY (HCC): ICD-10-CM

## 2018-04-25 DIAGNOSIS — C20 RECTAL CANCER METASTASIZED TO LIVER (HCC): ICD-10-CM

## 2018-04-25 PROCEDURE — 99214 OFFICE O/P EST MOD 30 MIN: CPT | Performed by: INTERNAL MEDICINE

## 2018-04-25 RX ORDER — SODIUM CHLORIDE 0.9 % (FLUSH) 0.9 %
10 SYRINGE (ML) INJECTION AS NEEDED
Status: DISCONTINUED | OUTPATIENT
Start: 2018-04-25 | End: 2018-04-25 | Stop reason: HOSPADM

## 2018-04-25 RX ORDER — SODIUM CHLORIDE 0.9 % (FLUSH) 0.9 %
10 SYRINGE (ML) INJECTION AS NEEDED
Status: CANCELLED | OUTPATIENT
Start: 2018-04-25

## 2018-04-25 NOTE — PROGRESS NOTES
Subjective .     REASONS FOR FOLLOW UP:  Metastatic SIGMOID UPPER rectal cancer to the liver.  Initiated chemotherapy with FOLFOX-6 9/1/2016. COMPLETED IN SEPT 2017, EXTENSIVE SURGERY ON HER LIVER AND REMVAL OF PRIMARY TUMOR SURRENDER HER KIRIT.    HISTORY OF PRESENT ILLNESS:  The patient is a 52 y.o. year old female with the above-mentioned history. She is here today feeling terrific. She has had a great appetite. She has gained some weight and she has not had any abdominal pain. When she is exercising, in the area of the ileostomy, she feels occasional discomfort but no obvious hernia formation. She has normal bowel activity. No loose bowel function and normal urination. She has not had any skeletal pain. She denies any cardiovascular or respiratory issues. 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 She just came back from the [facility] where she underwent extensive assessment including radiological assessment with an MRI scan of the liver, laboratory workup and so forth. Fortunately, all this was negative for recurrent disease.                     Past Medical History:   Diagnosis Date   • ADD (attention deficit disorder)    • Anemia    • Colon cancer     NEW DIAGNOSIS   • Drug therapy    • Fatigue     secondary to chemotherapy    • H/O foreign travel 03/2016    [facility], [place]   • Hepatic metastases 10/2016    partial right hepatectomy [place] 10/16   • History of transfusion     1999 AFTER TUBAL RUPTURE   • Hyperlipidemia    • Liver cancer    • Middle cerebral artery aneurysm    • Status post partial colectomy 04/2017    [facility]     Past Surgical History:   Procedure Laterality Date   • AUGMENTATION MAMMAPLASTY     • COLONOSCOPY      MAY 2016   • CRANIOTOMY  2004, 2005    Cerebral aneurysm   • LAPAROSCOPY FOR ECTOPIC PREGNANCY  09/1997   • LIVER SURGERY      MASS REMOVED   • IA INSJ TUNNELED CVC W/O SUBQ PORT/ AGE 3  YR/> Right 8/26/2016    Procedure: MEDIPORT PLACEMENT ;  Surgeon: Praful Holden MD;  Location: [facility];  Service: Vascular   • SINUS SURGERY       Medications: The current medication list was reviewed in the EMR.    ALLERGIES:   No Known Allergies    SOCIAL HISTORY:       Social History   Substance Use Topics   • Smoking status: Never Smoker   • Smokeless tobacco: Never Used   • Alcohol use 3.0 oz/week     5 Glasses of wine per week      Comment: Social     FAMILY HISTORY:  Family History   Problem Relation Age of Onset   • Cerebral aneurysm Mother    • Stroke Mother    • COPD Father        REVIEW OF SYSTEMS:  General: no fever, chills, fatigue, weight changes, or lack of appetite.  Eyes: no epiphora, xerophthalmia,conjunctivitis, pain, glaucoma, blurred vision, blindness, secretion, photophobia, proptosis, diplopia.  Ears: no otorrhea, tinnitus, otorrhagia, deafness, pain, vertigo.  Nose: no rhinorrhea, epistaxis, alteration in perception of odors, sinuses pressure.  Mouth: no alteration in gums or teeth,  ulcers, no difficulty with mastication or deglut ion, no odynophagia.  Neck: no masses or pain, no thyroid alterations, no pain in muscles or arteries, no carotid odynia, no crepitation.  Respiratory: no cough, sputum production, dyspnea, trepopnea, pleuritic pain, hemoptysis.  Heart: no syncope, irregularity, palpitations, angina, orthopnea, paroxysmal nocturnal dyspnea.  Vascular Venous: no tenderness,edema, palpable cords, postphlebitic syndrome, skin changes or ulcerations.  Vascular Arterial: no distal ischemia, claudication, gangrene, neuropathic ischemic pain, skin ulcers, paleness or cyanosis.  GI: no dysphagia, odynophagia, no regurgitation, no heartburn,no indigestion,no nausea,no vomiting,no hematemesis ,no melena,no jaundice,no distention, no obstipation,no enterorrhagia,no proctalgia,no anal  lesions, nochanges in bowel habits.  : no frequency, hesitancy, hematuria, discharge,  "pain.  Musculoskeletal: no muscle or tendon pain or inflammation, joint pain, edema, functional limitation, fasciculations, mass.  Neurologic: no headache, seizures, alterations on Craneal nerves, no motor or senssory deficit, normal coordination, no alteration in memory, orientation, calculation,writting, verbal or written language.  Skin: no rashes, pruritus or localized lesions.  Psychiatric: no anxiety, depression, agitation, delusions, proper insight.  Objective    Vitals:    04/25/18 1554   BP: 110/70   Pulse: 86   Resp: 16   Temp: 98.8 °F (37.1 °C)   SpO2: 100%  Comment: at rest   Weight: 56 kg (123 lb 6.4 oz)   Height: 163 cm (64.17\")   PainSc: 0-No pain     Current Status 4/25/2018   ECOG score 0      PHYSICAL EXAM:    GENERAL:  Well-developed, well-nourished  Patient  in no acute distress.   SKIN:  Warm, dry without rashes, purpura or petechiae.  HEENT:  Pupils were equal and reactive to light and accomodation, conjunctivas non injected, no pterigion, normal extraocular movements, normal visual acuity.   Mouth mucosa was moist, no exudates in oropharynx, normal gum line, normal roof of the mouth and pillars, normal papillations of the tongue  NECK:  Supple with good range of motion; no thyromegaly or masses, no JVD or bruits, no cervical adenopathies.No carotid arteries pain, no carotid abnormal pulsation or arterial dance.  LYMPHATICS:  No cervical, supraclavicular, axillary, epitrochlear or inguinal adenopathy.  CHEST:  Normal excursion of both mary thoraces, normal voice fremitus, no subcutaneous emphysema, normal axillas, no rashes or acanthosis nigricans. Lungs clear to percussion and auscultation, normal breath sounds bilaterally, no wheezing, crackles or ronchi, no stridor, no rubs.  CARDIAC AND VASCULAR: PMI not displaced,no thrills, normal rate and regular rhythm, without murmurs, rubs or S3 or S4 right or left sided gallops. Normal femoral, popliteal, pedis, brachial and carotid pulses.  ABDOMEN: "  Soft, nontender with no organomegaly or masses, no ascites, no collateral circulation,no distention,no Fidel sign, no abdominal pain, no inguinal hernias,no umbilical hernias, no abdominal bruits. Normal bowel sounds.SURGICAL SITES WELL HEALED, NO POSTSURGICAL HERNIAS  GENITAL: Not  Performed.  EXTREMITIES  AND SPINE:  No clubbing, cyanosis or edema, no deformities or pain .No kyphosis, scoliosis, deformities or pain in spine, ribs or pelvic bone.  NEUROLOGICAL:  Patient was awake, alert, oriented to time, person and place,normal gait and coordination, RESIDUAL GRADE 1 NEUROPATHY IN TOES.      RECENT LABS:  Lab Results   Component Value Date    WBC 6.85 03/30/2018    HGB 13.8 03/30/2018    HCT 41.9 03/30/2018    MCV 89.5 03/30/2018     03/30/2018     Lab Results   Component Value Date    GLUCOSE 99 03/30/2018    BUN 17 03/30/2018    CREATININE 0.65 03/30/2018    EGFRIFNONA 95 03/30/2018    EGFRIFAFRI  09/15/2016      Comment:      <15 Indicative of kidney failure.    BCR 26.2 03/30/2018    CO2 25.4 03/30/2018    CALCIUM 9.9 03/30/2018    ALBUMIN 4.40 03/30/2018    LABIL2 1.5 03/30/2018    AST 28 03/30/2018    ALT 17 03/30/2018     Component      Latest Ref Rng & Units 7/17/2017 9/7/2017 11/10/2017 3/30/2018           8:58 AM  1:48 PM  4:13 PM 10:55 AM   CEA      ng/mL 2.07 2.16 1.79 2.02         Assessment/Plan      1. Metastatic rectal sigmoid cancer to the liver,  KRAS negative, BRAF negative, MSI stable, NRAS negative. The patient has undergone neoadjuvant chemotherapy with FOLFOX-6 and she subsequently underwent 2 different surgeries for her liver metastasis one including resection and another one including ablation. Also she underwent final removal of the primary tumor that was still PET active after completion of FOLFOX chemotherapy. Subsequently the patient had an ileostomy that was closed and she was placed on Xeloda chronically that she took in September, October, November of 2017 when she  discontinued on her own because of side effects. Since then the patient has been seen at the [facility] .I have reviewed laboratory workup from the [facility]. The white count was 6000. The hemoglobin was 12.7, the platelet count was 205,000. The CEA level was 1.1. Her magnesium level was 1.5. The rest of her chemistry profile was normal. Most importantly the risks of the MRI of the liver are available for me to review and she has extensive postsurgical changes in the liver, residual fluid accumulation in the areas of ablation and resection is further improved but in my opinion there is nothing to suggest liver metastasis. In the other radiological analysis of the abdomen, CT scan documented minimal stranding in the area of the ileal loop but nothing to suggest any other alteration.     The CEA level done on her not too long ago was fine with us as stated above and stable.     My assessment is that this patient after extensive neoadjuvant chemotherapy, surgery, ablation, resection and so forth as stated above in the care plan has achieved KIRIT status in regard to metastatic colon cancer with liver metastasis. She looks terrific today. She has minimal grade 1 peripheral neuropathy in her toes that has no functional limitation on her and otherwise she remains fully functional, working on daily basis, working out, eating well and celebrating her life. This is very encouraging. She is taking her vitamin D supplement. She is taking also a diet that is rich in anti-inflammatory phytochemicals and hopefully all of this will have a positive impact in the long run. Other than that she has not had any other new issues. Therefore, in my opinion, the only issue that we need to discuss is having her port flushed every 6 weeks and review her back in 3 months. She has a new appointment to go to [facility] in [place] in 3 months as well. She will bring all her x-rays, laboratory workup with her. For this reason, I will not  need to do any blood work from our side.                Zheng Johnson MD

## 2018-05-24 ENCOUNTER — APPOINTMENT (OUTPATIENT)
Dept: ONCOLOGY | Facility: HOSPITAL | Age: 53
End: 2018-05-24

## 2018-05-24 ENCOUNTER — INFUSION (OUTPATIENT)
Dept: ONCOLOGY | Facility: HOSPITAL | Age: 53
End: 2018-05-24

## 2018-05-24 DIAGNOSIS — Z45.2 FITTING AND ADJUSTMENT OF VASCULAR CATHETER: Primary | ICD-10-CM

## 2018-05-24 PROCEDURE — 96523 IRRIG DRUG DELIVERY DEVICE: CPT | Performed by: INTERNAL MEDICINE

## 2018-05-24 PROCEDURE — 25010000002 HEPARIN FLUSH (PORCINE) 100 UNIT/ML SOLUTION: Performed by: INTERNAL MEDICINE

## 2018-05-24 RX ORDER — SODIUM CHLORIDE 0.9 % (FLUSH) 0.9 %
10 SYRINGE (ML) INJECTION AS NEEDED
Status: DISCONTINUED | OUTPATIENT
Start: 2018-05-24 | End: 2018-05-24 | Stop reason: HOSPADM

## 2018-05-24 RX ORDER — SODIUM CHLORIDE 0.9 % (FLUSH) 0.9 %
10 SYRINGE (ML) INJECTION AS NEEDED
Status: CANCELLED | OUTPATIENT
Start: 2018-05-24

## 2018-05-24 RX ADMIN — Medication 10 ML: at 16:29

## 2018-05-24 RX ADMIN — SODIUM CHLORIDE, PRESERVATIVE FREE 500 UNITS: 5 INJECTION INTRAVENOUS at 16:29

## 2018-07-18 ENCOUNTER — APPOINTMENT (OUTPATIENT)
Dept: ONCOLOGY | Facility: HOSPITAL | Age: 53
End: 2018-07-18

## 2018-07-18 ENCOUNTER — APPOINTMENT (OUTPATIENT)
Dept: ONCOLOGY | Facility: CLINIC | Age: 53
End: 2018-07-18

## 2018-08-22 DIAGNOSIS — C78.7 RECTAL CANCER METASTASIZED TO LIVER (HCC): Primary | ICD-10-CM

## 2018-08-22 DIAGNOSIS — C20 RECTAL CANCER METASTASIZED TO LIVER (HCC): Primary | ICD-10-CM

## 2018-08-24 ENCOUNTER — APPOINTMENT (OUTPATIENT)
Dept: ONCOLOGY | Facility: CLINIC | Age: 53
End: 2018-08-24

## 2018-08-24 ENCOUNTER — APPOINTMENT (OUTPATIENT)
Dept: ONCOLOGY | Facility: HOSPITAL | Age: 53
End: 2018-08-24

## 2018-09-07 ENCOUNTER — APPOINTMENT (OUTPATIENT)
Dept: ONCOLOGY | Facility: HOSPITAL | Age: 53
End: 2018-09-07

## 2018-09-10 ENCOUNTER — APPOINTMENT (OUTPATIENT)
Dept: ONCOLOGY | Facility: HOSPITAL | Age: 53
End: 2018-09-10

## 2018-09-19 ENCOUNTER — TELEPHONE (OUTPATIENT)
Dept: GENERAL RADIOLOGY | Facility: HOSPITAL | Age: 53
End: 2018-09-19

## 2018-09-19 ENCOUNTER — APPOINTMENT (OUTPATIENT)
Dept: ONCOLOGY | Facility: HOSPITAL | Age: 53
End: 2018-09-19

## 2018-09-19 ENCOUNTER — INFUSION (OUTPATIENT)
Dept: ONCOLOGY | Facility: HOSPITAL | Age: 53
End: 2018-09-19

## 2018-09-19 DIAGNOSIS — Z45.2 FITTING AND ADJUSTMENT OF VASCULAR CATHETER: Primary | ICD-10-CM

## 2018-09-19 PROCEDURE — 96523 IRRIG DRUG DELIVERY DEVICE: CPT | Performed by: INTERNAL MEDICINE

## 2018-09-19 RX ORDER — SODIUM CHLORIDE 0.9 % (FLUSH) 0.9 %
10 SYRINGE (ML) INJECTION AS NEEDED
Status: CANCELLED | OUTPATIENT
Start: 2018-09-19

## 2018-09-19 RX ORDER — SODIUM CHLORIDE 0.9 % (FLUSH) 0.9 %
10 SYRINGE (ML) INJECTION AS NEEDED
Status: DISCONTINUED | OUTPATIENT
Start: 2018-09-19 | End: 2018-09-19 | Stop reason: HOSPADM

## 2018-09-19 NOTE — TELEPHONE ENCOUNTER
----- Message from Carmel Alcaraz RN sent at 9/19/2018  7:30 AM EDT -----  Please move pt off of port schedule. No staff is ever staffed after 4pm. Thanks!

## 2018-10-05 ENCOUNTER — INFUSION (OUTPATIENT)
Dept: ONCOLOGY | Facility: HOSPITAL | Age: 53
End: 2018-10-05

## 2018-10-05 ENCOUNTER — OFFICE VISIT (OUTPATIENT)
Dept: ONCOLOGY | Facility: CLINIC | Age: 53
End: 2018-10-05

## 2018-10-05 VITALS
TEMPERATURE: 98.2 F | OXYGEN SATURATION: 93 % | HEART RATE: 72 BPM | BODY MASS INDEX: 21.68 KG/M2 | RESPIRATION RATE: 16 BRPM | SYSTOLIC BLOOD PRESSURE: 122 MMHG | HEIGHT: 64 IN | DIASTOLIC BLOOD PRESSURE: 68 MMHG | WEIGHT: 127 LBS

## 2018-10-05 DIAGNOSIS — G62.0 PERIPHERAL NEUROPATHY DUE TO CHEMOTHERAPY (HCC): ICD-10-CM

## 2018-10-05 DIAGNOSIS — C20 RECTAL CANCER METASTASIZED TO LIVER (HCC): Primary | ICD-10-CM

## 2018-10-05 DIAGNOSIS — T45.1X5A PERIPHERAL NEUROPATHY DUE TO CHEMOTHERAPY (HCC): ICD-10-CM

## 2018-10-05 DIAGNOSIS — C78.7 RECTAL CANCER METASTASIZED TO LIVER (HCC): Primary | ICD-10-CM

## 2018-10-05 DIAGNOSIS — Z45.2 FITTING AND ADJUSTMENT OF VASCULAR CATHETER: ICD-10-CM

## 2018-10-05 DIAGNOSIS — C78.7 METASTATIC CANCER TO LIVER (HCC): ICD-10-CM

## 2018-10-05 LAB
BASOPHILS # BLD AUTO: 0.02 10*3/MM3 (ref 0–0.1)
BASOPHILS NFR BLD AUTO: 0.3 % (ref 0–1.1)
DEPRECATED RDW RBC AUTO: 43.3 FL (ref 37–49)
EOSINOPHIL # BLD AUTO: 0.11 10*3/MM3 (ref 0–0.36)
EOSINOPHIL NFR BLD AUTO: 1.6 % (ref 1–5)
ERYTHROCYTE [DISTWIDTH] IN BLOOD BY AUTOMATED COUNT: 13.2 % (ref 11.7–14.5)
HCT VFR BLD AUTO: 37.5 % (ref 34–45)
HGB BLD-MCNC: 12.3 G/DL (ref 11.5–14.9)
IMM GRANULOCYTES # BLD: 0.02 10*3/MM3 (ref 0–0.03)
IMM GRANULOCYTES NFR BLD: 0.3 % (ref 0–0.5)
LYMPHOCYTES # BLD AUTO: 1.23 10*3/MM3 (ref 1–3.5)
LYMPHOCYTES NFR BLD AUTO: 17.7 % (ref 20–49)
MCH RBC QN AUTO: 29.6 PG (ref 27–33)
MCHC RBC AUTO-ENTMCNC: 32.8 G/DL (ref 32–35)
MCV RBC AUTO: 90.4 FL (ref 83–97)
MONOCYTES # BLD AUTO: 0.81 10*3/MM3 (ref 0.25–0.8)
MONOCYTES NFR BLD AUTO: 11.7 % (ref 4–12)
NEUTROPHILS # BLD AUTO: 4.76 10*3/MM3 (ref 1.5–7)
NEUTROPHILS NFR BLD AUTO: 68.4 % (ref 39–75)
NRBC BLD MANUAL-RTO: 0 /100 WBC (ref 0–0)
PLATELET # BLD AUTO: 208 10*3/MM3 (ref 150–375)
PMV BLD AUTO: 10.1 FL (ref 8.9–12.1)
RBC # BLD AUTO: 4.15 10*6/MM3 (ref 3.9–5)
WBC NRBC COR # BLD: 6.95 10*3/MM3 (ref 4–10)

## 2018-10-05 PROCEDURE — 85025 COMPLETE CBC W/AUTO DIFF WBC: CPT | Performed by: INTERNAL MEDICINE

## 2018-10-05 PROCEDURE — 96523 IRRIG DRUG DELIVERY DEVICE: CPT | Performed by: INTERNAL MEDICINE

## 2018-10-05 PROCEDURE — 99214 OFFICE O/P EST MOD 30 MIN: CPT | Performed by: INTERNAL MEDICINE

## 2018-10-05 RX ORDER — DEXMETHYLPHENIDATE HYDROCHLORIDE 20 MG/1
CAPSULE, EXTENDED RELEASE ORAL
Refills: 0 | COMMUNITY
Start: 2018-09-24 | End: 2019-07-25 | Stop reason: SDUPTHER

## 2018-10-05 RX ORDER — SODIUM CHLORIDE 0.9 % (FLUSH) 0.9 %
10 SYRINGE (ML) INJECTION AS NEEDED
Status: DISCONTINUED | OUTPATIENT
Start: 2018-10-05 | End: 2018-10-05

## 2018-10-05 RX ORDER — SODIUM CHLORIDE 0.9 % (FLUSH) 0.9 %
10 SYRINGE (ML) INJECTION AS NEEDED
Status: CANCELLED | OUTPATIENT
Start: 2018-10-05

## 2018-10-05 RX ADMIN — Medication 10 ML: at 16:30

## 2018-10-05 RX ADMIN — SODIUM CHLORIDE, PRESERVATIVE FREE 500 UNITS: 5 INJECTION INTRAVENOUS at 16:30

## 2018-10-05 NOTE — PROGRESS NOTES
Subjective .     REASONS FOR FOLLOW UP:  Metastatic SIGMOID UPPER rectal cancer to the liver.  Initiated chemotherapy with FOLFOX-6 9/1/2016. COMPLETED IN SEPT 2017, EXTENSIVE SURGERY ON HER LIVER AND REMVAL OF PRIMARY TUMOR SURRENDER HER KIRIT.    HISTORY OF PRESENT ILLNESS:This patient returns today to the office for followup. She has been at [facility] not too long ago in order to do the reassessment of her metastatic colorectal cancer to the liver. Fortunately, nothing has been found in regard to recurrence and clinically she continues doing fantastic. She has plenty of energy, normal appetite and weight. No abdominal pain or jaundice and normal urination and defecation. She has not had any cardiorespiratory issues. Her port remains fully functional. She has leftover sensory neuropathy in her feet that is not functionality limiting for her. No neuropathy in her hands. She has an ECOG performance status of 0. No other new comorbidities.                         Past Medical History:   Diagnosis Date   • ADD (attention deficit disorder)    • Anemia    • Colon cancer (CMS/HCC)     NEW DIAGNOSIS   • Drug therapy    • Fatigue     secondary to chemotherapy    • H/O foreign travel 03/2016    [facility], [place]   • Hepatic metastases (CMS/HCC) 10/2016    partial right hepatectomy [place] 10/16   • History of transfusion     1999 AFTER TUBAL RUPTURE   • Hyperlipidemia    • Liver cancer (CMS/HCC)    • Middle cerebral artery aneurysm    • Status post partial colectomy 04/2017    [facility]     Past Surgical History:   Procedure Laterality Date   • AUGMENTATION MAMMAPLASTY     • COLONOSCOPY      MAY 2016   • CRANIOTOMY  2004, 2005    Cerebral aneurysm   • LAPAROSCOPY FOR ECTOPIC PREGNANCY  09/1997   • LIVER SURGERY      MASS REMOVED   • [place] INSJ TUNNELED CVC W/O SUBQ PORT/ AGE 3 YR/> Right 8/26/2016    Procedure: MEDIPORT PLACEMENT ;  Surgeon: Praful Holden MD;  Location: [facility] OR;   Service: Vascular   • SINUS SURGERY       Medications: The current medication list was reviewed in the EMR.    ALLERGIES:   No Known Allergies    SOCIAL HISTORY:       Social History   Substance Use Topics   • Smoking status: Never Smoker   • Smokeless tobacco: Never Used   • Alcohol use 3.0 oz/week     5 Glasses of wine per week      Comment: Social     FAMILY HISTORY:  Family History   Problem Relation Age of Onset   • Cerebral aneurysm Mother    • Stroke Mother    • COPD Father        REVIEW OF SYSTEMS:  General: no fever, no chills, no fatigue,no weight changes, no lack of appetite.  Eyes: no epiphora, xerophthalmia,conjunctivitis, pain, glaucoma, blurred vision, blindness, secretion, photophobia, proptosis, diplopia.  Ears: no otorrhea, tinnitus, otorrhagia, deafness, pain, vertigo.  Nose: no rhinorrhea, no epistaxis, no alteration in perception of odors, no sinuses pressure.  Mouth: no alteration in gums or teeth,  No ulcers, no difficulty with mastication or deglut ion, no odynophagia.  Neck: no masses or pain, no thyroid alterations, no pain in muscles or arteries, no carotid odynia, no crepitation.  Respiratory: no cough, no sputum production,no dyspnea,no trepopnea, no pleuritic pain,no hemoptysis.  Heart: no syncope, no irregularity, no palpitations, no angina,no orthopnea,no paroxysmal nocturnal dyspnea.  Vascular Venous: no tenderness,no edema,no palpable cords,no postphlebitic syndrome, no skin changes no ulcerations.  Vascular Arterial: no distal ischemia, noclaudication, no gangrene, no neuropathic ischemic pain, no skin ulcers, no paleness no cyanosis.  GI: no dysphagia, no odynophagia, no regurgitation, no heartburn,no indigestion,no nausea,no vomiting,no hematemesis ,no melena,no jaundice,no distention, no obstipation,no enterorrhagia,no proctalgia,no anal  lesions, no changes in bowel habits.  : no frequency, no hesitancy, no hematuria, no discharge,no  pain.  Musculoskeletal: no muscle or  "tendon pain or inflammation,no  joint pain, no edema, no functional limitation,no fasciculations, no mass.  Neurologic: no headache, no seizures, noalterations on Craneal nerves, no motor deficit, stated sensory deficit, normal coordination, no alteration in memory,normal orientation, calculation,normal writting, verbal and written language.  Skin: no rashes,no pruritus no localized lesions.  Psychiatric: no anxiety, no depression,no agitation, no delusions, proper insight.    Objective    Vitals:    10/05/18 1632   BP: 122/68   Pulse: 72   Resp: 16   Temp: 98.2 °F (36.8 °C)   TempSrc: Oral   SpO2: 93%   Weight: 57.6 kg (127 lb)   Height: 163 cm (64.17\")   PainSc: 0-No pain     Current Status 10/5/2018   ECOG score 0      PHYSICAL EXAM:    GENERAL:  Well-developed, well-nourished  Patient  in no acute distress.   SKIN:  Warm, dry ,NO rashes,NO purpura ,NO petechiae.  HEENT:  Pupils were equal and reactive to light and accomodation, conjunctivas non injected, no pterigion, normal extraocular movements, normal visual acuity.   Mouth mucosa was moist, no exudates in oropharynx, normal gum line, normal roof of the mouth and pillars, normal papillations of the tongue.  NECK:  Supple with good range of motion; no thyromegaly or masses, no JVD or bruits, no cervical adenopathies.No carotid arteries pain, no carotid abnormal pulsation , NO arterial dance.  LYMPHATICS:  No cervical, NO supraclavicular, NO axillary,NO epitrochlear , NO inguinal adenopathy.  CHEST:  Normal excursion of both mary thoraces, normal voice fremitus, no subcutaneous emphysema, normal axillas, no rashes or acanthosis nigricans. Lungs clear to percussion and auscultation, normal breath sounds bilaterally, no wheezing,NO crackles NO ronchi, NO stridor, NO rubs.  CARDIAC AND VASCULAR:  normal rate and regular rhythm, without murmurs,NO rubs NO S3 NO S4 right or left . Normal femoral, popliteal, pedis, brachial and carotid pulses.  ABDOMEN:  Soft, " nontender with no organomegaly or masses, no ascites, no collateral circulation,no distention,no Fidel sign, no abdominal pain, no inguinal hernias,no umbilical hernia, no abdominal bruits. Normal bowel sounds.  GENITAL: Not  Performed.  EXTREMITIES  AND SPINE:  No clubbing, cyanosis or edema, no deformities or pain .No kyphosis, scoliosis, deformities or pain in spine, ribs or pelvic bone.  NEUROLOGICAL:  Patient was awake, alert, oriented to time, person and place.sensory neuropathy in toes          RECENT LABS:  Lab Results   Component Value Date    WBC 6.95 10/05/2018    HGB 12.3 10/05/2018    HCT 37.5 10/05/2018    MCV 90.4 10/05/2018     10/05/2018     Lab Results   Component Value Date    GLUCOSE 99 03/30/2018    BUN 17 03/30/2018    CREATININE 0.65 03/30/2018    EGFRIFNONA 95 03/30/2018    EGFRIFAFRI  09/15/2016      Comment:      <15 Indicative of kidney failure.    BCR 26.2 03/30/2018    CO2 25.4 03/30/2018    CALCIUM 9.9 03/30/2018    ALBUMIN 4.40 03/30/2018    AST 28 03/30/2018    ALT 17 03/30/2018             Assessment/Plan      1. Metastatic rectal sigmoid cancer to the liver,  KRAS negative, BRAF negative, MSI stable, NRAS negative. The patient has undergone neoadjuvant chemotherapy with FOLFOX-6 and she subsequently underwent 2 different surgeries for her liver metastasis one including resection and another one including ablation. Also she underwent final removal of the primary tumor that was still PET active after completion of FOLFOX chemotherapy. Subsequently the patient had an ileostomy that was closed and she was placed on Xeloda chronically that she took in September, October, November of 2017 when she discontinued on her own because of side effects. Since then the patient has been seen at the [facility] .I have reviewed laboratory workup from the [facility]. I have reviewed all the information available from the [facility] and her radiological assessment as well as her laboratory  parameters and CEA are all normal. Therefore, it is always amazing to see somebody who has staged colon cancer to be treated aggressively and remain in remission after such a dramatic amount of extensive surgery and chemotherapy. 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 She has no other new comorbidities. She has seen Dr. Rowland, Neurosurgeon, in regard to her brain aneurysm and this is not an issue either at this time. Her health is excellent. She is taking some medications and I insisted in regard to the aspirin 81 mg a day and also advised her to continue taking her vitamin D. Other than that, I advised her to remain on a multivitamin. She has a very healthy diet. She is physically active. She is not gaining weight. Therefore, she is doing all the proper things to keep cancer away. She will return in January to be seen at the [facility] in [place] and I will review her back in 6 months from now. She will return every couple of months for her port flush.     I advised her that probably will need to keep the port in place until 2020 and eventually after next year if she wishes we will be glad to assume followup evaluation every 6 months through MRIs of the liver and radiological assessment on regular basis.           Zheng Johnson MD

## 2018-10-05 NOTE — PROGRESS NOTES
Subjective .     REASONS FOR FOLLOW UP:  Metastatic SIGMOID UPPER rectal cancer to the liver.  Initiated chemotherapy with FOLFOX-6 9/1/2016. COMPLETED IN SEPT 2017, EXTENSIVE SURGERY ON HER LIVER AND REMVAL OF PRIMARY TUMOR SURRENDER HER KIRIT.    HISTORY OF PRESENT ILLNESS:  The patient is a 53 y.o. year old female with the above-mentioned history. She is here today feeling terrific. She has had a great appetite. She has gained some weight and she has not had any abdominal pain. When she is exercising, in the area of the ileostomy, she feels occasional discomfort but no obvious hernia formation. She has normal bowel activity. No loose bowel function and normal urination. She has not had any skeletal pain. She denies any cardiovascular or respiratory issues. 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 She just came back from the [facility] where she underwent extensive assessment including radiological assessment with an MRI scan of the liver, laboratory workup and so forth. Fortunately, all this was negative for recurrent disease.                     Past Medical History:   Diagnosis Date   • ADD (attention deficit disorder)    • Anemia    • Colon cancer (CMS/HCC)     NEW DIAGNOSIS   • Drug therapy    • Fatigue     secondary to chemotherapy    • H/O foreign travel 03/2016    [place], [place]   • Hepatic metastases (CMS/HCC) 10/2016    partial right hepatectomy [place] 10/16   • History of transfusion     1999 AFTER TUBAL RUPTURE   • Hyperlipidemia    • Liver cancer (CMS/HCC)    • Middle cerebral artery aneurysm    • Status post partial colectomy 04/2017    [facility]     Past Surgical History:   Procedure Laterality Date   • AUGMENTATION MAMMAPLASTY     • COLONOSCOPY      MAY 2016   • CRANIOTOMY  2004, 2005    Cerebral aneurysm   • LAPAROSCOPY FOR ECTOPIC PREGNANCY  09/1997   • LIVER SURGERY      MASS REMOVED   • OH ADAM TUNNELED  CVC W/O SUBQ PORT/ AGE 3 YR/> Right 8/26/2016    Procedure: MEDIPORT PLACEMENT ;  Surgeon: Praful Holden MD;  Location: [facility];  Service: Vascular   • SINUS SURGERY       Medications: The current medication list was reviewed in the EMR.    ALLERGIES:   No Known Allergies    SOCIAL HISTORY:       Social History   Substance Use Topics   • Smoking status: Never Smoker   • Smokeless tobacco: Never Used   • Alcohol use 3.0 oz/week     5 Glasses of wine per week      Comment: Social     FAMILY HISTORY:  Family History   Problem Relation Age of Onset   • Cerebral aneurysm Mother    • Stroke Mother    • COPD Father        REVIEW OF SYSTEMS:    General: no fever, no chills, no fatigue,no weight changes, no lack of appetite.  Eyes: no epiphora, xerophthalmia,conjunctivitis, pain, glaucoma, blurred vision, blindness, secretion, photophobia, proptosis, diplopia.  Ears: no otorrhea, tinnitus, otorrhagia, deafness, pain, vertigo.  Nose: no rhinorrhea, no epistaxis, no alteration in perception of odors, no sinuses pressure.  Mouth: no alteration in gums or teeth,  No ulcers, no difficulty with mastication or deglut ion, no odynophagia.  Neck: no masses or pain, no thyroid alterations, no pain in muscles or arteries, no carotid odynia, no crepitation.  Respiratory: no cough, no sputum production,no dyspnea,no trepopnea, no pleuritic pain,no hemoptysis.  Heart: no syncope, no irregularity, no palpitations, no angina,no orthopnea,no paroxysmal nocturnal dyspnea.  Vascular Venous: no tenderness,no edema,no palpable cords,no postphlebitic syndrome, no skin changes no ulcerations.  Vascular Arterial: no distal ischemia, noclaudication, no gangrene, no neuropathic ischemic pain, no skin ulcers, no paleness no cyanosis.  GI: no dysphagia, no odynophagia, no regurgitation, no heartburn,no indigestion,no nausea,no vomiting,no hematemesis ,no melena,no jaundice,no distention, no obstipation,no enterorrhagia,no  "proctalgia,no anal  lesions, no changes in bowel habits.  : no frequency, no hesitancy, no hematuria, no discharge,no  pain.  Musculoskeletal: no muscle or tendon pain or inflammation,no  joint pain, no edema, no functional limitation,no fasciculations, no mass.  Neurologic: no headache, no seizures, noalterations on Craneal nerves, no motor deficit, no sensory deficit, normal coordination, no alteration in memory,normal orientation, calculation,normal writting, verbal and written language.  Skin: no rashes,no pruritus no localized lesions.  Psychiatric: no anxiety, no depression,no agitation, no delusions, proper insight.    Objective    Vitals:    10/05/18 1632   BP: 122/68   Pulse: 72   Resp: 16   Temp: 98.2 °F (36.8 °C)   TempSrc: Oral   SpO2: 93%   Weight: 57.6 kg (127 lb)   Height: 163 cm (64.17\")   PainSc: 0-No pain     Current Status 10/5/2018   ECOG score 0      PHYSICAL EXAM:    GENERAL:  Well-developed, well-nourished  Patient  in no acute distress.   SKIN:  Warm, dry without rashes, purpura or petechiae.  HEENT:  Pupils were equal and reactive to light and accomodation, conjunctivas non injected, no pterigion, normal extraocular movements, normal visual acuity.   Mouth mucosa was moist, no exudates in oropharynx, normal gum line, normal roof of the mouth and pillars, normal papillations of the tongue  NECK:  Supple with good range of motion; no thyromegaly or masses, no JVD or bruits, no cervical adenopathies.No carotid arteries pain, no carotid abnormal pulsation or arterial dance.  LYMPHATICS:  No cervical, supraclavicular, axillary, epitrochlear or inguinal adenopathy.  CHEST:  Normal excursion of both mary thoraces, normal voice fremitus, no subcutaneous emphysema, normal axillas, no rashes or acanthosis nigricans. Lungs clear to percussion and auscultation, normal breath sounds bilaterally, no wheezing, crackles or ronchi, no stridor, no rubs.  CARDIAC AND VASCULAR: PMI not displaced,no thrills, " normal rate and regular rhythm, without murmurs, rubs or S3 or S4 right or left sided gallops. Normal femoral, popliteal, pedis, brachial and carotid pulses.  ABDOMEN:  Soft, nontender with no organomegaly or masses, no ascites, no collateral circulation,no distention,no Fidel sign, no abdominal pain, no inguinal hernias,no umbilical hernias, no abdominal bruits. Normal bowel sounds.SURGICAL SITES WELL HEALED, NO POSTSURGICAL HERNIAS  GENITAL: Not  Performed.  EXTREMITIES  AND SPINE:  No clubbing, cyanosis or edema, no deformities or pain .No kyphosis, scoliosis, deformities or pain in spine, ribs or pelvic bone.  NEUROLOGICAL:  Patient was awake, alert, oriented to time, person and place,normal gait and coordination, RESIDUAL GRADE 1 NEUROPATHY IN TOES.      RECENT LABS:  Lab Results   Component Value Date    WBC 6.95 10/05/2018    HGB 12.3 10/05/2018    HCT 37.5 10/05/2018    MCV 90.4 10/05/2018     10/05/2018     Lab Results   Component Value Date    GLUCOSE 99 03/30/2018    BUN 17 03/30/2018    CREATININE 0.65 03/30/2018    EGFRIFNONA 95 03/30/2018    EGFRIFAFRI  09/15/2016      Comment:      <15 Indicative of kidney failure.    BCR 26.2 03/30/2018    CO2 25.4 03/30/2018    CALCIUM 9.9 03/30/2018    ALBUMIN 4.40 03/30/2018    AST 28 03/30/2018    ALT 17 03/30/2018     Component      Latest Ref Rng & Units 7/17/2017 9/7/2017 11/10/2017 3/30/2018           8:58 AM  1:48 PM  4:13 PM 10:55 AM   CEA      ng/mL 2.07 2.16 1.79 2.02         Assessment/Plan      1. Metastatic rectal sigmoid cancer to the liver,  KRAS negative, BRAF negative, MSI stable, NRAS negative. The patient has undergone neoadjuvant chemotherapy with FOLFOX-6 and she subsequently underwent 2 different surgeries for her liver metastasis one including resection and another one including ablation. Also she underwent final removal of the primary tumor that was still PET active after completion of FOLFOX chemotherapy. Subsequently the patient  had an ileostomy that was closed and she was placed on Xeloda chronically that she took in September, October, November of 2017 when she discontinued on her own because of side effects. Since then the patient has been seen at the [facility] .I have reviewed laboratory workup from the [facility]. The white count was 6000. The hemoglobin was 12.7, the platelet count was 205,000. The CEA level was 1.1. Her magnesium level was 1.5. The rest of her chemistry profile was normal. Most importantly the risks of the MRI of the liver are available for me to review and she has extensive postsurgical changes in the liver, residual fluid accumulation in the areas of ablation and resection is further improved but in my opinion there is nothing to suggest liver metastasis. In the other radiological analysis of the abdomen, CT scan documented minimal stranding in the area of the ileal loop but nothing to suggest any other alteration.     The CEA level done on her not too long ago was fine with us as stated above and stable.     My assessment is that this patient after extensive neoadjuvant chemotherapy, surgery, ablation, resection and so forth as stated above in the care plan has achieved KIRIT status in regard to metastatic colon cancer with liver metastasis. She looks terrific today. She has minimal grade 1 peripheral neuropathy in her toes that has no functional limitation on her and otherwise she remains fully functional, working on daily basis, working out, eating well and celebrating her life. This is very encouraging. She is taking her vitamin D supplement. She is taking also a diet that is rich in anti-inflammatory phytochemicals and hopefully all of this will have a positive impact in the long run. Other than that she has not had any other new issues. Therefore, in my opinion, the only issue that we need to discuss is having her port flushed every 6 weeks and review her back in 3 months. She has a new appointment to go to  [facility] in [place] in 3 months as well. She will bring all her x-rays, laboratory workup with her. For this reason, I will not need to do any blood work from our side.                Jonna Saunders MA

## 2018-11-21 ENCOUNTER — HOSPITAL ENCOUNTER (OUTPATIENT)
Dept: FAMILY MEDICINE CLINIC | Facility: CLINIC | Age: 53
Setting detail: SPECIMEN
Discharge: HOME | End: 2018-11-21
Attending: INTERNAL MEDICINE | Admitting: INTERNAL MEDICINE

## 2018-11-24 LAB — H CAPSUL MYC AB SER QL: NEGATIVE

## 2018-12-17 ENCOUNTER — HOSPITAL ENCOUNTER (OUTPATIENT)
Dept: FAMILY MEDICINE CLINIC | Facility: CLINIC | Age: 53
Setting detail: SPECIMEN
Discharge: HOME | End: 2018-12-17
Attending: INTERNAL MEDICINE | Admitting: INTERNAL MEDICINE

## 2018-12-17 LAB
ALBUMIN SERPL-MCNC: 4 G/DL (ref 3.5–4.8)
ALBUMIN/GLOB SERPL: 1.5 {RATIO} (ref 1–1.7)
ALP SERPL-CCNC: 71 IU/L (ref 32–91)
ALT SERPL-CCNC: 21 IU/L (ref 14–54)
ANION GAP SERPL CALC-SCNC: 13.6 MMOL/L (ref 10–20)
AST SERPL-CCNC: 33 IU/L (ref 15–41)
BASOPHILS # BLD AUTO: 0 10*3/UL (ref 0–0.2)
BASOPHILS NFR BLD AUTO: 0 % (ref 0–2)
BILIRUB SERPL-MCNC: 0.4 MG/DL (ref 0.3–1.2)
BUN SERPL-MCNC: 6 MG/DL (ref 8–20)
BUN/CREAT SERPL: 10 (ref 5.4–26.2)
CALCIUM SERPL-MCNC: 9.3 MG/DL (ref 8.9–10.3)
CHLORIDE SERPL-SCNC: 103 MMOL/L (ref 101–111)
CONV CO2: 22 MMOL/L (ref 22–32)
CONV TOTAL PROTEIN: 6.7 G/DL (ref 6.1–7.9)
CREAT UR-MCNC: 0.6 MG/DL (ref 0.4–1)
DIFFERENTIAL METHOD BLD: (no result)
EOSINOPHIL # BLD AUTO: 0 % (ref 0–3)
EOSINOPHIL # BLD AUTO: 0 10*3/UL (ref 0–0.3)
ERYTHROCYTE [DISTWIDTH] IN BLOOD BY AUTOMATED COUNT: 14.1 % (ref 11.5–14.5)
GLOBULIN UR ELPH-MCNC: 2.7 G/DL (ref 2.5–3.8)
GLUCOSE SERPL-MCNC: 103 MG/DL (ref 65–99)
HCT VFR BLD AUTO: 37.2 % (ref 35–49)
HGB BLD-MCNC: 12.3 G/DL (ref 12–15)
LYMPHOCYTES # BLD AUTO: 1.1 10*3/UL (ref 0.8–4.8)
LYMPHOCYTES NFR BLD AUTO: 22 % (ref 18–42)
MCH RBC QN AUTO: 28.6 PG (ref 26–32)
MCHC RBC AUTO-ENTMCNC: 33.1 G/DL (ref 32–36)
MCV RBC AUTO: 86.6 FL (ref 80–94)
MONOCYTES # BLD AUTO: 0.8 10*3/UL (ref 0.1–1.3)
MONOCYTES NFR BLD AUTO: 16 % (ref 2–11)
NEUTROPHILS # BLD AUTO: 3.1 10*3/UL (ref 2.3–8.6)
NEUTROPHILS NFR BLD AUTO: 62 % (ref 50–75)
NRBC BLD AUTO-RTO: 0 /100{WBCS}
NRBC/RBC NFR BLD MANUAL: 0 10*3/UL
PLATELET # BLD AUTO: 202 10*3/UL (ref 150–450)
PMV BLD AUTO: 9 FL (ref 7.4–10.4)
POTASSIUM SERPL-SCNC: 3.6 MMOL/L (ref 3.6–5.1)
RBC # BLD AUTO: 4.3 10*6/UL (ref 4–5.4)
SODIUM SERPL-SCNC: 135 MMOL/L (ref 136–144)
WBC # BLD AUTO: 4.9 10*3/UL (ref 4.5–11.5)

## 2018-12-21 ENCOUNTER — APPOINTMENT (OUTPATIENT)
Dept: ONCOLOGY | Facility: HOSPITAL | Age: 53
End: 2018-12-21

## 2019-01-03 ENCOUNTER — INFUSION (OUTPATIENT)
Dept: ONCOLOGY | Facility: HOSPITAL | Age: 54
End: 2019-01-03

## 2019-01-03 DIAGNOSIS — Z45.2 FITTING AND ADJUSTMENT OF VASCULAR CATHETER: ICD-10-CM

## 2019-01-03 PROCEDURE — 96523 IRRIG DRUG DELIVERY DEVICE: CPT | Performed by: INTERNAL MEDICINE

## 2019-01-03 RX ADMIN — SODIUM CHLORIDE, PRESERVATIVE FREE 500 UNITS: 5 INJECTION INTRAVENOUS at 16:26

## 2019-01-04 RX ORDER — SODIUM CHLORIDE 0.9 % (FLUSH) 0.9 %
10 SYRINGE (ML) INJECTION AS NEEDED
Status: CANCELLED | OUTPATIENT
Start: 2019-01-04

## 2019-03-07 ENCOUNTER — DOCUMENTATION (OUTPATIENT)
Dept: ONCOLOGY | Facility: CLINIC | Age: 54
End: 2019-03-07

## 2019-03-07 NOTE — PROGRESS NOTES
A letter was requested from Dr. Johnson for pt to have an emotional support animal on a plane, for her trips to [place]. They drafted a letter, which [facility] put on [employer] letterhead. Dr. Johnson requested the specific type of animal to be included. MARILYNW called the , and it's a [place] White Terrier. The letter was completed and Dr. Johnson signed it. The  will pick it up today at the .

## 2019-04-15 ENCOUNTER — INFUSION (OUTPATIENT)
Dept: ONCOLOGY | Facility: HOSPITAL | Age: 54
End: 2019-04-15

## 2019-04-15 DIAGNOSIS — Z45.2 FITTING AND ADJUSTMENT OF VASCULAR CATHETER: Primary | ICD-10-CM

## 2019-04-15 PROCEDURE — 96523 IRRIG DRUG DELIVERY DEVICE: CPT | Performed by: INTERNAL MEDICINE

## 2019-04-15 RX ORDER — SODIUM CHLORIDE 0.9 % (FLUSH) 0.9 %
10 SYRINGE (ML) INJECTION AS NEEDED
Status: CANCELLED | OUTPATIENT
Start: 2019-04-15

## 2019-04-15 RX ORDER — SODIUM CHLORIDE 0.9 % (FLUSH) 0.9 %
10 SYRINGE (ML) INJECTION AS NEEDED
Status: DISCONTINUED | OUTPATIENT
Start: 2019-04-15 | End: 2019-04-15 | Stop reason: HOSPADM

## 2019-04-15 RX ADMIN — SODIUM CHLORIDE, PRESERVATIVE FREE 500 UNITS: 5 INJECTION INTRAVENOUS at 16:40

## 2019-06-25 ENCOUNTER — OFFICE VISIT (OUTPATIENT)
Dept: OBSTETRICS AND GYNECOLOGY | Facility: CLINIC | Age: 54
End: 2019-06-25

## 2019-06-25 ENCOUNTER — PROCEDURE VISIT (OUTPATIENT)
Dept: OBSTETRICS AND GYNECOLOGY | Facility: CLINIC | Age: 54
End: 2019-06-25

## 2019-06-25 ENCOUNTER — APPOINTMENT (OUTPATIENT)
Dept: WOMENS IMAGING | Facility: HOSPITAL | Age: 54
End: 2019-06-25

## 2019-06-25 VITALS
HEIGHT: 64 IN | WEIGHT: 124.8 LBS | SYSTOLIC BLOOD PRESSURE: 120 MMHG | DIASTOLIC BLOOD PRESSURE: 76 MMHG | BODY MASS INDEX: 21.31 KG/M2

## 2019-06-25 DIAGNOSIS — Z00.00 ANNUAL PHYSICAL EXAM: Primary | ICD-10-CM

## 2019-06-25 DIAGNOSIS — Z12.31 VISIT FOR SCREENING MAMMOGRAM: Primary | ICD-10-CM

## 2019-06-25 DIAGNOSIS — N95.2 ATROPHIC VAGINITIS: ICD-10-CM

## 2019-06-25 PROCEDURE — 99396 PREV VISIT EST AGE 40-64: CPT | Performed by: OBSTETRICS & GYNECOLOGY

## 2019-06-25 PROCEDURE — 77067 SCR MAMMO BI INCL CAD: CPT | Performed by: RADIOLOGY

## 2019-06-25 PROCEDURE — 77067 SCR MAMMO BI INCL CAD: CPT | Performed by: OBSTETRICS & GYNECOLOGY

## 2019-06-25 RX ORDER — ESTRADIOL 0.1 MG/G
CREAM VAGINAL
Qty: 42.5 G | Refills: 3 | Status: SHIPPED | OUTPATIENT
Start: 2019-06-25 | End: 2020-03-13

## 2019-06-25 NOTE — PROGRESS NOTES
SILVESTRE Florentino  is a 53 y.o. female who presents for 2 reasons.  First, she is due for her routine gynecologic exam.  Mammogram was performed today.  Colonoscopy is performed regularly and this is due later this summer.  Second, she has vaginal dryness and dyspareunia as a result of this.  This responds well to Estrace vaginal cream.    Chief Complaint   Patient presents with   • Gynecologic Exam     Patient is here for a annual.       Past Medical History:   Diagnosis Date   • ADD (attention deficit disorder)    • Anemia    • Colon cancer (CMS/HCC)     NEW DIAGNOSIS   • Drug therapy    • Fatigue     secondary to chemotherapy    • H/O foreign travel 03/2016    [place], [place]   • Hepatic metastases (CMS/HCC) 10/2016    partial right hepatectomy [place] 10/16   • History of transfusion     1999 AFTER TUBAL RUPTURE   • Hyperlipidemia    • Liver cancer (CMS/HCC)    • Middle cerebral artery aneurysm    • Status post partial colectomy 04/2017    [facility]       Past Surgical History:   Procedure Laterality Date   • AUGMENTATION MAMMAPLASTY     • COLONOSCOPY      MAY 2016   • CRANIOTOMY  2004, 2005    Cerebral aneurysm   • LAPAROSCOPY FOR ECTOPIC PREGNANCY  09/1997   • LIVER SURGERY      MASS REMOVED   • WI INSJ TUNNELED CVC W/O SUBQ PORT/ AGE 3 YR/> Right 8/26/2016    Procedure: MEDIPORT PLACEMENT ;  Surgeon: Praful Holden MD;  Location: [facility];  Service: Vascular   • SINUS SURGERY         Social History     Socioeconomic History   • Marital status:      Spouse name: Amos   • Number of children: 1   • Years of education: COLLEGE   • Highest education level: Not on file   Occupational History   • Occupation: Teacher     Employer: [employer]     Comment: full time   Tobacco Use   • Smoking status: Never Smoker   • Smokeless tobacco: Never Used   Substance and Sexual Activity   • Alcohol use: Yes     Alcohol/week: 3.0 oz     Types: 5 Glasses of wine  per week     Comment: Social   • Drug use: No   • Sexual activity: Yes     Partners: Male     Birth control/protection: None   Social History Narrative    She is an . Never smoked. Social drinker about 5 glasses of wine per week       The following portions of the patient's history were reviewed and updated as appropriate: allergies, current medications, past family history, past medical history, past social history, past surgical history and problem list.    Review of Systems this is positive for vaginal dryness and dyspareunia which respond to Estrace vaginal cream.  It is negative for hot flashes or night sweats.  It is negative for unexplained weight change.  It is negative for fever or chills.  It is negative for sweats.  All other systems are reviewed and are negative          Physical Exam   Constitutional: She is oriented to person, place, and time. She appears well-developed and well-nourished.   HENT:   Head: Normocephalic and atraumatic.   Cardiovascular: Normal rate and regular rhythm.   Pulmonary/Chest: Effort normal and breath sounds normal. She has no wheezes. She has no rales.   Breast implants are in place bilaterally.  There are no palpable breast lumps.  Nipple discharge and axillary adenopathy are absent   Abdominal: Soft. She exhibits no distension. There is no tenderness.   Genitourinary: Vagina normal and uterus normal. There is no lesion on the right labia. There is no lesion on the left labia. Cervix exhibits no motion tenderness. Right adnexum displays no mass and no tenderness. Left adnexum displays no mass and no tenderness. No vaginal discharge found.   Neurological: She is alert and oriented to person, place, and time.   Skin: Skin is warm and dry.   Nursing note and vitals reviewed.      Assessment    Isabel was seen today for gynecologic exam.    Diagnoses and all orders for this visit:    Annual physical exam    Atrophic vaginitis    Other orders  -     estradiol  (ESTRACE VAGINAL) 0.1 MG/GM vaginal cream; Amount the size of a blueberry at  Bedtime 2 times per week.        Plan    1. Normal gynecologic exam.  Pap smear performed  2. Counseled regarding the importance of regular screening mammograms.  Mammogram was performed today  3. The patient has a history of colon cancer.  She is assessed with regular colonoscopy.  This is to be performed later this summer.  4. Atrophic vaginitis.  Counseled.  We discussed the benefits and risks of continued use of estrogen vaginal cream.  The patient would like to continue this.  Her prescription has been renewed.    5. Return in about 1 year (around 6/25/2020).    Social History     Tobacco Use   Smoking Status Never Smoker   6.     7.

## 2019-06-27 LAB
CONV .: NORMAL
CYTOLOGIST CVX/VAG CYTO: NORMAL
CYTOLOGY CVX/VAG DOC CYTO: NORMAL
CYTOLOGY CVX/VAG DOC THIN PREP: NORMAL
DX ICD CODE: NORMAL
HIV 1 & 2 AB SER-IMP: NORMAL
OTHER STN SPEC: NORMAL
STAT OF ADQ CVX/VAG CYTO-IMP: NORMAL

## 2019-07-12 ENCOUNTER — ON CAMPUS - OUTPATIENT (AMBULATORY)
Dept: URBAN - METROPOLITAN AREA HOSPITAL 2 | Facility: HOSPITAL | Age: 54
End: 2019-07-12
Payer: COMMERCIAL

## 2019-07-12 VITALS
RESPIRATION RATE: 16 BRPM | SYSTOLIC BLOOD PRESSURE: 136 MMHG | TEMPERATURE: 98.9 F | HEART RATE: 75 BPM | DIASTOLIC BLOOD PRESSURE: 68 MMHG | DIASTOLIC BLOOD PRESSURE: 54 MMHG | HEART RATE: 68 BPM | WEIGHT: 122.6 LBS | SYSTOLIC BLOOD PRESSURE: 123 MMHG | SYSTOLIC BLOOD PRESSURE: 128 MMHG | DIASTOLIC BLOOD PRESSURE: 69 MMHG | SYSTOLIC BLOOD PRESSURE: 119 MMHG | HEART RATE: 72 BPM | HEIGHT: 64 IN | OXYGEN SATURATION: 98 % | OXYGEN SATURATION: 100 % | SYSTOLIC BLOOD PRESSURE: 121 MMHG | HEART RATE: 85 BPM | HEART RATE: 67 BPM | DIASTOLIC BLOOD PRESSURE: 84 MMHG | DIASTOLIC BLOOD PRESSURE: 60 MMHG | SYSTOLIC BLOOD PRESSURE: 114 MMHG | RESPIRATION RATE: 17 BRPM | HEART RATE: 83 BPM | RESPIRATION RATE: 18 BRPM

## 2019-07-12 DIAGNOSIS — K64.4 RESIDUAL HEMORRHOIDAL SKIN TAGS: ICD-10-CM

## 2019-07-12 DIAGNOSIS — K57.30 DIVERTICULOSIS OF LARGE INTESTINE WITHOUT PERFORATION OR ABS: ICD-10-CM

## 2019-07-12 DIAGNOSIS — C20 MALIGNANT NEOPLASM OF RECTUM: ICD-10-CM

## 2019-07-12 DIAGNOSIS — Z98.890 OTHER SPECIFIED POSTPROCEDURAL STATES: ICD-10-CM

## 2019-07-12 PROCEDURE — 45378 DIAGNOSTIC COLONOSCOPY: CPT | Performed by: INTERNAL MEDICINE

## 2019-07-25 ENCOUNTER — TELEPHONE (OUTPATIENT)
Dept: FAMILY MEDICINE CLINIC | Facility: CLINIC | Age: 54
End: 2019-07-25

## 2019-07-25 RX ORDER — DEXMETHYLPHENIDATE HYDROCHLORIDE 20 MG/1
20 CAPSULE, EXTENDED RELEASE ORAL DAILY
Qty: 30 CAPSULE | Refills: 0 | Status: SHIPPED | OUTPATIENT
Start: 2019-07-25 | End: 2019-09-03 | Stop reason: SDUPTHER

## 2019-07-26 ENCOUNTER — OFFICE VISIT (OUTPATIENT)
Dept: ONCOLOGY | Facility: CLINIC | Age: 54
End: 2019-07-26

## 2019-07-26 ENCOUNTER — INFUSION (OUTPATIENT)
Dept: ONCOLOGY | Facility: HOSPITAL | Age: 54
End: 2019-07-26

## 2019-07-26 VITALS
TEMPERATURE: 99.3 F | BODY MASS INDEX: 21.48 KG/M2 | RESPIRATION RATE: 12 BRPM | HEART RATE: 90 BPM | DIASTOLIC BLOOD PRESSURE: 69 MMHG | SYSTOLIC BLOOD PRESSURE: 125 MMHG | OXYGEN SATURATION: 100 % | WEIGHT: 128.9 LBS | HEIGHT: 65 IN

## 2019-07-26 DIAGNOSIS — C78.7 RECTAL CANCER METASTASIZED TO LIVER (HCC): ICD-10-CM

## 2019-07-26 DIAGNOSIS — C20 RECTAL CANCER METASTASIZED TO LIVER (HCC): Primary | ICD-10-CM

## 2019-07-26 DIAGNOSIS — G62.0 PERIPHERAL NEUROPATHY DUE TO CHEMOTHERAPY (HCC): ICD-10-CM

## 2019-07-26 DIAGNOSIS — C78.7 METASTATIC CANCER TO LIVER (HCC): ICD-10-CM

## 2019-07-26 DIAGNOSIS — C78.7 RECTAL CANCER METASTASIZED TO LIVER (HCC): Primary | ICD-10-CM

## 2019-07-26 DIAGNOSIS — T45.1X5A PERIPHERAL NEUROPATHY DUE TO CHEMOTHERAPY (HCC): ICD-10-CM

## 2019-07-26 DIAGNOSIS — Z45.2 FITTING AND ADJUSTMENT OF VASCULAR CATHETER: Primary | ICD-10-CM

## 2019-07-26 DIAGNOSIS — C20 RECTAL CANCER METASTASIZED TO LIVER (HCC): ICD-10-CM

## 2019-07-26 LAB
BASOPHILS # BLD AUTO: 0.02 10*3/MM3 (ref 0–0.2)
BASOPHILS NFR BLD AUTO: 0.3 % (ref 0–1.5)
DEPRECATED RDW RBC AUTO: 44 FL (ref 37–54)
EOSINOPHIL # BLD AUTO: 0.06 10*3/MM3 (ref 0–0.4)
EOSINOPHIL NFR BLD AUTO: 0.8 % (ref 0.3–6.2)
ERYTHROCYTE [DISTWIDTH] IN BLOOD BY AUTOMATED COUNT: 13.5 % (ref 12.3–15.4)
HCT VFR BLD AUTO: 37.7 % (ref 34–46.6)
HGB BLD-MCNC: 12.4 G/DL (ref 12–15.9)
IMM GRANULOCYTES # BLD AUTO: 0.04 10*3/MM3 (ref 0–0.05)
IMM GRANULOCYTES NFR BLD AUTO: 0.5 % (ref 0–0.5)
LYMPHOCYTES # BLD AUTO: 1.59 10*3/MM3 (ref 0.7–3.1)
LYMPHOCYTES NFR BLD AUTO: 21 % (ref 19.6–45.3)
MCH RBC QN AUTO: 29.4 PG (ref 26.6–33)
MCHC RBC AUTO-ENTMCNC: 32.9 G/DL (ref 31.5–35.7)
MCV RBC AUTO: 89.3 FL (ref 79–97)
MONOCYTES # BLD AUTO: 0.72 10*3/MM3 (ref 0.1–0.9)
MONOCYTES NFR BLD AUTO: 9.5 % (ref 5–12)
NEUTROPHILS # BLD AUTO: 5.14 10*3/MM3 (ref 1.7–7)
NEUTROPHILS NFR BLD AUTO: 67.9 % (ref 42.7–76)
NRBC BLD AUTO-RTO: 0 /100 WBC (ref 0–0.2)
PLATELET # BLD AUTO: 216 10*3/MM3 (ref 140–450)
PMV BLD AUTO: 10.4 FL (ref 6–12)
RBC # BLD AUTO: 4.22 10*6/MM3 (ref 3.77–5.28)
WBC NRBC COR # BLD: 7.57 10*3/MM3 (ref 3.4–10.8)

## 2019-07-26 PROCEDURE — 99214 OFFICE O/P EST MOD 30 MIN: CPT | Performed by: INTERNAL MEDICINE

## 2019-07-26 PROCEDURE — 96523 IRRIG DRUG DELIVERY DEVICE: CPT | Performed by: INTERNAL MEDICINE

## 2019-07-26 PROCEDURE — 85025 COMPLETE CBC W/AUTO DIFF WBC: CPT | Performed by: INTERNAL MEDICINE

## 2019-07-26 RX ORDER — SODIUM CHLORIDE 0.9 % (FLUSH) 0.9 %
10 SYRINGE (ML) INJECTION AS NEEDED
Status: CANCELLED | OUTPATIENT
Start: 2019-07-26

## 2019-07-26 RX ORDER — SODIUM CHLORIDE 0.9 % (FLUSH) 0.9 %
10 SYRINGE (ML) INJECTION AS NEEDED
Status: DISCONTINUED | OUTPATIENT
Start: 2019-07-26 | End: 2019-07-26 | Stop reason: HOSPADM

## 2019-07-26 RX ADMIN — SODIUM CHLORIDE, PRESERVATIVE FREE 10 ML: 5 INJECTION INTRAVENOUS at 15:58

## 2019-07-26 RX ADMIN — Medication 500 UNITS: at 15:58

## 2019-07-26 NOTE — PROGRESS NOTES
Subjective .     REASONS FOR FOLLOW UP:  Metastatic SIGMOID UPPER rectal cancer to the liver.  Initiated chemotherapy with FOLFOX-6 9/1/2016. COMPLETED IN SEPT 2017, EXTENSIVE SURGERY ON HER LIVER AND REMVAL OF PRIMARY TUMOR SURRENDER HER KIRIT.    HISTORY OF PRESENT ILLNESS:This patient returns today to the office for followup. She is here stating that she has been short of breath since she underwent thoracic surgery at the [facility] in [place] in 12/2018. It was thought that she had lung cancer or metastasis in the lung. Turned out to have histoplasmosis. The patient has some limitations going to the gym in regard to her breathing. She has no wheezing, no cough, no hemoptysis and no chest pain. No swelling in her lower extremities. She has been at [facility] in 04/2019. Excellent reports in regard to radiological assessment and normal tumor markers. The patient feels well. She continues doing anything that she pleases. She is enjoying her summer and she is planning to go back in the fall for her next year in her school. The patient has a great appetite, normal bowel activity, normal urination. No cardiovascular issues otherwise. Her port is flushed with no difficulties. Minimal residual neuropathy in her feet. This has no functional limitation for her.                         Past Medical History:   Diagnosis Date   • ADD (attention deficit disorder)    • Anemia    • Colon cancer (CMS/HCC)     NEW DIAGNOSIS   • Drug therapy    • Fatigue     secondary to chemotherapy    • H/O foreign travel 03/2016    [place], [place]   • Hepatic metastases (CMS/HCC) 10/2016    partial right hepatectomy [place] 10/16   • History of transfusion     1999 AFTER TUBAL RUPTURE   • Hyperlipidemia    • Liver cancer (CMS/HCC)    • Middle cerebral artery aneurysm    • Status post partial colectomy 04/2017    [facility] [place] [facility]     Past Surgical History:   Procedure Laterality Date   • AUGMENTATION MAMMAPLASTY     •  COLONOSCOPY      MAY 2016   • CRANIOTOMY  2004, 2005    Cerebral aneurysm   • LAPAROSCOPY FOR ECTOPIC PREGNANCY  09/1997   • LIVER SURGERY      MASS REMOVED   • [place] INSJ TUNNELED CVC W/O SUBQ PORT/ AGE 3 YR/> Right 8/26/2016    Procedure: MEDIPORT PLACEMENT ;  Surgeon: Praful Holden MD;  Location: [facility] OR;  Service: Vascular   • SINUS SURGERY       Medications: The current medication list was reviewed in the EMR.    ALLERGIES:   No Known Allergies    SOCIAL HISTORY:       Social History     Tobacco Use   • Smoking status: Never Smoker   • Smokeless tobacco: Never Used   Substance Use Topics   • Alcohol use: Yes     Alcohol/week: 3.0 oz     Types: 5 Glasses of wine per week     Comment: Social     FAMILY HISTORY:  Family History   Problem Relation Age of Onset   • Cerebral aneurysm Mother    • Stroke Mother    • COPD Father        REVIEW OF SYSTEMS:      General: no fever, no chills, no fatigue,no weight changes, no lack of appetite.  Eyes: no epiphora, xerophthalmia,conjunctivitis, pain, glaucoma, blurred vision, blindness, secretion, photophobia, proptosis, diplopia.  Ears: no otorrhea, tinnitus, otorrhagia, deafness, pain, vertigo.  Nose: no rhinorrhea, no epistaxis, no alteration in perception of odors, no sinuses pressure.  Mouth: no alteration in gums or teeth,  No ulcers, no difficulty with mastication or deglut ion, no odynophagia.  Neck: no masses or pain, no thyroid alterations, no pain in muscles or arteries, no carotid odynia, no crepitation.  Respiratory: no cough, no sputum production,stated dyspnea,no trepopnea, no pleuritic pain,no hemoptysis.  Heart: no syncope, no irregularity, no palpitations, no angina,no orthopnea,no paroxysmal nocturnal dyspnea.  Vascular Venous: no tenderness,no edema,no palpable cords,no postphlebitic syndrome, no skin changes no ulcerations.  Vascular Arterial: no distal ischemia, noclaudication, no gangrene, no neuropathic ischemic pain, no skin ulcers, no  paleness no cyanosis.  GI: no dysphagia, no odynophagia, no regurgitation, no heartburn,no indigestion,no nausea,no vomiting,no hematemesis ,no melena,no jaundice,no distention, no obstipation,no enterorrhagia,no proctalgia,no anal  lesions, no changes in bowel habits.  : no frequency, no hesitancy, no hematuria, no discharge,no  pain.  Musculoskeletal: no muscle or tendon pain or inflammation,no  joint pain, no edema, no functional limitation,no fasciculations, no mass.  Neurologic: no headache, no seizures, noalterations on Craneal nerves, no motor deficit, stated sensory deficit, normal coordination, no alteration in memory,normal orientation, calculation,normal writting, verbal and written language.  Skin: no rashes,no pruritus no localized lesions.  Psychiatric: no anxiety, no depression,no agitation, no delusions, proper insight.      Objective    There were no vitals filed for this visit.  Current Status 10/5/2018   ECOG score 0      PHYSICAL EXAM:    GENERAL:  Well-developed, well-nourished  Patient  in no acute distress.   SKIN:  Warm, dry ,NO rashes,NO purpura ,NO petechiae.  HEENT:  Pupils were equal and reactive to light and accomodation, conjunctivas non injected, no pterigion, normal extraocular movements, normal visual acuity.   Mouth mucosa was moist, no exudates in oropharynx, normal gum line, normal roof of the mouth and pillars, normal papillations of the tongue.  NECK:  Supple with good range of motion; no thyromegaly or masses, no JVD or bruits, no cervical adenopathies.No carotid arteries pain, no carotid abnormal pulsation , NO arterial dance.  LYMPHATICS:  No cervical, NO supraclavicular, NO axillary,NO epitrochlear , NO inguinal adenopathy.  CHEST:  Normal excursion of both mary thoraces, normal voice fremitus, no subcutaneous emphysema, normal axillas, no rashes or acanthosis nigricans. Lungs clear to percussion and auscultation, decreased breath sounds r base, no wheezing,NO crackles NO  ronchi, NO stridor, NO rubs.  CARDIAC AND VASCULAR:  normal rate and regular rhythm, without murmurs,NO rubs NO S3 NO S4 right or left . Normal femoral, popliteal, pedis, brachial and carotid pulses.  ABDOMEN:  Soft, nontender with no organomegaly or masses, no ascites, no collateral circulation,no distention,no Show Low sign, no abdominal pain, no inguinal hernias,no umbilical hernia, no abdominal bruits. Normal bowel sounds.  GENITAL: Not  Performed.  EXTREMITIES  AND SPINE:  No clubbing, cyanosis or edema, no deformities or pain .No kyphosis, scoliosis, deformities or pain in spine, ribs or pelvic bone.  NEUROLOGICAL:  Patient was awake, alert, oriented to time, person and place.            RECENT LABS:  Lab Results   Component Value Date    WBC 7.57 07/26/2019    HGB 12.4 07/26/2019    HCT 37.7 07/26/2019    MCV 89.3 07/26/2019     07/26/2019     Lab Results   Component Value Date    GLUCOSE 102 (H) 02/05/2019    BUN 15 05/28/2019    CREATININE 0.67 05/28/2019    EGFRIFNONA >90 05/28/2019    EGFRIFAFRI >90 05/28/2019    BCR 6.7 02/05/2019    CO2 23 02/05/2019    CALCIUM 9.3 05/28/2019    ALBUMIN 4.2 05/28/2019    LABIL2 1.0 02/04/2019    AST 26 05/28/2019    ALT 21 05/28/2019             Assessment/Plan      1. Metastatic rectal sigmoid cancer to the liver,  KRAS negative, BRAF negative, MSI stable, NRAS negative. The patient has undergone neoadjuvant chemotherapy with FOLFOX-6 and she subsequently underwent 2 different surgeries for her liver metastasis one including resection and another one including ablation. Also she underwent final removal of the primary tumor that was still PET active after completion of FOLFOX chemotherapy. Subsequently the patient had an ileostomy that was closed and she was placed on Xeloda chronically that she took in September, October, November of 2017 when she discontinued on her own because of side effects. Since then the patient has been seen at the [facility] [place] [facility] .I have  reviewed laboratory workup from the [facility]. I have reviewed all the information available from the [facility] and her radiological assessment as well as her laboratory parameters and CEA are all normal. Therefore, it is always amazing to see somebody who has staged colon cancer to be treated aggressively and remain in remission after such a dramatic amount of extensive surgery and chemotherapy. 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  Upon reviewing the patient on 07/26/2019, all the new issues, shortness of breath upon exercise are probably related to the thoracotomy after she was diagnosed with histoplasmosis. According to her, a chunk of her lung was removed. I offered her the possibility of pulmonary function tests. She will think about it. In the meantime, given the lack of any other symptomatology she will be watched. She has an appointment to go back to [facility] in [place] in 09/2019. I will review her back in 6 months. Her port will remain to be flushed every 2 months. She raised the question for how long we need to keep her port. The answer will be at least for 1 year.     The patient otherwise is grateful of all the things that she went through and feeling in the wonderful way that she feels now.     I reviewed the information pertinent to her case in Care Everywhere [facility] in [place].               Jonna Saunders MA

## 2019-08-19 ENCOUNTER — TELEPHONE (OUTPATIENT)
Dept: FAMILY MEDICINE CLINIC | Facility: CLINIC | Age: 54
End: 2019-08-19

## 2019-09-03 ENCOUNTER — TELEPHONE (OUTPATIENT)
Dept: FAMILY MEDICINE CLINIC | Facility: CLINIC | Age: 54
End: 2019-09-03

## 2019-09-03 RX ORDER — DEXMETHYLPHENIDATE HYDROCHLORIDE 20 MG/1
20 CAPSULE, EXTENDED RELEASE ORAL DAILY
Qty: 30 CAPSULE | Refills: 0 | Status: SHIPPED | OUTPATIENT
Start: 2019-09-03 | End: 2019-09-06 | Stop reason: SDUPTHER

## 2019-09-06 ENCOUNTER — TELEPHONE (OUTPATIENT)
Dept: FAMILY MEDICINE CLINIC | Facility: CLINIC | Age: 54
End: 2019-09-06

## 2019-09-06 RX ORDER — DEXMETHYLPHENIDATE HYDROCHLORIDE 20 MG/1
20 CAPSULE, EXTENDED RELEASE ORAL DAILY
Qty: 30 CAPSULE | Refills: 0 | Status: SHIPPED | OUTPATIENT
Start: 2019-09-06 | End: 2019-10-08 | Stop reason: SDUPTHER

## 2019-09-06 NOTE — TELEPHONE ENCOUNTER
VM MESSAGE.  Patient needs generic Focalin XR 20mg RX sent to [facility]. The [facility] has told patient the med is on backorder at that location. Thank you.

## 2019-09-20 ENCOUNTER — TELEPHONE (OUTPATIENT)
Dept: ONCOLOGY | Facility: HOSPITAL | Age: 54
End: 2019-09-20

## 2019-09-20 NOTE — TELEPHONE ENCOUNTER
----- Message from Deedee Walden sent at 9/20/2019 12:01 PM EDT -----  Contact: [phone]  Pt asking when she is to have her next port flush?      Pt is scheduled for port flush on 9/26 but she is going to [place] on 10/4 where they will complete labs and flush. I informed her she can cancel this appt and keep appt in November. She v/u. Message sent to scheduling

## 2019-09-26 ENCOUNTER — APPOINTMENT (OUTPATIENT)
Dept: ONCOLOGY | Facility: HOSPITAL | Age: 54
End: 2019-09-26

## 2019-10-08 ENCOUNTER — TELEPHONE (OUTPATIENT)
Dept: FAMILY MEDICINE CLINIC | Facility: CLINIC | Age: 54
End: 2019-10-08

## 2019-10-08 RX ORDER — DEXMETHYLPHENIDATE HYDROCHLORIDE 20 MG/1
20 CAPSULE, EXTENDED RELEASE ORAL DAILY
Qty: 30 CAPSULE | Refills: 0 | Status: SHIPPED | OUTPATIENT
Start: 2019-10-08 | End: 2019-11-07 | Stop reason: SDUPTHER

## 2019-10-22 ENCOUNTER — TELEPHONE (OUTPATIENT)
Dept: ONCOLOGY | Facility: CLINIC | Age: 54
End: 2019-10-22

## 2019-10-22 ENCOUNTER — TELEPHONE (OUTPATIENT)
Dept: FAMILY MEDICINE CLINIC | Facility: CLINIC | Age: 54
End: 2019-10-22

## 2019-10-22 NOTE — TELEPHONE ENCOUNTER
----- Message from Sowmya Junior sent at 10/22/2019  8:22 AM EDT -----  Contact: [phone]  Pt left a message on voice mail that she needs a prescription for Pneumonia Vac. With which one faxed to [facility]

## 2019-10-22 NOTE — TELEPHONE ENCOUNTER
Informed pt. She can have her pcp order her pneumovax.   Informed pt. She can either get that at her pcp office or a pharmacy such as [facility], or [facility] etc.  understanding noted.

## 2019-10-22 NOTE — TELEPHONE ENCOUNTER
VM MESSAGE.  Pt's oncologist wants her to get pneumonia vaccine and Jay is telling her she needs RX in order for them to give this to her. Thank you.

## 2019-10-23 NOTE — TELEPHONE ENCOUNTER
Pt doesn't want to get it here. She wants to get it at [facility] if an RX can be sent there. Thank you.

## 2019-10-29 DIAGNOSIS — C78.7 RECTAL CANCER METASTASIZED TO LIVER (HCC): Primary | ICD-10-CM

## 2019-10-29 DIAGNOSIS — C20 RECTAL CANCER METASTASIZED TO LIVER (HCC): Primary | ICD-10-CM

## 2019-10-29 NOTE — TELEPHONE ENCOUNTER
Radha, this is the one I was talking about. If you could send order to [facility], I would appreciate it! Thanks for your help.

## 2019-11-06 ENCOUNTER — TELEPHONE (OUTPATIENT)
Dept: FAMILY MEDICINE CLINIC | Facility: CLINIC | Age: 54
End: 2019-11-06

## 2019-11-07 RX ORDER — DEXMETHYLPHENIDATE HYDROCHLORIDE 20 MG/1
20 CAPSULE, EXTENDED RELEASE ORAL DAILY
Qty: 30 CAPSULE | Refills: 0 | Status: SHIPPED | OUTPATIENT
Start: 2019-11-07 | End: 2019-12-03 | Stop reason: SDUPTHER

## 2019-11-26 ENCOUNTER — INFUSION (OUTPATIENT)
Dept: ONCOLOGY | Facility: HOSPITAL | Age: 54
End: 2019-11-26

## 2019-11-26 DIAGNOSIS — Z45.2 FITTING AND ADJUSTMENT OF VASCULAR CATHETER: Primary | ICD-10-CM

## 2019-11-26 DIAGNOSIS — T45.1X5A PERIPHERAL NEUROPATHY DUE TO CHEMOTHERAPY (HCC): ICD-10-CM

## 2019-11-26 DIAGNOSIS — C78.7 METASTATIC CANCER TO LIVER (HCC): ICD-10-CM

## 2019-11-26 DIAGNOSIS — G62.0 PERIPHERAL NEUROPATHY DUE TO CHEMOTHERAPY (HCC): ICD-10-CM

## 2019-11-26 DIAGNOSIS — C78.7 RECTAL CANCER METASTASIZED TO LIVER (HCC): ICD-10-CM

## 2019-11-26 DIAGNOSIS — C20 RECTAL CANCER METASTASIZED TO LIVER (HCC): ICD-10-CM

## 2019-11-26 LAB
ALBUMIN SERPL-MCNC: 4.5 G/DL (ref 3.5–5.2)
ALBUMIN/GLOB SERPL: 1.5 G/DL (ref 1.1–2.4)
ALP SERPL-CCNC: 65 U/L (ref 38–116)
ALT SERPL W P-5'-P-CCNC: 15 U/L (ref 0–33)
ANION GAP SERPL CALCULATED.3IONS-SCNC: 13.1 MMOL/L (ref 5–15)
AST SERPL-CCNC: 27 U/L (ref 0–32)
BASOPHILS # BLD AUTO: 0.03 10*3/MM3 (ref 0–0.2)
BASOPHILS NFR BLD AUTO: 0.4 % (ref 0–1.5)
BILIRUB SERPL-MCNC: 0.4 MG/DL (ref 0.2–1.2)
BUN BLD-MCNC: 11 MG/DL (ref 6–20)
BUN/CREAT SERPL: 16.2 (ref 7.3–30)
CALCIUM SPEC-SCNC: 9.9 MG/DL (ref 8.5–10.2)
CEA SERPL-MCNC: 1.71 NG/ML
CHLORIDE SERPL-SCNC: 103 MMOL/L (ref 98–107)
CO2 SERPL-SCNC: 23.9 MMOL/L (ref 22–29)
CREAT BLD-MCNC: 0.68 MG/DL (ref 0.6–1.1)
DEPRECATED RDW RBC AUTO: 40.9 FL (ref 37–54)
EOSINOPHIL # BLD AUTO: 0.02 10*3/MM3 (ref 0–0.4)
EOSINOPHIL NFR BLD AUTO: 0.3 % (ref 0.3–6.2)
ERYTHROCYTE [DISTWIDTH] IN BLOOD BY AUTOMATED COUNT: 12.6 % (ref 12.3–15.4)
GFR SERPL CREATININE-BSD FRML MDRD: 90 ML/MIN/1.73
GLOBULIN UR ELPH-MCNC: 3 GM/DL (ref 1.8–3.5)
GLUCOSE BLD-MCNC: 92 MG/DL (ref 74–124)
HCT VFR BLD AUTO: 39.4 % (ref 34–46.6)
HGB BLD-MCNC: 12.9 G/DL (ref 12–15.9)
IMM GRANULOCYTES # BLD AUTO: 0.02 10*3/MM3 (ref 0–0.05)
IMM GRANULOCYTES NFR BLD AUTO: 0.3 % (ref 0–0.5)
LYMPHOCYTES # BLD AUTO: 1.75 10*3/MM3 (ref 0.7–3.1)
LYMPHOCYTES NFR BLD AUTO: 23.8 % (ref 19.6–45.3)
MCH RBC QN AUTO: 29.1 PG (ref 26.6–33)
MCHC RBC AUTO-ENTMCNC: 32.7 G/DL (ref 31.5–35.7)
MCV RBC AUTO: 88.7 FL (ref 79–97)
MONOCYTES # BLD AUTO: 0.52 10*3/MM3 (ref 0.1–0.9)
MONOCYTES NFR BLD AUTO: 7.1 % (ref 5–12)
NEUTROPHILS # BLD AUTO: 5 10*3/MM3 (ref 1.7–7)
NEUTROPHILS NFR BLD AUTO: 68.1 % (ref 42.7–76)
NRBC BLD AUTO-RTO: 0 /100 WBC (ref 0–0.2)
PLATELET # BLD AUTO: 250 10*3/MM3 (ref 140–450)
PMV BLD AUTO: 9.5 FL (ref 6–12)
POTASSIUM BLD-SCNC: 4 MMOL/L (ref 3.5–4.7)
PROT SERPL-MCNC: 7.5 G/DL (ref 6.3–8)
RBC # BLD AUTO: 4.44 10*6/MM3 (ref 3.77–5.28)
SODIUM BLD-SCNC: 140 MMOL/L (ref 134–145)
WBC NRBC COR # BLD: 7.34 10*3/MM3 (ref 3.4–10.8)

## 2019-11-26 PROCEDURE — 36591 DRAW BLOOD OFF VENOUS DEVICE: CPT

## 2019-11-26 PROCEDURE — 80053 COMPREHEN METABOLIC PANEL: CPT

## 2019-11-26 PROCEDURE — 85025 COMPLETE CBC W/AUTO DIFF WBC: CPT

## 2019-11-26 PROCEDURE — 82378 CARCINOEMBRYONIC ANTIGEN: CPT | Performed by: INTERNAL MEDICINE

## 2019-11-26 RX ORDER — HEPARIN SODIUM (PORCINE) LOCK FLUSH IV SOLN 100 UNIT/ML 100 UNIT/ML
500 SOLUTION INTRAVENOUS AS NEEDED
Status: CANCELLED | OUTPATIENT
Start: 2019-11-26

## 2019-11-26 RX ORDER — SODIUM CHLORIDE 0.9 % (FLUSH) 0.9 %
10 SYRINGE (ML) INJECTION AS NEEDED
Status: DISCONTINUED | OUTPATIENT
Start: 2019-11-26 | End: 2019-11-26 | Stop reason: HOSPADM

## 2019-11-26 RX ORDER — SODIUM CHLORIDE 0.9 % (FLUSH) 0.9 %
10 SYRINGE (ML) INJECTION AS NEEDED
Status: CANCELLED | OUTPATIENT
Start: 2019-11-26

## 2019-11-26 RX ADMIN — Medication 500 UNITS: at 16:07

## 2019-11-26 RX ADMIN — SODIUM CHLORIDE, PRESERVATIVE FREE 10 ML: 5 INJECTION INTRAVENOUS at 16:07

## 2019-12-03 RX ORDER — DEXMETHYLPHENIDATE HYDROCHLORIDE 20 MG/1
20 CAPSULE, EXTENDED RELEASE ORAL DAILY
Qty: 30 CAPSULE | Refills: 0 | Status: SHIPPED | OUTPATIENT
Start: 2019-12-03 | End: 2020-01-31 | Stop reason: SDUPTHER

## 2019-12-03 NOTE — TELEPHONE ENCOUNTER
VM MESSAGE.  Patient is requesting RF of Focalin. She is aware it is a little early, but she is going to be in and out of town the next few days. Thank you.

## 2020-01-28 ENCOUNTER — LAB (OUTPATIENT)
Dept: LAB | Facility: HOSPITAL | Age: 55
End: 2020-01-28

## 2020-01-28 ENCOUNTER — OFFICE VISIT (OUTPATIENT)
Dept: ONCOLOGY | Facility: CLINIC | Age: 55
End: 2020-01-28

## 2020-01-28 VITALS
HEIGHT: 65 IN | DIASTOLIC BLOOD PRESSURE: 82 MMHG | WEIGHT: 130.3 LBS | TEMPERATURE: 98.3 F | SYSTOLIC BLOOD PRESSURE: 154 MMHG | RESPIRATION RATE: 16 BRPM | OXYGEN SATURATION: 95 % | HEART RATE: 79 BPM | BODY MASS INDEX: 21.71 KG/M2

## 2020-01-28 DIAGNOSIS — C78.7 METASTATIC CANCER TO LIVER (HCC): ICD-10-CM

## 2020-01-28 DIAGNOSIS — Z45.2 FITTING AND ADJUSTMENT OF VASCULAR CATHETER: ICD-10-CM

## 2020-01-28 DIAGNOSIS — C78.7 RECTAL CANCER METASTASIZED TO LIVER (HCC): ICD-10-CM

## 2020-01-28 DIAGNOSIS — C20 RECTAL CANCER METASTASIZED TO LIVER (HCC): Primary | ICD-10-CM

## 2020-01-28 DIAGNOSIS — G62.0 PERIPHERAL NEUROPATHY DUE TO CHEMOTHERAPY (HCC): ICD-10-CM

## 2020-01-28 DIAGNOSIS — C18.7 MALIGNANT NEOPLASM OF SIGMOID COLON (HCC): ICD-10-CM

## 2020-01-28 DIAGNOSIS — T45.1X5A PERIPHERAL NEUROPATHY DUE TO CHEMOTHERAPY (HCC): ICD-10-CM

## 2020-01-28 DIAGNOSIS — C20 RECTAL CANCER METASTASIZED TO LIVER (HCC): ICD-10-CM

## 2020-01-28 DIAGNOSIS — C78.7 RECTAL CANCER METASTASIZED TO LIVER (HCC): Primary | ICD-10-CM

## 2020-01-28 LAB
BASOPHILS # BLD AUTO: 0.05 10*3/MM3 (ref 0–0.2)
BASOPHILS NFR BLD AUTO: 0.6 % (ref 0–1.5)
DEPRECATED RDW RBC AUTO: 41.8 FL (ref 37–54)
EOSINOPHIL # BLD AUTO: 0.07 10*3/MM3 (ref 0–0.4)
EOSINOPHIL NFR BLD AUTO: 0.8 % (ref 0.3–6.2)
ERYTHROCYTE [DISTWIDTH] IN BLOOD BY AUTOMATED COUNT: 12.8 % (ref 12.3–15.4)
HCT VFR BLD AUTO: 42 % (ref 34–46.6)
HGB BLD-MCNC: 13.9 G/DL (ref 12–15.9)
IMM GRANULOCYTES # BLD AUTO: 0.02 10*3/MM3 (ref 0–0.05)
IMM GRANULOCYTES NFR BLD AUTO: 0.2 % (ref 0–0.5)
LYMPHOCYTES # BLD AUTO: 1.93 10*3/MM3 (ref 0.7–3.1)
LYMPHOCYTES NFR BLD AUTO: 22.2 % (ref 19.6–45.3)
MCH RBC QN AUTO: 29.4 PG (ref 26.6–33)
MCHC RBC AUTO-ENTMCNC: 33.1 G/DL (ref 31.5–35.7)
MCV RBC AUTO: 89 FL (ref 79–97)
MONOCYTES # BLD AUTO: 0.83 10*3/MM3 (ref 0.1–0.9)
MONOCYTES NFR BLD AUTO: 9.5 % (ref 5–12)
NEUTROPHILS # BLD AUTO: 5.81 10*3/MM3 (ref 1.7–7)
NEUTROPHILS NFR BLD AUTO: 66.7 % (ref 42.7–76)
NRBC BLD AUTO-RTO: 0 /100 WBC (ref 0–0.2)
PLATELET # BLD AUTO: 197 10*3/MM3 (ref 140–450)
PMV BLD AUTO: 10.2 FL (ref 6–12)
RBC # BLD AUTO: 4.72 10*6/MM3 (ref 3.77–5.28)
WBC NRBC COR # BLD: 8.71 10*3/MM3 (ref 3.4–10.8)

## 2020-01-28 PROCEDURE — 85025 COMPLETE CBC W/AUTO DIFF WBC: CPT

## 2020-01-28 PROCEDURE — 99214 OFFICE O/P EST MOD 30 MIN: CPT | Performed by: INTERNAL MEDICINE

## 2020-01-28 PROCEDURE — 36415 COLL VENOUS BLD VENIPUNCTURE: CPT

## 2020-01-28 RX ORDER — AZELAIC ACID 0.15 G/G
GEL TOPICAL
COMMUNITY
Start: 2019-11-01 | End: 2020-12-15

## 2020-01-28 NOTE — PROGRESS NOTES
Subjective .     REASONS FOR FOLLOW UP:  Metastatic SIGMOID UPPER rectal cancer to the liver.  Initiated chemotherapy with FOLFOX-6 9/1/2016. COMPLETED IN SEPT 2017, EXTENSIVE SURGERY ON HER LIVER AND REMVAL OF PRIMARY TUMOR SURRENDER HER KIRIT.    HISTORY OF PRESENT ILLNESS:This patient returns today to the office for followup. She is here today stating that she has been feeling terrific for the last several months with no issues pertinent to her previous history of metastatic colon cancer to the liver. She has been at [facility] where radiological analysis was done including MRI of the abdomen that failed to document any recurrence of her liver metastasis. The patient so far has not had any cough, sputum production, shortness of breath. Her appetite is excellent. Her weight is stable. Her bowel function and urination are normal. Minimal sensory neuropathy in her feet with no consequences. She has not had any other new health issues. She has had her Pap smear. She is up-to-date in vaccinations and mammogram and she will pursue another visit to [facility] in 04/2020. Her port is the only thing that we need to discuss about today. Please review below.                             Past Medical History:   Diagnosis Date   • ADD (attention deficit disorder)    • Anemia    • Colon cancer (CMS/HCC)     NEW DIAGNOSIS   • Drug therapy    • Fatigue     secondary to chemotherapy    • H/O foreign travel 03/2016    [place], [place]   • Hepatic metastases (CMS/HCC) 10/2016    partial right hepatectomy [place] 10/16   • History of transfusion     1999 AFTER TUBAL RUPTURE   • Hyperlipidemia    • Liver cancer (CMS/HCC)    • Middle cerebral artery aneurysm    • Status post partial colectomy 04/2017    [facility]     Past Surgical History:   Procedure Laterality Date   • AUGMENTATION MAMMAPLASTY     • COLONOSCOPY      MAY 2016   • CRANIOTOMY  2004, 2005    Cerebral aneurysm   • LAPAROSCOPY FOR ECTOPIC  PREGNANCY  09/1997   • LIVER SURGERY      MASS REMOVED   • OR INSJ TUNNELED CVC W/O SUBQ PORT/ AGE 3 YR/> Right 8/26/2016    Procedure: MEDIPORT PLACEMENT ;  Surgeon: Praful Holden MD;  Location: [facility] OR;  Service: Vascular   • SINUS SURGERY       Medications: The current medication list was reviewed in the EMR.    ALLERGIES:   No Known Allergies    SOCIAL HISTORY:       Social History     Tobacco Use   • Smoking status: Never Smoker   • Smokeless tobacco: Never Used   Substance Use Topics   • Alcohol use: Yes     Alcohol/week: 5.0 standard drinks     Types: 5 Glasses of wine per week     Comment: Social     FAMILY HISTORY:  Family History   Problem Relation Age of Onset   • Cerebral aneurysm Mother    • Stroke Mother    • COPD Father        REVIEW OF SYSTEMS:        General: no fever, no chills, no fatigue,no weight changes, no lack of appetite.  Eyes: no epiphora, xerophthalmia,conjunctivitis, pain, glaucoma, blurred vision, blindness, secretion, photophobia, proptosis, diplopia.  Ears: no otorrhea, tinnitus, otorrhagia, deafness, pain, vertigo.  Nose: no rhinorrhea, no epistaxis, no alteration in perception of odors, no sinuses pressure.  Mouth: no alteration in gums or teeth,  No ulcers, no difficulty with mastication or deglut ion, no odynophagia.  Neck: no masses or pain, no thyroid alterations, no pain in muscles or arteries, no carotid odynia, no crepitation.  Respiratory: no cough, no sputum production,no dyspnea,no trepopnea, no pleuritic pain,no hemoptysis.  Heart: no syncope, no irregularity, no palpitations, no angina,no orthopnea,no paroxysmal nocturnal dyspnea.  Vascular Venous: no tenderness,no edema,no palpable cords,no postphlebitic syndrome, no skin changes no ulcerations.  Vascular Arterial: no distal ischemia, noclaudication, no gangrene, no neuropathic ischemic pain, no skin ulcers, no paleness no cyanosis.  GI: no dysphagia, no odynophagia, no regurgitation, no heartburn,no  "indigestion,no nausea,no vomiting,no hematemesis ,no melena,no jaundice,no distention, no obstipation,no enterorrhagia,no proctalgia,no anal  lesions, no changes in bowel habits.  : no frequency, no hesitancy, no hematuria, no discharge,no  pain.  Musculoskeletal: no muscle or tendon pain or inflammation,no  joint pain, no edema, no functional limitation,no fasciculations, no mass.  Neurologic: no headache, no seizures, noalterations on Craneal nerves, no motor deficit, no sensory deficit, normal coordination, no alteration in memory,normal orientation, calculation,normal writting, verbal and written language.  Skin: no rashes,no pruritus no localized lesions.  Psychiatric: no anxiety, no depression,no agitation, no delusions, proper insight.            Objective    Vitals:    01/28/20 1638   BP: 154/82   Pulse: 79   Resp: 16   Temp: 98.3 °F (36.8 °C)   TempSrc: Oral   SpO2: 95%   Weight: 59.1 kg (130 lb 4.8 oz)   Height: 164 cm (64.57\")   PainSc: 0-No pain     Current Status 1/28/2020   ECOG score 0      PHYSICAL EXAM:    GENERAL:  Well-developed, well-nourished  Patient  in no acute distress.   SKIN:  Warm, dry ,NO rashes,NO purpura ,NO petechiae.  HEENT:  Pupils were equal and reactive to light and accomodation, conjunctivas non injected, no pterigion, normal extraocular movements, normal visual acuity.   Mouth mucosa was moist, no exudates in oropharynx, normal gum line, normal roof of the mouth and pillars, normal papillations of the tongue.  NECK:  Supple with good range of motion; no thyromegaly or masses, no JVD or bruits, no cervical adenopathies.No carotid arteries pain, no carotid abnormal pulsation , NO arterial dance.  LYMPHATICS:  No cervical, NO supraclavicular, NO axillary,NO epitrochlear , NO inguinal adenopathy.  CHEST:  Normal excursion of both mary thoraces, normal voice fremitus, no subcutaneous emphysema, normal axillas, no rashes or acanthosis nigricans. Lungs clear to percussion and " auscultation, normal breath sounds bilaterally, no wheezing,NO crackles NO ronchi, NO stridor, NO rubs.port in place r subclavian position normal  CARDIAC AND VASCULAR:  normal rate and regular rhythm, without murmurs,NO rubs NO S3 NO S4 right or left . Normal femoral, popliteal, pedis, brachial and carotid pulses.  ABDOMEN:  Soft, nontender with no organomegaly or masses, no ascites, no collateral circulation,no distention,no Hollis sign, no abdominal pain, no inguinal hernias,no umbilical hernia, no abdominal bruits. Normal bowel sounds.no hernias normal healed surgical scars  GENITAL: Not  Performed.  EXTREMITIES  AND SPINE:  No clubbing, cyanosis or edema, no deformities or pain .No kyphosis, scoliosis, deformities or pain in spine, ribs or pelvic bone.  NEUROLOGICAL:  Patient was awake, alert, oriented to time, person and place.                RECENT LABS:  Lab Results   Component Value Date    WBC 8.71 01/28/2020    HGB 13.9 01/28/2020    HCT 42.0 01/28/2020    MCV 89.0 01/28/2020     01/28/2020     Lab Results   Component Value Date    GLUCOSE 92 11/26/2019    BUN 11 11/26/2019    CREATININE 0.68 11/26/2019    EGFRIFNONA >90 01/08/2020    EGFRIFAFRI >90 01/08/2020    BCR 16.2 11/26/2019    CO2 23.9 11/26/2019    CALCIUM 9.9 11/26/2019    ALBUMIN 4.50 11/26/2019    LABIL2 1.0 02/04/2019    AST 27 11/26/2019    ALT 15 11/26/2019     MR ABDOMEN WITHOUT AND WITH IV CONTRAST, MR PELVIS WITHOUT AND WITH IV  CONTRAST  Multiplanar abdominal and pelvic MR sequences with and without IV contrast (7.5  mL Gadavist) and IV glucagon per protocol.      COMPARISON:  10/4/2019, 5/28/2019, 2/13/2019, and 8/16/2016.    FINDINGS:    Abdomen: Stable postoperative changes in the liver. No new areas of suspicious  nodularity or enhancement about the resection margins to suggest recurrent  tumor. No new suspicious liver lesions. No significant hepatic steatosis or iron  overload. Remaining hepatic vasculature is patent. No  adenopathy, soft tissue  implants, or ascites in the abdomen. Gallbladder surgically absent. No bile duct  dilatation. Minute cyst in the pancreatic body. No pancreatic ductal dilatation.  Tiny renal cysts. The right kidney, adrenal glands, and spleen are within normal  limits. Tiny hiatal hernia. Nonaneurysmal abdominal aorta. Bilateral breast  implants. Minimal scarring in the left lower lobe. Levocurvature lumbar spine.    Pelvis: Post low anterior resection. No suspicious nodularity or enhancement  about the anastomosis. No pelvic mass, adenopathy, or soft tissue implants.  Trace pelvic free fluid. Anteverted uterus. Endometrium is thin. Nabothian cysts  in the cervix.             CEA   Order: [id number]   Status:  Final result Next appt:  None   Component  Ref Range & Units 2wk ago 2mo ago 8mo ago   CEA  ng/mL 1.7  1.71  1.5 CM   Comment:    ----REFERENCE VALUE----   <=3.0 (Non-smokers)   Some smokers may have elevated   CEA, usually <5.0.     ----ADDITIONAL INFORMATION----   The testing method is an immunoenzymatic assay   manufactured by [employer] and performed   on the [employer] DxI 800.            Values obtained with different assay methods or kits   may be different and cannot be used interchangeably.            Test results cannot be interpreted as absolute   evidence for the presence or absence of malignant            DIFFERENTIAL   Order: [id number]   Status:  Final result   (important suggestion)  Newer results are available. Click to view them now.   Component  Ref Range & Units 2wk ago   Hemoglobin  11.6 - 15.0 g/dL 12.2    Platelets  157 - 371 x10(9)/L 233    WBC  3.4 - 9.6 x10(9)/L 7    Neutrophils Absolute  1.56 - 6.45 x10(9)/L 4.55    Resulting Agency [facility]         Specimen Collected: 01/08/20 17:24 Last Resulted: 01/08/20 17:34   Received From: [facility]                     Assessment/Plan      1. Metastatic rectal sigmoid cancer to the liver,  KRAS negative, BRAF negative, MSI stable,  NRAS negative. The patient has undergone neoadjuvant chemotherapy with FOLFOX-6 and she subsequently underwent 2 different surgeries for her liver metastasis one including resection and another one including ablation. Also she underwent final removal of the primary tumor that was still PET active after completion of FOLFOX chemotherapy. Subsequently the patient had an ileostomy that was closed and she was placed on Xeloda chronically that she took in September, October, November of 2017 when she discontinued on her own because of side effects. Since then the patient has been seen at the [facility] .I have reviewed laboratory workup from the [facility]. I have reviewed all the information available from the [facility] and her radiological assessment as well as her laboratory parameters and CEA are all normal. Therefore, it is always amazing to see somebody who has staged IV colon cancer to be treated aggressively and remain in remission after such a dramatic amount of extensive surgery and chemotherapy. 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    The patient will return to [facility] in 04/2020 to have further analysis and eventually I asked her to give us a call after visit there because I think it is time to start to think about removal of her port. This will be flushed every 6 weeks if remains functional.     I do believe that the danger time in regard to recurrent disease is almost over for this patient. It is amazing to see how much improvement, how well she is doing and how negative her blood work and her radiological analysis shows up. This is amazing and she is grateful for all the things that she received. The neuropathy is minimal. She has no consequences of this and this will remain in observation.     I will review her back in 6 months.               Zheng Johnson MD

## 2020-01-31 ENCOUNTER — TELEPHONE (OUTPATIENT)
Dept: FAMILY MEDICINE CLINIC | Facility: CLINIC | Age: 55
End: 2020-01-31

## 2020-01-31 DIAGNOSIS — F98.8 ATTENTION DEFICIT DISORDER (ADD) WITHOUT HYPERACTIVITY: Primary | ICD-10-CM

## 2020-01-31 RX ORDER — DEXMETHYLPHENIDATE HYDROCHLORIDE 20 MG/1
20 CAPSULE, EXTENDED RELEASE ORAL DAILY
Qty: 30 CAPSULE | Refills: 0 | Status: SHIPPED | OUTPATIENT
Start: 2020-01-31 | End: 2020-02-24 | Stop reason: SDUPTHER

## 2020-01-31 NOTE — TELEPHONE ENCOUNTER
Pt stated she will call back to schedule appointment in February. Did not have her calendar with her. 1/31 1:03 pm

## 2020-01-31 NOTE — TELEPHONE ENCOUNTER
Please remind that she needs appt every 6 months for this medicine- she was due in December.  I will fill this Rx, but needs to be seen in February before can give any further refills.

## 2020-02-24 ENCOUNTER — TELEPHONE (OUTPATIENT)
Dept: FAMILY MEDICINE CLINIC | Facility: CLINIC | Age: 55
End: 2020-02-24

## 2020-02-24 DIAGNOSIS — F98.8 ATTENTION DEFICIT DISORDER (ADD) WITHOUT HYPERACTIVITY: ICD-10-CM

## 2020-02-24 RX ORDER — DEXMETHYLPHENIDATE HYDROCHLORIDE 20 MG/1
20 CAPSULE, EXTENDED RELEASE ORAL DAILY
Qty: 14 CAPSULE | Refills: 0 | Status: SHIPPED | OUTPATIENT
Start: 2020-03-01 | End: 2020-03-26 | Stop reason: SDUPTHER

## 2020-02-24 NOTE — TELEPHONE ENCOUNTER
Patient called and scheduled appt for med f/u on 3/13. Patient states that she will be out of dexmethylphenidate XR (FOCALIN XR) 20 MG and is asking if new script can be sent in with enough to last until appt.

## 2020-03-05 PROBLEM — Z45.2 ENCOUNTER FOR FITTING AND ADJUSTMENT OF VASCULAR CATHETER: Status: ACTIVE | Noted: 2020-03-05

## 2020-03-05 RX ORDER — SODIUM CHLORIDE 0.9 % (FLUSH) 0.9 %
10 SYRINGE (ML) INJECTION AS NEEDED
Status: CANCELLED | OUTPATIENT
Start: 2020-03-05

## 2020-03-05 RX ORDER — HEPARIN SODIUM (PORCINE) LOCK FLUSH IV SOLN 100 UNIT/ML 100 UNIT/ML
500 SOLUTION INTRAVENOUS AS NEEDED
Status: CANCELLED | OUTPATIENT
Start: 2020-03-05

## 2020-03-10 ENCOUNTER — INFUSION (OUTPATIENT)
Dept: ONCOLOGY | Facility: HOSPITAL | Age: 55
End: 2020-03-10

## 2020-03-10 ENCOUNTER — APPOINTMENT (OUTPATIENT)
Dept: ONCOLOGY | Facility: HOSPITAL | Age: 55
End: 2020-03-10

## 2020-03-10 DIAGNOSIS — C78.7 METASTATIC CANCER TO LIVER (HCC): Primary | ICD-10-CM

## 2020-03-10 DIAGNOSIS — Z45.2 ENCOUNTER FOR FITTING AND ADJUSTMENT OF VASCULAR CATHETER: ICD-10-CM

## 2020-03-10 PROCEDURE — 96523 IRRIG DRUG DELIVERY DEVICE: CPT

## 2020-03-10 PROCEDURE — 25010000003 HEPARIN LOCK FLUCH PER 10 UNITS: Performed by: INTERNAL MEDICINE

## 2020-03-10 RX ADMIN — Medication 10 ML: at 16:42

## 2020-03-10 RX ADMIN — SODIUM CHLORIDE, PRESERVATIVE FREE 500 UNITS: 5 INJECTION INTRAVENOUS at 16:44

## 2020-03-13 ENCOUNTER — OFFICE VISIT (OUTPATIENT)
Dept: FAMILY MEDICINE CLINIC | Facility: CLINIC | Age: 55
End: 2020-03-13

## 2020-03-13 VITALS
SYSTOLIC BLOOD PRESSURE: 126 MMHG | TEMPERATURE: 98.3 F | WEIGHT: 129 LBS | OXYGEN SATURATION: 96 % | RESPIRATION RATE: 16 BRPM | HEART RATE: 73 BPM | DIASTOLIC BLOOD PRESSURE: 70 MMHG | BODY MASS INDEX: 21.76 KG/M2

## 2020-03-13 DIAGNOSIS — F90.0 ATTENTION DEFICIT HYPERACTIVITY DISORDER (ADHD), PREDOMINANTLY INATTENTIVE TYPE: Primary | ICD-10-CM

## 2020-03-13 DIAGNOSIS — C20 RECTAL CANCER (HCC): ICD-10-CM

## 2020-03-13 PROBLEM — C80.0: Status: ACTIVE | Noted: 2017-04-06

## 2020-03-13 PROBLEM — F90.9 ATTENTION-DEFICIT HYPERACTIVITY DISORDER: Status: ACTIVE | Noted: 2018-04-24

## 2020-03-13 PROCEDURE — 90471 IMMUNIZATION ADMIN: CPT | Performed by: INTERNAL MEDICINE

## 2020-03-13 PROCEDURE — 90715 TDAP VACCINE 7 YRS/> IM: CPT | Performed by: INTERNAL MEDICINE

## 2020-03-13 PROCEDURE — 99213 OFFICE O/P EST LOW 20 MIN: CPT | Performed by: INTERNAL MEDICINE

## 2020-03-13 NOTE — PROGRESS NOTES
Rooming Tab(CC,VS,Pt Hx,Fall Screen)  Chief Complaint   Patient presents with   • Med Management       Subjective   Pt here for ADD-  Doing well with meds- BP good. Sleeps good. Still going to [place] every 3 months- to get port out soon.     unsure of when last tdap was  I have reviewed and updated her medications, medical history and problem list during today's office visit.     Patient Care Team:  Hannah Roque MD as PCP - Zheng Qureshi MD as Consulting Physician (Hematology and Oncology)  Lenore Schuler MD as Consulting Physician (Colon and Rectal Surgery)    Problem List Tab  Medications Tab  Synopsis Tab  Chart Review Tab  Care Everywhere Tab  Immunizations Tab  Patient History Tab    Social History     Tobacco Use   • Smoking status: Never Smoker   • Smokeless tobacco: Never Used   Substance Use Topics   • Alcohol use: Yes     Alcohol/week: 5.0 standard drinks     Types: 5 Glasses of wine per week     Comment: Social       Review of Systems   Constitutional: Negative for fatigue and fever.   HENT: Negative for congestion.    Respiratory: Negative for apnea, cough and wheezing.    Cardiovascular: Negative for chest pain.   Gastrointestinal: Negative for abdominal distention.   Neurological: Negative for syncope.   Psychiatric/Behavioral: Negative for behavioral problems.       Objective     Rooming Tab(CC,VS,Pt Hx,Fall Screen)  /70 (BP Location: Left arm, Patient Position: Sitting, Cuff Size: Adult)   Pulse 73   Temp 98.3 °F (36.8 °C) (Oral)   Resp 16   Wt 58.5 kg (129 lb)   LMP  (LMP Unknown)   SpO2 96%   BMI 21.76 kg/m²     Body mass index is 21.76 kg/m².    Physical Exam   Constitutional: She is oriented to person, place, and time. She appears well-developed and well-nourished.   HENT:   Head: Normocephalic and atraumatic.   Right Ear: Tympanic membrane normal.   Left Ear: Tympanic membrane normal.   Eyes: Pupils are equal, round, and reactive to light.   Neck: Normal range  of motion. Neck supple.   Cardiovascular: Normal rate and regular rhythm.   No murmur heard.  Pulmonary/Chest: Effort normal and breath sounds normal.   Abdominal: Soft. Bowel sounds are normal. She exhibits no distension.   Neurological: She is oriented to person, place, and time.   Skin: Capillary refill takes less than 2 seconds.   Nursing note and vitals reviewed.       Statin Choice Calculator  Data Reviewed:               Lab Results   Component Value Date    EGFRIFNONA >90 01/08/2020    EGFRIFAFRI >90 01/08/2020    WBC 8.71 01/28/2020    WBC 7 01/08/2020    RBC 4.72 01/28/2020    HCT 42.0 01/28/2020    MCV 89.0 01/28/2020    MCH 29.4 01/28/2020      Assessment/Plan   Order Review Tab  Health Maintenance Tab  Patient Plan/Order Tab  Diagnoses and all orders for this visit:    1. Attention deficit hyperactivity disorder (ADHD), predominantly inattentive type (Primary)  Comments:  doing well on currentdose    2. Rectal cancer (CMS/[facility])  Comments:  in remission     Other orders  -     Tdap Vaccine Greater Than or Equal To 8yo IM        Wrapup Tab  Return if symptoms worsen or fail to improve.               During this visit for their annual exam, we reviewed their personal history, social history and family history.  We went over their medica

## 2020-03-14 RX ORDER — HEPARIN SODIUM (PORCINE) LOCK FLUSH IV SOLN 100 UNIT/ML 100 UNIT/ML
500 SOLUTION INTRAVENOUS AS NEEDED
Status: CANCELLED | OUTPATIENT
Start: 2020-03-14

## 2020-03-14 RX ORDER — SODIUM CHLORIDE 0.9 % (FLUSH) 0.9 %
10 SYRINGE (ML) INJECTION AS NEEDED
Status: CANCELLED | OUTPATIENT
Start: 2020-03-14

## 2020-03-14 RX ORDER — SODIUM CHLORIDE 0.9 % (FLUSH) 0.9 %
10 SYRINGE (ML) INJECTION AS NEEDED
Status: DISCONTINUED | OUTPATIENT
Start: 2020-03-14 | End: 2021-04-16

## 2020-03-14 RX ORDER — HEPARIN SODIUM (PORCINE) LOCK FLUSH IV SOLN 100 UNIT/ML 100 UNIT/ML
500 SOLUTION INTRAVENOUS AS NEEDED
Status: DISCONTINUED | OUTPATIENT
Start: 2020-03-14 | End: 2021-04-16

## 2020-03-26 DIAGNOSIS — F98.8 ATTENTION DEFICIT DISORDER (ADD) WITHOUT HYPERACTIVITY: ICD-10-CM

## 2020-03-26 RX ORDER — DEXMETHYLPHENIDATE HYDROCHLORIDE 20 MG/1
20 CAPSULE, EXTENDED RELEASE ORAL DAILY
Qty: 14 CAPSULE | Refills: 0 | Status: SHIPPED | OUTPATIENT
Start: 2020-03-26 | End: 2020-04-01 | Stop reason: SDUPTHER

## 2020-03-26 NOTE — TELEPHONE ENCOUNTER
PATIENT CALLED FOR MED REFILL    dexmethylphenidate XR (FOCALIN XR) 20 MG 24 hr capsule    [id number] - [place], [place] - [address] [facility] [address] - [phone]  - [phone]   [phone]    PATIENT CALL BACK NUMBER [phone]

## 2020-04-01 ENCOUNTER — TELEPHONE (OUTPATIENT)
Dept: FAMILY MEDICINE CLINIC | Facility: CLINIC | Age: 55
End: 2020-04-01

## 2020-04-01 DIAGNOSIS — F98.8 ATTENTION DEFICIT DISORDER (ADD) WITHOUT HYPERACTIVITY: ICD-10-CM

## 2020-04-01 RX ORDER — DEXMETHYLPHENIDATE HYDROCHLORIDE 20 MG/1
20 CAPSULE, EXTENDED RELEASE ORAL DAILY
Qty: 30 CAPSULE | Refills: 0 | Status: SHIPPED | OUTPATIENT
Start: 2020-04-01 | End: 2020-04-27 | Stop reason: SDUPTHER

## 2020-04-01 NOTE — TELEPHONE ENCOUNTER
Patient called in about the rx  -dexmethylphenidate XR (FOCALIN XR) 20 MG 24 hr capsule    Patient has always received a 30 day supply of the current rx, but a few days ago, she received only 14 tablets and I wondering why she did not receive the full months worth

## 2020-04-01 NOTE — TELEPHONE ENCOUNTER
I fixed the rx   This was because in feb   She needed enough to get thru to appt    So it just needed quanity switched back

## 2020-04-21 ENCOUNTER — INFUSION (OUTPATIENT)
Dept: ONCOLOGY | Facility: HOSPITAL | Age: 55
End: 2020-04-21

## 2020-04-21 DIAGNOSIS — Z45.2 ENCOUNTER FOR FITTING AND ADJUSTMENT OF VASCULAR CATHETER: Primary | ICD-10-CM

## 2020-04-21 DIAGNOSIS — T45.1X5A PERIPHERAL NEUROPATHY DUE TO CHEMOTHERAPY (HCC): ICD-10-CM

## 2020-04-21 DIAGNOSIS — C78.7 METASTATIC CANCER TO LIVER (HCC): ICD-10-CM

## 2020-04-21 DIAGNOSIS — C78.7 RECTAL CANCER METASTASIZED TO LIVER (HCC): ICD-10-CM

## 2020-04-21 DIAGNOSIS — C18.7 MALIGNANT NEOPLASM OF SIGMOID COLON (HCC): ICD-10-CM

## 2020-04-21 DIAGNOSIS — C20 RECTAL CANCER METASTASIZED TO LIVER (HCC): ICD-10-CM

## 2020-04-21 DIAGNOSIS — G62.0 PERIPHERAL NEUROPATHY DUE TO CHEMOTHERAPY (HCC): ICD-10-CM

## 2020-04-21 DIAGNOSIS — Z45.2 FITTING AND ADJUSTMENT OF VASCULAR CATHETER: ICD-10-CM

## 2020-04-21 PROCEDURE — 96523 IRRIG DRUG DELIVERY DEVICE: CPT

## 2020-04-21 PROCEDURE — 25010000003 HEPARIN LOCK FLUCH PER 10 UNITS: Performed by: INTERNAL MEDICINE

## 2020-04-21 RX ORDER — HEPARIN SODIUM (PORCINE) LOCK FLUSH IV SOLN 100 UNIT/ML 100 UNIT/ML
500 SOLUTION INTRAVENOUS AS NEEDED
Status: DISCONTINUED | OUTPATIENT
Start: 2020-04-21 | End: 2020-04-21 | Stop reason: HOSPADM

## 2020-04-21 RX ORDER — SODIUM CHLORIDE 0.9 % (FLUSH) 0.9 %
10 SYRINGE (ML) INJECTION AS NEEDED
Status: CANCELLED | OUTPATIENT
Start: 2020-04-21

## 2020-04-21 RX ORDER — HEPARIN SODIUM (PORCINE) LOCK FLUSH IV SOLN 100 UNIT/ML 100 UNIT/ML
500 SOLUTION INTRAVENOUS AS NEEDED
Status: CANCELLED | OUTPATIENT
Start: 2020-04-21

## 2020-04-21 RX ORDER — SODIUM CHLORIDE 0.9 % (FLUSH) 0.9 %
10 SYRINGE (ML) INJECTION AS NEEDED
Status: DISCONTINUED | OUTPATIENT
Start: 2020-04-21 | End: 2020-04-21 | Stop reason: HOSPADM

## 2020-04-21 RX ADMIN — Medication 10 ML: at 15:08

## 2020-04-21 RX ADMIN — SODIUM CHLORIDE, PRESERVATIVE FREE 500 UNITS: 5 INJECTION INTRAVENOUS at 15:08

## 2020-04-27 DIAGNOSIS — F98.8 ATTENTION DEFICIT DISORDER (ADD) WITHOUT HYPERACTIVITY: ICD-10-CM

## 2020-04-27 RX ORDER — DEXMETHYLPHENIDATE HYDROCHLORIDE 20 MG/1
20 CAPSULE, EXTENDED RELEASE ORAL DAILY
Qty: 30 CAPSULE | Refills: 0 | Status: SHIPPED | OUTPATIENT
Start: 2020-04-27 | End: 2020-05-26 | Stop reason: SDUPTHER

## 2020-04-27 NOTE — TELEPHONE ENCOUNTER
Requesting re-fill for dexmethylphenidate XR (FOCALIN XR) 20 MG 24 hr capsule    [facility] [address] [facility]    Patient call back [phone]

## 2020-05-26 ENCOUNTER — TELEPHONE (OUTPATIENT)
Dept: FAMILY MEDICINE CLINIC | Facility: CLINIC | Age: 55
End: 2020-05-26

## 2020-05-26 DIAGNOSIS — F98.8 ATTENTION DEFICIT DISORDER (ADD) WITHOUT HYPERACTIVITY: ICD-10-CM

## 2020-05-26 RX ORDER — DEXMETHYLPHENIDATE HYDROCHLORIDE 20 MG/1
20 CAPSULE, EXTENDED RELEASE ORAL DAILY
Qty: 30 CAPSULE | Refills: 0 | Status: SHIPPED | OUTPATIENT
Start: 2020-05-26 | End: 2020-07-02 | Stop reason: SDUPTHER

## 2020-05-28 ENCOUNTER — TELEPHONE (OUTPATIENT)
Dept: ONCOLOGY | Facility: CLINIC | Age: 55
End: 2020-05-28

## 2020-05-28 NOTE — TELEPHONE ENCOUNTER
Patient has an appt on 06/02/20 for port flush. Patient is gong to [facility] on 06/08/20 and will have port flush done there    Patient phone # [phone]

## 2020-06-02 ENCOUNTER — APPOINTMENT (OUTPATIENT)
Dept: ONCOLOGY | Facility: HOSPITAL | Age: 55
End: 2020-06-02

## 2020-06-11 ENCOUNTER — TELEPHONE (OUTPATIENT)
Dept: ONCOLOGY | Facility: CLINIC | Age: 55
End: 2020-06-11

## 2020-06-11 NOTE — TELEPHONE ENCOUNTER
Pt called to get scheduled to have Port removed.     She'd like to have it done as soon as possible.    Her schedule is pretty for any day and time.    Phone #:  [phone]

## 2020-06-11 NOTE — TELEPHONE ENCOUNTER
Message sent to Dr. Johnson, will wait for his response.       Pt called to get scheduled to have Port removed.     She'd like to have it done as soon as possible.    Her schedule is pretty for any day and time.    Phone #:  [phone]

## 2020-06-23 ENCOUNTER — TELEPHONE (OUTPATIENT)
Dept: ONCOLOGY | Facility: CLINIC | Age: 55
End: 2020-06-23

## 2020-06-23 DIAGNOSIS — Z45.2 ENCOUNTER FOR FITTING AND ADJUSTMENT OF VASCULAR CATHETER: Primary | ICD-10-CM

## 2020-06-23 NOTE — TELEPHONE ENCOUNTER
Pt following up on call made 6/11 to have port removed. D/w Dr. Johnson, order received to refer pt back to surgeon who originally place pt's port. Referral placed to Dr. Holden w/ vascular surgery for port removal. Pt v/u.

## 2020-06-23 NOTE — TELEPHONE ENCOUNTER
Patient called on 6/11 to find out about appointment to get port removed.  She hasn't heard anything back and would like to get it on the schedule.    Please call her asap  [phone]

## 2020-06-30 ENCOUNTER — PROCEDURE VISIT (OUTPATIENT)
Dept: OBSTETRICS AND GYNECOLOGY | Facility: CLINIC | Age: 55
End: 2020-06-30

## 2020-06-30 ENCOUNTER — APPOINTMENT (OUTPATIENT)
Dept: WOMENS IMAGING | Facility: HOSPITAL | Age: 55
End: 2020-06-30

## 2020-06-30 ENCOUNTER — OFFICE VISIT (OUTPATIENT)
Dept: OBSTETRICS AND GYNECOLOGY | Facility: CLINIC | Age: 55
End: 2020-06-30

## 2020-06-30 VITALS
DIASTOLIC BLOOD PRESSURE: 72 MMHG | WEIGHT: 130 LBS | BODY MASS INDEX: 22.2 KG/M2 | HEIGHT: 64 IN | SYSTOLIC BLOOD PRESSURE: 122 MMHG

## 2020-06-30 DIAGNOSIS — Z00.00 ANNUAL PHYSICAL EXAM: Primary | ICD-10-CM

## 2020-06-30 DIAGNOSIS — Z12.31 VISIT FOR SCREENING MAMMOGRAM: Primary | ICD-10-CM

## 2020-06-30 PROCEDURE — 77063 BREAST TOMOSYNTHESIS BI: CPT | Performed by: RADIOLOGY

## 2020-06-30 PROCEDURE — 77067 SCR MAMMO BI INCL CAD: CPT | Performed by: OBSTETRICS & GYNECOLOGY

## 2020-06-30 PROCEDURE — 77063 BREAST TOMOSYNTHESIS BI: CPT | Performed by: OBSTETRICS & GYNECOLOGY

## 2020-06-30 PROCEDURE — 99396 PREV VISIT EST AGE 40-64: CPT | Performed by: OBSTETRICS & GYNECOLOGY

## 2020-06-30 PROCEDURE — 77067 SCR MAMMO BI INCL CAD: CPT | Performed by: RADIOLOGY

## 2020-06-30 NOTE — PROGRESS NOTES
SILVESTRE Florentino  is a 54 y.o. female who presents for a routine gynecologic exam.  She is feeling well.  Bowels and bladder are functioning normally.  She is 3 years cancer free.  Mammogram was performed today.    Chief Complaint   Patient presents with   • Gynecologic Exam     Patient is here for a annual.       Past Medical History:   Diagnosis Date   • ADD (attention deficit disorder)    • Anemia    • Colon cancer (CMS/HCC)     NEW DIAGNOSIS   • Drug therapy    • Fatigue     secondary to chemotherapy    • H/O foreign travel 03/2016    [place], [place]   • Hepatic metastases (CMS/HCC) 10/2016    partial right hepatectomy [place] 10/16   • History of transfusion     1999 AFTER TUBAL RUPTURE   • Hyperlipidemia    • Liver cancer (CMS/HCC)    • Middle cerebral artery aneurysm    • Status post partial colectomy 04/2017    [facility]       Past Surgical History:   Procedure Laterality Date   • AUGMENTATION MAMMAPLASTY     • COLONOSCOPY      MAY 2016   • CRANIOTOMY  2004, 2005    Cerebral aneurysm   • LAPAROSCOPY FOR ECTOPIC PREGNANCY  09/1997   • LIVER SURGERY      MASS REMOVED   • TX INSJ TUNNELED CVC W/O SUBQ PORT/ AGE 3 YR/> Right 8/26/2016    Procedure: MEDIPORT PLACEMENT ;  Surgeon: Praful Holden MD;  Location: [facility];  Service: Vascular   • SINUS SURGERY         Social History     Socioeconomic History   • Marital status:      Spouse name: Amos   • Number of children: 1   • Years of education: COLLEGE   • Highest education level: Not on file   Occupational History   • Occupation: Teacher     Employer: [employer]     Comment: full time   Tobacco Use   • Smoking status: Never Smoker   • Smokeless tobacco: Never Used   Substance and Sexual Activity   • Alcohol use: Yes     Alcohol/week: 5.0 standard drinks     Types: 5 Glasses of wine per week     Comment: Social   • Drug use: No   • Sexual activity: Yes     Partners: Male     Birth  control/protection: None   Social History Narrative    She is an . Never smoked. Social drinker about 5 glasses of wine per week       The following portions of the patient's history were reviewed and updated as appropriate: allergies, current medications, past family history, past medical history, past social history, past surgical history and problem list.    Review of Systems   Constitutional: Negative.    HENT: Negative.    Respiratory: Negative.    Cardiovascular: Negative.    Gastrointestinal: Negative.    Genitourinary: Negative.    Musculoskeletal: Negative.    Skin: Negative.    Allergic/Immunologic: Negative.    Psychiatric/Behavioral: Negative.              Physical Exam   Constitutional: She is oriented to person, place, and time. She appears well-developed and well-nourished.   HENT:   Head: Normocephalic and atraumatic.   Cardiovascular: Normal rate and regular rhythm.   Pulmonary/Chest: Effort normal and breath sounds normal. She has no wheezes. She has no rales.   Bilateral implants are in place.  There are no palpable breast lumps.  Nipple discharge and axillary adenopathy are absent   Abdominal: Soft. She exhibits no distension. There is no tenderness.   Genitourinary: Vagina normal and uterus normal. There is no lesion on the right labia. There is no lesion on the left labia. Cervix exhibits no motion tenderness. Right adnexum displays no mass and no tenderness. Left adnexum displays no mass and no tenderness. No vaginal discharge found.   Neurological: She is alert and oriented to person, place, and time.   Skin: Skin is warm and dry.   Nursing note and vitals reviewed.      Assessment    Isabel was seen today for gynecologic exam.    Diagnoses and all orders for this visit:    Annual physical exam        Plan  1. Annual examination and Pap smear performed  2. Counseled regarding the importance of regular screening mammograms.  Mammogram was performed today  3. The patient is  3 years cancer free.    4. Return in about 1 year (around 6/30/2021).    Social History     Tobacco Use   Smoking Status Never Smoker   5.

## 2020-07-02 DIAGNOSIS — F98.8 ATTENTION DEFICIT DISORDER (ADD) WITHOUT HYPERACTIVITY: ICD-10-CM

## 2020-07-02 NOTE — TELEPHONE ENCOUNTER
Caller: Isabel Florentino    Relationship: Self    Best call back number: [phone]       Medication needed:   dexmethylphenidate XR (FOCALIN XR) 20 MG 24 hr capsule    Does the patient have less than a 3 day supply:  [x] Yes  [] No    What is the patient's preferred pharmacy:      [facility] #[id number] - [place], [place] - [address] - [phone]  - [phone]   [phone]

## 2020-07-03 RX ORDER — DEXMETHYLPHENIDATE HYDROCHLORIDE 20 MG/1
20 CAPSULE, EXTENDED RELEASE ORAL DAILY
Qty: 30 CAPSULE | Refills: 0 | Status: SHIPPED | OUTPATIENT
Start: 2020-07-03 | End: 2020-08-07 | Stop reason: SDUPTHER

## 2020-07-13 ENCOUNTER — HOSPITAL ENCOUNTER (OUTPATIENT)
Dept: CARDIOLOGY | Facility: HOSPITAL | Age: 55
Discharge: HOME | End: 2020-07-13
Admitting: SURGERY

## 2020-07-13 ENCOUNTER — LAB REQUISITION (OUTPATIENT)
Dept: LAB | Facility: HOSPITAL | Age: 55
End: 2020-07-13

## 2020-07-13 ENCOUNTER — LAB (OUTPATIENT)
Dept: LAB | Facility: HOSPITAL | Age: 55
End: 2020-07-13

## 2020-07-13 ENCOUNTER — TRANSCRIBE ORDERS (OUTPATIENT)
Dept: ADMINISTRATIVE | Facility: HOSPITAL | Age: 55
End: 2020-07-13

## 2020-07-13 ENCOUNTER — HOSPITAL ENCOUNTER (OUTPATIENT)
Dept: GENERAL RADIOLOGY | Facility: HOSPITAL | Age: 55
Discharge: HOME | End: 2020-07-13

## 2020-07-13 DIAGNOSIS — Z01.818 PRE-OP TESTING: ICD-10-CM

## 2020-07-13 DIAGNOSIS — Z01.818 PRE-OP TESTING: Primary | ICD-10-CM

## 2020-07-13 DIAGNOSIS — Z00.00 ENCOUNTER FOR GENERAL ADULT MEDICAL EXAMINATION WITHOUT ABNORMAL FINDINGS: ICD-10-CM

## 2020-07-13 LAB
ALBUMIN SERPL-MCNC: 4.4 G/DL (ref 3.5–5.2)
ALBUMIN/GLOB SERPL: 1.7 G/DL
ALP SERPL-CCNC: 60 U/L (ref 39–117)
ALT SERPL W P-5'-P-CCNC: 16 U/L (ref 1–33)
ANION GAP SERPL CALCULATED.3IONS-SCNC: 6.9 MMOL/L (ref 5–15)
APTT PPP: 29.5 SECONDS (ref 22.7–35.4)
AST SERPL-CCNC: 27 U/L (ref 1–32)
BILIRUB SERPL-MCNC: 0.3 MG/DL (ref 0–1.2)
BILIRUB UR QL STRIP: NEGATIVE
BUN SERPL-MCNC: 21 MG/DL (ref 6–20)
BUN/CREAT SERPL: 27.3 (ref 7–25)
CALCIUM SPEC-SCNC: 9.6 MG/DL (ref 8.6–10.5)
CHLORIDE SERPL-SCNC: 108 MMOL/L (ref 98–107)
CLARITY UR: CLEAR
CO2 SERPL-SCNC: 25.1 MMOL/L (ref 22–29)
COLOR UR: YELLOW
CREAT SERPL-MCNC: 0.77 MG/DL (ref 0.57–1)
DEPRECATED RDW RBC AUTO: 42.7 FL (ref 37–54)
ERYTHROCYTE [DISTWIDTH] IN BLOOD BY AUTOMATED COUNT: 12.9 % (ref 12.3–15.4)
GFR SERPL CREATININE-BSD FRML MDRD: 78 ML/MIN/1.73
GLOBULIN UR ELPH-MCNC: 2.6 GM/DL
GLUCOSE SERPL-MCNC: 121 MG/DL (ref 65–99)
GLUCOSE UR STRIP-MCNC: NEGATIVE MG/DL
HCT VFR BLD AUTO: 40 % (ref 34–46.6)
HGB BLD-MCNC: 13.1 G/DL (ref 12–15.9)
HGB UR QL STRIP.AUTO: NEGATIVE
INR PPP: 0.92 (ref 0.9–1.1)
KETONES UR QL STRIP: NEGATIVE
LEUKOCYTE ESTERASE UR QL STRIP.AUTO: NEGATIVE
MCH RBC QN AUTO: 29.6 PG (ref 26.6–33)
MCHC RBC AUTO-ENTMCNC: 32.8 G/DL (ref 31.5–35.7)
MCV RBC AUTO: 90.3 FL (ref 79–97)
NITRITE UR QL STRIP: NEGATIVE
PH UR STRIP.AUTO: 6 [PH] (ref 5–8)
PLATELET # BLD AUTO: 228 10*3/MM3 (ref 140–450)
PMV BLD AUTO: 9.9 FL (ref 6–12)
POTASSIUM SERPL-SCNC: 4.6 MMOL/L (ref 3.5–5.2)
PROT SERPL-MCNC: 7 G/DL (ref 6–8.5)
PROT UR QL STRIP: NEGATIVE
PROTHROMBIN TIME: 12.3 SECONDS (ref 11.7–14.2)
RBC # BLD AUTO: 4.43 10*6/MM3 (ref 3.77–5.28)
SODIUM SERPL-SCNC: 140 MMOL/L (ref 136–145)
SP GR UR STRIP: 1.02 (ref 1–1.03)
UROBILINOGEN UR QL STRIP: NORMAL
WBC # BLD AUTO: 5.97 10*3/MM3 (ref 3.4–10.8)

## 2020-07-13 PROCEDURE — 93010 ELECTROCARDIOGRAM REPORT: CPT | Performed by: INTERNAL MEDICINE

## 2020-07-13 PROCEDURE — 80053 COMPREHEN METABOLIC PANEL: CPT

## 2020-07-13 PROCEDURE — C9803 HOPD COVID-19 SPEC COLLECT: HCPCS

## 2020-07-13 PROCEDURE — 85027 COMPLETE CBC AUTOMATED: CPT

## 2020-07-13 PROCEDURE — 71046 X-RAY EXAM CHEST 2 VIEWS: CPT

## 2020-07-13 PROCEDURE — 85730 THROMBOPLASTIN TIME PARTIAL: CPT | Performed by: SURGERY

## 2020-07-13 PROCEDURE — 36415 COLL VENOUS BLD VENIPUNCTURE: CPT

## 2020-07-13 PROCEDURE — 85610 PROTHROMBIN TIME: CPT | Performed by: SURGERY

## 2020-07-13 PROCEDURE — U0004 COV-19 TEST NON-CDC HGH THRU: HCPCS | Performed by: ANESTHESIOLOGY

## 2020-07-13 PROCEDURE — 81003 URINALYSIS AUTO W/O SCOPE: CPT

## 2020-07-13 PROCEDURE — 93005 ELECTROCARDIOGRAM TRACING: CPT | Performed by: SURGERY

## 2020-07-14 LAB
REF LAB TEST METHOD: NORMAL
SARS-COV-2 RNA RESP QL NAA+PROBE: NOT DETECTED

## 2020-07-21 ENCOUNTER — TELEPHONE (OUTPATIENT)
Dept: ONCOLOGY | Facility: CLINIC | Age: 55
End: 2020-07-21

## 2020-07-21 ENCOUNTER — APPOINTMENT (OUTPATIENT)
Dept: ONCOLOGY | Facility: HOSPITAL | Age: 55
End: 2020-07-21

## 2020-07-21 NOTE — TELEPHONE ENCOUNTER
EXAM:  CT CHEST WITH IV CONTRAST    COMPARISON:  CT chest dated 1/8/2020    FINDINGS:  Medical device: Right internal jugular chest port tip terminating at the  cavoatrial junction. Bilateral breast implants.    Lungs and airways: The central airways are patent. Biapical scarring. Residual  linear scarring in the posterior left lower lobe (site of prior ablation)  measuring 1.6 x 3.2 cm (series 3, image 217), improved compared to prior exam  where a more nodular component was appreciated on the sagittal images.  Additional left lower lobe 3 mm nodule (series 3, image 220) is stable. No  additional pulmonary nodules are identified. No pulmonary masses or  consolidation. Stable scarring within the superior aspects of the right and left  major fissures.    Pleural: No pleural effusion or pneumothorax.    Heart and mediastinum: Nonaneurysmal minimally calcified thoracic aorta. Normal  caliber main pulmonary artery. Heart size is normal. Calcified plaque in the  coronary arteries. No pericardial effusion. No mediastinal or hilar  lymphadenopathy. The esophagus is decompressed. Small Bochdalek hernia on the  right containing retroperitoneal fat.    Bones and soft tissue: Mild degenerative changes throughout the thoracic spine.  No suspicious osseous lesion. No axillary or supraclavicular lymphadenopathy.    Upper abdomen: Postsurgical changes of a right hepatic lobe wedge resection. The  remainder of the visualized upper abdominal viscera are within normal limits.   Other Result Information   Interface,  [facility] Generic [id number] - 06/08/2020  3:37 PM EDT  EXAM:  CT CHEST WITH IV CONTRAST    COMPARISON:  CT chest dated 1/8/2020    FINDINGS:  Medical device: Right internal jugular chest port tip terminating at the  cavoatrial junction. Bilateral breast implants.    Lungs and airways: The central airways are patent. Biapical scarring. Residual  linear scarring in the posterior left lower lobe (site of prior  ablation)  measuring 1.6 x 3.2 cm (series 3, image 217), improved compared to prior exam  where a more nodular component was appreciated on the sagittal images.  Additional left lower lobe 3 mm nodule (series 3, image 220) is stable. No  additional pulmonary nodules are identified. No pulmonary masses or  consolidation. Stable scarring within the superior aspects of the right and left  major fissures.    Pleural: No pleural effusion or pneumothorax.    Heart and mediastinum: Nonaneurysmal minimally calcified thoracic aorta. Normal  caliber main pulmonary artery. Heart size is normal. Calcified plaque in the  coronary arteries. No pericardial effusion. No mediastinal or hilar  lymphadenopathy. The esophagus is decompressed. Small Bochdalek hernia on the  right containing retroperitoneal fat.    Bones and soft tissue: Mild degenerative changes throughout the thoracic spine.  No suspicious osseous lesion. No axillary or supraclavicular lymphadenopathy.    Upper abdomen: Postsurgical changes of a right hepatic lobe wedge resection. The  remainder of the visualized upper abdominal viscera are within normal limits.    IMPRESSION:  Healing postablation changes in the left lower lobe. Ablation site  now has a more linear configuration representing scar formation. No new evidence  of intrathoracic metastatic disease.     The patient was called in regard to her future appointment with us today. The patient was not even aware that was necessary. She just returned from [facility] a few days ago where she had extensive assessment including CBC, CMP, CEA, and CT scans of the chest, abdomen and pelvis showing no evidence of cancer recurrence. This is wonderful news. For this reason, I find no reason for the patient to have to come and see me today; there is no purpose. If she wants to review with me in 3 months that would be perfectly fine.

## 2020-07-22 DIAGNOSIS — C78.7 RECTAL CANCER METASTASIZED TO LIVER (HCC): Primary | ICD-10-CM

## 2020-07-22 DIAGNOSIS — C20 RECTAL CANCER METASTASIZED TO LIVER (HCC): Primary | ICD-10-CM

## 2020-08-07 DIAGNOSIS — F98.8 ATTENTION DEFICIT DISORDER (ADD) WITHOUT HYPERACTIVITY: ICD-10-CM

## 2020-08-07 RX ORDER — DEXMETHYLPHENIDATE HYDROCHLORIDE 20 MG/1
20 CAPSULE, EXTENDED RELEASE ORAL DAILY
Qty: 30 CAPSULE | Refills: 0 | Status: SHIPPED | OUTPATIENT
Start: 2020-08-07 | End: 2020-09-03 | Stop reason: SDUPTHER

## 2020-08-07 NOTE — TELEPHONE ENCOUNTER
Patient called in requesting a re fill for    dexmethylphenidate XR (FOCALIN XR) 20 MG 24 hr capsule    [address]    Best call back # [phone]

## 2020-09-03 DIAGNOSIS — F98.8 ATTENTION DEFICIT DISORDER (ADD) WITHOUT HYPERACTIVITY: ICD-10-CM

## 2020-09-03 RX ORDER — DEXMETHYLPHENIDATE HYDROCHLORIDE 20 MG/1
20 CAPSULE, EXTENDED RELEASE ORAL DAILY
Qty: 30 CAPSULE | Refills: 0 | Status: SHIPPED | OUTPATIENT
Start: 2020-09-03 | End: 2020-09-09 | Stop reason: SDUPTHER

## 2020-09-03 NOTE — TELEPHONE ENCOUNTER
Caller: Isabel Florentino    Relationship: Self    Best call back number: [phone]    Medication needed:   Requested Prescriptions     Pending Prescriptions Disp Refills   • dexmethylphenidate XR (FOCALIN XR) 20 MG 24 hr capsule 30 capsule 0     Sig: Take 1 capsule by mouth Daily       When do you need the refill by: ASAP    Does the patient have less than a 3 day supply:  [x] Yes  [] No    What is the patient's preferred pharmacy: [facility] #[id number] - [place], [place] - [address] - [phone]  - [phone] FX

## 2020-09-08 ENCOUNTER — TELEPHONE (OUTPATIENT)
Dept: ONCOLOGY | Facility: CLINIC | Age: 55
End: 2020-09-08

## 2020-09-08 ENCOUNTER — TELEPHONE (OUTPATIENT)
Dept: ONCOLOGY | Facility: HOSPITAL | Age: 55
End: 2020-09-08

## 2020-09-08 NOTE — TELEPHONE ENCOUNTER
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  Request sent to scheduling to see if they can work to reschedule the time of appt.

## 2020-09-09 DIAGNOSIS — F98.8 ATTENTION DEFICIT DISORDER (ADD) WITHOUT HYPERACTIVITY: ICD-10-CM

## 2020-09-09 RX ORDER — DEXMETHYLPHENIDATE HYDROCHLORIDE 20 MG/1
20 CAPSULE, EXTENDED RELEASE ORAL DAILY
Qty: 30 CAPSULE | Refills: 0 | Status: SHIPPED | OUTPATIENT
Start: 2020-09-09 | End: 2020-09-09 | Stop reason: SDUPTHER

## 2020-09-09 RX ORDER — DEXMETHYLPHENIDATE HYDROCHLORIDE 20 MG/1
20 CAPSULE, EXTENDED RELEASE ORAL DAILY
Qty: 30 CAPSULE | Refills: 0 | Status: SHIPPED | OUTPATIENT
Start: 2020-09-09 | End: 2020-10-13 | Stop reason: SDUPTHER

## 2020-09-09 NOTE — TELEPHONE ENCOUNTER
PATIENT REQUESTED A REFILL ON:dexmethylphenidate XR (FOCALIN XR) 20 MG 24 hr capsule    PATIENT CAN BE REACHED ON:[phone]    PHARMACY PREFERRED:[facility] #[id number] - [place], [place] - [address] - [phone]  - [phone] FX

## 2020-10-13 DIAGNOSIS — F98.8 ATTENTION DEFICIT DISORDER (ADD) WITHOUT HYPERACTIVITY: ICD-10-CM

## 2020-10-13 RX ORDER — DEXMETHYLPHENIDATE HYDROCHLORIDE 20 MG/1
20 CAPSULE, EXTENDED RELEASE ORAL DAILY
Qty: 30 CAPSULE | Refills: 0 | Status: SHIPPED | OUTPATIENT
Start: 2020-10-13 | End: 2020-11-16 | Stop reason: SDUPTHER

## 2020-10-13 NOTE — TELEPHONE ENCOUNTER
Caller: Isabel Florentino    Relationship: Self    Best call back number: [phone]    Medication needed:   Requested Prescriptions     Pending Prescriptions Disp Refills   • dexmethylphenidate XR (FOCALIN XR) 20 MG 24 hr capsule 30 capsule 0     Sig: Take 1 capsule by mouth Daily       When do you need the refill by: 10/13/20    What details did the patient provide when requesting the medication: n/a    Does the patient have less than a 3 day supply:  [x] Yes  [] No    What is the patient's preferred pharmacy:    [facility] #[id number] - [place], [place] - [address] - [phone]  - [phone] FX  [phone]

## 2020-10-16 ENCOUNTER — OFFICE VISIT (OUTPATIENT)
Dept: ONCOLOGY | Facility: CLINIC | Age: 55
End: 2020-10-16

## 2020-10-16 ENCOUNTER — LAB (OUTPATIENT)
Dept: LAB | Facility: HOSPITAL | Age: 55
End: 2020-10-16

## 2020-10-16 VITALS
DIASTOLIC BLOOD PRESSURE: 74 MMHG | SYSTOLIC BLOOD PRESSURE: 138 MMHG | OXYGEN SATURATION: 100 % | RESPIRATION RATE: 20 BRPM | HEIGHT: 64 IN | BODY MASS INDEX: 22.52 KG/M2 | WEIGHT: 131.9 LBS | HEART RATE: 77 BPM | TEMPERATURE: 97.5 F

## 2020-10-16 DIAGNOSIS — C78.7 RECTAL CANCER METASTASIZED TO LIVER (HCC): Primary | ICD-10-CM

## 2020-10-16 DIAGNOSIS — G62.0 PERIPHERAL NEUROPATHY DUE TO CHEMOTHERAPY (HCC): ICD-10-CM

## 2020-10-16 DIAGNOSIS — C20 RECTAL CANCER METASTASIZED TO LIVER (HCC): Primary | ICD-10-CM

## 2020-10-16 DIAGNOSIS — C20 RECTAL CANCER (HCC): ICD-10-CM

## 2020-10-16 DIAGNOSIS — C18.7 MALIGNANT NEOPLASM OF SIGMOID COLON (HCC): ICD-10-CM

## 2020-10-16 DIAGNOSIS — C78.7 METASTATIC CANCER TO LIVER (HCC): ICD-10-CM

## 2020-10-16 DIAGNOSIS — T45.1X5A PERIPHERAL NEUROPATHY DUE TO CHEMOTHERAPY (HCC): ICD-10-CM

## 2020-10-16 DIAGNOSIS — C78.7 RECTAL CANCER METASTASIZED TO LIVER (HCC): ICD-10-CM

## 2020-10-16 DIAGNOSIS — C20 RECTAL CANCER METASTASIZED TO LIVER (HCC): ICD-10-CM

## 2020-10-16 DIAGNOSIS — Z45.2 FITTING AND ADJUSTMENT OF VASCULAR CATHETER: ICD-10-CM

## 2020-10-16 LAB
ALBUMIN SERPL-MCNC: 4.4 G/DL (ref 3.5–5.2)
ALBUMIN/GLOB SERPL: 1.6 G/DL (ref 1.1–2.4)
ALP SERPL-CCNC: 59 U/L (ref 38–116)
ALT SERPL W P-5'-P-CCNC: 14 U/L (ref 0–33)
ANION GAP SERPL CALCULATED.3IONS-SCNC: 8.5 MMOL/L (ref 5–15)
AST SERPL-CCNC: 27 U/L (ref 0–32)
BASOPHILS # BLD AUTO: 0.02 10*3/MM3 (ref 0–0.2)
BASOPHILS NFR BLD AUTO: 0.3 % (ref 0–1.5)
BILIRUB SERPL-MCNC: 0.4 MG/DL (ref 0.2–1.2)
BUN SERPL-MCNC: 13 MG/DL (ref 6–20)
BUN/CREAT SERPL: 16.3 (ref 7.3–30)
CALCIUM SPEC-SCNC: 9.9 MG/DL (ref 8.5–10.2)
CEA SERPL-MCNC: 1.59 NG/ML
CHLORIDE SERPL-SCNC: 105 MMOL/L (ref 98–107)
CO2 SERPL-SCNC: 27.5 MMOL/L (ref 22–29)
CREAT SERPL-MCNC: 0.8 MG/DL (ref 0.6–1.1)
DEPRECATED RDW RBC AUTO: 41.2 FL (ref 37–54)
EOSINOPHIL # BLD AUTO: 0.02 10*3/MM3 (ref 0–0.4)
EOSINOPHIL NFR BLD AUTO: 0.3 % (ref 0.3–6.2)
ERYTHROCYTE [DISTWIDTH] IN BLOOD BY AUTOMATED COUNT: 12.5 % (ref 12.3–15.4)
GFR SERPL CREATININE-BSD FRML MDRD: 74 ML/MIN/1.73
GLOBULIN UR ELPH-MCNC: 2.8 GM/DL (ref 1.8–3.5)
GLUCOSE SERPL-MCNC: 95 MG/DL (ref 74–124)
HCT VFR BLD AUTO: 40 % (ref 34–46.6)
HGB BLD-MCNC: 13 G/DL (ref 12–15.9)
IMM GRANULOCYTES # BLD AUTO: 0.02 10*3/MM3 (ref 0–0.05)
IMM GRANULOCYTES NFR BLD AUTO: 0.3 % (ref 0–0.5)
LYMPHOCYTES # BLD AUTO: 1.9 10*3/MM3 (ref 0.7–3.1)
LYMPHOCYTES NFR BLD AUTO: 25.6 % (ref 19.6–45.3)
MCH RBC QN AUTO: 29.5 PG (ref 26.6–33)
MCHC RBC AUTO-ENTMCNC: 32.5 G/DL (ref 31.5–35.7)
MCV RBC AUTO: 90.7 FL (ref 79–97)
MONOCYTES # BLD AUTO: 0.58 10*3/MM3 (ref 0.1–0.9)
MONOCYTES NFR BLD AUTO: 7.8 % (ref 5–12)
NEUTROPHILS NFR BLD AUTO: 4.89 10*3/MM3 (ref 1.7–7)
NEUTROPHILS NFR BLD AUTO: 65.7 % (ref 42.7–76)
NRBC BLD AUTO-RTO: 0 /100 WBC (ref 0–0.2)
PLATELET # BLD AUTO: 226 10*3/MM3 (ref 140–450)
PMV BLD AUTO: 9.6 FL (ref 6–12)
POTASSIUM SERPL-SCNC: 4.1 MMOL/L (ref 3.5–4.7)
PROT SERPL-MCNC: 7.2 G/DL (ref 6.3–8)
RBC # BLD AUTO: 4.41 10*6/MM3 (ref 3.77–5.28)
SODIUM SERPL-SCNC: 141 MMOL/L (ref 134–145)
WBC # BLD AUTO: 7.43 10*3/MM3 (ref 3.4–10.8)

## 2020-10-16 PROCEDURE — 82378 CARCINOEMBRYONIC ANTIGEN: CPT | Performed by: INTERNAL MEDICINE

## 2020-10-16 PROCEDURE — 99214 OFFICE O/P EST MOD 30 MIN: CPT | Performed by: INTERNAL MEDICINE

## 2020-10-16 PROCEDURE — 80053 COMPREHEN METABOLIC PANEL: CPT

## 2020-10-16 PROCEDURE — 85025 COMPLETE CBC W/AUTO DIFF WBC: CPT

## 2020-10-16 PROCEDURE — 36415 COLL VENOUS BLD VENIPUNCTURE: CPT

## 2020-10-16 RX ORDER — AZELAIC ACID 0.15 G/G
1 AEROSOL, FOAM TOPICAL AS NEEDED
COMMUNITY
Start: 2020-08-10

## 2020-10-16 NOTE — PROGRESS NOTES
Subjective .     REASONS FOR FOLLOW UP:  Metastatic SIGMOID UPPER rectal cancer to the liver.  Initiated chemotherapy with FOLFOX-6 9/1/2016. COMPLETED IN SEPT 2017, EXTENSIVE SURGERY ON HER LIVER AND REMVAL OF PRIMARY TUMOR SURRENDER HER KIRIT.    HISTORY OF PRESENT ILLNESS:  PATIENT WAS CALLED THE DAY BEFORE BY THE OFFICE TO ASK FOR SYMPTOMS THAT COULD BE CONSISTENT WITH CORONAVIRUS INFECTION, AND BEING NEGATIVE WAS SCHEDULED TO BE SEEN IN THE OFFICE TODAY. SIMILAR QUESTIONING TODAY INCLUDING, CHILLS, FEVER, NEW COUGH, SHORTNESS OF BREATH, DIARRHEA,DIFFUSE BODY ACHES  AND CHANGES IN SMELL OR TASTE WERE NEGATIVE.THE PATIENT DENIED ANY CONTACT WITH PERSONS WHO WERE POSITIVE FOR COVID, AND PATIENT IS NOT IN CATEGORY OF HIGH RISK BEHAVIOR TO ACQUIRE COVID.    DURING THE VISIT WITH THE PATIENT TODAY , PATIENT HAD FACE MASK, MY MEDICAL ASSISTANT AND I  HAD PROPPER PROTECTIVE EQUIPMENT, AND I DID HAND HYGIENE WITH SOAP AND WATER BEFORE AND AFTER THE VISIT.                           Past Medical History:   Diagnosis Date   • ADD (attention deficit disorder)    • Anemia    • Colon cancer (CMS/HCC)     NEW DIAGNOSIS   • Drug therapy    • Fatigue     secondary to chemotherapy    • H/O foreign travel 03/2016    [facility] [place]   • Hepatic metastases (CMS/HCC) 10/2016    partial right hepatectomy [place] 10/16   • History of transfusion     1999 AFTER TUBAL RUPTURE   • Hyperlipidemia    • Liver cancer (CMS/HCC)    • Middle cerebral artery aneurysm    • Status post partial colectomy 04/2017    [facility]     Past Surgical History:   Procedure Laterality Date   • AUGMENTATION MAMMAPLASTY     • COLONOSCOPY      MAY 2016   • CRANIOTOMY  2004, 2005    Cerebral aneurysm   • LAPAROSCOPY FOR ECTOPIC PREGNANCY  09/1997   • LIVER SURGERY      MASS REMOVED   • OR INSJ TUNNELED CVC W/O SUBQ PORT/ AGE 3 YR/> Right 8/26/2016    Procedure: MEDIPORT PLACEMENT ;  Surgeon: Praful Holden MD;  Location:   [facility];  Service: Vascular   • SINUS SURGERY       Medications: The current medication list was reviewed in the EMR.    ALLERGIES:   No Known Allergies    SOCIAL HISTORY:       Social History     Tobacco Use   • Smoking status: Never Smoker   • Smokeless tobacco: Never Used   Substance Use Topics   • Alcohol use: Yes     Alcohol/week: 5.0 standard drinks     Types: 5 Glasses of wine per week     Comment: Social     FAMILY HISTORY:  Family History   Problem Relation Age of Onset   • Cerebral aneurysm Mother    • Stroke Mother    • COPD Father        REVIEW OF SYSTEMS:            General: no fever, no chills, no fatigue,no weight changes, no lack of appetite.  Eyes: no epiphora, xerophthalmia,conjunctivitis, pain, glaucoma, blurred vision, blindness, secretion, photophobia, proptosis, diplopia.  Ears: no otorrhea, tinnitus, otorrhagia, deafness, pain, vertigo.  Nose: no rhinorrhea, no epistaxis, no alteration in perception of odors, no sinuses pressure.  Mouth: no alteration in gums or teeth,  No ulcers, no difficulty with mastication or deglut ion, no odynophagia.  Neck: no masses or pain, no thyroid alterations, no pain in muscles or arteries, no carotid odynia, no crepitation.  Respiratory: no cough, no sputum production,no dyspnea,no trepopnea, no pleuritic pain,no hemoptysis.  Heart: no syncope, no irregularity, no palpitations, no angina,no orthopnea,no paroxysmal nocturnal dyspnea.  Vascular Venous: no tenderness,no edema,no palpable cords,no postphlebitic syndrome, no skin changes no ulcerations.  Vascular Arterial: no distal ischemia, noclaudication, no gangrene, no neuropathic ischemic pain, no skin ulcers, no paleness no cyanosis.  GI: no dysphagia, no odynophagia, no regurgitation, no heartburn,no indigestion,no nausea,no vomiting,no hematemesis ,no melena,no jaundice,no distention, no obstipation,no enterorrhagia,no proctalgia,no anal  lesions, no changes in bowel habits.  : no frequency, no  "hesitancy, no hematuria, no discharge,no  pain.  Musculoskeletal: no muscle or tendon pain or inflammation,no  joint pain, no edema, no functional limitation,no fasciculations, no mass.  Neurologic: no headache, no seizures, noalterations on Craneal nerves, no motor deficit, no sensory deficit, normal coordination, no alteration in memory,normal orientation, calculation,normal writting, verbal and written language.  Skin: no rashes,no pruritus no localized lesions.  Psychiatric: no anxiety, no depression,no agitation, no delusions, proper insight.          Objective    Vitals:    10/16/20 1552   BP: 138/74   Pulse: 77   Resp: 20   Temp: 97.5 °F (36.4 °C)   TempSrc: Temporal   SpO2: 100%   Weight: 59.8 kg (131 lb 14.4 oz)   Height: 162.6 cm (64\")   PainSc: 0-No pain     Current Status 10/16/2020   ECOG score 0      PHYSICAL EXAM:    I HAVE PERSONALLY REVIEWED THE HISTORY OF THE PRESENT ILLNESS, PAST MEDICAL HISTORY, FAMILY HISTORY, SOCIAL HISTORY, ALLERGIES, MEDICATIONS STATED ABOVE IN THE OFFICE NOTE FROM TODAY.        GENERAL:  Well-developed, well-nourished  Patient  in no acute distress.   SKIN:  Warm, dry ,NO rashes,NO purpura ,NO petechiae.  HEENT:  Pupils were equal and reactive to light and accomodation, conjunctivae noninjected, no pterigion, normal extraocular movements, normal visual acuity.   NECK:  Supple with good range of motion; no thyromegaly or masses, no JVD or bruits, no cervical adenopathies.No carotid artery pain, no carotid abnormal pulsation , NO arterial dance.  LYMPHATICS:  No cervical, NO supraclavicular, NO axillary,NO epitrochlear , NO inguinal adenopathy.  CARDIAC   normal rate and regular rhythm, without murmur,NO rubs NO S3 NO S4 right or left . Normal femoral, popliteal, pedis, brachial and carotid pulses.  VASCULAR ARTERIAL: normal carotids,brachial,radial,femoral,popliteal, pedis pulses , no bruits.no paleness or cyanosis, no pain, no edema, no numbness, no gangrene.  VASCULAR " VENOUS: no cyanosis, collateral circulation, varicosities, edema, palpable cords, pain, erythema.  ABDOMEN:  Soft, nontender with no hepatomegaly, no splenomegaly,no masses, no ascites, no collateral circulation,no distention,no Fidel sign, no abdominal pain, no inguinal hernias,no umbilical hernia, no abdominal bruits. Normal bowel sounds.  GENITAL: Not  Performed.  EXTREMITIES  AND SPINE:  No clubbing, cyanosis or edema, no deformities or pain .No kyphosis, scoliosis, deformities or pain in spine, ribs or pelvic bone.  NEUROLOGICAL:  Patient was awake, alert, oriented to time, person and place.                RECENT LABS:  Lab Results   Component Value Date    WBC 7.43 10/16/2020    HGB 13.0 10/16/2020    HCT 40.0 10/16/2020    MCV 90.7 10/16/2020     10/16/2020     Lab Results   Component Value Date    GLUCOSE 121 (H) 07/13/2020    BUN 21 (H) 07/13/2020    CREATININE 0.77 07/13/2020    EGFRIFNONA 78 07/13/2020    EGFRIFAFRI >90 01/08/2020    BCR 27.3 (H) 07/13/2020    CO2 25.1 07/13/2020    CALCIUM 9.6 07/13/2020    ALBUMIN 4.40 07/13/2020    LABIL2 1.0 02/04/2019    AST 27 07/13/2020    ALT 16 07/13/2020                       Assessment/Plan      1. Metastatic rectal sigmoid cancer to the liver,  KRAS negative, BRAF negative, MSI stable, NRAS negative. The patient has undergone neoadjuvant chemotherapy with FOLFOX-6 and she subsequently underwent 2 different surgeries for her liver metastasis one including resection and another one including ablation. Also she underwent final removal of the primary tumor that was still PET active after completion of FOLFOX chemotherapy. Subsequently the patient had an ileostomy that was closed and she was placed on Xeloda chronically that she took in September, October, November of 2017 when she discontinued on her own because of side effects. Since then the patient has been seen at the [facility] .I have reviewed laboratory workup from the [facility]. I have reviewed  all the information available from the [facility] and her radiological assessment as well as her laboratory parameters and CEA are all normal. Therefore, it is always amazing to see somebody who has staged IV colon cancer to be treated aggressively and remain in remission after such a dramatic amount of extensive surgery and chemotherapy. 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    The patient will return to [facility] in 04/2020 to have further analysis and eventually I asked her to give us a call after visit there because I think it is time to start to think about removal of her port. This will be flushed every 6 weeks if remains functional.                    Zheng Johnson MD

## 2020-11-16 ENCOUNTER — TELEPHONE (OUTPATIENT)
Dept: FAMILY MEDICINE CLINIC | Facility: CLINIC | Age: 55
End: 2020-11-16

## 2020-11-16 DIAGNOSIS — F98.8 ATTENTION DEFICIT DISORDER (ADD) WITHOUT HYPERACTIVITY: ICD-10-CM

## 2020-11-16 RX ORDER — DEXMETHYLPHENIDATE HYDROCHLORIDE 20 MG/1
20 CAPSULE, EXTENDED RELEASE ORAL DAILY
Qty: 30 CAPSULE | Refills: 0 | Status: SHIPPED | OUTPATIENT
Start: 2020-11-16 | End: 2020-12-15 | Stop reason: SDUPTHER

## 2020-11-16 NOTE — TELEPHONE ENCOUNTER
Patient called in and is requesting a refill of    dexmethylphenidate XR (FOCALIN XR) 20 MG 24 hr capsule       Patient has 3 days left       Sent to [id number] - [place], [place] - [address] - [phone]  - [phone]  [phone]      Patient call back [phone]

## 2020-11-17 NOTE — TELEPHONE ENCOUNTER
Left detailed message on patient's voice mail at 10:22am to call our office for an appt before needing more medicine.

## 2020-12-15 ENCOUNTER — OFFICE VISIT (OUTPATIENT)
Dept: FAMILY MEDICINE CLINIC | Facility: CLINIC | Age: 55
End: 2020-12-15

## 2020-12-15 VITALS
BODY MASS INDEX: 23.39 KG/M2 | OXYGEN SATURATION: 95 % | TEMPERATURE: 96.8 F | HEART RATE: 72 BPM | WEIGHT: 137 LBS | SYSTOLIC BLOOD PRESSURE: 130 MMHG | DIASTOLIC BLOOD PRESSURE: 80 MMHG | HEIGHT: 64 IN

## 2020-12-15 DIAGNOSIS — F98.8 ATTENTION DEFICIT DISORDER (ADD) WITHOUT HYPERACTIVITY: ICD-10-CM

## 2020-12-15 PROCEDURE — 90670 PCV13 VACCINE IM: CPT | Performed by: NURSE PRACTITIONER

## 2020-12-15 PROCEDURE — 90471 IMMUNIZATION ADMIN: CPT | Performed by: NURSE PRACTITIONER

## 2020-12-15 PROCEDURE — 99213 OFFICE O/P EST LOW 20 MIN: CPT | Performed by: NURSE PRACTITIONER

## 2020-12-15 RX ORDER — DEXMETHYLPHENIDATE HYDROCHLORIDE 20 MG/1
20 CAPSULE, EXTENDED RELEASE ORAL DAILY
Qty: 30 CAPSULE | Refills: 0 | Status: SHIPPED | OUTPATIENT
Start: 2020-12-15 | End: 2021-01-18 | Stop reason: SDUPTHER

## 2020-12-15 NOTE — PROGRESS NOTES
"Subjective   Isabel Florentino is a 55 y.o. female.     Chief Complaint   Patient presents with   • ADHD       /80 (BP Location: Right arm, Patient Position: Sitting, Cuff Size: Adult)   Pulse 72   Temp 96.8 °F (36 °C) (Temporal)   Ht 162.6 cm (64\")   Wt 62.1 kg (137 lb)   LMP  (LMP Unknown)   SpO2 95%   BMI 23.52 kg/m²     BP Readings from Last 3 Encounters:   12/15/20 130/80   10/16/20 138/74   20 122/72       Wt Readings from Last 3 Encounters:   12/15/20 62.1 kg (137 lb)   10/16/20 59.8 kg (131 lb 14.4 oz)   20 59 kg (130 lb)       Pt comes in today for follow up on focalin. She is doing well on current dose. Helps with focus at work. Feels she is more productive. She denies any CP, palpitations, trouble sleeping, or weight changes.  is good.     She is also requesting PNA vaccine. Had PNA 23 last year, and is requesting Prevnar 13 today. Recommended she get this from her oncologist.        The following portions of the patient's history were reviewed and updated as appropriate: allergies, current medications, past family history, past medical history, past social history, past surgical history and problem list.    Review of Systems   Constitutional: Negative for fatigue.   Eyes: Negative for blurred vision.   Respiratory: Negative for shortness of breath.    Cardiovascular: Negative for chest pain and palpitations.   Neurological: Negative for dizziness and headache.       Objective   Physical Exam  Constitutional:       Appearance: She is well-developed.   Eyes:      Pupils: Pupils are equal, round, and reactive to light.   Cardiovascular:      Rate and Rhythm: Normal rate and regular rhythm.   Pulmonary:      Effort: Pulmonary effort is normal.      Breath sounds: Normal breath sounds.   Neurological:      Mental Status: She is alert and oriented to person, place, and time.           Diagnoses and all orders for this visit:    1. Attention deficit disorder (ADD) without " hyperactivity  -     dexmethylphenidate XR (FOCALIN XR) 20 MG 24 hr capsule; Take 1 capsule by mouth Daily  Dispense: 30 capsule; Refill: 0    Other orders  -     Pneumococcal Conjugate Vaccine 13-Valent All    During this office visit, we discussed the pertinent aspects of the visit and treatment recommendations. Pt verbalizes understanding. Follow up was discussed. Patient was given the opportunity to ask questions and discuss other concerns.       Return in about 6 months (around 6/15/2021) for Annual physical.

## 2021-01-18 DIAGNOSIS — F98.8 ATTENTION DEFICIT DISORDER (ADD) WITHOUT HYPERACTIVITY: ICD-10-CM

## 2021-01-18 NOTE — TELEPHONE ENCOUNTER
Caller: Isabel Florentino    Relationship: Self    Best call back number:     Medication needed:   Requested Prescriptions     Pending Prescriptions Disp Refills   • dexmethylphenidate XR (FOCALIN XR) 20 MG 24 hr capsule 30 capsule 0     Sig: Take 1 capsule by mouth Daily       When do you need the refill by:     What details did the patient provide when requesting the medication:     Does the patient have less than a 3 day supply:  [x] Yes  [] No    What is the patient's preferred pharmacy:    [place] [phone]

## 2021-01-19 RX ORDER — DEXMETHYLPHENIDATE HYDROCHLORIDE 20 MG/1
20 CAPSULE, EXTENDED RELEASE ORAL DAILY
Qty: 30 CAPSULE | Refills: 0 | Status: SHIPPED | OUTPATIENT
Start: 2021-01-19 | End: 2021-03-22 | Stop reason: SDUPTHER

## 2021-01-26 ENCOUNTER — TELEPHONE (OUTPATIENT)
Dept: FAMILY MEDICINE CLINIC | Facility: CLINIC | Age: 56
End: 2021-01-26

## 2021-01-26 NOTE — TELEPHONE ENCOUNTER
Caller: Isabel Florentino    Relationship to patient: Self    Best call back number:[phone] Concerns or Questions if Applicable:    MS. FLORENTINO WOULD LIKE TO KNOW  IF SHE HAS ANY RECORD OF HER HAVING SHINGLE SHOT, IF SO IS SHE DO FOR HER 2 ND DOSE.

## 2021-01-27 NOTE — TELEPHONE ENCOUNTER
Called patient and let her know that she has already had both doses and she is good, left it on her voice mail

## 2021-03-22 DIAGNOSIS — F98.8 ATTENTION DEFICIT DISORDER (ADD) WITHOUT HYPERACTIVITY: ICD-10-CM

## 2021-03-22 NOTE — TELEPHONE ENCOUNTER
Caller: Isabel Florentino    Relationship: Self    Best call back number: [phone]    Medication needed:   Requested Prescriptions     Pending Prescriptions Disp Refills   • dexmethylphenidate XR (FOCALIN XR) 20 MG 24 hr capsule 30 capsule 0     Sig: Take 1 capsule by mouth Daily       When do you need the refill by: TODAY    What additional details did the patient provide when requesting the medication: PATIENT IS OUT    Does the patient have less than a 3 day supply:  [x] Yes  [] No    What is the patient's preferred pharmacy: [id number] - [place], [place] - [address] - [phone]  - [phone] FX

## 2021-03-23 RX ORDER — DEXMETHYLPHENIDATE HYDROCHLORIDE 20 MG/1
20 CAPSULE, EXTENDED RELEASE ORAL DAILY
Qty: 30 CAPSULE | Refills: 0 | Status: SHIPPED | OUTPATIENT
Start: 2021-03-23 | End: 2021-03-29 | Stop reason: SDUPTHER

## 2021-03-25 DIAGNOSIS — F98.8 ATTENTION DEFICIT DISORDER (ADD) WITHOUT HYPERACTIVITY: ICD-10-CM

## 2021-03-25 RX ORDER — DEXMETHYLPHENIDATE HYDROCHLORIDE 20 MG/1
20 CAPSULE, EXTENDED RELEASE ORAL DAILY
Qty: 30 CAPSULE | Refills: 0 | Status: CANCELLED | OUTPATIENT
Start: 2021-03-25

## 2021-03-25 NOTE — TELEPHONE ENCOUNTER
Caller: Isabel Florentino    Relationship: Self    Best call back number: [phone]     Medication needed:    Requested Prescriptions     Pending Prescriptions Disp Refills   • dexmethylphenidate XR (FOCALIN XR) 20 MG 24 hr capsule 30 capsule 0     Sig: Take 1 capsule by mouth Daily       When do you need the refill by: TODAY      What additional details did the patient provide when requesting the   medication:    MS. FLORENTINO IS NEEDING THE REFILL THAT WAS SENT  [facility],  PHARMACY, IT IS CHEAPER AT [facility]    .   Does the patient have less than a 3 day supply:  [x] Yes  [] No    What is the patient's preferred pharmacy: [id number] - [place], [place] - [address] - [phone]  - [phone] FX

## 2021-03-29 DIAGNOSIS — F98.8 ATTENTION DEFICIT DISORDER (ADD) WITHOUT HYPERACTIVITY: ICD-10-CM

## 2021-03-29 RX ORDER — DEXMETHYLPHENIDATE HYDROCHLORIDE 20 MG/1
20 CAPSULE, EXTENDED RELEASE ORAL DAILY
Qty: 30 CAPSULE | Refills: 0 | Status: SHIPPED | OUTPATIENT
Start: 2021-03-29 | End: 2021-04-23 | Stop reason: SDUPTHER

## 2021-03-29 NOTE — TELEPHONE ENCOUNTER
Caller: Isabel Florentino    Relationship: Self    Best call back number: [phone]    Medication needed:   Requested Prescriptions     Pending Prescriptions Disp Refills   • dexmethylphenidate XR (FOCALIN XR) 20 MG 24 hr capsule 30 capsule 0     Sig: Take 1 capsule by mouth Daily       When do you need the refill by: 3/29/21    What additional details did the patient provide when requesting the medication: NEEDS THIS CALLED IN TODAY, WILL LEAVE IN THE MORNING NEEDS SENT TO [facility]    Does the patient have less than a 3 day supply:  [x] Yes  [] No    What is the patient's preferred pharmacy: [id number] - [place], [place] - [address] - [phone]  - [phone] FX

## 2021-04-16 ENCOUNTER — OFFICE VISIT (OUTPATIENT)
Dept: ONCOLOGY | Facility: CLINIC | Age: 56
End: 2021-04-16

## 2021-04-16 ENCOUNTER — LAB (OUTPATIENT)
Dept: LAB | Facility: HOSPITAL | Age: 56
End: 2021-04-16

## 2021-04-16 VITALS
HEART RATE: 82 BPM | HEIGHT: 64 IN | DIASTOLIC BLOOD PRESSURE: 68 MMHG | TEMPERATURE: 96.9 F | OXYGEN SATURATION: 97 % | RESPIRATION RATE: 16 BRPM | WEIGHT: 130 LBS | BODY MASS INDEX: 22.2 KG/M2 | SYSTOLIC BLOOD PRESSURE: 143 MMHG

## 2021-04-16 DIAGNOSIS — C78.7 METASTATIC CANCER TO LIVER (HCC): ICD-10-CM

## 2021-04-16 DIAGNOSIS — C78.7 RECTAL CANCER METASTASIZED TO LIVER (HCC): Primary | ICD-10-CM

## 2021-04-16 DIAGNOSIS — T45.1X5A PERIPHERAL NEUROPATHY DUE TO CHEMOTHERAPY (HCC): ICD-10-CM

## 2021-04-16 DIAGNOSIS — G62.0 PERIPHERAL NEUROPATHY DUE TO CHEMOTHERAPY (HCC): ICD-10-CM

## 2021-04-16 DIAGNOSIS — Z45.2 ENCOUNTER FOR FITTING AND ADJUSTMENT OF VASCULAR CATHETER: ICD-10-CM

## 2021-04-16 DIAGNOSIS — C20 RECTAL CANCER METASTASIZED TO LIVER (HCC): Primary | ICD-10-CM

## 2021-04-16 DIAGNOSIS — C20 RECTAL CANCER (HCC): ICD-10-CM

## 2021-04-16 DIAGNOSIS — C20 RECTAL CANCER METASTASIZED TO LIVER (HCC): ICD-10-CM

## 2021-04-16 DIAGNOSIS — C78.7 RECTAL CANCER METASTASIZED TO LIVER (HCC): ICD-10-CM

## 2021-04-16 LAB
ALBUMIN SERPL-MCNC: 4.5 G/DL (ref 3.5–5.2)
ALBUMIN/GLOB SERPL: 1.5 G/DL (ref 1.1–2.4)
ALP SERPL-CCNC: 67 U/L (ref 38–116)
ALT SERPL W P-5'-P-CCNC: 17 U/L (ref 0–33)
ANION GAP SERPL CALCULATED.3IONS-SCNC: 10.1 MMOL/L (ref 5–15)
AST SERPL-CCNC: 26 U/L (ref 0–32)
BASOPHILS # BLD AUTO: 0.04 10*3/MM3 (ref 0–0.2)
BASOPHILS NFR BLD AUTO: 0.6 % (ref 0–1.5)
BILIRUB SERPL-MCNC: 0.2 MG/DL (ref 0.2–1.2)
BUN SERPL-MCNC: 16 MG/DL (ref 6–20)
BUN/CREAT SERPL: 22.5 (ref 7.3–30)
CALCIUM SPEC-SCNC: 9.7 MG/DL (ref 8.5–10.2)
CEA SERPL-MCNC: 1.78 NG/ML
CHLORIDE SERPL-SCNC: 106 MMOL/L (ref 98–107)
CO2 SERPL-SCNC: 23.9 MMOL/L (ref 22–29)
CREAT SERPL-MCNC: 0.71 MG/DL (ref 0.6–1.1)
DEPRECATED RDW RBC AUTO: 40.1 FL (ref 37–54)
EOSINOPHIL # BLD AUTO: 0.07 10*3/MM3 (ref 0–0.4)
EOSINOPHIL NFR BLD AUTO: 1 % (ref 0.3–6.2)
ERYTHROCYTE [DISTWIDTH] IN BLOOD BY AUTOMATED COUNT: 12.4 % (ref 12.3–15.4)
GFR SERPL CREATININE-BSD FRML MDRD: 85 ML/MIN/1.73
GLOBULIN UR ELPH-MCNC: 3.1 GM/DL (ref 1.8–3.5)
GLUCOSE SERPL-MCNC: 99 MG/DL (ref 74–124)
HCT VFR BLD AUTO: 41.5 % (ref 34–46.6)
HGB BLD-MCNC: 13.7 G/DL (ref 12–15.9)
IMM GRANULOCYTES # BLD AUTO: 0.02 10*3/MM3 (ref 0–0.05)
IMM GRANULOCYTES NFR BLD AUTO: 0.3 % (ref 0–0.5)
LYMPHOCYTES # BLD AUTO: 1.51 10*3/MM3 (ref 0.7–3.1)
LYMPHOCYTES NFR BLD AUTO: 20.9 % (ref 19.6–45.3)
MCH RBC QN AUTO: 29.3 PG (ref 26.6–33)
MCHC RBC AUTO-ENTMCNC: 33 G/DL (ref 31.5–35.7)
MCV RBC AUTO: 88.9 FL (ref 79–97)
MONOCYTES # BLD AUTO: 0.69 10*3/MM3 (ref 0.1–0.9)
MONOCYTES NFR BLD AUTO: 9.6 % (ref 5–12)
NEUTROPHILS NFR BLD AUTO: 4.89 10*3/MM3 (ref 1.7–7)
NEUTROPHILS NFR BLD AUTO: 67.6 % (ref 42.7–76)
NRBC BLD AUTO-RTO: 0 /100 WBC (ref 0–0.2)
PLATELET # BLD AUTO: 244 10*3/MM3 (ref 140–450)
PMV BLD AUTO: 9.8 FL (ref 6–12)
POTASSIUM SERPL-SCNC: 4 MMOL/L (ref 3.5–4.7)
PROT SERPL-MCNC: 7.6 G/DL (ref 6.3–8)
RBC # BLD AUTO: 4.67 10*6/MM3 (ref 3.77–5.28)
SODIUM SERPL-SCNC: 140 MMOL/L (ref 134–145)
WBC # BLD AUTO: 7.22 10*3/MM3 (ref 3.4–10.8)

## 2021-04-16 PROCEDURE — 36415 COLL VENOUS BLD VENIPUNCTURE: CPT

## 2021-04-16 PROCEDURE — 85025 COMPLETE CBC W/AUTO DIFF WBC: CPT

## 2021-04-16 PROCEDURE — 82378 CARCINOEMBRYONIC ANTIGEN: CPT | Performed by: INTERNAL MEDICINE

## 2021-04-16 PROCEDURE — 99213 OFFICE O/P EST LOW 20 MIN: CPT | Performed by: INTERNAL MEDICINE

## 2021-04-16 PROCEDURE — 80053 COMPREHEN METABOLIC PANEL: CPT

## 2021-04-16 NOTE — PROGRESS NOTES
Subjective .     REASONS FOR FOLLOW UP:  Metastatic SIGMOID UPPER rectal cancer to the liver.  Initiated chemotherapy with FOLFOX-6 9/1/2016. COMPLETED IN SEPT 2017, EXTENSIVE SURGERY ON HER LIVER AND REMVAL OF PRIMARY TUMOR SURRENDER HER KIRIT.    HISTORY OF PRESENT ILLNESS:  DURING THE VISIT WITH THE PATIENT TODAY , PATIENT HAD FACE MASK, MY MEDICAL ASSISTANT AND I  HAD PROPPER PROTECTIVE EQUIPMENT, AND I DID HAND HYGIENE WITH SOAP AND WATER BEFORE AND AFTER THE VISIT.      This patient returns today to the office for followup. She is here today with no specific complaints in regard to her previous history of metastatic sigmoid colon cancer to the liver. She underwent an aggressive plan of chemotherapy and radical surgery including the removal of her liver metastasis and her primary tumor at the [facility] in [place], [place]. She achieved a complete response. She has kept that response and she plans to go back to [facility] in the summer. Overall she continues doing fine. Her level of energy is excellent. Appetite is normal. No abdominal pain, distention or jaundice. Normal bowel activity. Occasional diarrhea time to time with no passage of blood. Urination is normal. No cardiovascular or respiratory issues. She is fully functional. She has completed her COVID vaccination.                         Past Medical History:   Diagnosis Date   • ADD (attention deficit disorder)    • Anemia    • Colon cancer (CMS/HCC)     NEW DIAGNOSIS   • Drug therapy    • Fatigue     secondary to chemotherapy    • H/O foreign travel 03/2016    [place], [place]   • Hepatic metastases (CMS/HCC) 10/2016    partial right hepatectomy [place] 10/16   • History of transfusion     1999 AFTER TUBAL RUPTURE   • Hyperlipidemia    • Middle cerebral artery aneurysm    • Status post partial colectomy 04/2017    [facility]     Past Surgical History:   Procedure Laterality Date   • AUGMENTATION MAMMAPLASTY     •  COLONOSCOPY      MAY 2016   • CRANIOTOMY  2004, 2005    Cerebral aneurysm   • LAPAROSCOPY FOR ECTOPIC PREGNANCY  09/1997   • LIVER SURGERY      MASS REMOVED   • [place] INSJ TUNNELED CVC W/O SUBQ PORT/ AGE 3 YR/> Right 8/26/2016    Procedure: MEDIPORT PLACEMENT ;  Surgeon: Praful Holden MD;  Location: [facility] OR;  Service: Vascular   • SINUS SURGERY         Current Outpatient Medications on File Prior to Visit   Medication Sig Dispense Refill   • B Complex Vitamins (VITAMIN B COMPLEX PO) Take  by mouth Daily.     • cholecalciferol (VITAMIN D3) 1000 UNITS tablet Take 1,000 Units by mouth Daily.     • Coenzyme Q10 (CO Q 10 PO) Take  by mouth.     • dexmethylphenidate XR (FOCALIN XR) 20 MG 24 hr capsule Take 1 capsule by mouth Daily 30 capsule 0   • Finacea 15 % foam      • Multiple Vitamins-Minerals (MULTIVITAMIN WITH MINERALS) tablet tablet Take 1 tablet by mouth Daily.       Current Facility-Administered Medications on File Prior to Visit   Medication Dose Route Frequency Provider Last Rate Last Admin   • [DISCONTINUED] heparin flush (porcine) 100 UNIT/ML injection 300 Units  300 Units Intracatheter Once Carmel Fritz APRN       • [DISCONTINUED] heparin flush (porcine) 100 UNIT/ML injection 500 Units  500 Units Intravenous PRN Zheng Johnson MD   500 Units at 09/07/17 1438   • [DISCONTINUED] heparin injection 500 Units  500 Units Intravenous PRN Zheng Johnson MD   500 Units at 03/10/20 1644   • [DISCONTINUED] sodium chloride 0.9 % flush 10 mL  10 mL Intravenous PRN Zheng Johnson MD   10 mL at 09/07/17 1437   • [DISCONTINUED] sodium chloride 0.9 % flush 10 mL  10 mL Intravenous PRN Zheng Johnson MD   10 mL at 03/10/20 1642     ALLERGIES:   No Known Allergies    SOCIAL HISTORY:       Social History     Tobacco Use   • Smoking status: Never Smoker   • Smokeless tobacco: Never Used   Substance Use Topics   • Alcohol use: Yes     Alcohol/week: 5.0 standard drinks     Types: 5 Glasses of  "wine per week     Comment: Social     FAMILY HISTORY:  Family History   Problem Relation Age of Onset   • Cerebral aneurysm Mother    • Stroke Mother    • COPD Father                  Objective    Vitals:    04/16/21 1625   BP: 143/68   Pulse: 82   Resp: 16   Temp: 96.9 °F (36.1 °C)   TempSrc: Temporal   SpO2: 97%   Weight: 59 kg (130 lb)   Height: 162.6 cm (64.02\")   PainSc: 0-No pain     Current Status 4/16/2021   ECOG score 0      PHYSICAL EXAM:  I HAVE PERSONALLY REVIEWED THE HISTORY OF THE PRESENT ILLNESS, PAST MEDICAL HISTORY, FAMILY HISTORY, SOCIAL HISTORY, ALLERGIES, MEDICATIONS STATED ABOVE IN THE OFFICE NOTE FROM TODAY.  Patient screened  for depression based on a PHQ-9 score of 0 on 4/16/2021.   GENERAL:  Well-developed, well-nourished  Patient  in no acute distress.   SKIN:  Warm, dry ,NO rashes,NO purpura ,NO petechiae.  HEENT:  Pupils were equal and reactive to light and accomodation, conjunctivae noninjected, no pterygium, normal extraocular movements, normal visual acuity.   NECK:  Supple with good range of motion; no thyromegaly or masses, no JVD or bruits, no cervical adenopathies.No carotid artery pain, no carotid abnormal pulsation , NO arterial dance.  LYMPHATICS:  No cervical, NO supraclavicular, NO axillary,NO epitrochlear , NO inguinal adenopathy.  CARDIAC   normal rate and regular rhythm, without murmur,NO rubs NO S3 NO S4 right or left . Normal femoral, popliteal, pedis, brachial and carotid pulses.  VASCULAR ARTERIAL: normal carotids,brachial,radial,femoral,popliteal, pedis pulses , no bruits.no paleness or cyanosis, no pain, no edema, no numbness, no gangrene.  VASCULAR VENOUS: no cyanosis, collateral circulation, varicosities, edema, palpable cords, pain, erythema.  ABDOMEN:  Soft, nontender with no hepatomegaly, no splenomegaly,no masses, no ascites, no collateral circulation,no distention,no Fidel sign, no abdominal pain, no inguinal hernias,no umbilical hernia,SURGICAL SCARS WELL " HEALED NO HERNIAS   GENITAL: Not  Performed.  EXTREMITIES  AND SPINE:  No clubbing, cyanosis or edema, no deformities or pain .No kyphosis, scoliosis, deformities or pain in spine, ribs or pelvic bone.  NEUROLOGICAL:  Patient was awake, alert, oriented to time, person and place.                    RECENT LABS:  Lab Results   Component Value Date    WBC 7.22 04/16/2021    HGB 13.7 04/16/2021    HCT 41.5 04/16/2021    MCV 88.9 04/16/2021     04/16/2021     Lab Results   Component Value Date    GLUCOSE 95 10/16/2020    BUN 13 10/16/2020    CREATININE 0.80 10/16/2020    EGFRIFNONA 82 12/29/2020    EGFRIFAFRI >90 12/29/2020    BCR 16.3 10/16/2020    CO2 27.5 10/16/2020    CALCIUM 9.9 10/16/2020    ALBUMIN 4.40 10/16/2020    LABIL2 1.0 02/04/2019    AST 27 10/16/2020    ALT 14 10/16/2020                       Assessment/Plan      1. Metastatic rectal sigmoid cancer to the liver,  KRAS negative, BRAF negative, MSI stable, NRAS negative. The patient has undergone neoadjuvant chemotherapy with FOLFOX-6 and she subsequently underwent 2 different surgeries for her liver metastasis one including resection and another one including ablation. Also she underwent final removal of the primary tumor that was still PET active after completion of FOLFOX chemotherapy. Subsequently the patient had an ileostomy that was closed and she was placed on Xeloda chronically that she took in September, October, November of 2017 when she discontinued on her own because of side effects. Since then the patient has been seen at the [facility] .I have reviewed laboratory workup from the [facility]. I have reviewed all the information available from the [facility] and her radiological assessment as well as her laboratory parameters and CEA are all normal. Therefore, it is always amazing to see somebody who has staged IV colon cancer to be treated aggressively and remain in remission after such a dramatic amount of extensive surgery and  chemotherapy. 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      The patient returned to the office on 04/16/2021. She is asymptomatic in regard to her previous history of sigmoid colon cancer with liver metastasis. She achieved a complete response after aggressive plan of chemotherapy as stated above and also resection of the primary tumor and her liver metastasis.     Today her clinical assessment is negative normal. She has minimal grade 1 sensory neuropathy in her toes. Has no implications whatsoever for her in regard to functional status. She has minimal if any other comorbidities. She has completed COVID vaccination. She will plan to go to [facility] in the summer. I will review her back in 6 months. She will have a CBC, CMP and a CEA level at that time.                   Zheng Johnson MD

## 2021-04-23 DIAGNOSIS — F98.8 ATTENTION DEFICIT DISORDER (ADD) WITHOUT HYPERACTIVITY: ICD-10-CM

## 2021-04-23 RX ORDER — DEXMETHYLPHENIDATE HYDROCHLORIDE 20 MG/1
20 CAPSULE, EXTENDED RELEASE ORAL DAILY
Qty: 30 CAPSULE | Refills: 0 | Status: SHIPPED | OUTPATIENT
Start: 2021-04-23 | End: 2021-05-26 | Stop reason: SDUPTHER

## 2021-04-23 NOTE — TELEPHONE ENCOUNTER
Caller: Isabel Florentino    Relationship: Self    Best call back number: [phone]    Medication needed:   Requested Prescriptions     Pending Prescriptions Disp Refills   • dexmethylphenidate XR (FOCALIN XR) 20 MG 24 hr capsule 30 capsule 0     Sig: Take 1 capsule by mouth Daily       When do you need the refill by: 04/27    What additional details did the patient provide when requesting the medication: PATIENT HAS A 5 DAY SUPPLY     Does the patient have less than a 3 day supply:  [] Yes  [x] No    What is the patient's preferred pharmacy: [facility] - [place], [place] - [facility] - [phone] [facility] [phone] FX

## 2021-05-26 DIAGNOSIS — F98.8 ATTENTION DEFICIT DISORDER (ADD) WITHOUT HYPERACTIVITY: ICD-10-CM

## 2021-05-26 RX ORDER — DEXMETHYLPHENIDATE HYDROCHLORIDE 20 MG/1
20 CAPSULE, EXTENDED RELEASE ORAL DAILY
Qty: 30 CAPSULE | Refills: 0 | Status: SHIPPED | OUTPATIENT
Start: 2021-05-26 | End: 2021-07-08 | Stop reason: SDUPTHER

## 2021-05-26 NOTE — TELEPHONE ENCOUNTER
Caller: Isabel Florentino    Relationship: Self    Best call back number: [phone]    Medication needed:   Requested Prescriptions     Pending Prescriptions Disp Refills   • dexmethylphenidate XR (FOCALIN XR) 20 MG 24 hr capsule 30 capsule 0     Sig: Take 1 capsule by mouth Daily       When do you need the refill by: 5/27/21    What additional details did the patient provide when requesting the medication: HAS FOUR PILLS     Does the patient have less than a 3 day supply:  [] Yes  [x] No    What is the patient's preferred pharmacy: [facility] - [place], [place] - [facility] - [phone] [facility] [phone] FX

## 2021-07-02 ENCOUNTER — APPOINTMENT (OUTPATIENT)
Dept: WOMENS IMAGING | Facility: HOSPITAL | Age: 56
End: 2021-07-02

## 2021-07-02 ENCOUNTER — PROCEDURE VISIT (OUTPATIENT)
Dept: OBSTETRICS AND GYNECOLOGY | Facility: CLINIC | Age: 56
End: 2021-07-02

## 2021-07-02 ENCOUNTER — OFFICE VISIT (OUTPATIENT)
Dept: OBSTETRICS AND GYNECOLOGY | Facility: CLINIC | Age: 56
End: 2021-07-02

## 2021-07-02 VITALS
SYSTOLIC BLOOD PRESSURE: 122 MMHG | WEIGHT: 125 LBS | HEIGHT: 64 IN | BODY MASS INDEX: 21.34 KG/M2 | DIASTOLIC BLOOD PRESSURE: 67 MMHG

## 2021-07-02 DIAGNOSIS — Z00.00 ANNUAL PHYSICAL EXAM: Primary | ICD-10-CM

## 2021-07-02 DIAGNOSIS — Z12.31 VISIT FOR SCREENING MAMMOGRAM: Primary | ICD-10-CM

## 2021-07-02 PROCEDURE — 77063 BREAST TOMOSYNTHESIS BI: CPT | Performed by: RADIOLOGY

## 2021-07-02 PROCEDURE — 99396 PREV VISIT EST AGE 40-64: CPT | Performed by: OBSTETRICS & GYNECOLOGY

## 2021-07-02 PROCEDURE — 77067 SCR MAMMO BI INCL CAD: CPT | Performed by: RADIOLOGY

## 2021-07-02 PROCEDURE — 77067 SCR MAMMO BI INCL CAD: CPT | Performed by: OBSTETRICS & GYNECOLOGY

## 2021-07-02 PROCEDURE — 77063 BREAST TOMOSYNTHESIS BI: CPT | Performed by: OBSTETRICS & GYNECOLOGY

## 2021-07-02 NOTE — PROGRESS NOTES
SILVESTRE Florentino  is a 55 y.o. female who presents for a routine gynecologic exam.  Overall, she is feeling well.  She has some diarrhea due to reversal of her ileostomy.  Bladder is functioning normally.  No longer is using vaginal estrogen.  Mammogram was performed today.  The patient is current with colon cancer screening.    Chief Complaint   Patient presents with   • Gynecologic Exam     Patient is here for a annual.       Past Medical History:   Diagnosis Date   • ADD (attention deficit disorder)    • Anemia    • Colon cancer (CMS/HCC)     NEW DIAGNOSIS   • Drug therapy    • Fatigue     secondary to chemotherapy    • H/O foreign travel 03/2016    [place], [place]   • Hepatic metastases (CMS/HCC) 10/2016    partial right hepatectomy [place] 10/16   • History of transfusion     1999 AFTER TUBAL RUPTURE   • Hyperlipidemia    • Middle cerebral artery aneurysm    • Status post partial colectomy 04/2017    [facility]       Past Surgical History:   Procedure Laterality Date   • AUGMENTATION MAMMAPLASTY     • COLONOSCOPY      MAY 2016   • CRANIOTOMY  2004, 2005    Cerebral aneurysm   • LAPAROSCOPY FOR ECTOPIC PREGNANCY  09/1997   • LIVER SURGERY      MASS REMOVED   • ID INSJ TUNNELED CVC W/O SUBQ PORT/ AGE 3 YR/> Right 8/26/2016    Procedure: MEDIPORT PLACEMENT ;  Surgeon: Praful Holden MD;  Location: [facility];  Service: Vascular   • SINUS SURGERY         Social History     Socioeconomic History   • Marital status:      Spouse name: Amos   • Number of children: 1   • Years of education: COLLEGE   • Highest education level: Not on file   Tobacco Use   • Smoking status: Never Smoker   • Smokeless tobacco: Never Used   Substance and Sexual Activity   • Alcohol use: Yes     Alcohol/week: 5.0 standard drinks     Types: 5 Glasses of wine per week     Comment: Social   • Drug use: No   • Sexual activity: Yes     Partners: Male     Birth control/protection: None        The following portions of the patient's history were reviewed and updated as appropriate: allergies, current medications, past family history, past medical history, past social history, past surgical history and problem list.    Review of Systems   Constitutional: Negative.    HENT: Negative.    Respiratory: Negative.    Cardiovascular: Negative.    Gastrointestinal: Negative.    Genitourinary: Negative.    Musculoskeletal: Negative.    Skin: Negative.    Allergic/Immunologic: Negative.    Psychiatric/Behavioral: Negative.      Patient reports that she is not currently experiencing any symptoms of urinary incontinence.          Physical Exam  Vitals and nursing note reviewed.   Constitutional:       Appearance: She is well-developed.   HENT:      Head: Normocephalic and atraumatic.   Cardiovascular:      Rate and Rhythm: Normal rate and regular rhythm.   Pulmonary:      Effort: Pulmonary effort is normal.      Breath sounds: Normal breath sounds. No wheezing or rales.   Chest:      Comments: Implants are in place bilaterally.  There are no palpable breast lumps.  Nipple discharge and axillary adenopathy are absent.  Abdominal:      General: There is no distension.      Palpations: Abdomen is soft.      Tenderness: There is no abdominal tenderness.   Genitourinary:     Labia:         Right: No lesion.         Left: No lesion.       Vagina: Normal. No vaginal discharge.      Cervix: No cervical motion tenderness.      Uterus: Absent.       Adnexa:         Right: No mass or tenderness.          Left: No mass or tenderness.     Skin:     General: Skin is warm and dry.   Neurological:      Mental Status: She is alert and oriented to person, place, and time.         Assessment    Diagnoses and all orders for this visit:    1. Annual physical exam (Primary)        Plan  1. Annual examination performed  2. Counseled regarding the importance of regular screening mammograms.  Mammogram was performed today.  3. The  patient is current with colon cancer screening.    4. Return in about 1 year (around 7/2/2022).    Social History     Tobacco Use   Smoking Status Never Smoker   5.

## 2021-07-08 ENCOUNTER — LAB (OUTPATIENT)
Dept: FAMILY MEDICINE CLINIC | Facility: CLINIC | Age: 56
End: 2021-07-08

## 2021-07-08 ENCOUNTER — OFFICE VISIT (OUTPATIENT)
Dept: FAMILY MEDICINE CLINIC | Facility: CLINIC | Age: 56
End: 2021-07-08

## 2021-07-08 VITALS
OXYGEN SATURATION: 99 % | DIASTOLIC BLOOD PRESSURE: 77 MMHG | RESPIRATION RATE: 10 BRPM | HEART RATE: 64 BPM | BODY MASS INDEX: 21.99 KG/M2 | WEIGHT: 128.8 LBS | SYSTOLIC BLOOD PRESSURE: 124 MMHG | HEIGHT: 64 IN

## 2021-07-08 DIAGNOSIS — F98.8 ATTENTION DEFICIT DISORDER (ADD) WITHOUT HYPERACTIVITY: Primary | ICD-10-CM

## 2021-07-08 DIAGNOSIS — E55.9 VITAMIN D DEFICIENCY: ICD-10-CM

## 2021-07-08 DIAGNOSIS — F90.0 ATTENTION DEFICIT HYPERACTIVITY DISORDER (ADHD), PREDOMINANTLY INATTENTIVE TYPE: ICD-10-CM

## 2021-07-08 LAB
25(OH)D3 SERPL-MCNC: 62.9 NG/ML
CHOLEST SERPL-MCNC: 206 MG/DL (ref 0–200)
CONV .: ABNORMAL
CYTOLOGIST CVX/VAG CYTO: ABNORMAL
CYTOLOGY CVX/VAG DOC CYTO: ABNORMAL
CYTOLOGY CVX/VAG DOC THIN PREP: ABNORMAL
DX ICD CODE: ABNORMAL
DX ICD CODE: ABNORMAL
HDLC SERPL-MCNC: 96 MG/DL (ref 40–60)
HIV 1 & 2 AB SER-IMP: ABNORMAL
HPV I/H RISK 4 DNA CVX QL PROBE+SIG AMP: NEGATIVE
LDLC SERPL CALC-MCNC: 95 MG/DL (ref 0–100)
LDLC/HDLC SERPL: 0.97 {RATIO}
OTHER STN SPEC: ABNORMAL
PATHOLOGIST CVX/VAG CYTO: ABNORMAL
RECOM F/U CVX/VAG CYTO: ABNORMAL
STAT OF ADQ CVX/VAG CYTO-IMP: ABNORMAL
TRIGL SERPL-MCNC: 83 MG/DL (ref 0–150)
VLDLC SERPL-MCNC: 15 MG/DL (ref 5–40)

## 2021-07-08 PROCEDURE — 36415 COLL VENOUS BLD VENIPUNCTURE: CPT | Performed by: INTERNAL MEDICINE

## 2021-07-08 PROCEDURE — 82306 VITAMIN D 25 HYDROXY: CPT | Performed by: INTERNAL MEDICINE

## 2021-07-08 PROCEDURE — 80061 LIPID PANEL: CPT | Performed by: INTERNAL MEDICINE

## 2021-07-08 PROCEDURE — 99213 OFFICE O/P EST LOW 20 MIN: CPT | Performed by: INTERNAL MEDICINE

## 2021-07-08 RX ORDER — DEXMETHYLPHENIDATE HYDROCHLORIDE 20 MG/1
20 CAPSULE, EXTENDED RELEASE ORAL DAILY
Qty: 30 CAPSULE | Refills: 0 | Status: SHIPPED | OUTPATIENT
Start: 2021-07-08 | End: 2021-08-12 | Stop reason: SDUPTHER

## 2021-07-08 NOTE — PROGRESS NOTES
"Rooming Tab(CC,VS,Pt Hx,Fall Screen)  Chief Complaint   Patient presents with   • ADD       Subjective   Pt here for ADD check- meds working well- wears off by dinner- still eats good lunch. Sleeps well. Has heart racing 1/month- but more at night- not associated with meds that she is aware of. Can tolerated 1/2- 1 coffee in mornings   Been working out 5 days a week- down 10 lbs very happy- all  Scans clear  Every 6 months        I have reviewed and updated her medications, medical history and problem list during today's office visit.     Patient Care Team:  Hannah Roque MD as PCP - Zheng Qureshi MD as Consulting Physician (Hematology and Oncology)  Lenore Schuler MD as Consulting Physician (Colon and Rectal Surgery)  Lenore Schuler MD as Referring Physician (Colon and Rectal Surgery)    Problem List Tab  Medications Tab  Synopsis Tab  Chart Review Tab  Care Everywhere Tab  Immunizations Tab  Patient History Tab    Social History     Tobacco Use   • Smoking status: Never Smoker   • Smokeless tobacco: Never Used   Substance Use Topics   • Alcohol use: Yes     Alcohol/week: 5.0 standard drinks     Types: 5 Glasses of wine per week     Comment: Social       Review of Systems    Objective     Rooming Tab(CC,VS,Pt Hx,Fall Screen)  /77   Pulse 64   Resp 10   Ht 162.6 cm (64\")   Wt 58.4 kg (128 lb 12.8 oz)   LMP  (LMP Unknown)   SpO2 99%   BMI 22.11 kg/m²     Body mass index is 22.11 kg/m².    Physical Exam  Vitals and nursing note reviewed.   Constitutional:       Appearance: Normal appearance. She is well-developed.   HENT:      Head: Normocephalic and atraumatic.      Right Ear: Tympanic membrane normal.      Left Ear: Tympanic membrane normal.      Nose: No rhinorrhea.      Mouth/Throat:      Pharynx: No posterior oropharyngeal erythema.   Eyes:      Pupils: Pupils are equal, round, and reactive to light.   Cardiovascular:      Rate and Rhythm: Normal rate and regular rhythm.     "  Pulses: Normal pulses.      Heart sounds: Normal heart sounds. No murmur heard.     Pulmonary:      Effort: Pulmonary effort is normal.      Breath sounds: Normal breath sounds.   Abdominal:      General: Bowel sounds are normal. There is no distension.      Palpations: Abdomen is soft.   Musculoskeletal:         General: No tenderness.      Cervical back: Normal range of motion and neck supple.   Skin:     Capillary Refill: Capillary refill takes less than 2 seconds.   Neurological:      Mental Status: She is alert and oriented to person, place, and time.   Psychiatric:         Mood and Affect: Mood normal.         Behavior: Behavior normal.          Statin Choice Calculator  Data Reviewed:         The data below has been reviewed by Hannah Roque MD on 07/08/2021.      Lab Results   Component Value Date    BUN 16 04/16/2021    CREATININE 0.71 04/16/2021    EGFRIFNONA 85 04/16/2021     04/16/2021    K 4.0 04/16/2021     04/16/2021    CALCIUM 9.7 04/16/2021    ALBUMIN 4.50 04/16/2021    BILITOT 0.2 04/16/2021    ALKPHOS 67 04/16/2021    AST 26 04/16/2021    ALT 17 04/16/2021    TRIG 83 07/08/2021    HDL 96 (H) 07/08/2021    VLDL 15 07/08/2021    LDL 95 07/08/2021    LDLHDL 0.97 07/08/2021    WBC 7.22 04/16/2021    RBC 4.67 04/16/2021    HCT 41.5 04/16/2021    MCV 88.9 04/16/2021    MCH 29.3 04/16/2021    ZNRK03MW 62.9 07/08/2021      Assessment/Plan   Order Review Tab  Health Maintenance Tab  Patient Plan/Order Tab  Diagnoses and all orders for this visit:    1. Attention deficit disorder (ADD) without hyperactivity (Primary)  Comments:  continue with current regimen  Orders:  -     dexmethylphenidate XR (FOCALIN XR) 20 MG 24 hr capsule; Take 1 capsule by mouth Daily  Dispense: 30 capsule; Refill: 0    2. Attention deficit hyperactivity disorder (ADHD), predominantly inattentive type  Comments:   stay on same dose for now   Orders:  -     Lipid Panel    3. Vitamin D deficiency  -     Vitamin D  25 Hydroxy        Wrapup Tab  Return in about 6 months (around 1/8/2022), or if symptoms worsen or fail to improve.       They were informed of the diagnosis and treatment plan and directed to f/u for any further problems or concerns.

## 2021-07-09 DIAGNOSIS — F98.8 ATTENTION DEFICIT DISORDER (ADD) WITHOUT HYPERACTIVITY: ICD-10-CM

## 2021-07-09 RX ORDER — DEXMETHYLPHENIDATE HYDROCHLORIDE 20 MG/1
20 CAPSULE, EXTENDED RELEASE ORAL DAILY
Qty: 30 CAPSULE | Refills: 0 | Status: CANCELLED | OUTPATIENT
Start: 2021-07-09

## 2021-07-09 NOTE — TELEPHONE ENCOUNTER
Caller: Isabel Florentino    Relationship: Self    Best call back number: [phone]    Medication needed:   Requested Prescriptions     Pending Prescriptions Disp Refills   • dexmethylphenidate XR (FOCALIN XR) 20 MG 24 hr capsule 30 capsule 0     Sig: Take 1 capsule by mouth Daily       When do you need the refill by: 7/9/21  What additional details did the patient provide when requesting the medication: NEEDS CALLED IN  LESS THAN THREE DAYS    Does the patient have less than a 3 day supply:  [x] Yes  [] No    What is the patient's preferred pharmacy: [facility] - [place], [place] - [address] - [phone]  - [phone] FX

## 2021-07-11 PROBLEM — F98.8 ATTENTION DEFICIT DISORDER (ADD) WITHOUT HYPERACTIVITY: Status: ACTIVE | Noted: 2018-04-24

## 2021-07-12 ENCOUNTER — TELEPHONE (OUTPATIENT)
Dept: FAMILY MEDICINE CLINIC | Facility: CLINIC | Age: 56
End: 2021-07-12

## 2021-07-12 DIAGNOSIS — F98.8 ATTENTION DEFICIT DISORDER (ADD) WITHOUT HYPERACTIVITY: ICD-10-CM

## 2021-07-12 NOTE — TELEPHONE ENCOUNTER
Caller: Isabel Florentino    Relationship: Self    Best call back number: [phone]    PATIENT CALLED AND WANTED HER GENERIC FOR FOCALIN SENT OVER TO THE [facility] AT [facility] IN [place]    SHE SAID IT HAD SUJATHA SENT TO [facility] ORIGINALLY BUT SHE IS ABLE TO USE [employer] TO SAVE MONEY ON IT AT [facility]     SHE SAID IT IS 50 TO 60 DOLLARS MORE AT [facility]    HUB ADVISED PATIENT A MESSAGE REQUESTING THAT WOULD BE SENT INTO THE CLINICAL STAFF, THE PATIENT BECAME UPSET THAT IT MAY TAKE UNTIL THE END OF THE DAY TO GET IT DONE     THE PATIENT THEN  SAID SHE WOULD JUST GET IT AT [facility] AND THAT SHE WOULD LIKE US TO SEND HER THE 50-60 DOLLARS IT IS GOING TO COST HER TO GET IT THERE

## 2021-08-11 ENCOUNTER — TELEPHONE (OUTPATIENT)
Dept: OBSTETRICS AND GYNECOLOGY | Facility: CLINIC | Age: 56
End: 2021-08-11

## 2021-08-11 ENCOUNTER — DOCUMENTATION (OUTPATIENT)
Dept: OBSTETRICS AND GYNECOLOGY | Facility: CLINIC | Age: 56
End: 2021-08-11

## 2021-08-11 ENCOUNTER — TELEPHONE (OUTPATIENT)
Dept: ONCOLOGY | Facility: CLINIC | Age: 56
End: 2021-08-11

## 2021-08-11 NOTE — TELEPHONE ENCOUNTER
Patient returned call and is aware of results. Patient stated that she does not want to wait 6 months to come back for repeat testing. Patient is going to call her insurance company to see how long she would have to wait to come back in for repeat testing because she wants it done as soon as possible and does not want to wait 6 months.    Patient would like a call back from MA about this if possible.    Thank you-SAMANTHA

## 2021-08-11 NOTE — TELEPHONE ENCOUNTER
It is perfectly okay to schedule the patient for colposcopy.  Please help her to schedule this.  Thank you.  I have not been able to reach her by phone to discuss her concerns.

## 2021-08-11 NOTE — TELEPHONE ENCOUNTER
Patient called she is aware of need for colpo and she did not want to schedule at this time but states she wants a call back to discuss results further if possible. I do see we have been trying to get ahold of her number is correct.

## 2021-08-11 NOTE — PROGRESS NOTES
Phone call.  Results were reviewed and discussed with the patient.  We discussed in detail the clinical significance of atypical squamous cells of undetermined significance.  We also discussed the importance of the negative HPV test.  Because the patient has a history of cancer in the past, she is very concerned about this and would like to do colposcopy to further evaluate her cervix.  I feel that this is appropriate.  We discussed the procedure of colposcopy and I answered the patient's questions.  She would like to proceed.  She will call to schedule this in the near future.

## 2021-08-11 NOTE — TELEPHONE ENCOUNTER
Good afternoon,  Jie from Dr Pisano's office called and Dr Pisano has requested a colpo for the patient due to previous cancer. Would it be ok to schedule patient for colpo or is your recommendation for her to wait 6 months for a repeat pap?    Please advise,  Thank you

## 2021-08-11 NOTE — TELEPHONE ENCOUNTER
Caller: PT    Relationship: SELF    Best call back number: [phone] OK IS TO LEAVE A  MSG     What is the best time to reach you: AFTER 3PM IF POSSIBLE    Who are you requesting to speak with (clinical staff, provider,  specific staff member): CLINICAL    What was the call regarding: PT HAD ABNORMAL PAP SMEAR AND THEY SAID HPV IS NEGATIVE AND THERE WERE ABNORMAL CELLS AND THEY SAID SHE NEEDS TO BE RETESTED IN 6 MONTHS BUT SHE WOULD LIKE DR. FERGUSON OPINION    Do you require a callback: YES

## 2021-08-11 NOTE — TELEPHONE ENCOUNTER
I spoke to the patient on the phone.  We discussed the pathophysiology of ASCUS and also discussed her cancer history.  The patient would like very much to schedule a colposcopy.  Please help her to do so.  Thank you.

## 2021-08-11 NOTE — TELEPHONE ENCOUNTER
Patient returned call and refused to wait until first available on 8/31 at 9:00 AM and refused to schedule when advised that this is providers first available appointment.      Thank you-SAMANTHA

## 2021-08-11 NOTE — TELEPHONE ENCOUNTER
Patient called in asking Dr. Johnson opinion on abnormal cell pap. HPV negative. Dr. Johnson advised to have a colposcopy done. I spoke with the nurse at Dr. Rehman office who stated that she will send Dr. Collins a message inquiring about this exam. She stated that typically two abnormal paps have to come back for insurance to approve this. She stated that if Dr. Collins approves of this they will call patient to schedule.     Left vm on patients phone with updated information.

## 2021-08-12 ENCOUNTER — TELEPHONE (OUTPATIENT)
Dept: OBSTETRICS AND GYNECOLOGY | Facility: CLINIC | Age: 56
End: 2021-08-12

## 2021-08-12 DIAGNOSIS — F98.8 ATTENTION DEFICIT DISORDER (ADD) WITHOUT HYPERACTIVITY: ICD-10-CM

## 2021-08-12 RX ORDER — DEXMETHYLPHENIDATE HYDROCHLORIDE 20 MG/1
20 CAPSULE, EXTENDED RELEASE ORAL DAILY
Qty: 30 CAPSULE | Refills: 0 | Status: SHIPPED | OUTPATIENT
Start: 2021-08-12 | End: 2021-09-13 | Stop reason: SDUPTHER

## 2021-08-12 NOTE — TELEPHONE ENCOUNTER
Caller: Isabel Florentino    Relationship: Self    Best call back number: [phone]    Medication needed:   Requested Prescriptions     Pending Prescriptions Disp Refills   • dexmethylphenidate XR (FOCALIN XR) 20 MG 24 hr capsule 30 capsule 0     Sig: Take 1 capsule by mouth Daily       When do you need the refill by: 08/12/21    What additional details did the patient provide when requesting the medication: PATIENT WANTS IT TO GO TO [facility]    Does the patient have less than a 3 day supply:  [x] Yes  [] No    What is the patient's preferred pharmacy: [address], [place] - [address] [facility] - [phone] [facility] [phone] FX

## 2021-08-31 ENCOUNTER — TELEPHONE (OUTPATIENT)
Dept: OBSTETRICS AND GYNECOLOGY | Facility: CLINIC | Age: 56
End: 2021-08-31

## 2021-08-31 NOTE — TELEPHONE ENCOUNTER
Patient had to reschedule appointment today due to Karina being out due to possible exposure. We rescheduled her two weeks out incase his test comes back positive and patient is wanting to see if another provider will see her due to this if he is to be positive because she does not want to wait two weeks out. This appointment is for a colpo would any of you be willing to see patient for this I noticed you had sooner appointments available if this needs to happen?

## 2021-08-31 NOTE — TELEPHONE ENCOUNTER
Gisselle,     This is not an urgent matter. It is best to wait for her doctor to do the exam.  Her pap is ASCUS, HPV negative on an prior negative one year prior. That is not typically something I would even colpo (but this is practitioner dependant).     Thanks,   Dr. Fajardo

## 2021-09-13 DIAGNOSIS — F98.8 ATTENTION DEFICIT DISORDER (ADD) WITHOUT HYPERACTIVITY: ICD-10-CM

## 2021-09-13 RX ORDER — DEXMETHYLPHENIDATE HYDROCHLORIDE 20 MG/1
20 CAPSULE, EXTENDED RELEASE ORAL DAILY
Qty: 30 CAPSULE | Refills: 0 | Status: SHIPPED | OUTPATIENT
Start: 2021-09-13 | End: 2021-10-18 | Stop reason: SDUPTHER

## 2021-09-13 NOTE — TELEPHONE ENCOUNTER
Caller: Isabel Florentino    Relationship: Self    Best call back number: [phone]    Medication needed:   Requested Prescriptions     Pending Prescriptions Disp Refills   • dexmethylphenidate XR (FOCALIN XR) 20 MG 24 hr capsule 30 capsule 0     Sig: Take 1 capsule by mouth Daily       When do you need the refill by:TODAY    What additional details did the patient provide when requesting the medication: 1 DAY LEFT    Does the patient have less than a 3 day supply:  [x] Yes  [] No    What is the patient's preferred pharmacy: [facility] [address] [place] [address], [place]  [address] [facility] [phone] [facility] [phone] FX

## 2021-09-21 ENCOUNTER — OFFICE VISIT (OUTPATIENT)
Dept: OBSTETRICS AND GYNECOLOGY | Facility: CLINIC | Age: 56
End: 2021-09-21

## 2021-09-21 VITALS
BODY MASS INDEX: 22.02 KG/M2 | HEIGHT: 64 IN | WEIGHT: 129 LBS | DIASTOLIC BLOOD PRESSURE: 72 MMHG | SYSTOLIC BLOOD PRESSURE: 122 MMHG

## 2021-09-21 DIAGNOSIS — R87.610 ASCUS OF CERVIX WITH NEGATIVE HIGH RISK HPV: ICD-10-CM

## 2021-09-21 PROCEDURE — 57455 BIOPSY OF CERVIX W/SCOPE: CPT | Performed by: OBSTETRICS & GYNECOLOGY

## 2021-09-21 NOTE — PROGRESS NOTES
SILVESTRE Florentino  is a 56 y.o. female who presents for a colposcopy.  Last Pap was ASCUS with a negative HPV.  Because of the patient's cancer history, she would like to proceed with colposcopy.  I counseled her regarding the procedure itself as well as its risks, benefits and alternatives.  She would like to proceed.    Chief Complaint   Patient presents with   • Colposcopy       Past Medical History:   Diagnosis Date   • ADD (attention deficit disorder)    • Anemia    • Colon cancer (CMS/HCC)     NEW DIAGNOSIS   • Drug therapy    • Fatigue     secondary to chemotherapy    • H/O foreign travel 03/2016    [place], [place]   • Hepatic metastases (CMS/HCC) 10/2016    partial right hepatectomy [place] 10/16   • History of transfusion     1999 AFTER TUBAL RUPTURE   • Hyperlipidemia    • Middle cerebral artery aneurysm    • Status post partial colectomy 04/2017    [facility]       Past Surgical History:   Procedure Laterality Date   • AUGMENTATION MAMMAPLASTY     • COLONOSCOPY      MAY 2016   • CRANIOTOMY  2004, 2005    Cerebral aneurysm   • LAPAROSCOPY FOR ECTOPIC PREGNANCY  09/1997   • LIVER SURGERY      MASS REMOVED   • [place] INSJ TUNNELED CVC W/O SUBQ PORT/ AGE 3 YR/> Right 8/26/2016    Procedure: MEDIPORT PLACEMENT ;  Surgeon: Praful Holden MD;  Location: [facility];  Service: Vascular   • SINUS SURGERY         Social History     Socioeconomic History   • Marital status:      Spouse name: Amos   • Number of children: 1   • Years of education: COLLEGE   • Highest education level: Not on file   Tobacco Use   • Smoking status: Never Smoker   • Smokeless tobacco: Never Used   Substance and Sexual Activity   • Alcohol use: Yes     Alcohol/week: 5.0 standard drinks     Types: 5 Glasses of wine per week     Comment: Social   • Drug use: No   • Sexual activity: Yes     Partners: Male     Birth control/protection: None       The following portions of the patient's history  were reviewed and updated as appropriate: allergies, current medications, past family history, past medical history, past social history, past surgical history and problem list.    Review of Systems          Physical Exam  Vitals and nursing note reviewed.   Constitutional:       Appearance: Normal appearance.   Genitourinary:           Comments: Colposcopy was performed with good visualization of the transformation zone.  There is acetowhite epithelium at the 9 o'clock position on the cervix.  Biopsy performed.  This was treated with Monsel solution to hemostasis.  The patient tolerated the procedure well.  Neurological:      Mental Status: She is alert and oriented to person, place, and time.         Assessment    Diagnoses and all orders for this visit:    1. ASCUS of cervix with negative high risk HPV        Plan  1. Colposcopy with biopsy was performed as described above.  The patient tolerated the procedure well.    2. No follow-ups on file.    Social History     Tobacco Use   Smoking Status Never Smoker   3.

## 2021-09-23 LAB
DX ICD CODE: NORMAL
DX ICD CODE: NORMAL
PATH REPORT.FINAL DX SPEC: NORMAL
PATH REPORT.GROSS SPEC: NORMAL
PATH REPORT.SITE OF ORIGIN SPEC: NORMAL
PATHOLOGIST NAME: NORMAL
PAYMENT PROCEDURE: NORMAL

## 2021-09-28 ENCOUNTER — TELEPHONE (OUTPATIENT)
Dept: ONCOLOGY | Facility: CLINIC | Age: 56
End: 2021-09-28

## 2021-09-28 NOTE — TELEPHONE ENCOUNTER
Called Dr. Johnson after receiving the patients call in from triage. I seen that Dr. Ramos noted he spoke with Dr. Johnson. He stated that he did speak with him and that he needed to call the patient. I let Dr. 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 He stated that he would have to call her this evening sometime then. I sent her a [employer] message to let her know to be expecting a call.

## 2021-09-28 NOTE — TELEPHONE ENCOUNTER
Provider: MARTY    Caller: DEEPTI    Relationship to Patient: SELF    Phone Number: [phone]    Reason for Call: PT CALLED TO INQUIRE IF TEST RESULTS WERE FAX OVER FROM HER OGBYN AND HAS DR FERGUSON HAD A CHANCE TO GO OVER RESULTS. PLEASE CALL PT BACK TO ADVISE

## 2021-09-29 ENCOUNTER — TELEPHONE (OUTPATIENT)
Dept: ONCOLOGY | Facility: CLINIC | Age: 56
End: 2021-09-29

## 2021-09-29 NOTE — TELEPHONE ENCOUNTER
I have talked with Xavier Collins MD, about this patient in regard abnormal pap smear that has been found recently. This is not the first time. He has asked me to talk with the patient about what he would like to do. I talked with her today on the telephone, she is having no symptoms but I pointed out to her that this is a condition that she needs to get rid of. I am sure that Dr. Collins will be able to explain to her what is the procedure that gives her the least of the problems with most of the benefits and proceed with that. From my point of view she needs to get rid of this, she is a radical person and she does not need to be worrying about this every 6 months another procedure or another sample and go on without any definition. She completely agrees with me and I asked her to discuss this with Dr. Collins. I will send this information to him today.

## 2021-10-04 ENCOUNTER — TELEPHONE (OUTPATIENT)
Dept: OBSTETRICS AND GYNECOLOGY | Facility: CLINIC | Age: 56
End: 2021-10-04

## 2021-10-04 NOTE — TELEPHONE ENCOUNTER
Patients oncologist told her to call and talk with you about a plant you all have come up with for her. 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

## 2021-10-06 ENCOUNTER — PREP FOR SURGERY (OUTPATIENT)
Dept: OTHER | Facility: HOSPITAL | Age: 56
End: 2021-10-06

## 2021-10-06 DIAGNOSIS — N87.9 CERVICAL DYSPLASIA: Primary | ICD-10-CM

## 2021-10-06 RX ORDER — SODIUM CHLORIDE 0.9 % (FLUSH) 0.9 %
10 SYRINGE (ML) INJECTION AS NEEDED
Status: CANCELLED | OUTPATIENT
Start: 2021-11-22

## 2021-10-06 RX ORDER — SODIUM CHLORIDE 0.9 % (FLUSH) 0.9 %
3 SYRINGE (ML) INJECTION EVERY 12 HOURS SCHEDULED
Status: CANCELLED | OUTPATIENT
Start: 2021-11-22

## 2021-10-06 NOTE — TELEPHONE ENCOUNTER
I have counseled the patient regarding options for management. We discussed the patient's results and options. The patient does not wish to continue managing expectantly. We discussed the benefits and risks of cervical cryotherapy versus LEEP. I answered the patient's questions. In view of her previous cancer history, the patient prefers to be more aggressive. She would like to have a LEEP. We reviewed again the benefits, risks and alternatives as well as the procedure itself. I answered the patient's questions and she would like to proceed.

## 2021-10-15 ENCOUNTER — APPOINTMENT (OUTPATIENT)
Dept: LAB | Facility: HOSPITAL | Age: 56
End: 2021-10-15

## 2021-10-18 DIAGNOSIS — F98.8 ATTENTION DEFICIT DISORDER (ADD) WITHOUT HYPERACTIVITY: ICD-10-CM

## 2021-10-18 RX ORDER — DEXMETHYLPHENIDATE HYDROCHLORIDE 20 MG/1
20 CAPSULE, EXTENDED RELEASE ORAL DAILY
Qty: 30 CAPSULE | Refills: 0 | Status: SHIPPED | OUTPATIENT
Start: 2021-10-18 | End: 2021-11-19 | Stop reason: SDUPTHER

## 2021-10-18 NOTE — TELEPHONE ENCOUNTER
Caller: Isabel Florentino    Relationship: Self      Medication requested (name and dosage): dexmethylphenidate XR (FOCALIN XR)   20 MG 24 hr capsule    Pharmacy where request should be sent: [facility] - [place], [place] - [address] [place] [address] - [phone]  - [phone]   FX  [phone]    Additional details provided by patient: 2-3 DAYS OF MEDICATION     Best call back number: [phone]     Does the patient have less than a 3 day supply:  [x] Yes  [] No    Angelika Conde   10/18/21 15:28 EDT

## 2021-11-05 ENCOUNTER — TELEPHONE (OUTPATIENT)
Dept: ONCOLOGY | Facility: OTHER | Age: 56
End: 2021-11-05

## 2021-11-10 ENCOUNTER — APPOINTMENT (OUTPATIENT)
Dept: LAB | Facility: HOSPITAL | Age: 56
End: 2021-11-10

## 2021-11-15 ENCOUNTER — TRANSCRIBE ORDERS (OUTPATIENT)
Dept: OBSTETRICS AND GYNECOLOGY | Facility: CLINIC | Age: 56
End: 2021-11-15

## 2021-11-15 DIAGNOSIS — N87.9 CERVICAL DYSPLASIA: Primary | ICD-10-CM

## 2021-11-19 ENCOUNTER — PRE-ADMISSION TESTING (OUTPATIENT)
Dept: PREADMISSION TESTING | Facility: HOSPITAL | Age: 56
End: 2021-11-19

## 2021-11-19 VITALS
TEMPERATURE: 97.9 F | SYSTOLIC BLOOD PRESSURE: 157 MMHG | BODY MASS INDEX: 22.53 KG/M2 | HEART RATE: 81 BPM | DIASTOLIC BLOOD PRESSURE: 58 MMHG | HEIGHT: 64 IN | WEIGHT: 132 LBS | OXYGEN SATURATION: 100 % | RESPIRATION RATE: 16 BRPM

## 2021-11-19 DIAGNOSIS — F98.8 ATTENTION DEFICIT DISORDER (ADD) WITHOUT HYPERACTIVITY: ICD-10-CM

## 2021-11-19 LAB
ANION GAP SERPL CALCULATED.3IONS-SCNC: 12.9 MMOL/L (ref 5–15)
BUN SERPL-MCNC: 14 MG/DL (ref 6–20)
BUN/CREAT SERPL: 20.6 (ref 7–25)
CALCIUM SPEC-SCNC: 9.5 MG/DL (ref 8.6–10.5)
CHLORIDE SERPL-SCNC: 103 MMOL/L (ref 98–107)
CO2 SERPL-SCNC: 21.1 MMOL/L (ref 22–29)
CREAT SERPL-MCNC: 0.68 MG/DL (ref 0.57–1)
DEPRECATED RDW RBC AUTO: 42.3 FL (ref 37–54)
ERYTHROCYTE [DISTWIDTH] IN BLOOD BY AUTOMATED COUNT: 12.9 % (ref 12.3–15.4)
GFR SERPL CREATININE-BSD FRML MDRD: 89 ML/MIN/1.73
GLUCOSE SERPL-MCNC: 89 MG/DL (ref 65–99)
HCT VFR BLD AUTO: 39 % (ref 34–46.6)
HGB BLD-MCNC: 13.1 G/DL (ref 12–15.9)
MCH RBC QN AUTO: 29.8 PG (ref 26.6–33)
MCHC RBC AUTO-ENTMCNC: 33.6 G/DL (ref 31.5–35.7)
MCV RBC AUTO: 88.8 FL (ref 79–97)
PLATELET # BLD AUTO: 242 10*3/MM3 (ref 140–450)
PMV BLD AUTO: 10.1 FL (ref 6–12)
POTASSIUM SERPL-SCNC: 4 MMOL/L (ref 3.5–5.2)
RBC # BLD AUTO: 4.39 10*6/MM3 (ref 3.77–5.28)
SARS-COV-2 N GENE NPH QL NAA+PROBE: NOT DETECTED
SODIUM SERPL-SCNC: 137 MMOL/L (ref 136–145)
WBC NRBC COR # BLD: 6.97 10*3/MM3 (ref 3.4–10.8)

## 2021-11-19 PROCEDURE — 85027 COMPLETE CBC AUTOMATED: CPT

## 2021-11-19 PROCEDURE — U0003 INFECTIOUS AGENT DETECTION BY NUCLEIC ACID (DNA OR RNA); SEVERE ACUTE RESPIRATORY SYNDROME CORONAVIRUS 2 (SARS-COV-2) (CORONAVIRUS DISEASE [COVID-19]), AMPLIFIED PROBE TECHNIQUE, MAKING USE OF HIGH THROUGHPUT TECHNOLOGIES AS DESCRIBED BY CMS-2020-01-R: HCPCS

## 2021-11-19 PROCEDURE — C9803 HOPD COVID-19 SPEC COLLECT: HCPCS

## 2021-11-19 PROCEDURE — 36415 COLL VENOUS BLD VENIPUNCTURE: CPT

## 2021-11-19 PROCEDURE — 80048 BASIC METABOLIC PNL TOTAL CA: CPT

## 2021-11-19 NOTE — TELEPHONE ENCOUNTER
Caller: Isabel Florentino    Relationship: Self    Best call back number: [phone]    Requested Prescriptions:   Requested Prescriptions     Pending Prescriptions Disp Refills   • dexmethylphenidate XR (FOCALIN XR) 20 MG 24 hr capsule 30 capsule 0     Sig: Take 1 capsule by mouth Daily        Pharmacy where request should be sent: [facility], [place] - [address] [facility] - [phone]  - [phone] FX     Additional details provided by patient: 3 DAYS LEFT OF MEDICATION    Does the patient have less than a 3 day supply:  [] Yes  [x] No    Roberto Reina Rep   11/19/21 15:23 EST

## 2021-11-19 NOTE — DISCHARGE INSTRUCTIONS
Take the following medications the morning of surgery:    NONE    ARRIVE AT 11:30      If you are on prescription narcotic pain medication to control your pain you may also take that medication the morning of surgery.    General Instructions:  • Do not eat solid food after midnight the night before surgery.  • You may drink clear liquids day of surgery but must stop at least one hour before your hospital arrival time.  • It is beneficial for you to have a clear drink that contains carbohydrates the day of surgery.  We suggest a 12 to 20 ounce bottle of Gatorade or Powerade for non-diabetic patients or a 12 to 20 ounce bottle of G2 or Powerade Zero for diabetic patients. (Pediatric patients, are not advised to drink a 12 to 20 ounce carbohydrate drink)    Clear liquids are liquids you can see through.  Nothing red in color.     Plain water                               Sports drinks  Sodas                                   Gelatin (Jell-O)  Fruit juices without pulp such as white grape juice and apple juice  Popsicles that contain no fruit or yogurt  Tea or coffee (no cream or milk added)  Gatorade / Powerade  G2 / Powerade Zero    •   • Patients who avoid smoking, chewing tobacco and alcohol for 4 weeks prior to surgery have a reduced risk of post-operative complications.  Quit smoking as many days before surgery as you can.  • Do not smoke, use chewing tobacco or drink alcohol the day of surgery.   • If applicable bring your C-PAP/ BI-PAP machine.  • Bring any papers given to you in the doctor’s office.  • Wear clean comfortable clothes.  • Do not wear contact lenses, false eyelashes or make-up.  Bring a case for your glasses.   • Bring crutches or walker if applicable.  • Remove all piercings.  Leave jewelry and any other valuables at home.  • Hair extensions with metal clips must be removed prior to surgery.  • The Pre-Admission Testing nurse will instruct you to bring medications if unable to obtain an accurate  list in Pre-Admission Testing.        If you were given a blood bank ID arm band remember to bring it with you the day of surgery.  Preventing a Surgical Site Infection:  • For 2 to 3 days before surgery, avoid shaving with a razor because the razor can irritate skin and make it easier to develop an infection.    • Any areas of open skin can increase the risk of a post-operative wound infection by allowing bacteria to enter and travel throughout the body.  Notify your surgeon if you have any skin wounds / rashes even if it is not near the expected surgical site.  The area will need assessed to determine if surgery should be delayed until it is healed.  • The night prior to surgery shower using a fresh bar of anti-bacterial soap (such as Dial) and clean washcloth.  Sleep in a clean bed with clean clothing.  Do not allow pets to sleep with you.  • Shower on the morning of surgery using a fresh bar of anti-bacterial soap (such as Dial) and clean washcloth.  Dry with a clean towel and dress in clean clothing.  • Ask your surgeon if you will be receiving antibiotics prior to surgery.  • Make sure you, your family, and all healthcare providers clean their hands with soap and water or an alcohol based hand  before caring for you or your wound.    Day of surgery:  Your arrival time is approximately two hours before your scheduled surgery time.  Upon arrival, a Pre-op nurse and Anesthesiologist will review your health history, obtain vital signs, and answer questions you may have.  The only belongings needed at this time will be a list of your home medications and if applicable your C-PAP/BI-PAP machine.  A Pre-op nurse will start an IV and you may receive medication in preparation for surgery, including something to help you relax.     Please be aware that surgery does come with discomfort.  We want to make every effort to control your discomfort so please discuss any uncontrolled symptoms with your nurse.   Your  doctor will most likely have prescribed pain medications.      If you are going home after surgery you will receive individualized written care instructions before being discharged.  A responsible adult must drive you to and from the hospital on the day of your surgery and stay with you for 24 hours.  Discharge prescriptions can be filled by the hospital pharmacy during regular pharmacy hours.  If you are having surgery late in the day/evening your prescription may be e-prescribed to your pharmacy.  Please verify your pharmacy hours or chose a 24 hour pharmacy to avoid not having access to your prescription because your pharmacy has closed for the day.    If you are staying overnight following surgery, you will be transported to your hospital room following the recovery period.  [facility] has all private rooms.    If you have any questions please call Pre-Admission Testing at [phone].  Deductibles and co-payments are collected on the day of service. Please be prepared to pay the required co-pay, deductible or deposit on the day of service as defined by your plan.    Patient Education for Self-Quarantine Process    • Following your COVID testing, we strongly recommend that you wear a mask when you are with other people and practice social distancing.   • Limit your activities to only required outings.  • Wash your hands with soap and water frequently for at least 20 seconds.   • Avoid touching your eyes, nose and mouth with unwashed hands.  • Do not share anything - utensils, drinking glasses, food from the same bowl.   • Sanitize household surfaces daily. Include all high touch areas (door handles, light switches, phones, countertops, etc.)    Call your surgeon immediately if you experience any of the following symptoms:  • Sore Throat  • Shortness of Breath or difficulty breathing  • Cough  • Chills  • Body soreness or muscle pain  • Headache  • Fever  • New loss of taste or smell  • Do not  arrive for your surgery ill.  Your procedure will need to be rescheduled to another time.  You will need to call your physician before the day of surgery to avoid any unnecessary exposure to hospital staff as well as other patients.    CHLORHEXIDINE CLOTH INSTRUCTIONS  The morning of surgery follow these instructions using the Chlorhexidine cloths you've been given.  These steps reduce bacteria on the body.  Do not use the cloths near your eyes, ears mouth, genitalia or on open wounds.  Throw the cloths away after use but do not try to flush them down a toilet.      • Open and remove one cloth at a time from the package.    • Leave the cloth unfolded and begin the bathing.  • Massage the skin with the cloths using gentle pressure to remove bacteria.  Do not scrub harshly.   • Follow the steps below with one 2% CHG cloth per area (6 total cloths).  • One cloth for neck, shoulders and chest.  • One cloth for both arms, hands, fingers and underarms (do underarms last).  • One cloth for the abdomen followed by groin.  • One cloth for right leg and foot including between the toes.  • One cloth for left leg and foot including between the toes.  • The last cloth is to be used for the back of the neck, back and buttocks.    Allow the CHG to air dry 3 minutes on the skin which will give it time to work and decrease the chance of irritation.  The skin may feel sticky until it is dry.  Do not rinse with water or any other liquid or you will lose the beneficial effects of the CHG.  If mild skin irritation occurs, do rinse the skin to remove the CHG.  Report this to the nurse at time of admission.  Do not apply lotions, creams, ointments, deodorants or perfumes after using the clothes. Dress in clean clothes before coming to the hospital.

## 2021-11-21 RX ORDER — DEXMETHYLPHENIDATE HYDROCHLORIDE 20 MG/1
20 CAPSULE, EXTENDED RELEASE ORAL DAILY
Qty: 30 CAPSULE | Refills: 0 | Status: SHIPPED | OUTPATIENT
Start: 2021-11-21 | End: 2021-12-23 | Stop reason: SDUPTHER

## 2021-11-22 ENCOUNTER — ANESTHESIA (OUTPATIENT)
Dept: PERIOP | Facility: HOSPITAL | Age: 56
End: 2021-11-22

## 2021-11-22 ENCOUNTER — ANESTHESIA EVENT (OUTPATIENT)
Dept: PERIOP | Facility: HOSPITAL | Age: 56
End: 2021-11-22

## 2021-11-22 ENCOUNTER — HOSPITAL ENCOUNTER (OUTPATIENT)
Facility: HOSPITAL | Age: 56
Discharge: HOME | End: 2021-11-22
Attending: OBSTETRICS & GYNECOLOGY | Admitting: OBSTETRICS & GYNECOLOGY

## 2021-11-22 VITALS
SYSTOLIC BLOOD PRESSURE: 132 MMHG | RESPIRATION RATE: 16 BRPM | DIASTOLIC BLOOD PRESSURE: 72 MMHG | HEART RATE: 68 BPM | TEMPERATURE: 98 F | OXYGEN SATURATION: 100 %

## 2021-11-22 DIAGNOSIS — N87.9 CERVICAL DYSPLASIA: ICD-10-CM

## 2021-11-22 PROCEDURE — 25010000002 FENTANYL CITRATE (PF) 50 MCG/ML SOLUTION: Performed by: NURSE ANESTHETIST, CERTIFIED REGISTERED

## 2021-11-22 PROCEDURE — S0260 H&P FOR SURGERY: HCPCS | Performed by: OBSTETRICS & GYNECOLOGY

## 2021-11-22 PROCEDURE — 57522 CONIZATION OF CERVIX: CPT | Performed by: OBSTETRICS & GYNECOLOGY

## 2021-11-22 PROCEDURE — 25010000002 PHENYLEPHRINE 10 MG/ML SOLUTION: Performed by: NURSE ANESTHETIST, CERTIFIED REGISTERED

## 2021-11-22 PROCEDURE — 88307 TISSUE EXAM BY PATHOLOGIST: CPT | Performed by: OBSTETRICS & GYNECOLOGY

## 2021-11-22 PROCEDURE — 88305 TISSUE EXAM BY PATHOLOGIST: CPT | Performed by: OBSTETRICS & GYNECOLOGY

## 2021-11-22 PROCEDURE — 25010000002 DEXAMETHASONE PER 1 MG: Performed by: NURSE ANESTHETIST, CERTIFIED REGISTERED

## 2021-11-22 PROCEDURE — 25010000002 ONDANSETRON PER 1 MG: Performed by: NURSE ANESTHETIST, CERTIFIED REGISTERED

## 2021-11-22 PROCEDURE — 25010000002 PROPOFOL 10 MG/ML EMULSION: Performed by: NURSE ANESTHETIST, CERTIFIED REGISTERED

## 2021-11-22 PROCEDURE — 25010000002 KETOROLAC TROMETHAMINE PER 15 MG: Performed by: NURSE ANESTHETIST, CERTIFIED REGISTERED

## 2021-11-22 RX ORDER — PROPOFOL 10 MG/ML
VIAL (ML) INTRAVENOUS AS NEEDED
Status: DISCONTINUED | OUTPATIENT
Start: 2021-11-22 | End: 2021-11-22 | Stop reason: SURG

## 2021-11-22 RX ORDER — DEXAMETHASONE SODIUM PHOSPHATE 10 MG/ML
INJECTION INTRAMUSCULAR; INTRAVENOUS AS NEEDED
Status: DISCONTINUED | OUTPATIENT
Start: 2021-11-22 | End: 2021-11-22 | Stop reason: SURG

## 2021-11-22 RX ORDER — HYDROCODONE BITARTRATE AND ACETAMINOPHEN 7.5; 325 MG/1; MG/1
1 TABLET ORAL EVERY 4 HOURS PRN
Status: DISCONTINUED | OUTPATIENT
Start: 2021-11-22 | End: 2021-11-22 | Stop reason: HOSPADM

## 2021-11-22 RX ORDER — MIDAZOLAM HYDROCHLORIDE 1 MG/ML
2 INJECTION INTRAMUSCULAR; INTRAVENOUS ONCE
Status: DISCONTINUED | OUTPATIENT
Start: 2021-11-22 | End: 2021-11-22 | Stop reason: HOSPADM

## 2021-11-22 RX ORDER — ONDANSETRON 2 MG/ML
4 INJECTION INTRAMUSCULAR; INTRAVENOUS ONCE AS NEEDED
Status: DISCONTINUED | OUTPATIENT
Start: 2021-11-22 | End: 2021-11-22 | Stop reason: HOSPADM

## 2021-11-22 RX ORDER — FENTANYL CITRATE 50 UG/ML
50 INJECTION, SOLUTION INTRAMUSCULAR; INTRAVENOUS ONCE
Status: DISCONTINUED | OUTPATIENT
Start: 2021-11-22 | End: 2021-11-22 | Stop reason: HOSPADM

## 2021-11-22 RX ORDER — SODIUM CHLORIDE 0.9 % (FLUSH) 0.9 %
3-10 SYRINGE (ML) INJECTION AS NEEDED
Status: DISCONTINUED | OUTPATIENT
Start: 2021-11-22 | End: 2021-11-22 | Stop reason: HOSPADM

## 2021-11-22 RX ORDER — SODIUM CHLORIDE 0.9 % (FLUSH) 0.9 %
10 SYRINGE (ML) INJECTION AS NEEDED
Status: DISCONTINUED | OUTPATIENT
Start: 2021-11-22 | End: 2021-11-22 | Stop reason: HOSPADM

## 2021-11-22 RX ORDER — ONDANSETRON 2 MG/ML
INJECTION INTRAMUSCULAR; INTRAVENOUS AS NEEDED
Status: DISCONTINUED | OUTPATIENT
Start: 2021-11-22 | End: 2021-11-22 | Stop reason: SURG

## 2021-11-22 RX ORDER — SODIUM CHLORIDE 0.9 % (FLUSH) 0.9 %
3 SYRINGE (ML) INJECTION EVERY 12 HOURS SCHEDULED
Status: DISCONTINUED | OUTPATIENT
Start: 2021-11-22 | End: 2021-11-22 | Stop reason: HOSPADM

## 2021-11-22 RX ORDER — FENTANYL CITRATE 50 UG/ML
100 INJECTION, SOLUTION INTRAMUSCULAR; INTRAVENOUS
Status: DISCONTINUED | OUTPATIENT
Start: 2021-11-22 | End: 2021-11-22 | Stop reason: HOSPADM

## 2021-11-22 RX ORDER — HYDROCODONE BITARTRATE AND ACETAMINOPHEN 5; 325 MG/1; MG/1
1 TABLET ORAL ONCE AS NEEDED
Status: DISCONTINUED | OUTPATIENT
Start: 2021-11-22 | End: 2021-11-22 | Stop reason: HOSPADM

## 2021-11-22 RX ORDER — FENTANYL CITRATE 50 UG/ML
INJECTION, SOLUTION INTRAMUSCULAR; INTRAVENOUS AS NEEDED
Status: DISCONTINUED | OUTPATIENT
Start: 2021-11-22 | End: 2021-11-22 | Stop reason: SURG

## 2021-11-22 RX ORDER — FLUMAZENIL 0.1 MG/ML
0.2 INJECTION INTRAVENOUS AS NEEDED
Status: DISCONTINUED | OUTPATIENT
Start: 2021-11-22 | End: 2021-11-22 | Stop reason: HOSPADM

## 2021-11-22 RX ORDER — LIDOCAINE HYDROCHLORIDE 10 MG/ML
0.5 INJECTION, SOLUTION EPIDURAL; INFILTRATION; INTRACAUDAL; PERINEURAL ONCE AS NEEDED
Status: COMPLETED | OUTPATIENT
Start: 2021-11-22 | End: 2021-11-22

## 2021-11-22 RX ORDER — HYDROMORPHONE HYDROCHLORIDE 1 MG/ML
0.5 INJECTION, SOLUTION INTRAMUSCULAR; INTRAVENOUS; SUBCUTANEOUS
Status: DISCONTINUED | OUTPATIENT
Start: 2021-11-22 | End: 2021-11-22 | Stop reason: HOSPADM

## 2021-11-22 RX ORDER — LIDOCAINE HYDROCHLORIDE AND EPINEPHRINE 10; 10 MG/ML; UG/ML
INJECTION, SOLUTION INFILTRATION; PERINEURAL AS NEEDED
Status: DISCONTINUED | OUTPATIENT
Start: 2021-11-22 | End: 2021-11-22 | Stop reason: HOSPADM

## 2021-11-22 RX ORDER — LIDOCAINE HYDROCHLORIDE 20 MG/ML
INJECTION, SOLUTION INFILTRATION; PERINEURAL AS NEEDED
Status: DISCONTINUED | OUTPATIENT
Start: 2021-11-22 | End: 2021-11-22 | Stop reason: SURG

## 2021-11-22 RX ORDER — MIDAZOLAM HYDROCHLORIDE 1 MG/ML
1 INJECTION INTRAMUSCULAR; INTRAVENOUS
Status: DISCONTINUED | OUTPATIENT
Start: 2021-11-22 | End: 2021-11-22 | Stop reason: HOSPADM

## 2021-11-22 RX ORDER — EPHEDRINE SULFATE 50 MG/ML
5 INJECTION, SOLUTION INTRAVENOUS ONCE AS NEEDED
Status: DISCONTINUED | OUTPATIENT
Start: 2021-11-22 | End: 2021-11-22 | Stop reason: HOSPADM

## 2021-11-22 RX ORDER — PHENYLEPHRINE HYDROCHLORIDE 10 MG/ML
INJECTION INTRAVENOUS AS NEEDED
Status: DISCONTINUED | OUTPATIENT
Start: 2021-11-22 | End: 2021-11-22 | Stop reason: SURG

## 2021-11-22 RX ORDER — HYDROCODONE BITARTRATE AND ACETAMINOPHEN 5; 325 MG/1; MG/1
1 TABLET ORAL EVERY 6 HOURS PRN
Qty: 8 TABLET | Refills: 0 | Status: SHIPPED | OUTPATIENT
Start: 2021-11-22 | End: 2022-09-08

## 2021-11-22 RX ORDER — FENTANYL CITRATE 50 UG/ML
50 INJECTION, SOLUTION INTRAMUSCULAR; INTRAVENOUS
Status: DISCONTINUED | OUTPATIENT
Start: 2021-11-22 | End: 2021-11-22 | Stop reason: HOSPADM

## 2021-11-22 RX ORDER — KETOROLAC TROMETHAMINE 30 MG/ML
INJECTION, SOLUTION INTRAMUSCULAR; INTRAVENOUS AS NEEDED
Status: DISCONTINUED | OUTPATIENT
Start: 2021-11-22 | End: 2021-11-22 | Stop reason: SURG

## 2021-11-22 RX ORDER — GLYCOPYRROLATE 0.2 MG/ML
INJECTION INTRAMUSCULAR; INTRAVENOUS AS NEEDED
Status: DISCONTINUED | OUTPATIENT
Start: 2021-11-22 | End: 2021-11-22 | Stop reason: SURG

## 2021-11-22 RX ORDER — SODIUM CHLORIDE, SODIUM LACTATE, POTASSIUM CHLORIDE, CALCIUM CHLORIDE 600; 310; 30; 20 MG/100ML; MG/100ML; MG/100ML; MG/100ML
9 INJECTION, SOLUTION INTRAVENOUS CONTINUOUS
Status: DISCONTINUED | OUTPATIENT
Start: 2021-11-22 | End: 2021-11-22 | Stop reason: HOSPADM

## 2021-11-22 RX ADMIN — PROPOFOL 200 MG: 10 INJECTION, EMULSION INTRAVENOUS at 13:21

## 2021-11-22 RX ADMIN — GLYCOPYRROLATE 0.2 MG: 0.2 INJECTION INTRAMUSCULAR; INTRAVENOUS at 13:21

## 2021-11-22 RX ADMIN — KETOROLAC TROMETHAMINE 30 MG: 30 INJECTION, SOLUTION INTRAMUSCULAR at 13:40

## 2021-11-22 RX ADMIN — FENTANYL CITRATE 50 MCG: 50 INJECTION INTRAMUSCULAR; INTRAVENOUS at 13:27

## 2021-11-22 RX ADMIN — SODIUM CHLORIDE, POTASSIUM CHLORIDE, SODIUM LACTATE AND CALCIUM CHLORIDE 9 ML/HR: 600; 310; 30; 20 INJECTION, SOLUTION INTRAVENOUS at 13:00

## 2021-11-22 RX ADMIN — ONDANSETRON 4 MG: 2 INJECTION INTRAMUSCULAR; INTRAVENOUS at 13:27

## 2021-11-22 RX ADMIN — LIDOCAINE HYDROCHLORIDE 0.5 ML: 10 INJECTION, SOLUTION EPIDURAL; INFILTRATION; INTRACAUDAL; PERINEURAL at 13:00

## 2021-11-22 RX ADMIN — PHENYLEPHRINE HYDROCHLORIDE 100 MCG: 10 INJECTION, SOLUTION INTRAVENOUS at 13:42

## 2021-11-22 RX ADMIN — DEXAMETHASONE SODIUM PHOSPHATE 10 MG: 10 INJECTION INTRAMUSCULAR; INTRAVENOUS at 13:27

## 2021-11-22 RX ADMIN — LIDOCAINE HYDROCHLORIDE 60 MG: 20 INJECTION, SOLUTION INFILTRATION; PERINEURAL at 13:21

## 2021-11-22 NOTE — ANESTHESIA PREPROCEDURE EVALUATION
Anesthesia Evaluation     Patient summary reviewed and Nursing notes reviewed   NPO Solid Status: > 8 hours             Airway   Mallampati: II  TM distance: >3 FB  Neck ROM: full  no difficulty expected  Dental - normal exam     Pulmonary - negative pulmonary ROS and normal exam   Cardiovascular - negative cardio ROS and normal exam        Neuro/Psych  (+) psychiatric history ADD,     GI/Hepatic/Renal/Endo - negative ROS     Musculoskeletal (-) negative ROS    Abdominal  - normal exam   Substance History - negative use     OB/GYN negative ob/gyn ROS         Other      history of cancer    ROS/Med Hx Other: Cerebral aneurysm    S/p lobectomy                  Anesthesia Plan    ASA 3     general     intravenous induction     Anesthetic plan, all risks, benefits, and alternatives have been provided, discussed and informed consent has been obtained with: patient.       Discharged

## 2021-11-22 NOTE — ANESTHESIA POSTPROCEDURE EVALUATION
Patient: Isabel Florentino    Procedure Summary     Date: 11/22/21 Room / [facility]:  [facility]  [facility]    Anesthesia Start: 1317 Anesthesia Stop: 1352    Procedure: LOOP ELECTROCAUTERY EXCISION PROCEDURE, ENDOCERVICAL CURETTING (N/A Cervix) Diagnosis:       Cervical dysplasia      (Cervical dysplasia [N87.9])    Surgeons: Xavier Collins MD Provider: Leonardo Kumar MD    Anesthesia Type: general ASA Status: 3          Anesthesia Type: general    Vitals  Vitals Value Taken Time   /63 11/22/21 1416   Temp 36.7 °C (98 °F) 11/22/21 1350   Pulse 69 11/22/21 1418   Resp 16 11/22/21 1415   SpO2 100 % 11/22/21 1418   Vitals shown include unvalidated device data.        Post Anesthesia Care and Evaluation    Patient location during evaluation: bedside  Patient participation: complete - patient participated  Level of consciousness: awake  Pain management: adequate  Airway patency: patent  Anesthetic complications: No anesthetic complications    Cardiovascular status: acceptable  Respiratory status: acceptable  Hydration status: acceptable    Comments: */63 (BP Location: Right arm, Patient Position: Lying)   Pulse 76   Temp 36.7 °C (98 °F) (Temporal)   Resp 16   LMP  (LMP Unknown)   SpO2 100%

## 2021-11-22 NOTE — H&P
H&P Note    Patient Identification:  Name: Isabel Florentino  Age: 56 y.o.  Sex: female  :  1965  MRN: [id number]                       Chief Complaint: Cervical dysplasia    History of Present Illness:   56-year-old lady who has been followed in the office for cervical dysplasia.  Most recent colposcopically directed biopsy showed KATINA-1.  I counseled the patient and answered her questions.  Because she has a previous cancer history, she would like to be aggressive in her treatment.  She has requested that a LEEP would be performed.  We have discussed the procedure itself as well as its benefits, risks and alternatives.  Her questions have been answered and she would like to proceed.    Problem List:  [email or web address]@  Past Medical History:  Past Medical History:   Diagnosis Date   • Abnormal Pap smear of cervix    • ADD (attention deficit disorder)    • Anemia    • Cervical dysplasia    • Colon cancer (HCC)     NEW DIAGNOSIS   • Drug therapy    • H/O foreign travel 2016    [place]   • Hepatic metastases (HCC) 10/2016    partial right hepatectomy [place] 10/16   • History of transfusion      AFTER TUBAL RUPTURE   • Hyperlipidemia    • Middle cerebral artery aneurysm    • Status post partial colectomy 2017    [facility]     Past Surgical History:  Past Surgical History:   Procedure Laterality Date   • AUGMENTATION MAMMAPLASTY     • COLON RESECTION WITH ILEOSTOMY     • COLONOSCOPY      MAY 2016   • CRANIOTOMY  ,     Cerebral aneurysm   • ILEOSTOMY CLOSURE     • LAPAROSCOPY FOR ECTOPIC PREGNANCY  1997   • LIVER RESECTION     • LUNG LOBECTOMY Right    • MEDIPORT REMOVAL     • UT INSJ TUNNELED CVC W/O SUBQ PORT/ AGE 3 YR/> Right 2016    Procedure: MEDIPORT PLACEMENT ;  Surgeon: Praful Holden MD;  Location: [facility];  Service: Vascular   • SINUS SURGERY        Home Meds:  Medications Prior to Admission   Medication Sig Dispense Refill  Last Dose   • B Complex Vitamins (VITAMIN B COMPLEX PO) Take 1 tablet by mouth Daily.   Past Week at Unknown time   • Chlorhexidine Gluconate 2 % pads Apply  topically. As directed pre op   11/22/2021 at Unknown time   • cholecalciferol (VITAMIN D3) 1000 UNITS tablet Take 1,000 Units by mouth Daily.   Past Week at Unknown time   • Coenzyme Q10 (CO Q 10 PO) Take 1 tablet by mouth Every Morning.   Past Month at Unknown time   • dexmethylphenidate XR (FOCALIN XR) 20 MG 24 hr capsule Take 1 capsule by mouth Daily 30 capsule 0 Past Week at Unknown time   • Finacea 15 % foam Apply 1 application topically to the appropriate area as directed As Needed.   11/21/2021 at Unknown time   • Multiple Vitamins-Minerals (MULTIVITAMIN WITH MINERALS) tablet tablet Take 1 tablet by mouth Daily. PT HOLDING FOR SURGERY   Past Week at Unknown time     Current Meds:   [unfilled]  Allergies:  No Known Allergies  Immunizations:  Immunization History   Administered Date(s) Administered   • COVID-19 (PFIZER) 03/04/2021, 03/25/2021   • Flu Vaccine Intradermal Quad 18-64YR 10/19/2019   • Flu Vaccine Quad PF >36MO 10/18/2018, 09/19/2020   • FluLaval/Fluarix/Fluzone >6 10/19/2019   • H1N1 Nasal 11/18/2009   • Hepatitis A 06/13/2018, 09/20/2019, 09/20/2019   • Influenza Quad Vaccine (Inpatient) 10/05/2015   • Influenza, Unspecified 10/01/2018   • Pneumococcal Conjugate 13-Valent (PCV13) 11/15/2019, 11/15/2019, 12/15/2020   • Pneumococcal Polysaccharide (PPSV23) 11/15/2019   • Shingrix 06/13/2018, 09/20/2019, 09/20/2019   • Tdap 03/13/2020   • flucelvax quad pfs =>4 YRS 09/19/2020     Social History:   Social History     Tobacco Use   • Smoking status: Never Smoker   • Smokeless tobacco: Never Used   Substance Use Topics   • Alcohol use: Yes     Alcohol/week: 5.0 standard drinks     Types: 5 Glasses of wine per week     Comment: Social      Family History:  Family History   Problem Relation Age of Onset   • Cerebral aneurysm Mother    • Stroke Mother     • COPD Father    • Malig Hyperthermia Neg Hx         Review of Systems  A comprehensive review of systems was negative.    Objective:  tMax 24 hrs: Temp (24hrs), Av.4 °F (36.9 °C), Min:98.4 °F (36.9 °C), Max:98.4 °F (36.9 °C)    Vitals Ranges:   Temp:  [98.4 °F (36.9 °C)] 98.4 °F (36.9 °C)  Heart Rate:  [61] 61  Resp:  [16] 16  BP: (137)/(61) 137/61  Intake and Output Last 3 Shifts:   No intake/output data recorded.    Exam:     General Appearance:    Alert, cooperative, no distress, appears stated age   Head:    Normocephalic, without obvious abnormality, atraumatic   Back:     Symmetric, no curvature, ROM normal, no CVA tenderness   Lungs:     Clear to auscultation bilaterally, respirations unlabored   Chest Wall:    No tenderness or deformity    Heart:    Regular rate and rhythm, S1 and S2 normal, no murmur, rub   or gallop       Abdomen:     Soft, non-tender, bowel sounds active all four quadrants,     no masses, no organomegaly           Extremities:   Extremities normal, atraumatic, no cyanosis or edema   Skin:   Skin color, texture, turgor normal, no rashes or lesions   Lymph nodes:   Cervical, supraclavicular, and axillary nodes normal       Data Review:    Lab Results (last 24 hours)     ** No results found for the last 24 hours. **        Assessment:    Cervical dysplasia          Plan:  Loop electrode excisional procedure.  I have counseled the patient regarding the procedure itself as well as its benefits, risks and alternatives.  Her questions been answered and she would like to proceed.    Xavier Collins MD  2021

## 2021-11-22 NOTE — ANESTHESIA PROCEDURE NOTES
Airway  Urgency: elective    Date/Time: 11/22/2021 1:23 PM  Airway not difficult    General Information and Staff    Patient location during procedure: OR  Anesthesiologist: Leonardo Kumar MD  CRNA: Cindy Pate CRNA    Indications and Patient Condition  Indications for airway management: airway protection    Preoxygenated: yes  Mask difficulty assessment: 0 - not attempted    Final Airway Details  Final airway type: supraglottic airway      Successful airway: LMA  Size 4    Number of attempts at approach: 1  Assessment: lips, teeth, and gum same as pre-op and atraumatic intubation    Additional Comments  Atraumatic LMA placement, LMA to MOP, good seal and air exchange.

## 2021-11-22 NOTE — OP NOTE
Operative Note      Isabel Florentino  56 y.o.  1965  female  [id number]      11/22/2021    Surgeon(s) and Role:     * Xavier Collins MD - Primary     Pre-op Diagnosis:   Cervical dysplasia [N87.9]    Post-Op Diagnosis Codes:     * Cervical dysplasia [N87.9]      Findings/Complications:  No complications    Description of procedure:  Procedure(s) and Anesthesia Type:     * LOOP ELECTROCAUTERY EXCISION PROCEDURE, ENDOCERVICAL CURETTING - General  The patient was taken to the operating room where general anesthesia was induced.  She was then placed in dorsolithotomy position using the candycane stirrups.  Examination under anesthesia revealed a normal-sized uterus which was mobile within the pelvis.  No adnexal masses were palpable.      The pelvis and perineum were prepped and draped in the usual sterile fashion and a weighted speculum was placed for exposure.  The cervix was grasped with a single-tooth tenaculum and injected with 1% lidocaine with epinephrine.      A loop of the appropriate size was selected.  The LEEP specimen was collected and sent to pathology.  Next, a Kevorkian curette was used to obtain endocervical curettings.  These were labeled separately and sent to pathology.      The LEEP bed was treated with the ball tip cautery.  It was observed without pressure for over 1 minute.  There was a small bleeding point at the 5 o'clock position at the LEEP bed.  This was cauterized with the ball tip cautery.  The operative site was examined without tension again for greater than 1 minute.  It remained completely hemostatic.  At this point, all instruments were removed and all counts were correct.      The procedure was terminated and the patient was taken to recovery in stable condition.  Anesthesia= General    Estimated Blood Loss: minimal    Specimens:   Order Name Source Comment Collection Info Order Time   TISSUE PATHOLOGY EXAM Cervix  Collected By: Xavier Collins MD 11/22/2021  1:43 PM      Release to patient   Immediate            @[email or web address]@      Xavier Collins MD  11/22/2021

## 2021-11-23 LAB
LAB AP CASE REPORT: NORMAL
PATH REPORT.FINAL DX SPEC: NORMAL
PATH REPORT.GROSS SPEC: NORMAL

## 2021-12-02 ENCOUNTER — OFFICE VISIT (OUTPATIENT)
Dept: ONCOLOGY | Facility: CLINIC | Age: 56
End: 2021-12-02

## 2021-12-02 ENCOUNTER — LAB (OUTPATIENT)
Dept: LAB | Facility: HOSPITAL | Age: 56
End: 2021-12-02

## 2021-12-02 VITALS
WEIGHT: 129.7 LBS | TEMPERATURE: 97.5 F | RESPIRATION RATE: 16 BRPM | DIASTOLIC BLOOD PRESSURE: 92 MMHG | BODY MASS INDEX: 22.14 KG/M2 | HEART RATE: 98 BPM | OXYGEN SATURATION: 94 % | HEIGHT: 64 IN | SYSTOLIC BLOOD PRESSURE: 154 MMHG

## 2021-12-02 DIAGNOSIS — Z45.2 ENCOUNTER FOR FITTING AND ADJUSTMENT OF VASCULAR CATHETER: ICD-10-CM

## 2021-12-02 DIAGNOSIS — T45.1X5A PERIPHERAL NEUROPATHY DUE TO CHEMOTHERAPY (HCC): ICD-10-CM

## 2021-12-02 DIAGNOSIS — C78.7 RECTAL CANCER METASTASIZED TO LIVER (HCC): ICD-10-CM

## 2021-12-02 DIAGNOSIS — C20 RECTAL CANCER METASTASIZED TO LIVER (HCC): Primary | ICD-10-CM

## 2021-12-02 DIAGNOSIS — C20 RECTAL CANCER METASTASIZED TO LIVER (HCC): ICD-10-CM

## 2021-12-02 DIAGNOSIS — C78.7 METASTATIC CANCER TO LIVER (HCC): ICD-10-CM

## 2021-12-02 DIAGNOSIS — G62.0 PERIPHERAL NEUROPATHY DUE TO CHEMOTHERAPY (HCC): ICD-10-CM

## 2021-12-02 DIAGNOSIS — C78.7 RECTAL CANCER METASTASIZED TO LIVER (HCC): Primary | ICD-10-CM

## 2021-12-02 LAB
ALBUMIN SERPL-MCNC: 4.5 G/DL (ref 3.5–5.2)
ALBUMIN/GLOB SERPL: 1.6 G/DL (ref 1.1–2.4)
ALP SERPL-CCNC: 59 U/L (ref 38–116)
ALT SERPL W P-5'-P-CCNC: 17 U/L (ref 0–33)
ANION GAP SERPL CALCULATED.3IONS-SCNC: 8.6 MMOL/L (ref 5–15)
AST SERPL-CCNC: 23 U/L (ref 0–32)
BASOPHILS # BLD AUTO: 0.04 10*3/MM3 (ref 0–0.2)
BASOPHILS NFR BLD AUTO: 0.5 % (ref 0–1.5)
BILIRUB SERPL-MCNC: 0.2 MG/DL (ref 0.2–1.2)
BUN SERPL-MCNC: 12 MG/DL (ref 6–20)
BUN/CREAT SERPL: 15.2 (ref 7.3–30)
CALCIUM SPEC-SCNC: 10 MG/DL (ref 8.5–10.2)
CEA SERPL-MCNC: 1.41 NG/ML
CHLORIDE SERPL-SCNC: 105 MMOL/L (ref 98–107)
CO2 SERPL-SCNC: 27.4 MMOL/L (ref 22–29)
CREAT SERPL-MCNC: 0.79 MG/DL (ref 0.6–1.1)
DEPRECATED RDW RBC AUTO: 42.3 FL (ref 37–54)
EOSINOPHIL # BLD AUTO: 0.09 10*3/MM3 (ref 0–0.4)
EOSINOPHIL NFR BLD AUTO: 1.2 % (ref 0.3–6.2)
ERYTHROCYTE [DISTWIDTH] IN BLOOD BY AUTOMATED COUNT: 12.6 % (ref 12.3–15.4)
GFR SERPL CREATININE-BSD FRML MDRD: 75 ML/MIN/1.73
GLOBULIN UR ELPH-MCNC: 2.8 GM/DL (ref 1.8–3.5)
GLUCOSE SERPL-MCNC: 105 MG/DL (ref 74–124)
HCT VFR BLD AUTO: 40.8 % (ref 34–46.6)
HGB BLD-MCNC: 13.1 G/DL (ref 12–15.9)
IMM GRANULOCYTES # BLD AUTO: 0.02 10*3/MM3 (ref 0–0.05)
IMM GRANULOCYTES NFR BLD AUTO: 0.3 % (ref 0–0.5)
LYMPHOCYTES # BLD AUTO: 1.87 10*3/MM3 (ref 0.7–3.1)
LYMPHOCYTES NFR BLD AUTO: 25.2 % (ref 19.6–45.3)
MCH RBC QN AUTO: 29.2 PG (ref 26.6–33)
MCHC RBC AUTO-ENTMCNC: 32.1 G/DL (ref 31.5–35.7)
MCV RBC AUTO: 90.9 FL (ref 79–97)
MONOCYTES # BLD AUTO: 0.6 10*3/MM3 (ref 0.1–0.9)
MONOCYTES NFR BLD AUTO: 8.1 % (ref 5–12)
NEUTROPHILS NFR BLD AUTO: 4.81 10*3/MM3 (ref 1.7–7)
NEUTROPHILS NFR BLD AUTO: 64.7 % (ref 42.7–76)
NRBC BLD AUTO-RTO: 0 /100 WBC (ref 0–0.2)
PLATELET # BLD AUTO: 259 10*3/MM3 (ref 140–450)
PMV BLD AUTO: 10 FL (ref 6–12)
POTASSIUM SERPL-SCNC: 4.1 MMOL/L (ref 3.5–4.7)
PROT SERPL-MCNC: 7.3 G/DL (ref 6.3–8)
RBC # BLD AUTO: 4.49 10*6/MM3 (ref 3.77–5.28)
SODIUM SERPL-SCNC: 141 MMOL/L (ref 134–145)
WBC NRBC COR # BLD: 7.43 10*3/MM3 (ref 3.4–10.8)

## 2021-12-02 PROCEDURE — 82378 CARCINOEMBRYONIC ANTIGEN: CPT | Performed by: INTERNAL MEDICINE

## 2021-12-02 PROCEDURE — 85025 COMPLETE CBC W/AUTO DIFF WBC: CPT

## 2021-12-02 PROCEDURE — 80053 COMPREHEN METABOLIC PANEL: CPT

## 2021-12-02 PROCEDURE — 36415 COLL VENOUS BLD VENIPUNCTURE: CPT

## 2021-12-02 PROCEDURE — 99214 OFFICE O/P EST MOD 30 MIN: CPT | Performed by: INTERNAL MEDICINE

## 2021-12-02 NOTE — PROGRESS NOTES
Subjective .     REASONS FOR FOLLOW UP:  Metastatic SIGMOID UPPER rectal cancer to the liver.  Initiated chemotherapy with FOLFOX-6 9/1/2016. COMPLETED IN SEPT 2017, EXTENSIVE SURGERY ON HER LIVER AND REMVAL OF PRIMARY TUMOR SURRENDER HER KIRIT.    HISTORY OF PRESENT ILLNESS:  DURING THE VISIT WITH THE PATIENT TODAY , PATIENT HAD FACE MASK, MY MEDICAL ASSISTANT AND I  HAD PROPPER PROTECTIVE EQUIPMENT, AND I DID HAND HYGIENE WITH SOAP AND WATER BEFORE AND AFTER THE VISIT.    This patient returns today to the office for followup. In the interim she has undergone a conization by her gynecologist having no complications after this and feeling perfectly fine with no vaginal bleeding, discharge or pain. No dyspareunia. She wants for me to review the pathology with her. Other than that, in regard to her previous history of rectal cancer and liver metastasis, she is going to see [facility] by the end of 12/2021. Her appetite is excellent. Her weight is stable. She has no nausea, vomiting, abdominal pain, abdominal distention or jaundice. Normal bowel activity. Normal urination. She has not had any cardiovascular or respiratory issues. No pain at any level. She feels terrific.                           Past Medical History:   Diagnosis Date   • Abnormal Pap smear of cervix    • ADD (attention deficit disorder)    • Anemia    • Cervical dysplasia    • Colon cancer (HCC)     NEW DIAGNOSIS   • Drug therapy    • H/O foreign travel 03/2016    [place]   • Hepatic metastases (HCC) 10/2016    partial right hepatectomy [facility] 10/16   • History of transfusion     1999 AFTER TUBAL RUPTURE   • Hyperlipidemia    • Middle cerebral artery aneurysm    • Status post partial colectomy 04/2017    [facility]     Past Surgical History:   Procedure Laterality Date   • AUGMENTATION MAMMAPLASTY     • COLON RESECTION WITH ILEOSTOMY     • COLONOSCOPY      MAY 2016   • CRANIOTOMY  2004, 2005    Cerebral aneurysm    • ILEOSTOMY CLOSURE     • LAPAROSCOPY FOR ECTOPIC PREGNANCY  09/1997   • LEEP N/A 11/22/2021    Procedure: LOOP ELECTROCAUTERY EXCISION PROCEDURE, ENDOCERVICAL CURETTING;  Surgeon: Xavier Collins MD;  [facility];  Service: Obstetrics/Gynecology;  Laterality: N/A;   • LIVER RESECTION  2016   • LUNG LOBECTOMY Right 2018   • MEDIPORT REMOVAL     • [place] INSJ TUNNELED CVC W/O SUBQ PORT/ AGE 3 YR/> Right 8/26/2016    Procedure: MEDIPORT PLACEMENT ;  Surgeon: Praful Holden MD;  Location: [facility] OR;  Service: Vascular   • SINUS SURGERY         Current Outpatient Medications on File Prior to Visit   Medication Sig Dispense Refill   • B Complex Vitamins (VITAMIN B COMPLEX PO) Take 1 tablet by mouth Daily.     • cholecalciferol (VITAMIN D3) 1000 UNITS tablet Take 1,000 Units by mouth Daily.     • dexmethylphenidate XR (FOCALIN XR) 20 MG 24 hr capsule Take 1 capsule by mouth Daily 30 capsule 0   • Finacea 15 % foam Apply 1 application topically to the appropriate area as directed As Needed.     • Multiple Vitamins-Minerals (MULTIVITAMIN WITH MINERALS) tablet tablet Take 1 tablet by mouth Daily. PT HOLDING FOR SURGERY     • Coenzyme Q10 (CO Q 10 PO) Take 1 tablet by mouth Every Morning.     • HYDROcodone-acetaminophen (NORCO) 5-325 MG per tablet Take 1 tablet by mouth Every 6 (Six) Hours As Needed for Moderate Pain . 8 tablet 0     No current facility-administered medications on file prior to visit.     ALLERGIES:   No Known Allergies    SOCIAL HISTORY:       Social History     Tobacco Use   • Smoking status: Never Smoker   • Smokeless tobacco: Never Used   Substance Use Topics   • Alcohol use: Yes     Alcohol/week: 5.0 standard drinks     Types: 5 Glasses of wine per week     Comment: Social     FAMILY HISTORY:  Family History   Problem Relation Age of Onset   • Cerebral aneurysm Mother    • Stroke Mother    • COPD Father    • Malig Hyperthermia Neg Hx                  Objective    Vitals:     "12/02/21 1546   BP: 154/92   Pulse: 98   Resp: 16   Temp: 97.5 °F (36.4 °C)   TempSrc: Temporal   SpO2: 94%   Weight: 58.8 kg (129 lb 11.2 oz)   Height: 162.6 cm (64.02\")   PainSc: 0-No pain     Current Status 12/2/2021   ECOG score 0      PHYSICAL EXAM:   I HAVE PERSONALLY REVIEWED THE HISTORY OF THE PRESENT ILLNESS, PAST MEDICAL HISTORY, FAMILY HISTORY, SOCIAL HISTORY, ALLERGIES, MEDICATIONS STATED ABOVE IN THE  NOTE FROM TODAY.        GENERAL:  Well-developed, well-nourished  Patient  in no acute distress.   SKIN:  Warm, dry ,NO rashes,NO purpura ,NO petechiae.  HEENT:  Pupils were equal and reactive to light and accomodation, conjunctivae noninjected, no pterygium, normal extraocular movements, normal visual acuity.   NECK:  Supple with good range of motion; no thyromegaly , no other masses, no JVD or bruits, no cervical adenopathies.No carotid artery pain, no carotid abnormal pulsation , NO arterial dance.  LYMPHATICS:  No cervical, NO supraclavicular, NO axillary,NO epitrochlear , NO inguinal adenopathy.  CARDIAC   normal rate and regular rhythm, without murmur,NO rubs NO S3 NO S4 right or left .  LUNGS: normal breath sounds bilateral, no wheezing, rhonchi, crackles or rubs.  VASCULAR VENOUS: no cyanosis, collateral circulation, varicosities, edema, palpable cords, pain, erythema.  ABDOMEN:  Soft, nontender with no hepatomegaly, no splenomegaly,no masses, no ascites, no collateral circulation,no distention,no Rochert sign.multiple surgical scars.no hernias  EXTREMITIES  AND SPINE:  No clubbing, cyanosis or edema, no deformities , no pain .No kyphosis, scoliosis, no other deformities, no pain in spine, no pain in ribs , no pain inpelvic bone.  NEUROLOGICAL:  Patient was awake, alert, oriented to time, person and place.                          RECENT LABS:  Lab Results   Component Value Date    WBC 7.43 12/02/2021    HGB 13.1 12/02/2021    HCT 40.8 12/02/2021    MCV 90.9 12/02/2021     12/02/2021     Lab " Results   Component Value Date    GLUCOSE 89 11/19/2021    BUN 14 11/19/2021    CREATININE 0.68 11/19/2021    EGFRIFNONA 89 11/19/2021    EGFRIFAFRI >90 12/29/2020    BCR 20.6 11/19/2021    CO2 21.1 (L) 11/19/2021    CALCIUM 9.5 11/19/2021    ALBUMIN 4.50 04/16/2021    LABIL2 1.0 02/04/2019    AST 26 04/16/2021    ALT 17 04/16/2021       Tissue Pathology Exam: [id number]  Order: [id number]   Status: Final result     Visible to patient: Yes (seen)     Next appt: None     Dx: Cervical dysplasia    Specimen Information: A: Cervix; Tissue    B: Cervix, Endocervix; Tissue         0 Result Notes    Component    Case Report   Surgical Pathology Report                         Case: [id number]                                   Authorizing Provider:  Xavier Collins MD         Collected:           11/22/2021 01:40 PM           [facility]  Received:            11/22/2021 03:21 PM                                  [facility]                                                                        Pathologist:           Mabel Davies MD                                                           Specimens:   1) - Cervix, CONE OF CERVIX                                                                          2) - Cervix, Endocervix, ENDOCERVICAL CURETTING                                            Final Diagnosis   1. Cervix, Conization:               A. Ectocervix with mild cytologic atypia, consistent with reactive changes.               B. Unremarkable endocervix.               C. Biopsy site changes present.     2. Endocervix, Curettings:               A. Fragments of benign lower uterine segment and atrophic squamous epithelium.    Electronically signed by Mabel Davies MD on 11/23/2021 at 1629                      Assessment/Plan      1. Metastatic rectal sigmoid cancer to the liver,  KRAS negative, BRAF negative, MSI stable, NRAS negative. The patient has undergone neoadjuvant chemotherapy  with FOLFOX-6 and she subsequently underwent 2 different surgeries for her liver metastasis one including resection and another one including ablation. Also she underwent final removal of the primary tumor that was still PET active after completion of FOLFOX chemotherapy. Subsequently the patient had an ileostomy that was closed and she was placed on Xeloda chronically that she took in September, October, November of 2017 when she discontinued on her own because of side effects. Since then the patient has been seen at the [facility] .I have reviewed laboratory workup from the [facility] [place] [facility]. I have reviewed all the information available from the [facility] [place] [facility] and her radiological assessment as well as her laboratory parameters and CEA are all normal. Therefore, it is always amazing to see somebody who has staged IV colon cancer to be treated aggressively and remain in remission after such a dramatic amount of extensive surgery and chemotherapy. 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      The patient returned to the office on 04/16/2021. She is asymptomatic in regard to her previous history of sigmoid colon cancer with liver metastasis. She achieved a complete response after aggressive plan of chemotherapy as stated above and also resection of the primary tumor and her liver metastasis.     Today her clinical assessment is negative normal. She has minimal grade 1 sensory neuropathy in her toes. Has no implications whatsoever for her in regard to functional status. She has minimal if any other comorbidities. She has completed COVID vaccination. She will plan to go to [facility] [place] [facility] in the summer. I will review her back in 6 months. She will have a CBC, CMP and a CEA level at that time.    The patient was reviewed in regard to her colorectal cancer on 12/02/2021. She has no symptoms or signs of colon cancer recurrence. Her physical examination today is normal. Her  ECOG performance status is 0 and her white count, hemoglobin, platelets, chemistry profile are normal. A CEA level is pending. She is going to be seen by the [facility] [place] [facility] in [place], [place] in 12/2021. CT scans will be performed in that institution. I asked her to call us a few days after she returns for me to review the reports of this.     In this regard she will return to see us in 6 months with a CBC, CMP and a CEA level.     2. The patient has requested for me to discuss with her gynecologist several weeks ago the roll of conization in regard to her cervical atypia documented through Pap smear. She has requested for me to review the pathology again. I went ahead and posted above. The pathology is more than benign to my eyes with no alterations that suggest any obvious malignancy and I do not think there is need for any other intervention. I made her aware that she will require very likely in the future not only the regular pelvic exam but also Pap smear and not to be surprised if in the future her gynecologist performs another colposcopy.     Other than that, no other intervention from my point of view. I asked her to remain on her vitamin D supplement. Her vitamin D level was appropriate not too long ago. Her lipid profile is terrific with an excellent HDL cholesterol. Triglycerides are normal.     Given the confidence that she has in my care, she requested information about all these above issues and I was glad to have commentaries about these problems or issues pertinent to her overall health.    ·                Zheng Johnson MD

## 2021-12-03 ENCOUNTER — TELEPHONE (OUTPATIENT)
Dept: ONCOLOGY | Facility: CLINIC | Age: 56
End: 2021-12-03

## 2021-12-03 NOTE — TELEPHONE ENCOUNTER
----- Message from Zheng Johnson MD sent at 12/2/2021  6:02 PM EST -----  CALL HER CEA AND OTHER LABS ARE NORMAL

## 2021-12-23 DIAGNOSIS — F98.8 ATTENTION DEFICIT DISORDER (ADD) WITHOUT HYPERACTIVITY: ICD-10-CM

## 2021-12-23 RX ORDER — DEXMETHYLPHENIDATE HYDROCHLORIDE 20 MG/1
20 CAPSULE, EXTENDED RELEASE ORAL DAILY
Qty: 30 CAPSULE | Refills: 0 | Status: SHIPPED | OUTPATIENT
Start: 2021-12-23 | End: 2022-01-25 | Stop reason: SDUPTHER

## 2021-12-23 NOTE — TELEPHONE ENCOUNTER
Caller: Isabel Florentino    Relationship: Self    Best call back number: [phone]     Requested Prescriptions:   Requested Prescriptions     Pending Prescriptions Disp Refills   • dexmethylphenidate XR (FOCALIN XR) 20 MG 24 hr capsule 30 capsule 0     Sig: Take 1 capsule by mouth Daily        Ph[facility] where request [facility] be sent: [facility] [address] [place] [address], [place] - [address] [facility] - [phone]  - [phone] FX     Additional details provided by patient: 2-3 DAYS OF MEDICATION REMAINING     Does the patient have less than a 3 day supply:  [x] Yes  [] No    Angelika Conde   12/23/21 11:18 EST

## 2022-01-25 DIAGNOSIS — F98.8 ATTENTION DEFICIT DISORDER (ADD) WITHOUT HYPERACTIVITY: ICD-10-CM

## 2022-01-25 RX ORDER — DEXMETHYLPHENIDATE HYDROCHLORIDE 20 MG/1
20 CAPSULE, EXTENDED RELEASE ORAL DAILY
Qty: 30 CAPSULE | Refills: 0 | Status: SHIPPED | OUTPATIENT
Start: 2022-01-25 | End: 2022-03-14 | Stop reason: SDUPTHER

## 2022-03-14 ENCOUNTER — TELEPHONE (OUTPATIENT)
Dept: FAMILY MEDICINE CLINIC | Facility: CLINIC | Age: 57
End: 2022-03-14

## 2022-03-14 DIAGNOSIS — F98.8 ATTENTION DEFICIT DISORDER (ADD) WITHOUT HYPERACTIVITY: ICD-10-CM

## 2022-03-14 RX ORDER — DEXMETHYLPHENIDATE HYDROCHLORIDE 20 MG/1
20 CAPSULE, EXTENDED RELEASE ORAL DAILY
Qty: 30 CAPSULE | Refills: 0 | Status: SHIPPED | OUTPATIENT
Start: 2022-03-14 | End: 2022-04-11 | Stop reason: SDUPTHER

## 2022-03-14 NOTE — TELEPHONE ENCOUNTER
Incoming Refill Request      Medication requested (name and dose):   dexmethylphenidate XR (FOCALIN XR) 20 MG 24 hr capsule  20 mg, Daily         Pharmacy where request should be sent: [facility] - [place], [place] - [address] [place] [address] - [phone]  - [phone] FX  [phone]    Additional details provided by patient: PATIENT HAS TWO PILLS LEFT, WANTING TO SEE IF IT CAN BE REFILLED BEFORE HER NEXT APPOINTMENT     Best call back number: [phone]    Does the patient have less than a 3 day supply:  [x] Yes  [] No    Roberto Rincon Rep  03/14/22, 12:38 EDT

## 2022-03-23 ENCOUNTER — OFFICE VISIT (OUTPATIENT)
Dept: FAMILY MEDICINE CLINIC | Facility: CLINIC | Age: 57
End: 2022-03-23

## 2022-03-23 VITALS
OXYGEN SATURATION: 98 % | HEIGHT: 64 IN | RESPIRATION RATE: 18 BRPM | HEART RATE: 89 BPM | TEMPERATURE: 97.7 F | DIASTOLIC BLOOD PRESSURE: 79 MMHG | SYSTOLIC BLOOD PRESSURE: 136 MMHG | WEIGHT: 129.4 LBS | BODY MASS INDEX: 22.09 KG/M2

## 2022-03-23 DIAGNOSIS — C78.7 RECTAL CANCER METASTASIZED TO LIVER: Primary | ICD-10-CM

## 2022-03-23 DIAGNOSIS — C20 RECTAL CANCER METASTASIZED TO LIVER: Primary | ICD-10-CM

## 2022-03-23 DIAGNOSIS — F98.8 ATTENTION DEFICIT DISORDER (ADD) WITHOUT HYPERACTIVITY: ICD-10-CM

## 2022-03-23 DIAGNOSIS — J06.9 VIRAL UPPER RESPIRATORY TRACT INFECTION: ICD-10-CM

## 2022-03-23 PROCEDURE — 99214 OFFICE O/P EST MOD 30 MIN: CPT | Performed by: INTERNAL MEDICINE

## 2022-03-23 NOTE — PROGRESS NOTES
"Rooming Tab(CC,VS,Pt Hx,Fall Screen)  Chief Complaint   Patient presents with   • Med Refill       Subjective   Pt here for ADD check- taking focalin XR well-  No issues with ches tpain or tachycardia- insurance no longer paying for though- using good Rx   had colposcopy with abnormal pap -    rectal cancer still in remission  Has vertigo- had congestion in head and cold.  Thick PND discolored and vertigo on going. 4-5 weeks total. Hard bending over working out.        I have reviewed and updated her medications, medical history and problem list during today's office visit.     Patient Care Team:  Hannah Roque MD as PCP - General (Internal Medicine)  Zheng Johnson MD as Consulting Physician (Hematology and Oncology)  Lenore Schuler MD as Consulting Physician (Colon and Rectal Surgery)  Lenore Schuler MD as Referring Physician (Colon and Rectal Surgery)    Problem List Tab  Medications Tab  Synopsis Tab  Chart Review Tab  Care Everywhere Tab  Immunizations Tab  Patient History Tab    Social History     Tobacco Use   • Smoking status: Never Smoker   • Smokeless tobacco: Never Used   Substance Use Topics   • Alcohol use: Yes     Alcohol/week: 5.0 standard drinks     Types: 5 Glasses of wine per week     Comment: Social       Review of Systems    Objective     Rooming Tab(CC,VS,Pt Hx,Fall Screen)  /79 (BP Location: Left arm, Patient Position: Sitting, Cuff Size: Adult)   Pulse 89   Temp 97.7 °F (36.5 °C) (Temporal)   Resp 18   Ht 162.6 cm (64.02\")   Wt 58.7 kg (129 lb 6.4 oz)   LMP  (LMP Unknown)   SpO2 98%   BMI 22.20 kg/m²     Body mass index is 22.2 kg/m².    Physical Exam  Vitals and nursing note reviewed.   Constitutional:       Appearance: Normal appearance. She is well-developed.   HENT:      Head: Normocephalic and atraumatic.      Right Ear: Tympanic membrane normal.      Left Ear: Tympanic membrane normal.      Nose: Congestion and rhinorrhea present.      Mouth/Throat:      " Pharynx: Posterior oropharyngeal erythema present.   Eyes:      Pupils: Pupils are equal, round, and reactive to light.   Cardiovascular:      Rate and Rhythm: Normal rate and regular rhythm.      Pulses: Normal pulses.      Heart sounds: Normal heart sounds. No murmur heard.  Pulmonary:      Effort: Pulmonary effort is normal.      Breath sounds: Normal breath sounds.   Abdominal:      General: Bowel sounds are normal. There is no distension.      Palpations: Abdomen is soft.   Musculoskeletal:         General: No tenderness.      Cervical back: Normal range of motion and neck supple.   Skin:     Capillary Refill: Capillary refill takes less than 2 seconds.   Neurological:      Mental Status: She is alert and oriented to person, place, and time.   Psychiatric:         Mood and Affect: Mood normal.         Behavior: Behavior normal.          Statin Choice Calculator  Data Reviewed:         The data below has been reviewed by Hannah Roque MD on 03/23/2022.          Assessment/Plan   Order Review Tab  Health Maintenance Tab  Patient Plan/Order Tab  Diagnoses and all orders for this visit:    1. Rectal cancer metastasized to liver (HCC) (Primary)    2. Attention deficit disorder (ADD) without hyperactivity  Comments:  continue with current regimen    3. Viral upper respiratory tract infection        Wrapup Tab  Return if symptoms worsen or fail to improve.       They were informed of the diagnosis and treatment plan and directed to f/u for any further problems or concerns.

## 2022-04-11 DIAGNOSIS — F98.8 ATTENTION DEFICIT DISORDER (ADD) WITHOUT HYPERACTIVITY: ICD-10-CM

## 2022-04-11 RX ORDER — DEXMETHYLPHENIDATE HYDROCHLORIDE 20 MG/1
20 CAPSULE, EXTENDED RELEASE ORAL DAILY
Qty: 30 CAPSULE | Refills: 0 | Status: SHIPPED | OUTPATIENT
Start: 2022-04-11 | End: 2022-04-18 | Stop reason: SDUPTHER

## 2022-04-11 NOTE — TELEPHONE ENCOUNTER
Caller: Isabel Florentino    Relationship: Self    Best call back number: [phone]     Requested Prescriptions:   Requested Prescriptions     Pending Prescriptions Disp Refills   • dexmethylphenidate XR (FOCALIN XR) 20 MG 24 hr capsule 30 capsule 0     Sig: Take 1 capsule by mouth Daily        Pharmacy where request should be sent: [facility], [place] - [address] [facility] [phone] [facility] [phone] FX     Additional details provided by patient: 2 DAYS OF MEDICATION REMAINING     Does the patient have less than a 3 day supply:  [x] Yes  [] No    Angelika Conde   04/11/22 15:44 EDT

## 2022-04-18 ENCOUNTER — TELEPHONE (OUTPATIENT)
Dept: FAMILY MEDICINE CLINIC | Facility: CLINIC | Age: 57
End: 2022-04-18

## 2022-04-18 DIAGNOSIS — F98.8 ATTENTION DEFICIT DISORDER (ADD) WITHOUT HYPERACTIVITY: ICD-10-CM

## 2022-04-18 RX ORDER — DEXMETHYLPHENIDATE HYDROCHLORIDE 20 MG/1
20 CAPSULE, EXTENDED RELEASE ORAL DAILY
Qty: 30 CAPSULE | Refills: 0 | Status: SHIPPED | OUTPATIENT
Start: 2022-04-18 | End: 2022-05-11 | Stop reason: SDUPTHER

## 2022-04-18 NOTE — TELEPHONE ENCOUNTER
Caller: Anika Florentinotchen    Relationship: Self    Best call back number: [phone]    Requested Prescriptions:   Requested Prescriptions     Pending Prescriptions Disp Refills   • dexmethylphenidate XR (FOCALIN XR) 20 MG 24 hr capsule 30 capsule 0     Sig: Take 1 capsule by mouth Daily        Pharmacy where request should be sent: [facility], [place] - [address] [facility] [phone] [facility] [phone] FX     Additional details provided by patient: NEEDS NEW PRESCRIPTION AND PRIOR AUTHORIZATION.PER PHARMACY FOR HER INSUARANCE    Does the patient have less than a 3 day supply:  [] Yes  [x] No    Jose Galeano   04/18/22 16:15 EDT

## 2022-05-02 ENCOUNTER — TELEPHONE (OUTPATIENT)
Dept: FAMILY MEDICINE CLINIC | Facility: CLINIC | Age: 57
End: 2022-05-02

## 2022-05-02 NOTE — TELEPHONE ENCOUNTER
Caller: Isabel Florentino    Relationship: Self    Best call back number: [phone]    What medications are you currently taking:   Current Outpatient Medications on File Prior to Visit   Medication Sig Dispense Refill   • B Complex Vitamins (VITAMIN B COMPLEX PO) Take 1 tablet by mouth Daily.     • cholecalciferol (VITAMIN D3) 1000 UNITS tablet Take 1,000 Units by mouth Daily.     • Coenzyme Q10 (CO Q 10 PO) Take 1 tablet by mouth Every Morning.     • dexmethylphenidate XR (FOCALIN XR) 20 MG 24 hr capsule Take 1 capsule by mouth Daily 30 capsule 0   • Finacea 15 % foam Apply 1 application topically to the appropriate area as directed As Needed.     • HYDROcodone-acetaminophen (NORCO) 5-325 MG per tablet Take 1 tablet by mouth Every 6 (Six) Hours As Needed for Moderate Pain . 8 tablet 0   • Multiple Vitamins-Minerals (MULTIVITAMIN WITH MINERALS) tablet tablet Take 1 tablet by mouth Daily. PT HOLDING FOR SURGERY       No current facility-administered medications on file prior to visit.       Which medication are you concerned about: dexmethylphenidate XR (FOCALIN XR) 20 MG 24 hr capsule    Who prescribed you this medication: DR PAREKH    What are your concerns:  PATIENT UPSET THAT SHE PAID OVER $100 OUT OF POCKET AND CALLED FOR A PRIOR AUTHORIZATION ON 4/18/2022. INSURANCE WILL NOT REIMBURSE HER DUE TO NOT RECEIVING AUTH.      PLEASE CONTACT PATIENT TO DISCUSS.

## 2022-05-03 NOTE — TELEPHONE ENCOUNTER
The note got signed after rx was sent in  I have submitted the PA for next time and it is approved .   But we can not change ins company policies

## 2022-05-03 NOTE — TELEPHONE ENCOUNTER
HUB TO SHARE: LEFT VM ASKING PATIENT TO CALL BACK. LET HER KNOW THAT PRIOR AUTHORIZATIONS CAN TAKE A WHILE TO GO THROUGH AND WE APOLOGIZE THAT SHE PICKED UP THE MEDS BEFORE IT HAD BEEN APPROVED. WE UNFORTUNATELY CANNOT DO ANYTHING ABOUT THE INSURANCE COMPANY'S REIMBURSEMENT POLICY. THE PA HAS NOW BEEN APPROVED SO SHE SHOULD NOT HAVE AN ISSUE WITH HER REFILLS.

## 2022-05-11 DIAGNOSIS — F98.8 ATTENTION DEFICIT DISORDER (ADD) WITHOUT HYPERACTIVITY: ICD-10-CM

## 2022-05-11 RX ORDER — DEXMETHYLPHENIDATE HYDROCHLORIDE 20 MG/1
20 CAPSULE, EXTENDED RELEASE ORAL DAILY
Qty: 30 CAPSULE | Refills: 0 | Status: SHIPPED | OUTPATIENT
Start: 2022-05-11 | End: 2022-06-20 | Stop reason: SDUPTHER

## 2022-05-11 NOTE — TELEPHONE ENCOUNTER
Caller: Deepti Florentino    Relationship: Self    Best call back number: [phone]     Requested Prescriptions:   Requested Prescriptions     Pending Prescriptions Disp Refills   • dexmethylphenidate XR (FOCALIN XR) 20 MG 24 hr capsule 30 capsule 0     Sig: Take 1 capsule by mouth Daily        [facility] be sent[facility] [address] [facility] [place] [place] - [address] [facility] - [phone]  - [phone] FX     Additional details provided by patient: 2 DAYS OF MEDICATION REMAINING.     DEEPTI SAYS THE LAST 2 TIMES SHE HAS REQUESTED REFILL PRIOR AUTH WAS NOT DONE , SHE HAD TO PAY OUT OF POCKET, SHE WOULD LIKE THE PRIOR AUTH DONE PRIOR TO HER PICKING UP MEDICATION    Does the patient have less than a 3 day supply:  [x] Yes  [] No    Angelika Conde   05/11/22 15:30 EDT

## 2022-06-20 DIAGNOSIS — F98.8 ATTENTION DEFICIT DISORDER (ADD) WITHOUT HYPERACTIVITY: ICD-10-CM

## 2022-06-20 RX ORDER — DEXMETHYLPHENIDATE HYDROCHLORIDE 20 MG/1
20 CAPSULE, EXTENDED RELEASE ORAL DAILY
Qty: 30 CAPSULE | Refills: 0 | Status: SHIPPED | OUTPATIENT
Start: 2022-06-20 | End: 2022-07-15 | Stop reason: SDUPTHER

## 2022-06-20 NOTE — TELEPHONE ENCOUNTER
Caller: Isabel Florentino    Relationship: Self    Best call back number: [phone]    Requested Prescriptions:   Requested Prescriptions     Pending Prescriptions Disp Refills   • dexmethylphenidate XR (FOCALIN XR) 20 MG 24 hr capsule 30 capsule 0     Sig: Take 1 capsule by mouth Daily        Pharmacy where request should be sent: [facility] [address] [place] [address], [place] - [address] [facility] [phone] [facility] [phone] FX     Additional details provided by patient: TOOK LAST ONE 6/20/22  Does the patient have less than a 3 day supply:  [x] Yes  [] No    Jose Galeano   06/20/22 08:50 EDT

## 2022-06-23 ENCOUNTER — TELEPHONE (OUTPATIENT)
Dept: ONCOLOGY | Facility: CLINIC | Age: 57
End: 2022-06-23

## 2022-06-23 NOTE — TELEPHONE ENCOUNTER
Caller: Isabel Florentino    Relationship to patient: Self    Best call back number: [phone]    Type of visit: LAB AND FOLLOW UP    Requested date: AROUND CHRISTMAS    If rescheduling, when is the original appointment: 06/22    Additional notes: PLEASE CALL ONCE R/S. HUB UNABLE TO R/S WITHIN TIMEFRAME.

## 2022-07-08 ENCOUNTER — APPOINTMENT (OUTPATIENT)
Dept: WOMENS IMAGING | Facility: HOSPITAL | Age: 57
End: 2022-07-08

## 2022-07-08 PROCEDURE — 77063 BREAST TOMOSYNTHESIS BI: CPT | Performed by: RADIOLOGY

## 2022-07-08 PROCEDURE — 77067 SCR MAMMO BI INCL CAD: CPT | Performed by: RADIOLOGY

## 2022-07-15 DIAGNOSIS — F98.8 ATTENTION DEFICIT DISORDER (ADD) WITHOUT HYPERACTIVITY: ICD-10-CM

## 2022-07-15 NOTE — TELEPHONE ENCOUNTER
Caller: Isabel Florentino    Relationship: Self    Best call back number: [phone]    Requested Prescriptions:   Requested Prescriptions     Pending Prescriptions Disp Refills   • dexmethylphenidate XR (FOCALIN XR) 20 MG 24 hr capsule 30 capsule 0     Sig: Take 1 capsule by mouth Daily        Pharmacy where request should be sent: [facility], [place] - [address] [facility] [phone] [facility] [phone] FX       Does the patient have less than a 3 day supply:  [] Yes  [x] No    Roberto Reina Rep   07/15/22 11:15 EDT

## 2022-07-17 RX ORDER — DEXMETHYLPHENIDATE HYDROCHLORIDE 20 MG/1
20 CAPSULE, EXTENDED RELEASE ORAL DAILY
Qty: 30 CAPSULE | Refills: 0 | Status: SHIPPED | OUTPATIENT
Start: 2022-07-17 | End: 2022-08-19 | Stop reason: SDUPTHER

## 2022-08-08 ENCOUNTER — TELEPHONE (OUTPATIENT)
Dept: FAMILY MEDICINE CLINIC | Facility: CLINIC | Age: 57
End: 2022-08-08

## 2022-08-08 RX ORDER — SCOLOPAMINE TRANSDERMAL SYSTEM 1 MG/1
1 PATCH, EXTENDED RELEASE TRANSDERMAL
Qty: 4 PATCH | Refills: 0 | Status: SHIPPED | OUTPATIENT
Start: 2022-08-08 | End: 2022-09-08 | Stop reason: SDUPTHER

## 2022-08-08 NOTE — TELEPHONE ENCOUNTER
Caller: Isabel Florentino    Relationship: Self    Best call back number: [phone]    What medication are you requesting: SOMETHING TO HELP WITH NAUSEA AND SICKNESS.     WOULD ALSO LIKE MEDICAL ADVICE TO HELP PREPARE HER FOR THE TRIP SO SHE WILL NOT GET SICK. PATIENT STATES SHE HAS BEEN KNOWN TO HAVE VERTIGO    Have you had these symptoms before:    [x] Yes  [] No    Have you been treated for these symptoms before:   [] Yes  [x] No    If a prescription is needed, what is your preferred pharmacy and phone number: [facility] - [place], [place] - [address] [facility] - [phone]  - [phone]      Additional notes:TRAVELING TO HIGH ALTITUD[place] AND WILL BE FLYING OUT IN A FEW WEEKS

## 2022-08-19 DIAGNOSIS — F98.8 ATTENTION DEFICIT DISORDER (ADD) WITHOUT HYPERACTIVITY: ICD-10-CM

## 2022-08-19 RX ORDER — DEXMETHYLPHENIDATE HYDROCHLORIDE 20 MG/1
20 CAPSULE, EXTENDED RELEASE ORAL DAILY
Qty: 30 CAPSULE | Refills: 0 | Status: SHIPPED | OUTPATIENT
Start: 2022-08-19 | End: 2022-09-16 | Stop reason: SDUPTHER

## 2022-08-19 NOTE — TELEPHONE ENCOUNTER
Incoming Refill Request      Medication requested (name and dose):   dexmethylphenidate XR (FOCALIN XR) 20 MG 24 hr capsule  20 mg, Daily         Pharmacy where request should be sent: [facility] - [place], [place] - [address] [place] [address] - [phone]  - [phone]   [phone]    Additional details provided by patient: PATIENT WILL BE OUT OVER THE WEEKEND    Best call back number: [phone]    Does the patient have less than a 3 day supply:  [x] Yes  [] No    Roberto Rincon Rep  08/19/22, 15:36 EDT

## 2022-09-08 ENCOUNTER — OFFICE VISIT (OUTPATIENT)
Dept: FAMILY MEDICINE CLINIC | Facility: CLINIC | Age: 57
End: 2022-09-08

## 2022-09-08 VITALS
SYSTOLIC BLOOD PRESSURE: 140 MMHG | HEART RATE: 71 BPM | WEIGHT: 130 LBS | BODY MASS INDEX: 22.2 KG/M2 | DIASTOLIC BLOOD PRESSURE: 86 MMHG | RESPIRATION RATE: 16 BRPM | OXYGEN SATURATION: 99 % | TEMPERATURE: 97.3 F | HEIGHT: 64 IN

## 2022-09-08 DIAGNOSIS — F98.8 ATTENTION DEFICIT DISORDER (ADD) WITHOUT HYPERACTIVITY: ICD-10-CM

## 2022-09-08 DIAGNOSIS — Z23 IMMUNIZATION DUE: Primary | ICD-10-CM

## 2022-09-08 DIAGNOSIS — C20 RECTAL CANCER: ICD-10-CM

## 2022-09-08 PROCEDURE — 99214 OFFICE O/P EST MOD 30 MIN: CPT | Performed by: INTERNAL MEDICINE

## 2022-09-08 PROCEDURE — 90677 PCV20 VACCINE IM: CPT | Performed by: INTERNAL MEDICINE

## 2022-09-08 PROCEDURE — 90471 IMMUNIZATION ADMIN: CPT | Performed by: INTERNAL MEDICINE

## 2022-09-08 RX ORDER — SCOLOPAMINE TRANSDERMAL SYSTEM 1 MG/1
1 PATCH, EXTENDED RELEASE TRANSDERMAL
Qty: 5 PATCH | Refills: 0 | Status: ON HOLD | OUTPATIENT
Start: 2022-09-08 | End: 2022-09-11

## 2022-09-08 NOTE — PROGRESS NOTES
Rooming Tab(CC,VS,Pt Hx,Fall Screen)  Chief Complaint   Patient presents with   • ADD       Subjective   Colon  She reports that she has had 2 episodes of bloating. She states that she looks 4 months pregnant. She gets very bloated. She notes that this has happened twice. She admits that she does not have a good history. She adds that her  is on a sugar-free diet and he brought home sugar free cookies. She states that it was near that time; however, the other time she does not think it was. She reports that it just swells up and she thinks she is constipated. She notes that it starts high and it is hard and painful to touch. The pain goes all the way across.  She denies having a gallbladder. She admits that she had a CT scan in 06/2022 and it was normal. She adds that dairy does bother her. She states that she does not eat a lot of dairy. She reports that she eats a lot of fruits. She notes that she needs more episodes before she can see a pattern. She admits that last night she hardly slept at all. She adds that she is due for a colonoscopy this year.    Attention  She states that she feels like her focus is good on the medication. She states that she is sleeping well.    Blood pressure  She states that her blood pressure was high in 12/2021. She states that her systolic pressure is slightly elevated due to the medication.    Health maintenance  She denies any issues breathing or chest pain. She states that she normally gets her influenza vaccination. The patient would like to get more altitude patches. She is going to be flying to [place] for her son's wedding.     I have reviewed and updated her medications, medical history and problem list during today's office visit.     Patient Care Team:  Hannah Roque MD as PCP - General (Internal Medicine)  Zheng Johnson MD as Consulting Physician (Hematology and Oncology)  Lenore Schuler MD as Consulting Physician (Colon and Rectal  "Surgery)  Lenore Schuler MD as Referring Physician (Colon and Rectal Surgery)    Problem List Tab  Medications Tab  Synopsis Tab  Chart Review Tab  Care Everywhere Tab  Immunizations Tab  Patient History Tab    Social History     Tobacco Use   • Smoking status: Never Smoker   • Smokeless tobacco: Never Used   Substance Use Topics   • Alcohol use: Yes     Alcohol/week: 5.0 standard drinks     Types: 5 Glasses of wine per week     Comment: Social       Review of Systems   Gastrointestinal:        Bloating.       Objective     Rooming Tab(CC,VS,Pt Hx,Fall Screen)  /86   Pulse 71   Temp 97.3 °F (36.3 °C)   Resp 16   Ht 162.6 cm (64\")   Wt 59 kg (130 lb)   LMP  (LMP Unknown)   SpO2 99%   BMI 22.31 kg/m²     Body mass index is 22.31 kg/m².    Physical Exam  Vitals and nursing note reviewed.   Constitutional:       Appearance: Normal appearance. She is well-developed.   HENT:      Head: Normocephalic and atraumatic.      Mouth/Throat:      Pharynx: No posterior oropharyngeal erythema.   Eyes:      Pupils: Pupils are equal, round, and reactive to light.   Cardiovascular:      Rate and Rhythm: Normal rate and regular rhythm.      Pulses: Normal pulses.      Heart sounds: Normal heart sounds. No murmur heard.  Pulmonary:      Effort: Pulmonary effort is normal.      Breath sounds: Normal breath sounds.   Abdominal:      General: Bowel sounds are normal. There is no distension.      Palpations: Abdomen is soft.   Musculoskeletal:         General: No tenderness.      Cervical back: Normal range of motion and neck supple.   Skin:     Capillary Refill: Capillary refill takes less than 2 seconds.   Neurological:      Mental Status: She is alert and oriented to person, place, and time.   Psychiatric:         Mood and Affect: Mood normal.         Behavior: Behavior normal.          Statin Choice Calculator  Data Reviewed:         The data below has been reviewed by Hannah Roque MD on 09/08/2022.      Lab " Results   Component Value Date    WBC 7.5 06/14/2022      Assessment & Plan   Order Review Tab  Health Maintenance Tab  Patient Plan/Order Tab    Colon cancer screening  She is due for a colonoscopy in 12/2022.    ADHD  She will continue her current medication regimen.    Health maintenance  She will receive her pneumonia vaccine today.  She will receive a prescription for the altitude patches.           Diagnoses and all orders for this visit:    1. Immunization due (Primary)  -     Pneumococcal Conjugate Vaccine 20-Valent (PCV20)    2. Attention deficit disorder (ADD) without hyperactivity    3. Rectal cancer (HCC)    Other orders  -     Scopolamine (Transderm-Scop, 1.5 MG,) 1 MG/3DAYS patch; Place 1 patch on the skin as directed by provider Every 72 (Seventy-Two) Hours.  Dispense: 5 patch; Refill: 0        Wrapup Tab  Return if symptoms worsen or fail to improve.       They were informed of the diagnosis and treatment plan and directed to f/u for any further problems or concerns.        Transcribed from ambient dictation for Hannah Roque MD by DARLENE GAYLE.  09/08/22   20:06 EDT    Patient verbalized consent to the visit recording.  I have personally performed the services described in this document as transcribed by the above individual, and it is both accurate and complete.  Hannah Roque MD  9/10/2022  14:27 EDT

## 2022-09-10 ENCOUNTER — APPOINTMENT (OUTPATIENT)
Dept: CT IMAGING | Facility: HOSPITAL | Age: 57
End: 2022-09-10

## 2022-09-10 ENCOUNTER — APPOINTMENT (OUTPATIENT)
Dept: GENERAL RADIOLOGY | Facility: HOSPITAL | Age: 57
End: 2022-09-10

## 2022-09-10 ENCOUNTER — HOSPITAL ENCOUNTER (INPATIENT)
Facility: HOSPITAL | Age: 57
LOS: 2 days | Discharge: HOME | End: 2022-09-12
Attending: EMERGENCY MEDICINE | Admitting: INTERNAL MEDICINE

## 2022-09-10 DIAGNOSIS — K56.609 INTESTINAL OBSTRUCTION, UNSPECIFIED CAUSE, UNSPECIFIED WHETHER PARTIAL OR COMPLETE: ICD-10-CM

## 2022-09-10 DIAGNOSIS — R10.84 GENERALIZED ABDOMINAL PAIN: Primary | ICD-10-CM

## 2022-09-10 PROBLEM — Z23 IMMUNIZATION DUE: Status: ACTIVE | Noted: 2022-09-10

## 2022-09-10 LAB
ALBUMIN SERPL-MCNC: 5.1 G/DL (ref 3.5–5.2)
ALBUMIN/GLOB SERPL: 1.5 G/DL
ALP SERPL-CCNC: 75 U/L (ref 39–117)
ALT SERPL W P-5'-P-CCNC: 16 U/L (ref 1–33)
ANION GAP SERPL CALCULATED.3IONS-SCNC: 16 MMOL/L (ref 5–15)
AST SERPL-CCNC: 31 U/L (ref 1–32)
BACTERIA UR QL AUTO: ABNORMAL /HPF
BASOPHILS # BLD AUTO: 0.1 10*3/MM3 (ref 0–0.2)
BASOPHILS NFR BLD AUTO: 0.4 % (ref 0–1.5)
BILIRUB SERPL-MCNC: 0.7 MG/DL (ref 0–1.2)
BILIRUB UR QL STRIP: NEGATIVE
BUN SERPL-MCNC: 20 MG/DL (ref 6–20)
BUN/CREAT SERPL: 23 (ref 7–25)
CALCIUM SPEC-SCNC: 10 MG/DL (ref 8.6–10.5)
CHLORIDE SERPL-SCNC: 98 MMOL/L (ref 98–107)
CLARITY UR: CLEAR
CO2 SERPL-SCNC: 25 MMOL/L (ref 22–29)
COLOR UR: YELLOW
CREAT SERPL-MCNC: 0.87 MG/DL (ref 0.57–1)
D-LACTATE SERPL-SCNC: 0.8 MMOL/L (ref 0.5–2)
DEPRECATED RDW RBC AUTO: 42 FL (ref 37–54)
EGFRCR SERPLBLD CKD-EPI 2021: 77.3 ML/MIN/1.73
EOSINOPHIL # BLD AUTO: 0 10*3/MM3 (ref 0–0.4)
EOSINOPHIL NFR BLD AUTO: 0.1 % (ref 0.3–6.2)
ERYTHROCYTE [DISTWIDTH] IN BLOOD BY AUTOMATED COUNT: 13.6 % (ref 12.3–15.4)
GLOBULIN UR ELPH-MCNC: 3.4 GM/DL
GLUCOSE SERPL-MCNC: 98 MG/DL (ref 65–99)
GLUCOSE UR STRIP-MCNC: NEGATIVE MG/DL
HCT VFR BLD AUTO: 48.2 % (ref 34–46.6)
HGB BLD-MCNC: 16 G/DL (ref 12–15.9)
HGB UR QL STRIP.AUTO: NEGATIVE
HYALINE CASTS UR QL AUTO: ABNORMAL /LPF
KETONES UR QL STRIP: ABNORMAL
LEUKOCYTE ESTERASE UR QL STRIP.AUTO: ABNORMAL
LIPASE SERPL-CCNC: 18 U/L (ref 13–60)
LYMPHOCYTES # BLD AUTO: 1.4 10*3/MM3 (ref 0.7–3.1)
LYMPHOCYTES NFR BLD AUTO: 9.1 % (ref 19.6–45.3)
MCH RBC QN AUTO: 28.9 PG (ref 26.6–33)
MCHC RBC AUTO-ENTMCNC: 33.2 G/DL (ref 31.5–35.7)
MCV RBC AUTO: 87.1 FL (ref 79–97)
MONOCYTES # BLD AUTO: 1 10*3/MM3 (ref 0.1–0.9)
MONOCYTES NFR BLD AUTO: 6.6 % (ref 5–12)
NEUTROPHILS NFR BLD AUTO: 12.9 10*3/MM3 (ref 1.7–7)
NEUTROPHILS NFR BLD AUTO: 83.8 % (ref 42.7–76)
NITRITE UR QL STRIP: NEGATIVE
NRBC BLD AUTO-RTO: 0.1 /100 WBC (ref 0–0.2)
PH UR STRIP.AUTO: 5.5 [PH] (ref 5–8)
PLATELET # BLD AUTO: 323 10*3/MM3 (ref 140–450)
PMV BLD AUTO: 8.5 FL (ref 6–12)
POTASSIUM SERPL-SCNC: 4.4 MMOL/L (ref 3.5–5.2)
PROT SERPL-MCNC: 8.5 G/DL (ref 6–8.5)
PROT UR QL STRIP: ABNORMAL
RBC # BLD AUTO: 5.53 10*6/MM3 (ref 3.77–5.28)
RBC # UR STRIP: ABNORMAL /HPF
REF LAB TEST METHOD: ABNORMAL
SODIUM SERPL-SCNC: 139 MMOL/L (ref 136–145)
SP GR UR STRIP: 1.03 (ref 1–1.03)
SQUAMOUS #/AREA URNS HPF: ABNORMAL /HPF
UROBILINOGEN UR QL STRIP: ABNORMAL
WBC # UR STRIP: ABNORMAL /HPF
WBC NRBC COR # BLD: 15.4 10*3/MM3 (ref 3.4–10.8)
WHOLE BLOOD HOLD COAG: NORMAL
WHOLE BLOOD HOLD SPECIMEN: NORMAL

## 2022-09-10 PROCEDURE — 25010000002 LORAZEPAM PER 2 MG: Performed by: NURSE PRACTITIONER

## 2022-09-10 PROCEDURE — 99223 1ST HOSP IP/OBS HIGH 75: CPT | Performed by: INTERNAL MEDICINE

## 2022-09-10 PROCEDURE — 87040 BLOOD CULTURE FOR BACTERIA: CPT | Performed by: INTERNAL MEDICINE

## 2022-09-10 PROCEDURE — 25010000002 CEFTRIAXONE PER 250 MG: Performed by: INTERNAL MEDICINE

## 2022-09-10 PROCEDURE — 83690 ASSAY OF LIPASE: CPT | Performed by: NURSE PRACTITIONER

## 2022-09-10 PROCEDURE — 25010000002 DIPHENHYDRAMINE PER 50 MG: Performed by: NURSE PRACTITIONER

## 2022-09-10 PROCEDURE — 36415 COLL VENOUS BLD VENIPUNCTURE: CPT | Performed by: INTERNAL MEDICINE

## 2022-09-10 PROCEDURE — 80053 COMPREHEN METABOLIC PANEL: CPT | Performed by: NURSE PRACTITIONER

## 2022-09-10 PROCEDURE — 74018 RADEX ABDOMEN 1 VIEW: CPT

## 2022-09-10 PROCEDURE — 99283 EMERGENCY DEPT VISIT LOW MDM: CPT

## 2022-09-10 PROCEDURE — 0 IOPAMIDOL PER 1 ML: Performed by: EMERGENCY MEDICINE

## 2022-09-10 PROCEDURE — 25010000002 ONDANSETRON PER 1 MG: Performed by: NURSE PRACTITIONER

## 2022-09-10 PROCEDURE — 85025 COMPLETE CBC W/AUTO DIFF WBC: CPT | Performed by: NURSE PRACTITIONER

## 2022-09-10 PROCEDURE — 81001 URINALYSIS AUTO W/SCOPE: CPT | Performed by: EMERGENCY MEDICINE

## 2022-09-10 PROCEDURE — 83735 ASSAY OF MAGNESIUM: CPT | Performed by: INTERNAL MEDICINE

## 2022-09-10 PROCEDURE — 74177 CT ABD & PELVIS W/CONTRAST: CPT

## 2022-09-10 PROCEDURE — 85025 COMPLETE CBC W/AUTO DIFF WBC: CPT | Performed by: INTERNAL MEDICINE

## 2022-09-10 PROCEDURE — 83605 ASSAY OF LACTIC ACID: CPT | Performed by: INTERNAL MEDICINE

## 2022-09-10 RX ORDER — METRONIDAZOLE 7.5 MG/G
GEL TOPICAL EVERY 12 HOURS SCHEDULED
Status: DISCONTINUED | OUTPATIENT
Start: 2022-09-10 | End: 2022-09-11

## 2022-09-10 RX ORDER — ENOXAPARIN SODIUM 100 MG/ML
40 INJECTION SUBCUTANEOUS
Status: DISCONTINUED | OUTPATIENT
Start: 2022-09-10 | End: 2022-09-12 | Stop reason: HOSPADM

## 2022-09-10 RX ORDER — LORAZEPAM 2 MG/ML
0.5 INJECTION INTRAMUSCULAR ONCE
Status: COMPLETED | OUTPATIENT
Start: 2022-09-10 | End: 2022-09-10

## 2022-09-10 RX ORDER — SODIUM CHLORIDE 0.9 % (FLUSH) 0.9 %
10 SYRINGE (ML) INJECTION AS NEEDED
Status: DISCONTINUED | OUTPATIENT
Start: 2022-09-10 | End: 2022-09-12 | Stop reason: HOSPADM

## 2022-09-10 RX ORDER — ONDANSETRON 2 MG/ML
4 INJECTION INTRAMUSCULAR; INTRAVENOUS EVERY 6 HOURS PRN
Status: DISCONTINUED | OUTPATIENT
Start: 2022-09-10 | End: 2022-09-12 | Stop reason: HOSPADM

## 2022-09-10 RX ORDER — DIPHENHYDRAMINE HYDROCHLORIDE 50 MG/ML
12.5 INJECTION INTRAMUSCULAR; INTRAVENOUS ONCE
Status: COMPLETED | OUTPATIENT
Start: 2022-09-10 | End: 2022-09-10

## 2022-09-10 RX ORDER — SCOLOPAMINE TRANSDERMAL SYSTEM 1 MG/1
1 PATCH, EXTENDED RELEASE TRANSDERMAL
Status: DISCONTINUED | OUTPATIENT
Start: 2022-09-10 | End: 2022-09-12 | Stop reason: HOSPADM

## 2022-09-10 RX ORDER — ONDANSETRON 2 MG/ML
4 INJECTION INTRAMUSCULAR; INTRAVENOUS ONCE
Status: COMPLETED | OUTPATIENT
Start: 2022-09-10 | End: 2022-09-10

## 2022-09-10 RX ORDER — SODIUM CHLORIDE 0.9 % (FLUSH) 0.9 %
10 SYRINGE (ML) INJECTION EVERY 12 HOURS SCHEDULED
Status: DISCONTINUED | OUTPATIENT
Start: 2022-09-10 | End: 2022-09-12 | Stop reason: HOSPADM

## 2022-09-10 RX ORDER — SODIUM CHLORIDE 9 MG/ML
100 INJECTION, SOLUTION INTRAVENOUS CONTINUOUS
Status: DISCONTINUED | OUTPATIENT
Start: 2022-09-10 | End: 2022-09-12 | Stop reason: HOSPADM

## 2022-09-10 RX ADMIN — DIPHENHYDRAMINE HYDROCHLORIDE 12.5 MG: 50 INJECTION INTRAMUSCULAR; INTRAVENOUS at 22:42

## 2022-09-10 RX ADMIN — ONDANSETRON 4 MG: 2 INJECTION INTRAMUSCULAR; INTRAVENOUS at 15:10

## 2022-09-10 RX ADMIN — CEFTRIAXONE 1 G: 1 INJECTION, POWDER, FOR SOLUTION INTRAMUSCULAR; INTRAVENOUS at 22:43

## 2022-09-10 RX ADMIN — LORAZEPAM 0.5 MG: 2 INJECTION INTRAMUSCULAR; INTRAVENOUS at 17:41

## 2022-09-10 RX ADMIN — IOPAMIDOL 100 ML: 755 INJECTION, SOLUTION INTRAVENOUS at 16:18

## 2022-09-10 RX ADMIN — Medication 10 ML: at 22:43

## 2022-09-10 RX ADMIN — SODIUM CHLORIDE 500 ML: 9 INJECTION, SOLUTION INTRAVENOUS at 15:10

## 2022-09-10 RX ADMIN — PHENOL 1 SPRAY: 1.4 SPRAY ORAL at 21:33

## 2022-09-10 NOTE — H&P
[facility] Medicine Services      Patient Name: Isabel Florentino  : 1965  MRN: [id number]  Primary Care Physician: Hannah Roque MD  Date of admission: 9/10/2022    Patient Care Team:  Hannah Roque MD as PCP - General (Internal Medicine)  Zheng Johnson MD as Consulting Physician (Hematology and Oncology)  Lenore Schuler MD as Consulting Physician (Colon and Rectal Surgery)  Lenore Schuler MD as Referring Physician (Colon and Rectal Surgery)          Subjective   History Present Illness     Chief Complaint:   Chief Complaint   Patient presents with   • Abdominal Pain     Pt reports worsening generalized abdominal pain with N/V and bloating since Monday.           Brief HPI: 56-year-old female with extensive surgical history significant for CRCA with liver metastasis S/p CT/resection , S/p hand-assisted-converted to open right hepatectomy(, followed at [facility]) and anatomical resection of the left lateral lobe of the liver with bilateral ureteral stent placement by urology at that time, cervical dysplasia, history of IC aneurysm, S/p reconstructive left frontal lobe/temporal craniotomy, ADHD, HLD, and history of periorbital cellulitis.  Patient presented to [facility] ED on , complaining 2-day history of abdominal pain with associated bloating,  nausea with nonbloody vomiting, and difficulty holding meals. No diarrhea, BRBPR,  Endorses chills,but no fever,  diarrhea, chest pain, or  shortness of breath.  CT of the abdomen/pelvis with contrast obtained in ED showed small amount of ascitic fluid in the abdomen/pelvis with dilated loops of small bowel with decreased gas in the colon most consistent with small bowel obstruction.  Laboratories notable for leukocytosis, with WBC of 15.4 and anion gap of 16 otherwise, no electrolyte abnormalities.  Urine analysis suggestive of UTI.  In ED, patient receives IV fluid, and antiemetic.     Review of Systems    Constitutional: Negative.   HENT: Negative.    Cardiovascular: Negative.    Endocrine: Negative.    Hematologic/Lymphatic: Negative.    Musculoskeletal: Negative.    Gastrointestinal: Positive for bloating, abdominal pain, nausea and vomiting.   Genitourinary: Negative.    Neurological: Negative.    Psychiatric/Behavioral: Negative.    Allergic/Immunologic: Negative.               Personal History     Past Medical History:   Past Medical History:   Diagnosis Date   • Abnormal Pap smear of cervix    • ADD (attention deficit disorder)    • Anemia    • Cervical dysplasia    • Colon cancer (HCC)     NEW DIAGNOSIS   • Drug therapy    • H/O foreign travel 03/2016    [place]   • Hepatic metastases (HCC) 10/2016    partial right hepatectomy [place] 10/16   • History of transfusion     1999 AFTER TUBAL RUPTURE   • Hyperlipidemia    • Middle cerebral artery aneurysm    • Status post partial colectomy 04/2017    [facility]       Surgical History:      Past Surgical History:   Procedure Laterality Date   • AUGMENTATION MAMMAPLASTY     • COLON RESECTION WITH ILEOSTOMY     • COLONOSCOPY      MAY 2016   • CRANIOTOMY  2004, 2005    Cerebral aneurysm   • ILEOSTOMY CLOSURE     • LAPAROSCOPY FOR ECTOPIC PREGNANCY  09/1997   • LEEP N/A 11/22/2021    Procedure: LOOP ELECTROCAUTERY EXCISION PROCEDURE, ENDOCERVICAL CURETTING;  Surgeon: Xavier Collins MD;  Location: [facility];  Service: Obstetrics/Gynecology;  Laterality: N/A;   • LIVER RESECTION  2016   • LUNG LOBECTOMY Right 2018   • MEDIPORT REMOVAL     • TN INSJ TUNNELED CVC W/O SUBQ PORT/ AGE 3 YR/> Right 8/26/2016    Procedure: MEDIPORT PLACEMENT ;  Surgeon: Praful Holden MD;  Location: [facility];  Service: Vascular   • SINUS SURGERY             Family History: family history includes COPD in her father; Cerebral aneurysm in her mother; Stroke in her mother. Otherwise pertinent FHx was reviewed and unremarkable.     Social  History:  reports that she has never smoked. She has never used smokeless tobacco. She reports current alcohol use of about 5.0 standard drinks of alcohol per week. She reports that she does not use drugs.      Medications:  Prior to Admission medications    Medication Sig Start Date End Date Taking? Authorizing Provider   B Complex Vitamins (VITAMIN B COMPLEX PO) Take 1 tablet by mouth Daily.    Veena Deleon MD   cholecalciferol (VITAMIN D3) 1000 UNITS tablet Take 1,000 Units by mouth Daily.    Veena Deleon MD   dexmethylphenidate XR (FOCALIN XR) 20 MG 24 hr capsule Take 1 capsule by mouth Daily Needs appt this month 8/19/22   Hannah Roque MD   Finacea 15 % foam Apply 1 application topically to the appropriate area as directed As Needed. 8/10/20   Veena Deleon MD   Multiple Vitamins-Minerals (MULTIVITAMIN WITH MINERALS) tablet tablet Take 1 tablet by mouth Daily. PT HOLDING FOR SURGERY    Veena Deleon MD   Scopolamine (Transderm-Scop, 1.5 MG,) 1 MG/3DAYS patch Place 1 patch on the skin as directed by provider Every 72 (Seventy-Two) Hours. 9/8/22   Hannah Roque MD       Allergies:  No Known Allergies    Objective   Objective     Vital Signs  Temp:  [98.7 °F (37.1 °C)] 98.7 °F (37.1 °C)  Heart Rate:  [90] 90  Resp:  [16] 16  BP: (143)/(63) 143/63  SpO2:  [99 %] 99 %  on   ;   Device (Oxygen Therapy): room air  Body mass index is 21.57 kg/m².    Physical Exam  Constitutional:       General: She is not in acute distress.     Appearance: Normal appearance. She is not ill-appearing.   HENT:      Head: Normocephalic.      Nose: Nose normal.      Mouth/Throat:      Mouth: Mucous membranes are dry.   Eyes:      Pupils: Pupils are equal, round, and reactive to light.   Cardiovascular:      Rate and Rhythm: Normal rate.      Pulses: Normal pulses.   Pulmonary:      Effort: Pulmonary effort is normal.   Abdominal:      Palpations: Abdomen is soft.   Musculoskeletal:          General: Normal range of motion.      Cervical back: Neck supple.   Skin:     General: Skin is warm.   Neurological:      Mental Status: She is alert. Mental status is at baseline.            Results Review:  I have personally reviewed most recent  and agree with findings, most notably: .    Results from last 7 days   Lab Units 09/10/22  1454   WBC 10*3/mm3 15.40*   HEMOGLOBIN g/dL 16.0*   HEMATOCRIT % 48.2*   PLATELETS 10*3/mm3 323     Results from last 7 days   Lab Units 09/10/22  1454   SODIUM mmol/L 139   POTASSIUM mmol/L 4.4   CHLORIDE mmol/L 98   CO2 mmol/L 25.0   BUN mg/dL 20   CREATININE mg/dL 0.87   GLUCOSE mg/dL 98   CALCIUM mg/dL 10.0   ALT (SGPT) U/L 16   AST (SGOT) U/L 31     Estimated Creatinine Clearance: 63.4 mL/min (by C-G formula based on SCr of 0.87 mg/dL).  Brief Urine Lab Results  (Last result in the past 365 days)      Color   Clarity   Blood   Leuk Est   Nitrite   Protein   CREAT   Urine HCG        09/10/22 1454 Yellow   Clear   Negative   Trace   Negative   Trace                 Microbiology Results (last 10 days)     ** No results found for the last 240 hours. **          ECG/EMG Results (most recent)     None          Results for orders placed in visit on 06/23/20    SCANNED VASCULAR STUDIES          CT Abdomen Pelvis With Contrast    Result Date: 9/10/2022   1. Small amount of ascitic fluid in the abdomen and pelvis. 2. Dilated loops of small bowel with decreased gas in the colon most consistent with small bowel obstruction. I recommend general surgical consultation or further evaluate this abnormality. The terminal ileum does not appear to be dilated. It is difficult to find a transitional zone in the small bowel loops. 3. Postoperative changes in the area of the rectum. No evidence of mass or adenopathy or in formation in this area  Electronically Signed By-Brandon Sarmiento MD On:9/10/2022 4:31 PM This report was finalized on [id number] by  Brandon Sarmiento,  MD.        Estimated Creatinine Clearance: 63.4 mL/min (by C-G formula based on SCr of 0.87 mg/dL).    Assessment & Plan   Assessment/Plan       Active Hospital Problems    Diagnosis  POA   • SBO (small bowel obstruction) (HCC) [K56.609]  Yes      Resolved Hospital Problems   No resolved problems to display.     Assessment/plan:       pSBO      -supportive care, NG tube for decompression, n.p.o., IV fluids saline, antiemetic,       -CT A/p (9/10)-->CT of the abdomen/pelvis with contrast  small amount of ascitic fluid in the abdomen/pelvis with dilated loops of small bowel with decreased gas in the colon most consistent with small bowel obstruction.        -General surgery consulted    Suspected UTI        -follow up on UC/blood cultures, and lactic acid pending      -on Rocephin, monitor infectious parameters closely and de-escalate antibiotic    History of CRCA with liver metastasis       -S/p hand-assisted-converted to open right hepatectomy/cholecystectomy (2016) (followed at [facility])     Extensive surgical history, as outlined above    History of intracranial aneurysm       -S/p  reconstructive left frontal lobe/temporal craniotomy    History of ADHD        -on focalin      VTE Prophylaxis -   Mechanical Order History:     None      Pharmalogical Order History:      Ordered     Dose Route Frequency Stop    Signed and Held  Enoxaparin Sodium (LOVENOX) syringe 40 mg         40 mg SC Daily --                CODE STATUS:    There are no questions and answers to display.       I discussed the patient's findings and my recommendations with patient at bedside.        Electronically signed by Tony Larios MD, 09/10/22, 5:36 PM EDT.  [facility] Hospitalist Team

## 2022-09-10 NOTE — ED PROVIDER NOTES
Subjective   PIT    Patient presents with:  Abdominal Pain: Pt reports worsening generalized abdominal pain with N/V and bloating since Monday.      Hannah Roque MD   No LMP recorded (lmp unknown). Patient is postmenopausal.    Patient is a 56-year-old female with a history of colon cancer, underwent surgery and chemotherapy, presents to the emergency department with complaint of abdominal pain, nausea, vomiting.  This began 3 days ago.  Patient was seen by her primary care doctor earlier this week, she was given laxative, but had no improvement in symptoms.  She reports chills, no objective fever.  No hematemesis or coffee-ground emesis.  No hematochezia melena.  Denies urinary symptoms.  No abnormal vaginal discharge or bleeding.  Patient reports she received most of her treatment for colon cancer through [facility], she did last have her ileostomy reversal in 2017, she thinks at [facility], [place], operated by [facility].             Review of Systems   Constitutional: Positive for chills. Negative for fever.   Respiratory: Negative for shortness of breath.    Cardiovascular: Negative for chest pain.   Gastrointestinal: Positive for abdominal pain, nausea and vomiting. Negative for diarrhea.   Genitourinary: Negative for dysuria.   Musculoskeletal: Negative for back pain and neck pain.   Skin: Negative for color change and rash.   Neurological: Negative for dizziness, syncope, weakness and light-headedness.   Psychiatric/Behavioral: Negative for confusion.       Past Medical History:   Diagnosis Date   • Abnormal Pap smear of cervix    • ADD (attention deficit disorder)    • Anemia    • Cervical dysplasia    • Colon cancer (HCC)     NEW DIAGNOSIS   • Drug therapy    • H/O foreign travel 03/2016    [facility], [place]   • Hepatic metastases (HCC) 10/2016    partial right hepatectomy [place] 10/16   • History of transfusion     1999 AFTER TUBAL RUPTURE   • Hyperlipidemia    • Middle cerebral artery aneurysm    • Status post  partial colectomy 04/2017    [facility]       No Known Allergies    Past Surgical History:   Procedure Laterality Date   • AUGMENTATION MAMMAPLASTY     • COLON RESECTION WITH ILEOSTOMY     • COLONOSCOPY      MAY 2016   • CRANIOTOMY  2004, 2005    Cerebral aneurysm   • ILEOSTOMY CLOSURE     • LAPAROSCOPY FOR ECTOPIC PREGNANCY  09/1997   • LEEP N/A 11/22/2021    Procedure: LOOP ELECTROCAUTERY EXCISION PROCEDURE, ENDOCERVICAL CURETTING;  Surgeon: Xavier Collins MD;  Location: [facility] OR [facility];  Service: Obstetrics/Gynecology;  Laterality: N/A;   • LIVER RESECTION  2016   • LUNG LOBECTOMY Right 2018   • MEDIPORT REMOVAL     • NC INSJ TUNNELED CVC W/O SUBQ PORT/ AGE 3 YR/> Right 8/26/2016    Procedure: MEDIPORT PLACEMENT ;  Surgeon: Praful Holden MD;  Location: [facility] MAIN OR;  Service: Vascular   • SINUS SURGERY         Family History   Problem Relation Age of Onset   • Cerebral aneurysm Mother    • Stroke Mother    • COPD Father    • Malig Hyperthermia Neg Hx        Social History     Socioeconomic History   • Marital status:      Spouse name: Amos   • Number of children: 1   • Years of education: COLLEGE   Tobacco Use   • Smoking status: Never Smoker   • Smokeless tobacco: Never Used   Vaping Use   • Vaping Use: Never used   Substance and Sexual Activity   • Alcohol use: Yes     Alcohol/week: 5.0 standard drinks     Types: 5 Glasses of wine per week     Comment: Social   • Drug use: No   • Sexual activity: Defer     Partners: Male     Birth control/protection: None           Objective   Physical Exam  Vitals and nursing note reviewed.   Constitutional:       General: She is not in acute distress.     Appearance: She is well-developed. She is not ill-appearing, toxic-appearing or diaphoretic.   HENT:      Head: Normocephalic.      Mouth/Throat:      Mouth: Mucous membranes are moist.      Pharynx: Oropharynx is clear.   Eyes:      Extraocular Movements: Extraocular movements intact.       "Pupils: Pupils are equal, round, and reactive to light.   Cardiovascular:      Rate and Rhythm: Normal rate and regular rhythm.      Heart sounds: No murmur heard.    No friction rub. No gallop.   Pulmonary:      Effort: Pulmonary effort is normal.      Breath sounds: Normal breath sounds.   Abdominal:      General: Abdomen is protuberant. Bowel sounds are normal.      Palpations: Abdomen is soft.      Tenderness: There is generalized abdominal tenderness. There is no guarding or rebound. Negative signs include Vega's sign.      Comments: No active emesis   Skin:     General: Skin is warm and dry.      Capillary Refill: Capillary refill takes less than 2 seconds.   Neurological:      General: No focal deficit present.      Mental Status: She is alert and oriented to person, place, and time.         Procedures           ED Course  ED Course as of 09/10/22 2250   Sat Sep 10, 2022   1710 Spoke with Dr. Lizama [LB]   1734 CT Abdomen Pelvis With Contrast [LB]   1736 Spoke with hospitalist [LB]      ED Course User Index  [LB] Linda Gamboa, APRN      /64 (BP Location: Left arm, Patient Position: Lying)   Pulse 103   Temp 98.4 °F (36.9 °C) (Oral)   Resp 14   Ht 162.6 cm (64\")   Wt 57 kg (125 lb 10.6 oz)   LMP  (LMP Unknown)   SpO2 96%   BMI 21.57 kg/m²   Labs Reviewed   URINALYSIS W/ MICROSCOPIC IF INDICATED (NO CULTURE) - Abnormal; Notable for the following components:       Result Value    Specific Gravity, UA 1.032 (*)     Ketones, UA 40 mg/dL (2+) (*)     Protein, UA Trace (*)     Leuk Esterase, UA Trace (*)     All other components within normal limits   COMPREHENSIVE METABOLIC PANEL - Abnormal; Notable for the following components:    Anion Gap 16.0 (*)     All other components within normal limits    Narrative:     GFR Normal >60  Chronic Kidney Disease <60  Kidney Failure <15     CBC WITH AUTO DIFFERENTIAL - Abnormal; Notable for the following components:    WBC 15.40 (*)     RBC 5.53 (*)     " Hemoglobin 16.0 (*)     Hematocrit 48.2 (*)     Neutrophil % 83.8 (*)     Lymphocyte % 9.1 (*)     Eosinophil % 0.1 (*)     Neutrophils, Absolute 12.90 (*)     Monocytes, Absolute 1.00 (*)     All other components within normal limits   URINALYSIS, MICROSCOPIC ONLY - Abnormal; Notable for the following components:    RBC, UA 0-2 (*)     WBC, UA 13-20 (*)     All other components within normal limits   LIPASE - Normal   LACTIC ACID, PLASMA - Normal   BLOOD CULTURE   BLOOD CULTURE   RAINBOW DRAW    Narrative:     The following orders were created for panel order [place] Draw.  Procedure                               Abnormality         Status                     ---------                               -----------         ------                     Green Top (Gel)[[id number]]                                  Final result               Lavender Top[[id number]]                                     Final result               Gold Top - SST[[id number]]                                   Final result               Light Blue Top[[id number]]                                   Final result                 Please view results for these tests on the individual orders.   BASIC METABOLIC PANEL   CBC WITH AUTO DIFFERENTIAL   MAGNESIUM   GREEN TOP   LAVENDER TOP   GOLD TOP - SST   LIGHT BLUE TOP   CBC AND DIFFERENTIAL    Narrative:     The following orders were created for panel order CBC & Differential.  Procedure                               Abnormality         Status                     ---------                               -----------         ------                     CBC Auto Differential[[id number]]        Abnormal            Final result                 Please view results for these tests on the individual orders.     Medications   sodium chloride 0.9 % flush 10 mL (has no administration in time range)   scopolamine patch 1 mg/72 hr (1 patch Transdermal Not Given 9/10/22 2221)   metroNIDAZOLE (METROGEL) 0.75 % gel ( Topical Not  Given 9/10/22 2141)   sodium chloride 0.9 % flush 10 mL (10 mL Intravenous Given 9/10/22 2243)   sodium chloride 0.9 % flush 10 mL (has no administration in time range)   Enoxaparin Sodium (LOVENOX) syringe 40 mg (40 mg Subcutaneous Not Given 9/10/22 2138)   sodium chloride 0.9 % infusion (100 mL/hr Intravenous Currently Infusing 9/10/22 2221)   ondansetron (ZOFRAN) injection 4 mg (has no administration in time range)   cefTRIAXone (ROCEPHIN) 1 g in sodium chloride 0.9 % 100 mL IVPB (1 g Intravenous New Bag 9/10/22 2243)   phenol (CHLORASEPTIC) 1.4 % liquid 1 spray (1 spray Mouth/Throat Given 9/10/22 2133)   sodium chloride 0.9 % bolus 500 mL (0 mL Intravenous Stopped 9/10/22 1540)   ondansetron (ZOFRAN) injection 4 mg (4 mg Intravenous Given 9/10/22 1510)   iopamidol (ISOVUE-370) 76 % injection 100 mL (100 mL Intravenous Given 9/10/22 1618)   LORazepam (ATIVAN) injection 0.5 mg (0.5 mg Intravenous Given 9/10/22 1741)   diphenhydrAMINE (BENADRYL) injection 12.5 mg (12.5 mg Intravenous Given 9/10/22 2242)     CT Abdomen Pelvis With Contrast    Result Date: 9/10/2022   1. Small amount of ascitic fluid in the abdomen and pelvis. 2. Dilated loops of small bowel with decreased gas in the colon most consistent with small bowel obstruction. I recommend general surgical consultation or further evaluate this abnormality. The terminal ileum does not appear to be dilated. It is difficult to find a transitional zone in the small bowel loops. 3. Postoperative changes in the area of the rectum. No evidence of mass or adenopathy or in formation in this area  Electronically Signed By-Brandon Sarmiento MD On:9/10/2022 4:31 PM This report was finalized on [id number] by  Brandon Sarmiento MD.    XR Abdomen KUB    Result Date: 9/10/2022  A nasogastric tube tip is in the fundus of the stomach. Electronically signed by:  Scooter Modi M.D.  9/10/2022 6:48 PM                                         [facility]  Clinical information  obtained from an independent historian. History obtained from or confirmed by : Family were at bedside    Differentials: Bowel obstruction, colitis, enteritis    Patient was brought back to the emergency department room for evaluation and placed on appropriate monitoring.  Patient had IV established and blood work obtained.  CBC White blood cell count 15.  CMP reviewed.  No bacteria in the urine.  Patient does not have any dysuria.  Urine reviewed.  Lactates normal.  CT reveals findings concerning for bowel obstruction, general surgery was consulted, patient will have NG tube placed, and be admitted for further management.    Managment of patient discussed with: Dr. Lizama, general surgery.  Hospitalist, Dr. Huggins.  Note Disclaimer: At [facility], we believe that sharing information builds trust and better relationships. You are receiving this note because you recently visited [facility]. It is possible you will see health information before a provider has talked with you about it. This kind of information can be easy to misunderstand. To help you fully understand what it means for your health, we urge you to discuss this note with your provider.    Note dicatated utilizing Dragon Dictation.  Final diagnoses:   Generalized abdominal pain   Intestinal obstruction, unspecified cause, unspecified whether partial or complete (HCC)       ED Disposition  ED Disposition     ED Disposition   Decision to Admit    Condition   --    Comment   Level of Care: Med/Surg [1]   Admitting Physician: JACI PENDLETON [[id number]]   Attending Physician: JACI PENDLETON [[id number]]               No follow-up provider specified.       Medication List      No changes were made to your prescriptions during this visit.          Linda Gamboa, APRN  09/10/22 0277

## 2022-09-11 LAB
ANION GAP SERPL CALCULATED.3IONS-SCNC: 14 MMOL/L (ref 5–15)
BASOPHILS # BLD AUTO: 0.1 10*3/MM3 (ref 0–0.2)
BASOPHILS NFR BLD AUTO: 0.4 % (ref 0–1.5)
BUN SERPL-MCNC: 20 MG/DL (ref 6–20)
BUN/CREAT SERPL: 25 (ref 7–25)
CALCIUM SPEC-SCNC: 8.9 MG/DL (ref 8.6–10.5)
CHLORIDE SERPL-SCNC: 104 MMOL/L (ref 98–107)
CO2 SERPL-SCNC: 23 MMOL/L (ref 22–29)
CREAT SERPL-MCNC: 0.8 MG/DL (ref 0.57–1)
D-LACTATE SERPL-SCNC: 0.6 MMOL/L (ref 0.5–2)
D-LACTATE SERPL-SCNC: 0.7 MMOL/L (ref 0.5–2)
D-LACTATE SERPL-SCNC: 0.8 MMOL/L (ref 0.5–2)
DEPRECATED RDW RBC AUTO: 42.4 FL (ref 37–54)
EGFRCR SERPLBLD CKD-EPI 2021: 85.5 ML/MIN/1.73
EOSINOPHIL # BLD AUTO: 0 10*3/MM3 (ref 0–0.4)
EOSINOPHIL NFR BLD AUTO: 0.3 % (ref 0.3–6.2)
ERYTHROCYTE [DISTWIDTH] IN BLOOD BY AUTOMATED COUNT: 13.5 % (ref 12.3–15.4)
GLUCOSE SERPL-MCNC: 88 MG/DL (ref 65–99)
HCT VFR BLD AUTO: 42 % (ref 34–46.6)
HGB BLD-MCNC: 13.7 G/DL (ref 12–15.9)
LYMPHOCYTES # BLD AUTO: 1.6 10*3/MM3 (ref 0.7–3.1)
LYMPHOCYTES NFR BLD AUTO: 12.6 % (ref 19.6–45.3)
MAGNESIUM SERPL-MCNC: 2.2 MG/DL (ref 1.6–2.6)
MCH RBC QN AUTO: 28.9 PG (ref 26.6–33)
MCHC RBC AUTO-ENTMCNC: 32.7 G/DL (ref 31.5–35.7)
MCV RBC AUTO: 88.3 FL (ref 79–97)
MONOCYTES # BLD AUTO: 1 10*3/MM3 (ref 0.1–0.9)
MONOCYTES NFR BLD AUTO: 8.4 % (ref 5–12)
NEUTROPHILS NFR BLD AUTO: 78.3 % (ref 42.7–76)
NEUTROPHILS NFR BLD AUTO: 9.8 10*3/MM3 (ref 1.7–7)
NRBC BLD AUTO-RTO: 0.1 /100 WBC (ref 0–0.2)
PLATELET # BLD AUTO: 246 10*3/MM3 (ref 140–450)
PMV BLD AUTO: 8.5 FL (ref 6–12)
POTASSIUM SERPL-SCNC: 4 MMOL/L (ref 3.5–5.2)
RBC # BLD AUTO: 4.75 10*6/MM3 (ref 3.77–5.28)
SODIUM SERPL-SCNC: 141 MMOL/L (ref 136–145)
WBC NRBC COR # BLD: 12.6 10*3/MM3 (ref 3.4–10.8)

## 2022-09-11 PROCEDURE — 99222 1ST HOSP IP/OBS MODERATE 55: CPT | Performed by: SURGERY

## 2022-09-11 PROCEDURE — 25010000002 CEFTRIAXONE PER 250 MG: Performed by: INTERNAL MEDICINE

## 2022-09-11 PROCEDURE — 83605 ASSAY OF LACTIC ACID: CPT | Performed by: NURSE PRACTITIONER

## 2022-09-11 PROCEDURE — 99232 SBSQ HOSP IP/OBS MODERATE 35: CPT | Performed by: INTERNAL MEDICINE

## 2022-09-11 RX ORDER — ACETAMINOPHEN 160 MG/5ML
650 SOLUTION ORAL EVERY 6 HOURS PRN
Status: DISCONTINUED | OUTPATIENT
Start: 2022-09-11 | End: 2022-09-11

## 2022-09-11 RX ORDER — SCOLOPAMINE TRANSDERMAL SYSTEM 1 MG/1
1 PATCH, EXTENDED RELEASE TRANSDERMAL
COMMUNITY

## 2022-09-11 RX ORDER — ACETAMINOPHEN 160 MG/5ML
650 SOLUTION ORAL EVERY 6 HOURS PRN
Status: DISCONTINUED | OUTPATIENT
Start: 2022-09-11 | End: 2022-09-12 | Stop reason: HOSPADM

## 2022-09-11 RX ADMIN — CEFTRIAXONE 1 G: 1 INJECTION, POWDER, FOR SOLUTION INTRAMUSCULAR; INTRAVENOUS at 17:39

## 2022-09-11 RX ADMIN — Medication 10 ML: at 21:27

## 2022-09-11 RX ADMIN — ACETAMINOPHEN 650 MG: 160 SOLUTION ORAL at 18:20

## 2022-09-11 NOTE — PLAN OF CARE
Goal Outcome Evaluation:      NG tube CDI. Output measured and documented. Pt ambulated in hallway 2 times this shift. Pt stated she is passing some flatus. IVF continued. Plan of care reviewed with patient and spouse.

## 2022-09-11 NOTE — PROGRESS NOTES
[facility] Medicine Services Daily Progress Note    Patient Name: Isabel Florentino  : 1965  MRN: [id number]  Primary Care Physician:  Hannah Roque MD  Date of admission: 9/10/2022      Subjective        Brief interim history: 56-year-old female with extensive surgical history significant for CRCA with liver metastasis S/p CT/resection , S/p hand-assisted-converted to open right hepatectomy(, followed at [facility]) and anatomical resection of the left lateral lobe of the liver with bilateral ureteral stent placement by urology at that time, cervical dysplasia, history of IC aneurysm, S/p reconstructive left frontal lobe/temporal craniotomy, ADHD, HLD, and history of periorbital cellulitis.  Patient is admitted with partial small bowel obstruction, presenting to [facility] ED on 9/10/2022, complaining of abdomen pain with associated bloating/nausea and nonbloody vomiting with difficulty holding meals. Laboratories notable for leukocytosis, with WBC of 15.4 and anion gap of 16 otherwise, no electrolyte abnormalities.  Urine analysis suggestive of UTI.    2022: Seen and examined in follow-up.  Resting in bed comfortably with  at bedside.  She reports feeling better, with NG tube suction with moderate amount of gastric content.      Objective      Vitals:   Temp:  [98.2 °F (36.8 °C)-101.4 °F (38.6 °C)] 98.2 °F (36.8 °C)  Heart Rate:  [] 75  Resp:  [14-17] 14  BP: (122-150)/(64-84) 148/75    Physical Exam   Constitutional:       General: She is not in acute distress.     Appearance: Normal appearance. She is not ill-appearing.   HENT:      Head: Normocephalic.      Nose: Nose normal.      Mouth/Throat:      Mouth: Mucous membranes are dry.   Eyes:      Pupils: Pupils are equal, round, and reactive to light.   Cardiovascular:      Rate and Rhythm: Normal rate.      Pulses: Normal pulses.   Pulmonary:      Effort: Pulmonary effort is normal.   Abdominal:       Palpations: Abdomen is soft.   Musculoskeletal:         General: Normal range of motion.      Cervical back: Neck supple.   Skin:     General: Skin is warm.   Neurological:      Mental Status: She is alert. Mental status is at baseline.        Result Review    Result Review:  I have personally reviewed the results from the time of this admission to 9/11/2022 15:27 EDT and agree with these findings:  []  Laboratory  []  Microbiology  []  Radiology  []  EKG/Telemetry   []  Cardiology/Vascular   []  Pathology  []  Old records  []  Other:      Assessment & Plan        cefTRIAXone, 1 g, Intravenous, Q24H  enoxaparin, 40 mg, Subcutaneous, Q24H  Scopolamine, 1 patch, Transdermal, Q72H  sodium chloride, 10 mL, Intravenous, Q12H       sodium chloride, 100 mL/hr, Last Rate: 100 mL/hr (09/10/22 2221)         Active Hospital Problems:  Active Hospital Problems    Diagnosis    • SBO (small bowel obstruction) (HCC)      Assessment/plan:        pSBO      -supportive care, NG tube for decompression, n.p.o., IV fluids saline, antiemetic,       -CT A/p (9/10)-->CT of the abdomen/pelvis with contrast  small amount of ascitic fluid in the abdomen/pelvis with dilated loops of small bowel with decreased gas in the colon most consistent with small bowel obstruction.        -General surgery consulted     Suspected UTI        -Rocephin, and UC/blood cultures     History of CRCA with liver metastasis       -S/p hand-assisted-converted to open right hepatectomy/cholecystectomy (2016) (followed at [facility])      Extensive surgical history, as outlined above     History of intracranial aneurysm       -S/p  reconstructive left frontal lobe/temporal craniotomy     History of ADHD        -on focalin    DVT prophylaxis:  Medical DVT prophylaxis orders are present.    CODE STATUS:    Level Of Support Discussed With: Patient  Code Status (Patient has no pulse and is not breathing): CPR (Attempt to Resuscitate)  Medical Interventions (Patient has  pulse or is breathing): Full Support  Release to patient: Routine Release      Disposition:  I expect patient to be discharged     Electronically signed by Tony Larios MD, 09/11/22, 15:27 EDT.  [facility] Hospitalist Team

## 2022-09-11 NOTE — CONSULTS
GENERAL SURGERY CONSULTATION NOTE    Consult requested by: [facility] emergency department    Patient Care Team:  Hannah Roque MD as PCP - General (Internal Medicine)  Zheng Johnson MD as Consulting Physician (Hematology and Oncology)  Lenore Schuler MD as Consulting Physician (Colon and Rectal Surgery)  Lenore Schuler MD as Referring Physician (Colon and Rectal Surgery)    Reason for consult: Bowel obstruction    Subjective     Patient is a 56 y.o. female presents with obstipation and constipation, nausea, vomiting, and abdominal pain which has been progressively getting worse over the last week or so.  She was constipated and seen by her PCP who recommended laxatives.  Unfortunately, throughout the week, her constipation, abdominal distention and abdominal pain got worse until yesterday when she started having nausea and vomiting.  The patient has a history of rectal cancer which was metastatic to the liver.  She subsequently underwent a colon resection at [facility] followed by a open right hepatectomy in 2016.  She then subsequently had her colostomy reversed in 2017 by a colorectal surgeon at the [facility].  She has never had any symptoms of bowel obstruction before this episode.  Additional surgeries include anatomical resection of the left lateral lobe of the liver, history of intracerebral aneurysm, S/p reconstructive left frontal lobe/temporal craniotomy,  and past medical history is significant for ADHD, HLD, and history of periorbital cellulitis.  In the emergency room, a CT scan of the abdomen and pelvis demonstrated bowel obstruction with small amount of ascites in the pelvis.  A nasogastric tube was placed, the patient noticed some symptomatic relief.  The patient now reports that she is passing some flatus, but has not had a bowel movement    Review of Systems   Constitutional: Negative for appetite change, chills and fever.   HENT: Negative for congestion  and sore throat.    Respiratory: Negative for cough and shortness of breath.    Cardiovascular: Negative for chest pain and palpitations.   Gastrointestinal: Positive for abdominal distention, abdominal pain, constipation, nausea and vomiting. Negative for diarrhea and GERD.   Genitourinary: Negative for difficulty urinating, dysuria and frequency.   Musculoskeletal: Negative for arthralgias and back pain.   Skin: Negative for rash and skin lesions.   Neurological: Negative for dizziness, seizures and memory problem.   Hematological: Negative for adenopathy. Does not bruise/bleed easily.   Psychiatric/Behavioral: Negative for sleep disturbance and depressed mood.        History  Past Medical History:   Diagnosis Date   • Abnormal Pap smear of cervix    • ADD (attention deficit disorder)    • Anemia    • Cervical dysplasia    • Colon cancer (HCC)     NEW DIAGNOSIS   • Drug therapy    • H/O foreign travel 03/2016    [place], [place]   • Hepatic metastases (HCC) 10/2016    partial right hepatectomy [place] 10/16   • History of transfusion     1999 AFTER TUBAL RUPTURE   • Hyperlipidemia    • Middle cerebral artery aneurysm    • Status post partial colectomy 04/2017    [facility]     Past Surgical History:   Procedure Laterality Date   • AUGMENTATION MAMMAPLASTY     • COLON RESECTION WITH ILEOSTOMY     • COLONOSCOPY      MAY 2016   • CRANIOTOMY  2004, 2005    Cerebral aneurysm   • ILEOSTOMY CLOSURE     • LAPAROSCOPY FOR ECTOPIC PREGNANCY  09/1997   • LEEP N/A 11/22/2021    Procedure: LOOP ELECTROCAUTERY EXCISION PROCEDURE, ENDOCERVICAL CURETTING;  Surgeon: Xavier Collins MD;  Location: [facility];  Service: Obstetrics/Gynecology;  Laterality: N/A;   • LIVER RESECTION  2016   • LUNG LOBECTOMY Right 2018   • MEDIPORT REMOVAL     • RI INSJ TUNNELED CVC W/O SUBQ PORT/ AGE 3 YR/> Right 8/26/2016    Procedure: MEDIPORT PLACEMENT ;  Surgeon: Praful Holden MD;  Location: [facility] OR;   Service: Vascular   • SINUS SURGERY       Family History   Problem Relation Age of Onset   • Cerebral aneurysm Mother    • Stroke Mother    • COPD Father    • Malig Hyperthermia Neg Hx      Social History     Tobacco Use   • Smoking status: Never Smoker   • Smokeless tobacco: Never Used   Vaping Use   • Vaping Use: Never used   Substance Use Topics   • Alcohol use: Yes     Alcohol/week: 5.0 standard drinks     Types: 5 Glasses of wine per week     Comment: Social   • Drug use: No     Medications Prior to Admission   Medication Sig Dispense Refill Last Dose   • B Complex Vitamins (VITAMIN B COMPLEX PO) Take 1 tablet by mouth Daily.   8/31/2022   • cholecalciferol (VITAMIN D3) 1000 UNITS tablet Take 1,000 Units by mouth Daily.   8/31/2022   • dexmethylphenidate XR (FOCALIN XR) 20 MG 24 hr capsule Take 1 capsule by mouth Daily Needs appt this month 30 capsule 0 9/7/2022   • Finacea 15 % foam Apply 1 application topically to the appropriate area as directed As Needed.   9/8/2022   • Multiple Vitamins-Minerals (MULTIVITAMIN WITH MINERALS) tablet tablet Take 1 tablet by mouth Daily.   9/7/2022   • Scopolamine 1 MG/3DAYS patch Place 1 patch on the skin as directed by provider Every 72 (Seventy-Two) Hours As Needed.        Allergies:  Patient has no known allergies.    Objective     Vital Signs  Temp:  [98.2 °F (36.8 °C)-101.4 °F (38.6 °C)] 98.2 °F (36.8 °C)  Heart Rate:  [] 78  Resp:  [14-17] 17  BP: (122-150)/(63-84) 148/84    Physical Exam  Vitals reviewed.   Constitutional:       Appearance: She is well-developed.   HENT:      Head: Normocephalic and atraumatic.      Nose:      Comments: Nasogastric tube in place with clear brown fluid  Eyes:      Pupils: Pupils are equal, round, and reactive to light.   Cardiovascular:      Rate and Rhythm: Normal rate and regular rhythm.   Pulmonary:      Effort: Pulmonary effort is normal.      Breath sounds: Normal breath sounds.   Abdominal:      General: There is distension.       Palpations: Abdomen is soft.      Tenderness: There is generalized abdominal tenderness. There is no guarding or rebound.      Hernia: No hernia is present.      Comments: Multiple well-healed incisions without evidence of hernia.  Patient's abdomen is soft, mildly distended, and mildly tender to palpation over the majority of the abdomen without rebound or guarding.   Musculoskeletal:         General: Normal range of motion.      Cervical back: Normal range of motion.   Lymphadenopathy:      Cervical: No cervical adenopathy.   Skin:     General: Skin is warm and dry.      Findings: No rash.   Neurological:      General: No focal deficit present.      Mental Status: She is alert and oriented to person, place, and time.   Psychiatric:         Mood and Affect: Mood normal.         Behavior: Behavior normal.         Thought Content: Thought content normal.         Judgment: Judgment normal.         Results Review:   Lab Results (last 24 hours)     Procedure Component Value Units Date/Time    Lactic Acid, Plasma [[id number]]  (Normal) Collected: 09/11/22 0945    Specimen: Blood Updated: 09/11/22 1018     Lactate 0.7 mmol/L     Lactic Acid, Plasma [[id number]]  (Normal) Collected: 09/11/22 0313    Specimen: Blood Updated: 09/11/22 0345     Lactate 0.6 mmol/L     Basic Metabolic Panel [[id number]]  (Normal) Collected: 09/10/22 2343    Specimen: Blood Updated: 09/11/22 0047     Glucose 88 mg/dL      BUN 20 mg/dL      Creatinine 0.80 mg/dL      Sodium 141 mmol/L      Potassium 4.0 mmol/L      Comment: Slight hemolysis detected by analyzer. Results may be affected.        Chloride 104 mmol/L      CO2 23.0 mmol/L      Calcium 8.9 mg/dL      BUN/Creatinine Ratio 25.0     Anion Gap 14.0 mmol/L      eGFR 85.5 mL/min/1.73      Comment: National Kidney Foundation and American Society of Nephrology (ASN) Task Force recommended calculation based on the Chronic Kidney Disease Epidemiology Collaboration (CKD-EPI) equation refit  without adjustment for race.       Narrative:      GFR Normal >60  Chronic Kidney Disease <60  Kidney Failure <15      Magnesium [[id number]]  (Normal) Collected: 09/10/22 2343    Specimen: Blood Updated: 09/11/22 0047     Magnesium 2.2 mg/dL     CBC Auto Differential [[id number]]  (Abnormal) Collected: 09/10/22 2343    Specimen: Blood Updated: 09/11/22 0042     WBC 12.60 10*3/mm3      RBC 4.75 10*6/mm3      Hemoglobin 13.7 g/dL      Comment: Result checked          Hematocrit 42.0 %      MCV 88.3 fL      MCH 28.9 pg      MCHC 32.7 g/dL      RDW 13.5 %      RDW-SD 42.4 fl      MPV 8.5 fL      Platelets 246 10*3/mm3      Neutrophil % 78.3 %      Lymphocyte % 12.6 %      Monocyte % 8.4 %      Eosinophil % 0.3 %      Basophil % 0.4 %      Neutrophils, Absolute 9.80 10*3/mm3      Lymphocytes, Absolute 1.60 10*3/mm3      Monocytes, Absolute 1.00 10*3/mm3      Eosinophils, Absolute 0.00 10*3/mm3      Basophils, Absolute 0.10 10*3/mm3      nRBC 0.1 /100 WBC     Blood Culture - Blood, Arm, Right [[id number]] Collected: 09/10/22 2343    Specimen: Blood from Arm, Right Updated: 09/11/22 0023    Blood Culture - Blood, Arm, Left [[id number]] Collected: 09/10/22 2106    Specimen: Blood from Arm, Left Updated: 09/10/22 2231    Lactic Acid, Plasma [[id number]]  (Normal) Collected: 09/10/22 2106    Specimen: Blood from Arm, Right Updated: 09/10/22 2154     Lactate 0.8 mmol/L     [place] Draw [[id number]] Collected: 09/10/22 1454    Specimen: Blood Updated: 09/10/22 1604    Narrative:      The following orders were created for panel order [place] Draw.  Procedure                               Abnormality         Status                     ---------                               -----------         ------                     Green Top (Gel)[[id number]]                                  Final result               Lavender Top[[id number]]                                     Final result               Gold Top - SST[[id number]]                                    Final result               Light Blue Top[[id number]]                                   Final result                 Please view results for these tests on the individual orders.    Lavender Top [[id number]] Collected: 09/10/22 1454    Specimen: Blood Updated: 09/10/22 1604     Extra Tube hold for add-on     Comment: Auto resulted       Light Blue Top [[id number]] Collected: 09/10/22 1454    Specimen: Blood Updated: 09/10/22 1604     Extra Tube Hold for add-ons.     Comment: Auto resulted       Gold Top - SST [[id number]] Collected: 09/10/22 1454    Specimen: Blood Updated: 09/10/22 1551    Green Top (Gel) [[id number]] Collected: 09/10/22 1454    Specimen: Blood Updated: 09/10/22 1549    Comprehensive Metabolic Panel [[id number]]  (Abnormal) Collected: 09/10/22 1454    Specimen: Blood Updated: 09/10/22 1540     Glucose 98 mg/dL      BUN 20 mg/dL      Creatinine 0.87 mg/dL      Sodium 139 mmol/L      Potassium 4.4 mmol/L      Comment: Slight hemolysis detected by analyzer. Results may be affected.        Chloride 98 mmol/L      CO2 25.0 mmol/L      Calcium 10.0 mg/dL      Total Protein 8.5 g/dL      Albumin 5.10 g/dL      ALT (SGPT) 16 U/L      AST (SGOT) 31 U/L      Comment: Slight hemolysis detected by analyzer. Results may be affected.        Alkaline Phosphatase 75 U/L      Total Bilirubin 0.7 mg/dL      Globulin 3.4 gm/dL      A/G Ratio 1.5 g/dL      BUN/Creatinine Ratio 23.0     Anion Gap 16.0 mmol/L      eGFR 77.3 mL/min/1.73      Comment: National Kidney Foundation and American Society of Nephrology (ASN) Task Force recommended calculation based on the Chronic Kidney Disease Epidemiology Collaboration (CKD-EPI) equation refit without adjustment for race.       Narrative:      GFR Normal >60  Chronic Kidney Disease <60  Kidney Failure <15      Lipase [[id number]]  (Normal) Collected: 09/10/22 1454    Specimen: Blood Updated: 09/10/22 1539     Lipase 18 U/L     Urinalysis With Microscopic If Indicated  (No Culture) - Urine, Clean Catch [[id number]]  (Abnormal) Collected: 09/10/22 1454    Specimen: Urine, Clean Catch Updated: 09/10/22 1510     Color, UA Yellow     Appearance, UA Clear     pH, UA 5.5     Specific Gravity, UA 1.032     Glucose, UA Negative     Ketones, UA 40 mg/dL (2+)     Bilirubin, UA Negative     Blood, UA Negative     Protein, UA Trace     Leuk Esterase, UA Trace     Nitrite, UA Negative     Urobilinogen, UA 1.0 E.U./dL    Urinalysis, Microscopic Only - Urine, Clean Catch [[id number]]  (Abnormal) Collected: 09/10/22 1454    Specimen: Urine, Clean Catch Updated: 09/10/22 1510     RBC, UA 0-2 /HPF      WBC, UA 13-20 /HPF      Bacteria, UA None Seen /HPF      Squamous Epithelial Cells, UA 0-2 /HPF      Hyaline Casts, UA 0-2 /LPF      Methodology Automated Microscopy    CBC & Differential [[id number]]  (Abnormal) Collected: 09/10/22 1454    Specimen: Blood Updated: 09/10/22 1504    Narrative:      The following orders were created for panel order CBC & Differential.  Procedure                               Abnormality         Status                     ---------                               -----------         ------                     CBC Auto Differential[[id number]]        Abnormal            Final result                 Please view results for these tests on the individual orders.    CBC Auto Differential [[id number]]  (Abnormal) Collected: 09/10/22 1454    Specimen: Blood Updated: 09/10/22 1504     WBC 15.40 10*3/mm3      RBC 5.53 10*6/mm3      Hemoglobin 16.0 g/dL      Hematocrit 48.2 %      MCV 87.1 fL      MCH 28.9 pg      MCHC 33.2 g/dL      RDW 13.6 %      RDW-SD 42.0 fl      MPV 8.5 fL      Platelets 323 10*3/mm3      Neutrophil % 83.8 %      Lymphocyte % 9.1 %      Monocyte % 6.6 %      Eosinophil % 0.1 %      Basophil % 0.4 %      Neutrophils, Absolute 12.90 10*3/mm3      Lymphocytes, Absolute 1.40 10*3/mm3      Monocytes, Absolute 1.00 10*3/mm3      Eosinophils, Absolute 0.00 10*3/mm3       Basophils, Absolute 0.10 10*3/mm3      nRBC 0.1 /100 WBC         CT Abdomen Pelvis With Contrast    Result Date: 9/10/2022   1. Small amount of ascitic fluid in the abdomen and pelvis. 2. Dilated loops of small bowel with decreased gas in the colon most consistent with small bowel obstruction. I recommend general surgical consultation or further evaluate this abnormality. The terminal ileum does not appear to be dilated. It is difficult to find a transitional zone in the small bowel loops. 3. Postoperative changes in the area of the rectum. No evidence of mass or adenopathy or in formation in this area  Electronically Signed By-Brandon Sarmiento MD On:9/10/2022 4:31 PM This report was finalized on [id number] by  Brandon Sarmiento MD.    XR Abdomen KUB    Result Date: 9/10/2022  A nasogastric tube tip is in the fundus of the stomach. Electronically signed by:  Scooter Modi M.D.  9/10/2022 6:48 PM        I reviewed the patient's new imaging results and agree with the interpretation.  I reviewed the patient's other test results and agree with the interpretation    Assessment & Plan     Active Problems:    SBO (small bowel obstruction) (HCC)    Recommended trial of nonoperative management at this time.  Continue n.p.o., IV fluids, and nasogastric tube decompression  Consider small bowel follow-through in the next 24 to 48 hours if the patient does not resolve on her own.  If the patient has a bowel movement, can discontinue nasogastric tube and begin clear liquid diet    I discussed the patients findings and my recommendations with the patient.     Scooter Lizama MD  09/11/22  11:30 EDT

## 2022-09-11 NOTE — PLAN OF CARE
Goal Outcome Evaluation:           Progress: no change       Admit from ED. NG tube placed LIWS  Problem: Pain Acute  Goal: Acceptable Pain Control and Functional Ability  Outcome: Ongoing, Progressing     Problem: Adult Inpatient Plan of Care  Goal: Plan of Care Review  Outcome: Ongoing, Progressing  Flowsheets (Taken 9/10/2022 2020)  Progress: no change  Goal: Patient-Specific Goal (Individualized)  Outcome: Ongoing, Progressing  Goal: Absence of Hospital-Acquired Illness or Injury  Outcome: Ongoing, Progressing  Intervention: Identify and Manage Fall Risk  Description: Perform standard risk assessment on admission using a validated tool or comprehensive approach appropriate to the patient; reassess fall risk frequently, with change in status or transfer to another level of care.  Communicate fall injury risk to interprofessional healthcare team.  Determine need for increased observation, equipment and environmental modification, such as low bed, signage and supportive, nonskid footwear.  Adjust safety measures to individual developmental age, stage and identified risk factors.  Reinforce the importance of safety and physical activity with patient and family.  Perform regular intentional rounding to assess need for position change, pain assessment and personal needs, including assistance with toileting.  Recent Flowsheet Documentation  Taken 9/10/2022 1943 by Salvatore Espitia, RN  Safety Promotion/Fall Prevention: safety round/check completed  Goal: Optimal Comfort and Wellbeing  Outcome: Ongoing, Progressing  Goal: Readiness for Transition of Care  Outcome: Ongoing, Progressing     Problem: Fluid Deficit (Intestinal Obstruction)  Goal: Fluid Balance  Outcome: Ongoing, Progressing     Problem: Infection (Intestinal Obstruction)  Goal: Absence of Infection Signs and Symptoms  Outcome: Ongoing, Progressing     Problem: Nausea and Vomiting (Intestinal Obstruction)  Goal: Nausea and Vomiting Relief  Outcome: Ongoing,  Progressing     Problem: Pain (Intestinal Obstruction)  Goal: Acceptable Pain Control  Outcome: Ongoing, Progressing

## 2022-09-12 ENCOUNTER — READMISSION MANAGEMENT (OUTPATIENT)
Dept: CALL CENTER | Facility: HOSPITAL | Age: 57
End: 2022-09-12

## 2022-09-12 ENCOUNTER — APPOINTMENT (OUTPATIENT)
Dept: GENERAL RADIOLOGY | Facility: HOSPITAL | Age: 57
End: 2022-09-12

## 2022-09-12 VITALS
DIASTOLIC BLOOD PRESSURE: 76 MMHG | TEMPERATURE: 97.6 F | SYSTOLIC BLOOD PRESSURE: 144 MMHG | HEIGHT: 64 IN | RESPIRATION RATE: 18 BRPM | HEART RATE: 71 BPM | WEIGHT: 125.66 LBS | OXYGEN SATURATION: 100 % | BODY MASS INDEX: 21.45 KG/M2

## 2022-09-12 LAB
ANION GAP SERPL CALCULATED.3IONS-SCNC: 8 MMOL/L (ref 5–15)
BASOPHILS # BLD AUTO: 0 10*3/MM3 (ref 0–0.2)
BASOPHILS NFR BLD AUTO: 0.3 % (ref 0–1.5)
BUN SERPL-MCNC: 12 MG/DL (ref 6–20)
BUN/CREAT SERPL: 17.6 (ref 7–25)
CALCIUM SPEC-SCNC: 8.5 MG/DL (ref 8.6–10.5)
CHLORIDE SERPL-SCNC: 108 MMOL/L (ref 98–107)
CO2 SERPL-SCNC: 25 MMOL/L (ref 22–29)
CREAT SERPL-MCNC: 0.68 MG/DL (ref 0.57–1)
DEPRECATED RDW RBC AUTO: 40.3 FL (ref 37–54)
EGFRCR SERPLBLD CKD-EPI 2021: 101.1 ML/MIN/1.73
EOSINOPHIL # BLD AUTO: 0.2 10*3/MM3 (ref 0–0.4)
EOSINOPHIL NFR BLD AUTO: 2.7 % (ref 0.3–6.2)
ERYTHROCYTE [DISTWIDTH] IN BLOOD BY AUTOMATED COUNT: 12.9 % (ref 12.3–15.4)
GLUCOSE SERPL-MCNC: 80 MG/DL (ref 65–99)
HCT VFR BLD AUTO: 36.5 % (ref 34–46.6)
HGB BLD-MCNC: 12.5 G/DL (ref 12–15.9)
LYMPHOCYTES # BLD AUTO: 2 10*3/MM3 (ref 0.7–3.1)
LYMPHOCYTES NFR BLD AUTO: 24.2 % (ref 19.6–45.3)
MCH RBC QN AUTO: 30 PG (ref 26.6–33)
MCHC RBC AUTO-ENTMCNC: 34.1 G/DL (ref 31.5–35.7)
MCV RBC AUTO: 87.9 FL (ref 79–97)
MONOCYTES # BLD AUTO: 1 10*3/MM3 (ref 0.1–0.9)
MONOCYTES NFR BLD AUTO: 11.6 % (ref 5–12)
NEUTROPHILS NFR BLD AUTO: 5.1 10*3/MM3 (ref 1.7–7)
NEUTROPHILS NFR BLD AUTO: 61.2 % (ref 42.7–76)
NRBC BLD AUTO-RTO: 0.1 /100 WBC (ref 0–0.2)
PLATELET # BLD AUTO: 201 10*3/MM3 (ref 140–450)
PMV BLD AUTO: 8.2 FL (ref 6–12)
POTASSIUM SERPL-SCNC: 4.3 MMOL/L (ref 3.5–5.2)
RBC # BLD AUTO: 4.16 10*6/MM3 (ref 3.77–5.28)
SODIUM SERPL-SCNC: 141 MMOL/L (ref 136–145)
WBC NRBC COR # BLD: 8.3 10*3/MM3 (ref 3.4–10.8)

## 2022-09-12 PROCEDURE — 99239 HOSP IP/OBS DSCHRG MGMT >30: CPT | Performed by: INTERNAL MEDICINE

## 2022-09-12 PROCEDURE — 80048 BASIC METABOLIC PNL TOTAL CA: CPT | Performed by: INTERNAL MEDICINE

## 2022-09-12 PROCEDURE — 74018 RADEX ABDOMEN 1 VIEW: CPT

## 2022-09-12 PROCEDURE — 85025 COMPLETE CBC W/AUTO DIFF WBC: CPT | Performed by: INTERNAL MEDICINE

## 2022-09-12 PROCEDURE — 99231 SBSQ HOSP IP/OBS SF/LOW 25: CPT | Performed by: SURGERY

## 2022-09-12 NOTE — PAYOR COMM NOTE
"Request IP Auth for ER Admit  RE: Deepti Florentino  1965  Policy no. [id number]    AUTHORIZATION PENDING 9/10- Remains in Hospital 22  PLEASE FORWARD DETERMINATION TO FOLLOWING CONTACT:    LAY RODRIGUES LPN UR  Utilization Review Nurse  [facility]  Direct & confidential phone # [phone]  Fax # [phone]    Deepti Florentino (56 y.o. Female)             Date of Birth   1965    Social Security Number       Address   [address] [place] [address] [place] [facility]    Home Phone   [phone]    MRN   [id number]       Pentecostalism   None    Marital Status                               Admission Date   9/10/22    Admission Type   Emergency    Admitting Provider   Tony Larios MD    Attending Provider   Tony Larios MD    Department, Room/Bed   [facility] SURGICAL INPATIENT,        Discharge Date       Discharge Disposition       Discharge Destination                               Attending Provider: Tony Larios MD    Allergies: No Known Allergies    Isolation: None   Infection: None   Code Status: CPR   Advance Care Planning Activity    Ht: 162.6 cm (64\")   Wt: 57 kg (125 lb 10.6 oz)    Admission Cmt: None   Principal Problem: None                Active Insurance as of 9/10/2022     Primary Coverage     Payor Plan Insurance Group Employer/Plan Group    [employer]UE [employer] BLUE [employer] PPO [id number]     Payor Plan Address Payor Plan Phone Number Payor Plan Fax Number Effective Dates    [address] [phone]  2021 - None Entered    [place] [address]       Subscriber Name Subscriber Birth Date Member ID       DEEPTI FLORENTINO 1965 [id number]                 Emergency Contacts      (Rel.) Home Phone Work Phone Mobile Phone    Amos Florentino (Spouse) [phone] -- --               History & Physical      Tony Larios MD at 09/10/22 1735                [facility] " Hospital Medicine Services      Patient Name: Isabel Florentino  : 1965  MRN: [id number]  Primary Care Physician: Hannah Roque MD  Date of admission: 9/10/2022    Patient Care Team:  Hannah Roque MD as PCP - General (Internal Medicine)  Zheng Johnson MD as Consulting Physician (Hematology and Oncology)  Lenore Schuler MD as Consulting Physician (Colon and Rectal Surgery)  Lenore Schuler MD as Referring Physician (Colon and Rectal Surgery)          Subjective   History Present Illness     Chief Complaint:   Chief Complaint   Patient presents with   • Abdominal Pain     Pt reports worsening generalized abdominal pain with N/V and bloating since Monday.           Brief HPI: 56-year-old female with extensive surgical history significant for CRCA with liver metastasis S/p CT/resection , S/p hand-assisted-converted to open right hepatectomy(, followed at [facility]) and anatomical resection of the left lateral lobe of the liver with bilateral ureteral stent placement by urology at that time, cervical dysplasia, history of IC aneurysm, S/p reconstructive left frontal lobe/temporal craniotomy, ADHD, HLD, and history of periorbital cellulitis.  Patient presented to [facility] ED on , complaining 2-day history of abdominal pain with associated bloating,  nausea with nonbloody vomiting, and difficulty holding meals. No diarrhea, BRBPR,  Endorses chills,but no fever,  diarrhea, chest pain, or  shortness of breath.  CT of the abdomen/pelvis with contrast obtained in ED showed small amount of ascitic fluid in the abdomen/pelvis with dilated loops of small bowel with decreased gas in the colon most consistent with small bowel obstruction.  Laboratories notable for leukocytosis, with WBC of 15.4 and anion gap of 16 otherwise, no electrolyte abnormalities.  Urine analysis suggestive of UTI.  In ED, patient receives IV fluid, and antiemetic.     Review of Systems   Constitutional: Negative.   HENT:  Negative.    Cardiovascular: Negative.    Endocrine: Negative.    Hematologic/Lymphatic: Negative.    Musculoskeletal: Negative.    Gastrointestinal: Positive for bloating, abdominal pain, nausea and vomiting.   Genitourinary: Negative.    Neurological: Negative.    Psychiatric/Behavioral: Negative.    Allergic/Immunologic: Negative.               Personal History     Past Medical History:   Past Medical History:   Diagnosis Date   • Abnormal Pap smear of cervix    • ADD (attention deficit disorder)    • Anemia    • Cervical dysplasia    • Colon cancer (HCC)     NEW DIAGNOSIS   • Drug therapy    • H/O foreign travel 03/2016    [facility] [place], [place]   • Hepatic metastases (HCC) 10/2016    partial right hepatectomy [place] 10/16   • History of transfusion     1999 AFTER TUBAL RUPTURE   • Hyperlipidemia    • Middle cerebral artery aneurysm    • Status post partial colectomy 04/2017    [facility]       Surgical History:      Past Surgical History:   Procedure Laterality Date   • AUGMENTATION MAMMAPLASTY     • COLON RESECTION WITH ILEOSTOMY     • COLONOSCOPY      MAY 2016   • CRANIOTOMY  2004, 2005    Cerebral aneurysm   • ILEOSTOMY CLOSURE     • LAPAROSCOPY FOR ECTOPIC PREGNANCY  09/1997   • LEEP N/A 11/22/2021    Procedure: LOOP ELECTROCAUTERY EXCISION PROCEDURE, ENDOCERVICAL CURETTING;  Surgeon: Xavier Collins MD;  Location: [facility];  Service: Obstetrics/Gynecology;  Laterality: N/A;   • LIVER RESECTION  2016   • LUNG LOBECTOMY Right 2018   • MEDIPORT REMOVAL     • MN INSJ TUNNELED CVC W/O SUBQ PORT/ AGE 5 YR/> Right 8/26/2016    Procedure: MEDIPORT PLACEMENT ;  Surgeon: Praful Holden MD;  Location: [facility];  Service: Vascular   • SINUS SURGERY             Family History: family history includes COPD in her father; Cerebral aneurysm in her mother; Stroke in her mother. Otherwise pertinent FHx was reviewed and unremarkable.     Social History:  reports that she has never  smoked. She has never used smokeless tobacco. She reports current alcohol use of about 5.0 standard drinks of alcohol per week. She reports that she does not use drugs.      Medications:  Prior to Admission medications    Medication Sig Start Date End Date Taking? Authorizing Provider   B Complex Vitamins (VITAMIN B COMPLEX PO) Take 1 tablet by mouth Daily.    Veena Deleon MD   cholecalciferol (VITAMIN D3) 1000 UNITS tablet Take 1,000 Units by mouth Daily.    Veena Deleon MD   dexmethylphenidate XR (FOCALIN XR) 20 MG 24 hr capsule Take 1 capsule by mouth Daily Needs appt this month 8/19/22   Hannah Roque MD   Finacea 15 % foam Apply 1 application topically to the appropriate area as directed As Needed. 8/10/20   Veena Deleon MD   Multiple Vitamins-Minerals (MULTIVITAMIN WITH MINERALS) tablet tablet Take 1 tablet by mouth Daily. PT HOLDING FOR SURGERY    Veena Deleon MD   Scopolamine (Transderm-Scop, 1.5 MG,) 1 MG/3DAYS patch Place 1 patch on the skin as directed by provider Every 72 (Seventy-Two) Hours. 9/8/22   Hannah Roque MD       Allergies:  No Known Allergies    Objective   Objective     Vital Signs  Temp:  [98.7 °F (37.1 °C)] 98.7 °F (37.1 °C)  Heart Rate:  [90] 90  Resp:  [16] 16  BP: (143)/(63) 143/63  SpO2:  [99 %] 99 %  on   ;   Device (Oxygen Therapy): room air  Body mass index is 21.57 kg/m².    Physical Exam  Constitutional:       General: She is not in acute distress.     Appearance: Normal appearance. She is not ill-appearing.   HENT:      Head: Normocephalic.      Nose: Nose normal.      Mouth/Throat:      Mouth: Mucous membranes are dry.   Eyes:      Pupils: Pupils are equal, round, and reactive to light.   Cardiovascular:      Rate and Rhythm: Normal rate.      Pulses: Normal pulses.   Pulmonary:      Effort: Pulmonary effort is normal.   Abdominal:      Palpations: Abdomen is soft.   Musculoskeletal:         General: Normal range of motion.       Cervical back: Neck supple.   Skin:     General: Skin is warm.   Neurological:      Mental Status: She is alert. Mental status is at baseline.            Results Review:  I have personally reviewed most recent  and agree with findings, most notably: .    Results from last 7 days   Lab Units 09/10/22  1454   WBC 10*3/mm3 15.40*   HEMOGLOBIN g/dL 16.0*   HEMATOCRIT % 48.2*   PLATELETS 10*3/mm3 323     Results from last 7 days   Lab Units 09/10/22  1454   SODIUM mmol/L 139   POTASSIUM mmol/L 4.4   CHLORIDE mmol/L 98   CO2 mmol/L 25.0   BUN mg/dL 20   CREATININE mg/dL 0.87   GLUCOSE mg/dL 98   CALCIUM mg/dL 10.0   ALT (SGPT) U/L 16   AST (SGOT) U/L 31     Estimated Creatinine Clearance: 63.4 mL/min (by C-G formula based on SCr of 0.87 mg/dL).  Brief Urine Lab Results  (Last result in the past 365 days)      Color   Clarity   Blood   Leuk Est   Nitrite   Protein   CREAT   Urine HCG        09/10/22 1454 Yellow   Clear   Negative   Trace   Negative   Trace                 Microbiology Results (last 10 days)     ** No results found for the last 240 hours. **          ECG/EMG Results (most recent)     None          Results for orders placed in visit on 06/23/20    SCANNED VASCULAR STUDIES          CT Abdomen Pelvis With Contrast    Result Date: 9/10/2022   1. Small amount of ascitic fluid in the abdomen and pelvis. 2. Dilated loops of small bowel with decreased gas in the colon most consistent with small bowel obstruction. I recommend general surgical consultation or further evaluate this abnormality. The terminal ileum does not appear to be dilated. It is difficult to find a transitional zone in the small bowel loops. 3. Postoperative changes in the area of the rectum. No evidence of mass or adenopathy or in formation in this area  Electronically Signed By-Brandon Sarmiento MD On:9/10/2022 4:31 PM This report was finalized on [id number] by  Brandon Sarmiento MD.        Estimated Creatinine Clearance: 63.4 mL/min  (by C-G formula based on SCr of 0.87 mg/dL).    Assessment & Plan   Assessment/Plan       Active Hospital Problems    Diagnosis  POA   • SBO (small bowel obstruction) (HCC) [K56.609]  Yes      Resolved Hospital Problems   No resolved problems to display.     Assessment/plan:       pSBO      -supportive care, NG tube for decompression, n.p.o., IV fluids saline, antiemetic,       -CT A/p (9/10)-->CT of the abdomen/pelvis with contrast  small amount of ascitic fluid in the abdomen/pelvis with dilated loops of small bowel with decreased gas in the colon most consistent with small bowel obstruction.        -General surgery consulted    Suspected UTI        -follow up on UC/blood cultures, and lactic acid pending      -on Rocephin, monitor infectious parameters closely and de-escalate antibiotic    History of CRCA with liver metastasis       -S/p hand-assisted-converted to open right hepatectomy/cholecystectomy (2016) (followed at [facility])     Extensive surgical history, as outlined above    History of intracranial aneurysm       -S/p  reconstructive left frontal lobe/temporal craniotomy    History of ADHD        -on focalin      VTE Prophylaxis -   Mechanical Order History:     None      Pharmalogical Order History:      Ordered     Dose Route Frequency Stop    Signed and Held  Enoxaparin Sodium (LOVENOX) syringe 40 mg         40 mg SC Daily --                CODE STATUS:    There are no questions and answers to display.       I discussed the patient's findings and my recommendations with patient at bedside.        Electronically signed by Tony Larios MD, 09/10/22, 5:36 PM EDT.  [facility] Hospitalist Team          Electronically signed by Tony Larios MD at 09/10/22 1818          Physician Progress Notes (last 48 hours)      Scooter Lizama MD at 09/12/22 0974          GENERAL SURGERY PROGRESS NOTE    9/12/2022   LOS: 2 days   Patient Care Team:  Hannah Roque MD as PCP -  General (Internal Medicine)  Zheng Johnson MD as Consulting Physician (Hematology and Oncology)  Lenore Schuler MD as Consulting Physician (Colon and Rectal Surgery)  Lenore Schuler MD as Referring Physician (Colon and Rectal Surgery)    Chief Complaint: Small bowel obstruction    Subjective   HPI: Patient is a  56 y.o.  female presents with obstipation and constipation, nausea, vomiting, and abdominal pain which has been progressively getting worse over the last week or so.  She was constipated and seen by her PCP who recommended laxatives.  Unfortunately, throughout the week, her constipation, abdominal distention and abdominal pain got worse until yesterday when she started having nausea and vomiting.  The patient has a history of rectal cancer which was metastatic to the liver.  She subsequently underwent a colon resection at [facility] followed by a open right hepatectomy in 2016.  She then subsequently had her colostomy reversed in 2017 by a colorectal surgeon at the [facility].  She has never had any symptoms of bowel obstruction before this episode.  Additional surgeries include anatomical resection of the left lateral lobe of the liver, history of intracerebral aneurysm, S/p reconstructive left frontal lobe/temporal craniotomy,  and past medical history is significant for ADHD, HLD, and history of periorbital cellulitis.  In the emergency room, a CT scan of the abdomen and pelvis demonstrated bowel obstruction with small amount of ascites in the pelvis.  A nasogastric tube was placed, the patient noticed some symptomatic relief.  The patient now reports that she is passing some flatus, but has not had a bowel movement.    Interval History:   Overnight, the patient had bowel movements x3 and was passing flatus.  Nasogastric tube was discontinued and the patient was started on a clear liquid diet which she has tolerated without any nausea or vomiting.  Abdomen still feels slightly  distended.    Objective     Vital Signs  Temp:  [98.2 °F (36.8 °C)-99 °F (37.2 °C)] 98.2 °F (36.8 °C)  Heart Rate:  [64-76] 70  Resp:  [14-16] 16  BP: (135-149)/(65-77) 135/65    Physical Exam  Vitals reviewed.   Constitutional:       Appearance: She is well-developed.   HENT:      Head: Normocephalic and atraumatic.   Eyes:      Pupils: Pupils are equal, round, and reactive to light.   Cardiovascular:      Rate and Rhythm: Normal rate and regular rhythm.   Pulmonary:      Effort: Pulmonary effort is normal.      Breath sounds: Normal breath sounds.   Abdominal:      General: There is distension.      Palpations: Abdomen is soft.      Tenderness: There is generalized abdominal tenderness. There is no guarding or rebound.      Hernia: No hernia is present.   Musculoskeletal:         General: Normal range of motion.      Cervical back: Normal range of motion.   Lymphadenopathy:      Cervical: No cervical adenopathy.   Skin:     General: Skin is warm and dry.      Findings: No rash.   Neurological:      Mental Status: She is alert and oriented to person, place, and time.          Results Review:    Lab Results (last 24 hours)     Procedure Component Value Units Date/Time    Basic Metabolic Panel [[id number]]  (Abnormal) Collected: 09/12/22 0359    Specimen: Blood Updated: 09/12/22 0457     Glucose 80 mg/dL      BUN 12 mg/dL      Creatinine 0.68 mg/dL      Sodium 141 mmol/L      Potassium 4.3 mmol/L      Chloride 108 mmol/L      CO2 25.0 mmol/L      Calcium 8.5 mg/dL      BUN/Creatinine Ratio 17.6     Anion Gap 8.0 mmol/L      eGFR 101.1 mL/min/1.73      Comment: National Kidney Foundation and American Society of Nephrology (ASN) Task Force recommended calculation based on the Chronic Kidney Disease Epidemiology Collaboration (CKD-EPI) equation refit without adjustment for race.       Narrative:      GFR Normal >60  Chronic Kidney Disease <60  Kidney Failure <15      CBC & Differential [[id number]]  (Abnormal) Collected:  09/12/22 0359    Specimen: Blood Updated: 09/12/22 0417    Narrative:      The following orders were created for panel order CBC & Differential.  Procedure                               Abnormality         Status                     ---------                               -----------         ------                     CBC Auto Differential[[id number]]        Abnormal            Final result                 Please view results for these tests on the individual orders.    CBC Auto Differential [[id number]]  (Abnormal) Collected: 09/12/22 0359    Specimen: Blood Updated: 09/12/22 0417     WBC 8.30 10*3/mm3      RBC 4.16 10*6/mm3      Hemoglobin 12.5 g/dL      Hematocrit 36.5 %      MCV 87.9 fL      MCH 30.0 pg      MCHC 34.1 g/dL      RDW 12.9 %      RDW-SD 40.3 fl      MPV 8.2 fL      Platelets 201 10*3/mm3      Neutrophil % 61.2 %      Lymphocyte % 24.2 %      Monocyte % 11.6 %      Eosinophil % 2.7 %      Basophil % 0.3 %      Neutrophils, Absolute 5.10 10*3/mm3      Lymphocytes, Absolute 2.00 10*3/mm3      Monocytes, Absolute 1.00 10*3/mm3      Eosinophils, Absolute 0.20 10*3/mm3      Basophils, Absolute 0.00 10*3/mm3      nRBC 0.1 /100 WBC     Blood Culture - Blood, Arm, Right [[id number]]  (Normal) Collected: 09/10/22 2343    Specimen: Blood from Arm, Right Updated: 09/12/22 0032     Blood Culture No growth at 24 hours    Blood Culture - Blood, Arm, Left [[id number]]  (Normal) Collected: 09/10/22 2106    Specimen: Blood from Arm, Left Updated: 09/11/22 2248     Blood Culture No growth at 24 hours    Lactic Acid, Plasma [[id number]]  (Normal) Collected: 09/11/22 1405    Specimen: Blood Updated: 09/11/22 1434     Lactate 0.8 mmol/L     Lactic Acid, Plasma [[id number]]  (Normal) Collected: 09/11/22 0945    Specimen: Blood Updated: 09/11/22 1018     Lactate 0.7 mmol/L         Imaging Results (Last 24 Hours)     Procedure Component Value Units Date/Time    XR Abdomen KUB [[id number]] Collected: 09/12/22 0859     Updated:  09/12/22 0908    Narrative:      DATE OF EXAM:  9/12/2022 8:51 AM     PROCEDURE:  XR ABDOMEN KUB-     INDICATIONS:  sbo; R10.84-Generalized abdominal pain; K56.609-Unspecified intestinal  obstruction, unspecified as to partial versus complete obstruction     COMPARISON:  CT abdomen pelvis 09/10/2022, KUB 09/10/2022.     TECHNIQUE:   Radiographic view(s) of the abdomen obtained.     FINDINGS:  Enteric tube has been removed. Previously seen distended small bowel  loops have decreased in diameter. There is bowel gas in the nondistended  colon. Right upper quadrant surgical clips are again noted. Included  lung bases are clear.       Impression:      Decreased diameter of previously seen distended small bowel loops,  suggestive of improving/resolving small bowel obstruction.     Electronically Signed By-Sydney Marie MD On:9/12/2022 9:06 AM  This report was finalized on [id number] by  Sydney Marie MD.           I have reviewed the above results and noted them below    Medication Review:    Current Facility-Administered Medications:   •  acetaminophen (TYLENOL) 160 MG/5ML solution 650 mg, 650 mg, Nasogastric, Q6H PRN, Tony Larios MD, 650 mg at 09/11/22 1820  •  cefTRIAXone (ROCEPHIN) 1 g in sodium chloride 0.9 % 100 mL IVPB, 1 g, Intravenous, Q24H, Tony Larios MD, Last Rate: 200 mL/hr at 09/11/22 1739, 1 g at 09/11/22 1739  •  Enoxaparin Sodium (LOVENOX) syringe 40 mg, 40 mg, Subcutaneous, Q24H, Tony Larios MD  •  ondansetron (ZOFRAN) injection 4 mg, 4 mg, Intravenous, Q6H PRN, Tony Larios MD  •  phenol (CHLORASEPTIC) 1.4 % liquid 1 spray, 1 spray, Mouth/Throat, Q2H PRN, Alem Awad APRN, 1 spray at 09/10/22 2133  •  scopolamine patch 1 mg/72 hr, 1 patch, Transdermal, Q72H, Tony Larios MD  •  [COMPLETED] Insert peripheral IV, , , Once **AND** sodium chloride 0.9 % flush 10 mL, 10 mL, Intravenous, PRN, Tony Larios MD  •  sodium chloride 0.9 % flush 10  mL, 10 mL, Intravenous, Q12H, Tony Larios MD, 10 mL at 22  •  sodium chloride 0.9 % flush 10 mL, 10 mL, Intravenous, PRN, Tony Larios MD  •  sodium chloride 0.9 % infusion, 100 mL/hr, Intravenous, Continuous, Tony Larios MD, Last Rate: 100 mL/hr at 09/10/22 2221, 100 mL/hr at 09/10/22 2221    Assessment & Plan     Active Problems:    SBO (small bowel obstruction) (HCC)    Clinically and radiographically patient seems to be improving with regards to her small bowel obstruction  Recommend advance diet as tolerated  Patient should go home on 2 weeks of low residue diet if able to tolerate  Follow-up as needed  Please call with questions    Plan for disposition: Patient is surgically stable for discharge home if her diet is able to be advanced throughout the day without any further nausea or vomiting    Scooter Lizama MD  22  09:44 EDT        Electronically signed by Scooter Lizama MD at 22 0946     Tony Larios MD at 22 1527              [facility] Medicine Services Daily Progress Note    Patient Name: Isabel Florentino  : 1965  MRN: [id number]  Primary Care Physician:  Hannah Roque MD  Date of admission: 9/10/2022      Subjective        Brief interim history: 56-year-old female with extensive surgical history significant for CRCA with liver metastasis S/p CT/resection , S/p hand-assisted-converted to open right hepatectomy(, followed at [facility]) and anatomical resection of the left lateral lobe of the liver with bilateral ureteral stent placement by urology at that time, cervical dysplasia, history of IC aneurysm, S/p reconstructive left frontal lobe/temporal craniotomy, ADHD, HLD, and history of periorbital cellulitis.  Patient is admitted with partial small bowel obstruction, presenting to [facility] ED on 9/10/2022, complaining of abdomen pain with associated bloating/nausea and nonbloody  vomiting with difficulty holding meals. Laboratories notable for leukocytosis, with WBC of 15.4 and anion gap of 16 otherwise, no electrolyte abnormalities.  Urine analysis suggestive of UTI.    9/11/2022: Seen and examined in follow-up.  Resting in bed comfortably with  at bedside.  She reports feeling better, with NG tube suction with moderate amount of gastric content.      Objective      Vitals:   Temp:  [98.2 °F (36.8 °C)-101.4 °F (38.6 °C)] 98.2 °F (36.8 °C)  Heart Rate:  [] 75  Resp:  [14-17] 14  BP: (122-150)/(64-84) 148/75    Physical Exam   Constitutional:       General: She is not in acute distress.     Appearance: Normal appearance. She is not ill-appearing.   HENT:      Head: Normocephalic.      Nose: Nose normal.      Mouth/Throat:      Mouth: Mucous membranes are dry.   Eyes:      Pupils: Pupils are equal, round, and reactive to light.   Cardiovascular:      Rate and Rhythm: Normal rate.      Pulses: Normal pulses.   Pulmonary:      Effort: Pulmonary effort is normal.   Abdominal:      Palpations: Abdomen is soft.   Musculoskeletal:         General: Normal range of motion.      Cervical back: Neck supple.   Skin:     General: Skin is warm.   Neurological:      Mental Status: She is alert. Mental status is at baseline.        Result Review    Result Review:  I have personally reviewed the results from the time of this admission to 9/11/2022 15:27 EDT and agree with these findings:  []  Laboratory  []  Microbiology  []  Radiology  []  EKG/Telemetry   []  Cardiology/Vascular   []  Pathology  []  Old records  []  Other:      Assessment & Plan        cefTRIAXone, 1 g, Intravenous, Q24H  enoxaparin, 40 mg, Subcutaneous, Q24H  Scopolamine, 1 patch, Transdermal, Q72H  sodium chloride, 10 mL, Intravenous, Q12H       sodium chloride, 100 mL/hr, Last Rate: 100 mL/hr (09/10/22 2221)         Active Hospital Problems:  Active Hospital Problems    Diagnosis    • SBO (small bowel obstruction) (HCC)       Assessment/plan:        pSBO      -supportive care, NG tube for decompression, n.p.o., IV fluids saline, antiemetic,       -CT A/p (9/10)-->CT of the abdomen/pelvis with contrast  small amount of ascitic fluid in the abdomen/pelvis with dilated loops of small bowel with decreased gas in the colon most consistent with small bowel obstruction.        -General surgery consulted     Suspected UTI        -Rocephin, and UC/blood cultures     History of CRCA with liver metastasis       -S/p hand-assisted-converted to open right hepatectomy/cholecystectomy (2016) (followed at [facility])      Extensive surgical history, as outlined above     History of intracranial aneurysm       -S/p  reconstructive left frontal lobe/temporal craniotomy     History of ADHD        -on focalin    DVT prophylaxis:  Medical DVT prophylaxis orders are present.    CODE STATUS:    Level Of Support Discussed With: Patient  Code Status (Patient has no pulse and is not breathing): CPR (Attempt to Resuscitate)  Medical Interventions (Patient has pulse or is breathing): Full Support  Release to patient: Routine Release      Disposition:  I expect patient to be discharged     Electronically signed by Tony Larios MD, 09/11/22, 15:27 EDT.  [facility], operated by [facility] Hospitalist Team             Electronically signed by Tony Larios MD at 09/11/22 1534          Consult Notes (last 48 hours)      Scooter Lizama MD at 09/11/22 1130      Consult Orders    1. Inpatient General Surgery Consult [[id number]] ordered by Tony Larios MD at 09/10/22 1731               GENERAL SURGERY CONSULTATION NOTE    Consult requested by: [facility] emergency department    Patient Care Team:  Hannah Roque MD as PCP - General (Internal Medicine)  Zheng Johnson MD as Consulting Physician (Hematology and Oncology)  eLnore Schuler MD as Consulting Physician (Colon and Rectal Surgery)  Lenore Schuler MD as Referring Physician  (Colon and Rectal Surgery)    Reason for consult: Bowel obstruction    Subjective     Patient is a 56 y.o. female presents with obstipation and constipation, nausea, vomiting, and abdominal pain which has been progressively getting worse over the last week or so.  She was constipated and seen by her PCP who recommended laxatives.  Unfortunately, throughout the week, her constipation, abdominal distention and abdominal pain got worse until yesterday when she started having nausea and vomiting.  The patient has a history of rectal cancer which was metastatic to the liver.  She subsequently underwent a colon resection at [facility] followed by a open right hepatectomy in 2016.  She then subsequently had her colostomy reversed in 2017 by a colorectal surgeon at the [facility].  She has never had any symptoms of bowel obstruction before this episode.  Additional surgeries include anatomical resection of the left lateral lobe of the liver, history of intracerebral aneurysm, S/p reconstructive left frontal lobe/temporal craniotomy,  and past medical history is significant for ADHD, HLD, and history of periorbital cellulitis.  In the emergency room, a CT scan of the abdomen and pelvis demonstrated bowel obstruction with small amount of ascites in the pelvis.  A nasogastric tube was placed, the patient noticed some symptomatic relief.  The patient now reports that she is passing some flatus, but has not had a bowel movement    Review of Systems   Constitutional: Negative for appetite change, chills and fever.   HENT: Negative for congestion and sore throat.    Respiratory: Negative for cough and shortness of breath.    Cardiovascular: Negative for chest pain and palpitations.   Gastrointestinal: Positive for abdominal distention, abdominal pain, constipation, nausea and vomiting. Negative for diarrhea and GERD.   Genitourinary: Negative for difficulty urinating, dysuria and frequency.   Musculoskeletal:  Negative for arthralgias and back pain.   Skin: Negative for rash and skin lesions.   Neurological: Negative for dizziness, seizures and memory problem.   Hematological: Negative for adenopathy. Does not bruise/bleed easily.   Psychiatric/Behavioral: Negative for sleep disturbance and depressed mood.        History  Past Medical History:   Diagnosis Date   • Abnormal Pap smear of cervix    • ADD (attention deficit disorder)    • Anemia    • Cervical dysplasia    • Colon cancer (HCC)     NEW DIAGNOSIS   • Drug therapy    • H/O foreign travel 03/2016    [place]   • Hepatic metastases (HCC) 10/2016    partial right hepatectomy [place] 10/16   • History of transfusion     1999 AFTER TUBAL RUPTURE   • Hyperlipidemia    • Middle cerebral artery aneurysm    • Status post partial colectomy 04/2017    [facility]     Past Surgical History:   Procedure Laterality Date   • AUGMENTATION MAMMAPLASTY     • COLON RESECTION WITH ILEOSTOMY     • COLONOSCOPY      MAY 2016   • CRANIOTOMY  2004, 2005    Cerebral aneurysm   • ILEOSTOMY CLOSURE     • LAPAROSCOPY FOR ECTOPIC PREGNANCY  09/1997   • LEEP N/A 11/22/2021    Procedure: LOOP ELECTROCAUTERY EXCISION PROCEDURE, ENDOCERVICAL CURETTING;  Surgeon: Xavier Collins MD;  Location: [facility];  Service: Obstetrics/Gynecology;  Laterality: N/A;   • LIVER RESECTION  2016   • LUNG LOBECTOMY Right 2018   • MEDIPORT REMOVAL     • DE INSJ TUNNELED CVC W/O SUBQ PORT/ AGE 3 YR/> Right 8/26/2016    Procedure: MEDIPORT PLACEMENT ;  Surgeon: Praful Holden MD;  Location: [facility];  Service: Vascular   • SINUS SURGERY       Family History   Problem Relation Age of Onset   • Cerebral aneurysm Mother    • Stroke Mother    • COPD Father    • Malig Hyperthermia Neg Hx      Social History     Tobacco Use   • Smoking status: Never Smoker   • Smokeless tobacco: Never Used   Vaping Use   • Vaping Use: Never used   Substance Use Topics   • Alcohol use:  Yes     Alcohol/week: 5.0 standard drinks     Types: 5 Glasses of wine per week     Comment: Social   • Drug use: No     Medications Prior to Admission   Medication Sig Dispense Refill Last Dose   • B Complex Vitamins (VITAMIN B COMPLEX PO) Take 1 tablet by mouth Daily.   8/31/2022   • cholecalciferol (VITAMIN D3) 1000 UNITS tablet Take 1,000 Units by mouth Daily.   8/31/2022   • dexmethylphenidate XR (FOCALIN XR) 20 MG 24 hr capsule Take 1 capsule by mouth Daily Needs appt this month 30 capsule 0 9/7/2022   • Finacea 15 % foam Apply 1 application topically to the appropriate area as directed As Needed.   9/8/2022   • Multiple Vitamins-Minerals (MULTIVITAMIN WITH MINERALS) tablet tablet Take 1 tablet by mouth Daily.   9/7/2022   • Scopolamine 1 MG/3DAYS patch Place 1 patch on the skin as directed by provider Every 72 (Seventy-Two) Hours As Needed.        Allergies:  Patient has no known allergies.    Objective     Vital Signs  Temp:  [98.2 °F (36.8 °C)-101.4 °F (38.6 °C)] 98.2 °F (36.8 °C)  Heart Rate:  [] 78  Resp:  [14-17] 17  BP: (122-150)/(63-84) 148/84    Physical Exam  Vitals reviewed.   Constitutional:       Appearance: She is well-developed.   HENT:      Head: Normocephalic and atraumatic.      Nose:      Comments: Nasogastric tube in place with clear brown fluid  Eyes:      Pupils: Pupils are equal, round, and reactive to light.   Cardiovascular:      Rate and Rhythm: Normal rate and regular rhythm.   Pulmonary:      Effort: Pulmonary effort is normal.      Breath sounds: Normal breath sounds.   Abdominal:      General: There is distension.      Palpations: Abdomen is soft.      Tenderness: There is generalized abdominal tenderness. There is no guarding or rebound.      Hernia: No hernia is present.      Comments: Multiple well-healed incisions without evidence of hernia.  Patient's abdomen is soft, mildly distended, and mildly tender to palpation over the majority of the abdomen without rebound or  guarding.   Musculoskeletal:         General: Normal range of motion.      Cervical back: Normal range of motion.   Lymphadenopathy:      Cervical: No cervical adenopathy.   Skin:     General: Skin is warm and dry.      Findings: No rash.   Neurological:      General: No focal deficit present.      Mental Status: She is alert and oriented to person, place, and time.   Psychiatric:         Mood and Affect: Mood normal.         Behavior: Behavior normal.         Thought Content: Thought content normal.         Judgment: Judgment normal.         Results Review:   Lab Results (last 24 hours)     Procedure Component Value Units Date/Time    Lactic Acid, Plasma [[id number]]  (Normal) Collected: 09/11/22 0945    Specimen: Blood Updated: 09/11/22 1018     Lactate 0.7 mmol/L     Lactic Acid, Plasma [[id number]]  (Normal) Collected: 09/11/22 0313    Specimen: Blood Updated: 09/11/22 0345     Lactate 0.6 mmol/L     Basic Metabolic Panel [[id number]]  (Normal) Collected: 09/10/22 2343    Specimen: Blood Updated: 09/11/22 0047     Glucose 88 mg/dL      BUN 20 mg/dL      Creatinine 0.80 mg/dL      Sodium 141 mmol/L      Potassium 4.0 mmol/L      Comment: Slight hemolysis detected by analyzer. Results may be affected.        Chloride 104 mmol/L      CO2 23.0 mmol/L      Calcium 8.9 mg/dL      BUN/Creatinine Ratio 25.0     Anion Gap 14.0 mmol/L      eGFR 85.5 mL/min/1.73      Comment: National Kidney Foundation and American Society of Nephrology (ASN) Task Force recommended calculation based on the Chronic Kidney Disease Epidemiology Collaboration (CKD-EPI) equation refit without adjustment for race.       Narrative:      GFR Normal >60  Chronic Kidney Disease <60  Kidney Failure <15      Magnesium [[id number]]  (Normal) Collected: 09/10/22 2343    Specimen: Blood Updated: 09/11/22 0047     Magnesium 2.2 mg/dL     CBC Auto Differential [[id number]]  (Abnormal) Collected: 09/10/22 2343    Specimen: Blood Updated: 09/11/22 0042     WBC  12.60 10*3/mm3      RBC 4.75 10*6/mm3      Hemoglobin 13.7 g/dL      Comment: Result checked          Hematocrit 42.0 %      MCV 88.3 fL      MCH 28.9 pg      MCHC 32.7 g/dL      RDW 13.5 %      RDW-SD 42.4 fl      MPV 8.5 fL      Platelets 246 10*3/mm3      Neutrophil % 78.3 %      Lymphocyte % 12.6 %      Monocyte % 8.4 %      Eosinophil % 0.3 %      Basophil % 0.4 %      Neutrophils, Absolute 9.80 10*3/mm3      Lymphocytes, Absolute 1.60 10*3/mm3      Monocytes, Absolute 1.00 10*3/mm3      Eosinophils, Absolute 0.00 10*3/mm3      Basophils, Absolute 0.10 10*3/mm3      nRBC 0.1 /100 WBC     Blood Culture - Blood, Arm, Right [[id number]] Collected: 09/10/22 2343    Specimen: Blood from Arm, Right Updated: 09/11/22 0023    Blood Culture - Blood, Arm, Left [[id number]] Collected: 09/10/22 2106    Specimen: Blood from Arm, Left Updated: 09/10/22 2231    Lactic Acid, Plasma [[id number]]  (Normal) Collected: 09/10/22 2106    Specimen: Blood from Arm, Right Updated: 09/10/22 2154     Lactate 0.8 mmol/L     [facility]ector Draw [[id number]] Collected: 09/10/22 1454    Specimen: Blood Updated: 09/10/22 1604    Narrative:      The following orders were created for panel order [facility]ector Draw.  Procedure                               Abnormality         Status                     ---------                               -----------         ------                     Green Top (Gel)[[id number]]                                  Final result               Lavender Top[[id number]]                                     Final result               Gold Top - SST[[id number]]                                   Final result               Light Blue Top[[id number]]                                   Final result                 Please view results for these tests on the individual orders.    Lavender Top [[id number]] Collected: 09/10/22 1454    Specimen: Blood Updated: 09/10/22 1604     Extra Tube hold for add-on     Comment: Auto resulted       Light Blue Top  [[id number]] Collected: 09/10/22 1454    Specimen: Blood Updated: 09/10/22 1604     Extra Tube Hold for add-ons.     Comment: Auto resulted       Gold Top - SST [[id number]] Collected: 09/10/22 1454    Specimen: Blood Updated: 09/10/22 1551    Green Top (Gel) [[id number]] Collected: 09/10/22 1454    Specimen: Blood Updated: 09/10/22 1549    Comprehensive Metabolic Panel [[id number]]  (Abnormal) Collected: 09/10/22 1454    Specimen: Blood Updated: 09/10/22 1540     Glucose 98 mg/dL      BUN 20 mg/dL      Creatinine 0.87 mg/dL      Sodium 139 mmol/L      Potassium 4.4 mmol/L      Comment: Slight hemolysis detected by analyzer. Results may be affected.        Chloride 98 mmol/L      CO2 25.0 mmol/L      Calcium 10.0 mg/dL      Total Protein 8.5 g/dL      Albumin 5.10 g/dL      ALT (SGPT) 16 U/L      AST (SGOT) 31 U/L      Comment: Slight hemolysis detected by analyzer. Results may be affected.        Alkaline Phosphatase 75 U/L      Total Bilirubin 0.7 mg/dL      Globulin 3.4 gm/dL      A/G Ratio 1.5 g/dL      BUN/Creatinine Ratio 23.0     Anion Gap 16.0 mmol/L      eGFR 77.3 mL/min/1.73      Comment: National Kidney Foundation and American Society of Nephrology (ASN) Task Force recommended calculation based on the Chronic Kidney Disease Epidemiology Collaboration (CKD-EPI) equation refit without adjustment for race.       Narrative:      GFR Normal >60  Chronic Kidney Disease <60  Kidney Failure <15      Lipase [[id number]]  (Normal) Collected: 09/10/22 1454    Specimen: Blood Updated: 09/10/22 1539     Lipase 18 U/L     Urinalysis With Microscopic If Indicated (No Culture) - Urine, Clean Catch [[id number]]  (Abnormal) Collected: 09/10/22 1454    Specimen: Urine, Clean Catch Updated: 09/10/22 1510     Color, UA Yellow     Appearance, UA Clear     pH, UA 5.5     Specific Gravity, UA 1.032     Glucose, UA Negative     Ketones, UA 40 mg/dL (2+)     Bilirubin, UA Negative     Blood, UA Negative     Protein, UA Trace     Leuk  Esterase, UA Trace     Nitrite, UA Negative     Urobilinogen, UA 1.0 E.U./dL    Urinalysis, Microscopic Only - Urine, Clean Catch [[id number]]  (Abnormal) Collected: 09/10/22 1454    Specimen: Urine, Clean Catch Updated: 09/10/22 1510     RBC, UA 0-2 /HPF      WBC, UA 13-20 /HPF      Bacteria, UA None Seen /HPF      Squamous Epithelial Cells, UA 0-2 /HPF      Hyaline Casts, UA 0-2 /LPF      Methodology Automated Microscopy    CBC & Differential [[id number]]  (Abnormal) Collected: 09/10/22 1454    Specimen: Blood Updated: 09/10/22 1504    Narrative:      The following orders were created for panel order CBC & Differential.  Procedure                               Abnormality         Status                     ---------                               -----------         ------                     CBC Auto Differential[[id number]]        Abnormal            Final result                 Please view results for these tests on the individual orders.    CBC Auto Differential [[id number]]  (Abnormal) Collected: 09/10/22 1454    Specimen: Blood Updated: 09/10/22 1504     WBC 15.40 10*3/mm3      RBC 5.53 10*6/mm3      Hemoglobin 16.0 g/dL      Hematocrit 48.2 %      MCV 87.1 fL      MCH 28.9 pg      MCHC 33.2 g/dL      RDW 13.6 %      RDW-SD 42.0 fl      MPV 8.5 fL      Platelets 323 10*3/mm3      Neutrophil % 83.8 %      Lymphocyte % 9.1 %      Monocyte % 6.6 %      Eosinophil % 0.1 %      Basophil % 0.4 %      Neutrophils, Absolute 12.90 10*3/mm3      Lymphocytes, Absolute 1.40 10*3/mm3      Monocytes, Absolute 1.00 10*3/mm3      Eosinophils, Absolute 0.00 10*3/mm3      Basophils, Absolute 0.10 10*3/mm3      nRBC 0.1 /100 WBC         CT Abdomen Pelvis With Contrast    Result Date: 9/10/2022   1. Small amount of ascitic fluid in the abdomen and pelvis. 2. Dilated loops of small bowel with decreased gas in the colon most consistent with small bowel obstruction. I recommend general surgical consultation or further evaluate this  abnormality. The terminal ileum does not appear to be dilated. It is difficult to find a transitional zone in the small bowel loops. 3. Postoperative changes in the area of the rectum. No evidence of mass or adenopathy or in formation in this area  Electronically Signed By-Brandon Sarmiento MD On:9/10/2022 4:31 PM This report was finalized on [id number] by  Brandon Sarmiento MD.    XR Abdomen KUB    Result Date: 9/10/2022  A nasogastric tube tip is in the fundus of the stomach. Electronically signed by:  Scooter Modi M.D.  9/10/2022 6:48 PM        I reviewed the patient's new imaging results and agree with the interpretation.  I reviewed the patient's other test results and agree with the interpretation    Assessment & Plan     Active Problems:    SBO (small bowel obstruction) (HCC)    Recommended trial of nonoperative management at this time.  Continue n.p.o., IV fluids, and nasogastric tube decompression  Consider small bowel follow-through in the next 24 to 48 hours if the patient does not resolve on her own.  If the patient has a bowel movement, can discontinue nasogastric tube and begin clear liquid diet    I discussed the patients findings and my recommendations with the patient.     Scooter Lizama MD  09/11/22  11:30 EDT          Electronically signed by Scooter Lizama MD at 09/11/22 6278

## 2022-09-12 NOTE — DISCHARGE SUMMARY
[facility] Medicine Services  DISCHARGE SUMMARY    Patient Name: Isabel Florentino  : 1965  MRN: [id number]    Date of Admission: 9/10/2022  Discharge Diagnosis:  1.  Small bowel obstruction, resolving  2.  Probable UTI  3.  History of CRCA with liver metastasis  4.  Prior extensive surgical history  5.  History of intracranial aneurysm, status post reconstructive left frontal/temporal lobe craniotomy  6.  ADHD    Date of Discharge:  22   Primary Care Physician: Hannah Roque MD      Presenting Problem:   SBO (small bowel obstruction) (HCC) [K56.609]    Active and Resolved Hospital Problems:  Active Hospital Problems    Diagnosis POA   • SBO (small bowel obstruction) (HCC) [K56.609] Yes      Resolved Hospital Problems   No resolved problems to display.         Hospital Course     Brief HPI/Hospital course: 56-year-old female with extensive surgical history significant for CRCA with liver metastasis S/p CT/resection , S/p hand-assisted-converted to open right hepatectomy(, followed at [facility]) and anatomical resection of the left lateral lobe of the liver with bilateral ureteral stent placement by urology at that time, cervical dysplasia, history of IC aneurysm, S/p reconstructive left frontal lobe/temporal craniotomy, ADHD, HLD, and history of periorbital cellulitis. Patient was  admitted with partial small bowel obstruction, presenting to [facility] ED on 9/10/2022, complaining of abdomen pain with associated bloating/nausea and nonbloody vomiting with difficulty holding meals. Laboratories notable for leukocytosis, with WBC of 15.4 and anion gap of 16 otherwise, no electrolyte abnormalities.  Urine analysis suggestive of UTI.  CT A/p () shows small amount of ascitic fluid in the abdomen/pelvis with dilated loops of small bowels with decreased gas in the colon most consistent with small bowel obstruction.      Patient was admitted and treated expectantly for  small bowel obstruction with n.p.o., NG tube placement, IV fluids saline, antiemetic and supportive care.  She was seen and followed by general surgery during her hospital course.  Recommendation is to continue with conservative treatment with gradual improvement.  NG tube was subsequently discontinued (9/11) and patient was transitioned to full liquid diet and later advanced to regular diet.  She tolerated meals and liquids well without severe abdominal pain.  Prior to discharge home today, patient ports feeling better.  She is up and ambulating in the room.  No chest pain, no shortness of breath no abdominal pain, nausea or vomiting.    DISCHARGE Follow Up Recommendations for labs and diagnostics:    1.  Follow with PCP and general surgery as recommended            Reasons For Change In Medications and Indications for New Medications:      Day of Discharge     Vital Signs:  Temp:  [97.6 °F (36.4 °C)-99 °F (37.2 °C)] 97.6 °F (36.4 °C)  Heart Rate:  [70-76] 71  Resp:  [14-18] 18  BP: (135-144)/(65-76) 144/76    Physical Exam   Constitutional:       General: She is not in acute distress.     Appearance: Normal appearance. She is not ill-appearing.   HENT:      Head: Normocephalic.      Nose: Nose normal.      Mouth/Throat:      Mouth: Mucous membranes are dry.   Eyes:      Pupils: Pupils are equal, round, and reactive to light.   Cardiovascular:      Rate and Rhythm: Normal rate.      Pulses: Normal pulses.   Pulmonary:      Effort: Pulmonary effort is normal.   Abdominal:      Palpations: Abdomen is soft.   Musculoskeletal:         General: Normal range of motion.      Cervical back: Neck supple.   Skin:     General: Skin is warm.   Neurological:      Mental Status: She is alert. Mental status is at baseline.       Pertinent  and/or Most Recent Results     LAB RESULTS:      Lab 09/12/22  0359 09/11/22  1405 09/11/22  0945 09/11/22  0313 09/10/22  2343 09/10/22  2106 09/10/22  1454   WBC 8.30  --   --   --  12.60*   --  15.40*   HEMOGLOBIN 12.5  --   --   --  13.7  --  16.0*   HEMATOCRIT 36.5  --   --   --  42.0  --  48.2*   PLATELETS 201  --   --   --  246  --  323   NEUTROS ABS 5.10  --   --   --  9.80*  --  12.90*   LYMPHS ABS 2.00  --   --   --  1.60  --  1.40   MONOS ABS 1.00*  --   --   --  1.00*  --  1.00*   EOS ABS 0.20  --   --   --  0.00  --  0.00   MCV 87.9  --   --   --  88.3  --  87.1   LACTATE  --  0.8 0.7 0.6  --  0.8  --          Lab 09/12/22  0359 09/10/22  2343 09/10/22  1454   SODIUM 141 141 139   POTASSIUM 4.3 4.0 4.4   CHLORIDE 108* 104 98   CO2 25.0 23.0 25.0   ANION GAP 8.0 14.0 16.0*   BUN 12 20 20   CREATININE 0.68 0.80 0.87   EGFR 101.1 85.5 77.3   GLUCOSE 80 88 98   CALCIUM 8.5* 8.9 10.0   MAGNESIUM  --  2.2  --          Lab 09/10/22  1454   TOTAL PROTEIN 8.5   ALBUMIN 5.10   GLOBULIN 3.4   ALT (SGPT) 16   AST (SGOT) 31   BILIRUBIN 0.7   ALK PHOS 75   LIPASE 18                     Brief Urine Lab Results  (Last result in the past 365 days)      Color   Clarity   Blood   Leuk Est   Nitrite   Protein   CREAT   Urine HCG        09/10/22 1454 Yellow   Clear   Negative   Trace   Negative   Trace               Microbiology Results (last 10 days)     Procedure Component Value - Date/Time    Blood Culture - Blood, Arm, Right [[id number]]  (Normal) Collected: 09/10/22 2343    Lab Status: Preliminary result Specimen: Blood from Arm, Right Updated: 09/12/22 0032     Blood Culture No growth at 24 hours    Blood Culture - Blood, Arm, Left [[id number]]  (Normal) Collected: 09/10/22 2106    Lab Status: Preliminary result Specimen: Blood from Arm, Left Updated: 09/11/22 2248     Blood Culture No growth at 24 hours          CT Abdomen Pelvis With Contrast    Result Date: 9/10/2022  Impression:  1. Small amount of ascitic fluid in the abdomen and pelvis. 2. Dilated loops of small bowel with decreased gas in the colon most consistent with small bowel obstruction. I recommend general surgical consultation or further  evaluate this abnormality. The terminal ileum does not appear to be dilated. It is difficult to find a transitional zone in the small bowel loops. 3. Postoperative changes in the area of the rectum. No evidence of mass or adenopathy or in formation in this area  Electronically Signed By-Brandon Sarmiento MD On:9/10/2022 4:31 PM This report was finalized on [id number] by  Brandon Sarmiento MD.    XR Abdomen KUB    Result Date: 9/12/2022  Impression: Decreased diameter of previously seen distended small bowel loops, suggestive of improving/resolving small bowel obstruction.  Electronically Signed By-Sydney Marie MD On:9/12/2022 9:06 AM This report was finalized on [id number] by  Sydney Marie MD.    XR Abdomen KUB    Result Date: 9/10/2022  Impression: A nasogastric tube tip is in the fundus of the stomach. Electronically signed by:  Scooter Modi M.D.  9/10/2022 6:48 PM      Results for orders placed in visit on 06/23/20    SCANNED VASCULAR STUDIES      Results for orders placed in visit on 06/23/20    SCANNED VASCULAR STUDIES          Labs Pending at Discharge:  Pending Labs     Order Current Status    Blood Culture - Blood, Arm, Left Preliminary result    Blood Culture - Blood, Arm, Right Preliminary result          Procedures Performed           Consults:   Consults     Date and Time Order Name Status Description    9/10/2022  7:55 PM Inpatient General Surgery Consult Completed     9/10/2022  4:48 PM Surgery (on-call MD unless specified)      9/10/2022  4:48 PM Hospitalist (on-call MD unless specified)              Discharge Details        Discharge Medications      ASK your doctor about these medications      Instructions Start Date   cholecalciferol 25 MCG (1000 UT) tablet  Commonly known as: VITAMIN D3   1,000 Units, Oral, Daily      dexmethylphenidate XR 20 MG 24 hr capsule  Commonly known as: FOCALIN XR   20 mg, Oral, Daily, Needs appt this month      Finacea 15 % foam  Generic drug: Azelaic  Acid   1 application, Topical, As Needed      multivitamin with minerals tablet tablet   1 tablet, Oral, Daily      Scopolamine 1 MG/3DAYS patch  Ask about: Which instructions should I use?   1 patch, Transdermal, Every 72 Hours PRN      VITAMIN B COMPLEX PO   1 tablet, Oral, Daily             No Known Allergies      Discharge Disposition: Home      Diet:  Hospital:  Diet Order   Procedures   • Diet Gastrointestinal; Low Residue         Discharge Activity: Ad haleigh.        CODE STATUS:  Code Status and Medical Interventions:   Ordered at: 09/11/22 0857     Level Of Support Discussed With:    Patient     Code Status (Patient has no pulse and is not breathing):    CPR (Attempt to Resuscitate)     Medical Interventions (Patient has pulse or is breathing):    Full Support     Release to patient:    Routine Release         Future Appointments   Date Time Provider Department Center   12/28/2022  2:50 PM LAB CHAIR 1 KATHLEEN DUMONT  [facility]   12/28/2022  3:20 PM Zheng Johnson MD [facility]       Time spent on Discharge including face to face service:  >30minutes

## 2022-09-12 NOTE — PLAN OF CARE
Goal Outcome Evaluation:  Patient has had 3 bm's since yesterday, has walked around unit several times, NG tube was removed due to having BM, advanced to clear liquid diet with no issues, no nausea/vomiting

## 2022-09-12 NOTE — PLAN OF CARE
Goal Outcome Evaluation:  Plan of Care Reviewed With: patient           Outcome Evaluation: pt. to be d/c home this shift

## 2022-09-12 NOTE — NURSING NOTE
Patient has had 2 bowel movements today, per Dr. Lizama's note, NG tube removed and clears started

## 2022-09-12 NOTE — PROGRESS NOTES
GENERAL SURGERY PROGRESS NOTE    9/12/2022   LOS: 2 days   Patient Care Team:  Hannah Roque MD as PCP - General (Internal Medicine)  Zheng Johnson MD as Consulting Physician (Hematology and Oncology)  Lenore Schuler MD as Consulting Physician (Colon and Rectal Surgery)  Lenore Schuler MD as Referring Physician (Colon and Rectal Surgery)    Chief Complaint: Small bowel obstruction    Subjective   HPI: Patient is a 56 y.o. female presents with obstipation and constipation, nausea, vomiting, and abdominal pain which has been progressively getting worse over the last week or so.  She was constipated and seen by her PCP who recommended laxatives.  Unfortunately, throughout the week, her constipation, abdominal distention and abdominal pain got worse until yesterday when she started having nausea and vomiting.  The patient has a history of rectal cancer which was metastatic to the liver.  She subsequently underwent a colon resection at [facility] followed by a open right hepatectomy in 2016.  She then subsequently had her colostomy reversed in 2017 by a colorectal surgeon at the [facility].  She has never had any symptoms of bowel obstruction before this episode.  Additional surgeries include anatomical resection of the left lateral lobe of the liver, history of intracerebral aneurysm, S/p reconstructive left frontal lobe/temporal craniotomy,  and past medical history is significant for ADHD, HLD, and history of periorbital cellulitis.  In the emergency room, a CT scan of the abdomen and pelvis demonstrated bowel obstruction with small amount of ascites in the pelvis.  A nasogastric tube was placed, the patient noticed some symptomatic relief.  The patient now reports that she is passing some flatus, but has not had a bowel movement.    Interval History:   Overnight, the patient had bowel movements x3 and was passing flatus.  Nasogastric tube was discontinued and the patient was started on  a clear liquid diet which she has tolerated without any nausea or vomiting.  Abdomen still feels slightly distended.    Objective     Vital Signs  Temp:  [98.2 °F (36.8 °C)-99 °F (37.2 °C)] 98.2 °F (36.8 °C)  Heart Rate:  [64-76] 70  Resp:  [14-16] 16  BP: (135-149)/(65-77) 135/65    Physical Exam  Vitals reviewed.   Constitutional:       Appearance: She is well-developed.   HENT:      Head: Normocephalic and atraumatic.   Eyes:      Pupils: Pupils are equal, round, and reactive to light.   Cardiovascular:      Rate and Rhythm: Normal rate and regular rhythm.   Pulmonary:      Effort: Pulmonary effort is normal.      Breath sounds: Normal breath sounds.   Abdominal:      General: There is distension.      Palpations: Abdomen is soft.      Tenderness: There is generalized abdominal tenderness. There is no guarding or rebound.      Hernia: No hernia is present.   Musculoskeletal:         General: Normal range of motion.      Cervical back: Normal range of motion.   Lymphadenopathy:      Cervical: No cervical adenopathy.   Skin:     General: Skin is warm and dry.      Findings: No rash.   Neurological:      Mental Status: She is alert and oriented to person, place, and time.          Results Review:    Lab Results (last 24 hours)     Procedure Component Value Units Date/Time    Basic Metabolic Panel [[id number]]  (Abnormal) Collected: 09/12/22 0359    Specimen: Blood Updated: 09/12/22 0457     Glucose 80 mg/dL      BUN 12 mg/dL      Creatinine 0.68 mg/dL      Sodium 141 mmol/L      Potassium 4.3 mmol/L      Chloride 108 mmol/L      CO2 25.0 mmol/L      Calcium 8.5 mg/dL      BUN/Creatinine Ratio 17.6     Anion Gap 8.0 mmol/L      eGFR 101.1 mL/min/1.73      Comment: National Kidney Foundation and American Society of Nephrology (ASN) Task Force recommended calculation based on the Chronic Kidney Disease Epidemiology Collaboration (CKD-EPI) equation refit without adjustment for race.       Narrative:      GFR Normal  >60  Chronic Kidney Disease <60  Kidney Failure <15      CBC & Differential [[id number]]  (Abnormal) Collected: 09/12/22 0359    Specimen: Blood Updated: 09/12/22 0417    Narrative:      The following orders were created for panel order CBC & Differential.  Procedure                               Abnormality         Status                     ---------                               -----------         ------                     CBC Auto Differential[[id number]]        Abnormal            Final result                 Please view results for these tests on the individual orders.    CBC Auto Differential [[id number]]  (Abnormal) Collected: 09/12/22 0359    Specimen: Blood Updated: 09/12/22 0417     WBC 8.30 10*3/mm3      RBC 4.16 10*6/mm3      Hemoglobin 12.5 g/dL      Hematocrit 36.5 %      MCV 87.9 fL      MCH 30.0 pg      MCHC 34.1 g/dL      RDW 12.9 %      RDW-SD 40.3 fl      MPV 8.2 fL      Platelets 201 10*3/mm3      Neutrophil % 61.2 %      Lymphocyte % 24.2 %      Monocyte % 11.6 %      Eosinophil % 2.7 %      Basophil % 0.3 %      Neutrophils, Absolute 5.10 10*3/mm3      Lymphocytes, Absolute 2.00 10*3/mm3      Monocytes, Absolute 1.00 10*3/mm3      Eosinophils, Absolute 0.20 10*3/mm3      Basophils, Absolute 0.00 10*3/mm3      nRBC 0.1 /100 WBC     Blood Culture - Blood, Arm, Right [[id number]]  (Normal) Collected: 09/10/22 2343    Specimen: Blood from Arm, Right Updated: 09/12/22 0032     Blood Culture No growth at 24 hours    Blood Culture - Blood, Arm, Left [[id number]]  (Normal) Collected: 09/10/22 2106    Specimen: Blood from Arm, Left Updated: 09/11/22 2248     Blood Culture No growth at 24 hours    Lactic Acid, Plasma [[id number]]  (Normal) Collected: 09/11/22 1405    Specimen: Blood Updated: 09/11/22 1434     Lactate 0.8 mmol/L     Lactic Acid, Plasma [[id number]]  (Normal) Collected: 09/11/22 0945    Specimen: Blood Updated: 09/11/22 1018     Lactate 0.7 mmol/L         Imaging Results (Last 24 Hours)      Procedure Component Value Units Date/Time    XR Abdomen KUB [[id number]] Collected: 09/12/22 0859     Updated: 09/12/22 0908    Narrative:      DATE OF EXAM:  9/12/2022 8:51 AM     PROCEDURE:  XR ABDOMEN KUB-     INDICATIONS:  sbo; R10.84-Generalized abdominal pain; K56.609-Unspecified intestinal  obstruction, unspecified as to partial versus complete obstruction     COMPARISON:  CT abdomen pelvis 09/10/2022, KUB 09/10/2022.     TECHNIQUE:   Radiographic view(s) of the abdomen obtained.     FINDINGS:  Enteric tube has been removed. Previously seen distended small bowel  loops have decreased in diameter. There is bowel gas in the nondistended  colon. Right upper quadrant surgical clips are again noted. Included  lung bases are clear.       Impression:      Decreased diameter of previously seen distended small bowel loops,  suggestive of improving/resolving small bowel obstruction.     Electronically Signed By-Sydney Marie MD On:9/12/2022 9:06 AM  This report was finalized on [id number] by  Sydney Marie MD.           I have reviewed the above results and noted them below    Medication Review:    Current Facility-Administered Medications:   •  acetaminophen (TYLENOL) 160 MG/5ML solution 650 mg, 650 mg, Nasogastric, Q6H PRN, Tony Larios MD, 650 mg at 09/11/22 1820  •  cefTRIAXone (ROCEPHIN) 1 g in sodium chloride 0.9 % 100 mL IVPB, 1 g, Intravenous, Q24H, Tony Larios MD, Last Rate: 200 mL/hr at 09/11/22 1739, 1 g at 09/11/22 1739  •  Enoxaparin Sodium (LOVENOX) syringe 40 mg, 40 mg, Subcutaneous, Q24H, Tony Larios MD  •  ondansetron (ZOFRAN) injection 4 mg, 4 mg, Intravenous, Q6H PRN, Tony Larios MD  •  phenol (CHLORASEPTIC) 1.4 % liquid 1 spray, 1 spray, Mouth/Throat, Q2H PRN, Alem Awad, APRN, 1 spray at 09/10/22 2133  •  scopolamine patch 1 mg/72 hr, 1 patch, Transdermal, Q72H, Tony Larios MD  •  [COMPLETED] Insert peripheral IV, , , Once **AND** sodium  chloride 0.9 % flush 10 mL, 10 mL, Intravenous, PRN, Tony Larios MD  •  sodium chloride 0.9 % flush 10 mL, 10 mL, Intravenous, Q12H, Tony Larios MD, 10 mL at 09/11/22 2127  •  sodium chloride 0.9 % flush 10 mL, 10 mL, Intravenous, PRN, Tony Larios MD  •  sodium chloride 0.9 % infusion, 100 mL/hr, Intravenous, Continuous, Tony Larios MD, Last Rate: 100 mL/hr at 09/10/22 2221, 100 mL/hr at 09/10/22 2221    Assessment & Plan     Active Problems:    SBO (small bowel obstruction) (HCC)    Clinically and radiographically patient seems to be improving with regards to her small bowel obstruction  Recommend advance diet as tolerated  Patient should go home on 2 weeks of low residue diet if able to tolerate  Follow-up as needed  Please call with questions    Plan for disposition: Patient is surgically stable for discharge home if her diet is able to be advanced throughout the day without any further nausea or vomiting    Scooter Lizama MD  09/12/22  09:44 EDT

## 2022-09-12 NOTE — OUTREACH NOTE
Prep Survey    Flowsheet Row Responses   [facility] patient discharged from? [facility]   Is LACE score < 7 ? No   Emergency Room discharge w/ pulse ox? No   Eligibility [facility]   Date of Admission 09/10/22   Date of Discharge 09/12/22   Discharge Disposition Home or Self Care   Discharge diagnosis small bowel obstruction   Does the patient have one of the following disease processes/diagnoses(primary or secondary)? Other   Does the patient have Home health ordered? No   Is there a DME ordered? No   Prep survey completed? Yes          NADEEM SHARMA - Registered Nurse

## 2022-09-12 NOTE — CASE MANAGEMENT/SOCIAL WORK
Discharge Planning Assessment   [facility]     Patient Name: Isabel Florentino  MRN: [id number]  Today's Date: 9/12/2022    Admit Date: 9/10/2022     Discharge Needs Assessment     Row Name 09/12/22 1301       Living Environment    People in Home spouse    Name(s) of People in Home Amos    Current Living Arrangements home    Primary Care Provided by self    Provides Primary Care For no one    Family Caregiver if Needed spouse    Family Caregiver Names Amos    Quality of Family Relationships supportive    Able to Return to Prior Arrangements yes       Resource/Environmental Concerns    Resource/Environmental Concerns none    Transportation Concerns none       Transition Planning    Patient/Family Anticipates Transition to home with family    Patient/Family Anticipated Services at Transition none    Transportation Anticipated family or friend will provide       Discharge Needs Assessment    Readmission Within the Last 30 Days no previous admission in last 30 days    Equipment Currently Used at Home none    Concerns to be Addressed denies needs/concerns at this time    Anticipated Changes Related to Illness none    Equipment Needed After Discharge none    Provided Post Acute Provider List? N/A    N/A Provider List Comment Denies dc needs               Discharge Plan     Row Name 09/12/22 1303       Plan    Plan Home    Patient/Family in Agreement with Plan yes    Plan Comments Spoke with patient at bedside. Confirmed pcp and pharmacy.  Patient lives at home with spouse who will be able to take patient home at discharge.  Patient denies any dc needs at this time.              Continued Care and Services - Admitted Since 9/10/2022    Coordination has not been started for this encounter.       Expected Discharge Date and Time     Expected Discharge Date Expected Discharge Time    Sep 13, 2022          Demographic Summary     Row Name 09/12/22 1258       General Information    Admission Type inpatient    Arrived From emergency  department    Referral Source admission list    Reason for Consult discharge planning    Preferred Language English       Contact Information    Permission Granted to Share Info With                Functional Status     Row Name 09/12/22 1309       Functional Status    Usual Activity Tolerance good    Current Activity Tolerance good       Functional Status, IADL    Medications independent    Meal Preparation independent    Housekeeping independent    Laundry independent    Shopping independent       Mental Status    General Appearance WDL WDL       Mental Status Summary    Recent Changes in Mental Status/Cognitive Functioning no changes                    Natalie Sánchez RN   Met with patient in room wearing PPE: mask, face shield/goggles, gloves, gown.      Maintained distance greater than six feet and spent less than 15 minutes in the room.

## 2022-09-13 ENCOUNTER — TELEPHONE (OUTPATIENT)
Dept: ONCOLOGY | Facility: CLINIC | Age: 57
End: 2022-09-13

## 2022-09-13 ENCOUNTER — TRANSITIONAL CARE MANAGEMENT TELEPHONE ENCOUNTER (OUTPATIENT)
Dept: CALL CENTER | Facility: HOSPITAL | Age: 57
End: 2022-09-13

## 2022-09-13 NOTE — OUTREACH NOTE
Call Center TCM Note    Flowsheet Row Responses   [facility] facility patient discharged from? [facility]   Does the patient have one of the following disease processes/diagnoses(primary or secondary)? Other   TCM attempt successful? No   Unsuccessful attempts Attempt 1          Brandee Beck MA    9/13/2022, 15:18 EDT

## 2022-09-13 NOTE — TELEPHONE ENCOUNTER
Caller: Isabel Florentino    Relationship: Self    Best call back number: [phone]    What is the best time to reach you: ANYTIME    Who are you requesting to speak with (clinical staff, provider,  specific staff member): DOCTOR, NURSE     What was the call regarding: PT CALLING WILL NEED MORE EXTENSIVE LOOK AS SHE WAS HOSPITALIZATION AND SHE HAD A OBSTRUCTION IN HER COLON WHICH WAS TAKEN CARE OF, AND WOULD LIKE FOR DR. FERGUSON TO SEND A REFERRAL TO DR. RONNIE DOOLEY PHONE NUMBER [phone]    CALL PT WITH ANY QUESTIONS    Do you require a callback: YES

## 2022-09-15 LAB — BACTERIA SPEC AEROBE CULT: NORMAL

## 2022-09-16 DIAGNOSIS — F98.8 ATTENTION DEFICIT DISORDER (ADD) WITHOUT HYPERACTIVITY: ICD-10-CM

## 2022-09-16 LAB — BACTERIA SPEC AEROBE CULT: NORMAL

## 2022-09-16 NOTE — TELEPHONE ENCOUNTER
Caller: Isabel Florentino    Relationship: Self    Best call back number: [phone]    Requested Prescriptions:   Requested Prescriptions     Pending Prescriptions Disp Refills   • dexmethylphenidate XR (FOCALIN XR) 20 MG 24 hr capsule 30 capsule 0     Sig: Take 1 capsule by mouth Daily Needs appt this month        Pharmacy where request should be sent: [facility] - [place], [place] - [address] - [phone]  - [phone] FX     Does the patient have less than a 3 day supply:  [x] Yes  [] No    Roberto Reina Rep   09/16/22 15:53 EDT

## 2022-09-19 RX ORDER — DEXMETHYLPHENIDATE HYDROCHLORIDE 20 MG/1
20 CAPSULE, EXTENDED RELEASE ORAL DAILY
Qty: 30 CAPSULE | Refills: 0 | Status: SHIPPED | OUTPATIENT
Start: 2022-09-19 | End: 2022-11-01 | Stop reason: SDUPTHER

## 2022-09-20 NOTE — TELEPHONE ENCOUNTER
PATIENT SAID SHE DOES WANT TO FOLLOW UP WITH US BECAUSE SHE IS FOLLOWING UP WITH HER SURGEON AND HER ONCOLOGIST.

## 2022-10-03 ENCOUNTER — OFFICE VISIT (OUTPATIENT)
Dept: ONCOLOGY | Facility: CLINIC | Age: 57
End: 2022-10-03

## 2022-10-03 ENCOUNTER — LAB (OUTPATIENT)
Dept: LAB | Facility: HOSPITAL | Age: 57
End: 2022-10-03

## 2022-10-03 VITALS
TEMPERATURE: 97.5 F | HEART RATE: 77 BPM | WEIGHT: 127.1 LBS | BODY MASS INDEX: 21.7 KG/M2 | DIASTOLIC BLOOD PRESSURE: 75 MMHG | HEIGHT: 64 IN | OXYGEN SATURATION: 99 % | SYSTOLIC BLOOD PRESSURE: 136 MMHG | RESPIRATION RATE: 17 BRPM

## 2022-10-03 DIAGNOSIS — C78.7 METASTATIC CANCER TO LIVER: ICD-10-CM

## 2022-10-03 DIAGNOSIS — I67.1 CEREBRAL ARTERIAL ANEURYSM: ICD-10-CM

## 2022-10-03 DIAGNOSIS — T45.1X5A PERIPHERAL NEUROPATHY DUE TO CHEMOTHERAPY: ICD-10-CM

## 2022-10-03 DIAGNOSIS — G62.0 PERIPHERAL NEUROPATHY DUE TO CHEMOTHERAPY: ICD-10-CM

## 2022-10-03 DIAGNOSIS — C78.7 RECTAL CANCER METASTASIZED TO LIVER: ICD-10-CM

## 2022-10-03 DIAGNOSIS — C78.7 RECTAL CANCER METASTASIZED TO LIVER: Primary | ICD-10-CM

## 2022-10-03 DIAGNOSIS — C20 RECTAL CANCER METASTASIZED TO LIVER: ICD-10-CM

## 2022-10-03 DIAGNOSIS — C20 RECTAL CANCER METASTASIZED TO LIVER: Primary | ICD-10-CM

## 2022-10-03 LAB
ALBUMIN SERPL-MCNC: 4.3 G/DL (ref 3.5–5.2)
ALBUMIN/GLOB SERPL: 1.5 G/DL (ref 1.1–2.4)
ALP SERPL-CCNC: 61 U/L (ref 38–116)
ALT SERPL W P-5'-P-CCNC: 13 U/L (ref 0–33)
ANION GAP SERPL CALCULATED.3IONS-SCNC: 11.8 MMOL/L (ref 5–15)
AST SERPL-CCNC: 23 U/L (ref 0–32)
BASOPHILS # BLD AUTO: 0.03 10*3/MM3 (ref 0–0.2)
BASOPHILS NFR BLD AUTO: 0.5 % (ref 0–1.5)
BILIRUB SERPL-MCNC: 0.3 MG/DL (ref 0.2–1.2)
BUN SERPL-MCNC: 15 MG/DL (ref 6–20)
BUN/CREAT SERPL: 21.1 (ref 7.3–30)
CALCIUM SPEC-SCNC: 10 MG/DL (ref 8.5–10.2)
CEA SERPL-MCNC: 1.42 NG/ML
CHLORIDE SERPL-SCNC: 106 MMOL/L (ref 98–107)
CO2 SERPL-SCNC: 23.2 MMOL/L (ref 22–29)
CREAT SERPL-MCNC: 0.71 MG/DL (ref 0.6–1.1)
DEPRECATED RDW RBC AUTO: 42.9 FL (ref 37–54)
EGFRCR SERPLBLD CKD-EPI 2021: 98.1 ML/MIN/1.73
EOSINOPHIL # BLD AUTO: 0.08 10*3/MM3 (ref 0–0.4)
EOSINOPHIL NFR BLD AUTO: 1.4 % (ref 0.3–6.2)
ERYTHROCYTE [DISTWIDTH] IN BLOOD BY AUTOMATED COUNT: 12.8 % (ref 12.3–15.4)
GLOBULIN UR ELPH-MCNC: 2.8 GM/DL (ref 1.8–3.5)
GLUCOSE SERPL-MCNC: 119 MG/DL (ref 74–124)
HCT VFR BLD AUTO: 40.1 % (ref 34–46.6)
HGB BLD-MCNC: 12.9 G/DL (ref 12–15.9)
IMM GRANULOCYTES # BLD AUTO: 0.02 10*3/MM3 (ref 0–0.05)
IMM GRANULOCYTES NFR BLD AUTO: 0.4 % (ref 0–0.5)
LYMPHOCYTES # BLD AUTO: 1.87 10*3/MM3 (ref 0.7–3.1)
LYMPHOCYTES NFR BLD AUTO: 32.7 % (ref 19.6–45.3)
MCH RBC QN AUTO: 29.3 PG (ref 26.6–33)
MCHC RBC AUTO-ENTMCNC: 32.2 G/DL (ref 31.5–35.7)
MCV RBC AUTO: 91.1 FL (ref 79–97)
MONOCYTES # BLD AUTO: 0.53 10*3/MM3 (ref 0.1–0.9)
MONOCYTES NFR BLD AUTO: 9.3 % (ref 5–12)
NEUTROPHILS NFR BLD AUTO: 3.18 10*3/MM3 (ref 1.7–7)
NEUTROPHILS NFR BLD AUTO: 55.7 % (ref 42.7–76)
NRBC BLD AUTO-RTO: 0 /100 WBC (ref 0–0.2)
PLATELET # BLD AUTO: 272 10*3/MM3 (ref 140–450)
PMV BLD AUTO: 9.4 FL (ref 6–12)
POTASSIUM SERPL-SCNC: 4.1 MMOL/L (ref 3.5–4.7)
PROT SERPL-MCNC: 7.1 G/DL (ref 6.3–8)
RBC # BLD AUTO: 4.4 10*6/MM3 (ref 3.77–5.28)
SODIUM SERPL-SCNC: 141 MMOL/L (ref 134–145)
WBC NRBC COR # BLD: 5.71 10*3/MM3 (ref 3.4–10.8)

## 2022-10-03 PROCEDURE — 82378 CARCINOEMBRYONIC ANTIGEN: CPT | Performed by: INTERNAL MEDICINE

## 2022-10-03 PROCEDURE — 99214 OFFICE O/P EST MOD 30 MIN: CPT | Performed by: INTERNAL MEDICINE

## 2022-10-03 PROCEDURE — 80053 COMPREHEN METABOLIC PANEL: CPT

## 2022-10-03 PROCEDURE — 36415 COLL VENOUS BLD VENIPUNCTURE: CPT

## 2022-10-03 PROCEDURE — 85025 COMPLETE CBC W/AUTO DIFF WBC: CPT

## 2022-10-03 NOTE — PROGRESS NOTES
Subjective .     REASONS FOR FOLLOW UP:  Metastatic SIGMOID UPPER rectal cancer to the liver.  Initiated chemotherapy with FOLFOX-6 9/1/2016. COMPLETED IN SEPT 2017, EXTENSIVE SURGERY ON HER LIVER AND REMOVAL OF PRIMARY TUMOR SURRENDER HER KIRIT.CARE PROVIDED AT [facility]    HISTORY OF PRESENT ILLNESS:  DURING THE VISIT WITH THE PATIENT TODAY , PATIENT HAD FACE MASK, MY MEDICAL ASSISTANT AND I  HAD PROPPER PROTECTIVE EQUIPMENT, AND I DID HAND HYGIENE WITH SOAP AND WATER BEFORE AND AFTER THE VISIT.    On 10/03/2022 this 57-year-old female who has history of stage IV rectal cancer with liver metastasis who was aggressively treated in 2016 with chemotherapy,, surgery and also seen at [facility] with extensive resection of liver metastasis achieving an KIRIT since completion of her therapy. She returns today to the office for follow up. Not too long ago she was doing extremely well, for many days she was eating excessive amount of popcorn and suddenly she developed abdominal bloating, distention, tenderness, nausea and vomiting and she was admitted to the hospital with a partial bowel obstruction. The patient improved clinically, her bowel function resumed and she was discharged. Since then she has not had any other episodes. She denies any abdominal pain, changes in bowel habits, passage of blood in the stool or accidents in regard to explosive diarrhea or anything of that nature. She has not had any problems in her appetite. She has discontinued the popcorn altogether. She denies any cardiovascular or respiratory issues. She denies any urinary problems. She denies any bone pain or joint pain. Otherwise she feels extremely well. Her weight is stable and her functionality, she has an ECOG performance status of 0.                      Past Medical History:   Diagnosis Date   • Abnormal Pap smear of cervix    • ADD (attention deficit disorder)    • Anemia    • Cervical dysplasia    •  Colon cancer (HCC)     NEW DIAGNOSIS   • Drug therapy    • H/O foreign travel 03/2016    [place], [place]   • Hepatic metastases (HCC) 10/2016    partial right hepatectomy [place] 10/16   • History of transfusion     1999 AFTER TUBAL RUPTURE   • Hyperlipidemia    • Middle cerebral artery aneurysm    • Status post partial colectomy 04/2017    [facility]     Past Surgical History:   Procedure Laterality Date   • AUGMENTATION MAMMAPLASTY     • COLON RESECTION WITH ILEOSTOMY     • COLONOSCOPY      MAY 2016   • CRANIOTOMY  2004, 2005    Cerebral aneurysm   • ILEOSTOMY CLOSURE     • LAPAROSCOPY FOR ECTOPIC PREGNANCY  09/1997   • LEEP N/A 11/22/2021    Procedure: LOOP ELECTROCAUTERY EXCISION PROCEDURE, ENDOCERVICAL CURETTING;  Surgeon: Xavier Collins MD;  Location: [facility] OR [facility];  Service: Obstetrics/Gynecology;  Laterality: N/A;   • LIVER RESECTION  2016   • LUNG LOBECTOMY Right 2018   • MEDIPORT REMOVAL     • [place] INSJ TUNNELED CVC W/O SUBQ PORT/ AGE 3 YR/> Right 8/26/2016    Procedure: MEDIPORT PLACEMENT ;  Surgeon: Praful Holden MD;  Location: [facility] OR;  Service: Vascular   • SINUS SURGERY         Current Outpatient Medications on File Prior to Visit   Medication Sig Dispense Refill   • B Complex Vitamins (VITAMIN B COMPLEX PO) Take 1 tablet by mouth Daily.     • cholecalciferol (VITAMIN D3) 1000 UNITS tablet Take 1,000 Units by mouth Daily.     • dexmethylphenidate XR (FOCALIN XR) 20 MG 24 hr capsule      • dexmethylphenidate XR (FOCALIN XR) 20 MG 24 hr capsule Take 1 capsule by mouth Daily Needs appt this month 30 capsule 0   • Finacea 15 % foam Apply 1 application topically to the appropriate area as directed As Needed.     • Finacea 15 % foam      • Multiple Vitamins-Minerals (MULTIVITAMIN WITH MINERALS) tablet tablet Take 1 tablet by mouth Daily.     • Scopolamine 1 MG/3DAYS patch      • Scopolamine 1 MG/3DAYS patch Place 1 patch on the skin as directed by provider Every  "72 (Seventy-Two) Hours As Needed.       No current facility-administered medications on file prior to visit.     ALLERGIES:   No Known Allergies    SOCIAL HISTORY:       Social History     Tobacco Use   • Smoking status: Never Smoker   • Smokeless tobacco: Never Used   Substance Use Topics   • Alcohol use: Yes     Alcohol/week: 5.0 standard drinks     Types: 5 Glasses of wine per week     Comment: Social     FAMILY HISTORY:  Family History   Problem Relation Age of Onset   • Cerebral aneurysm Mother    • Stroke Mother    • COPD Father    • Malig Hyperthermia Neg Hx                  Objective    Vitals:    10/03/22 0907   BP: 136/75   Pulse: 77   Resp: 17   Temp: 97.5 °F (36.4 °C)   TempSrc: Temporal   SpO2: 99%   Weight: 57.7 kg (127 lb 1.6 oz)   Height: 162.6 cm (64\")   PainSc: 0-No pain     Current Status 10/3/2022   ECOG score 0      PHYSICAL EXAM:           GENERAL:  Well-developed, well-nourished  Patient  in no acute distress.   SKIN:  Warm, dry ,NO purpura ,no rash.  HEENT:  Pupils were equal and reactive to light and accomodation, conjunctivae noninjected, normal extraocular movements, normal visual acuity.   NECK:  Supple with good range of motion; no thyromegaly , no JVD or bruits,.No carotid artery pain, no carotid abnormal pulsation   LYMPHATICS:  No cervical, NO supraclavicular, NO axillary, NO inguinal adenopathies.  CARDIAC   normal rate , regular rhythm, without murmur,NO rubs NO S3 NO S4   LUNGS: normal breath sounds bilateral, no wheezing, NO rhonchi, NO crackles ,NO rubs.  VASCULAR VENOUS: no cyanosis, NO collateral circulation, NO varicosities, NO edema, NO palpable cords, NO pain,NO erythema, NO pigmentation of the skin.  ABDOMEN:  Soft, NO pain,no hepatomegaly, no splenomegaly,no masses, no ascites, no collateral circulation,no distention.  EXTREMITIES  AND SPINE:  No clubbing, no cyanosis ,no deformities , no pain .No kyphosis,  no pain in spine, no pain in ribs , no pain in pelvic " bone.  NEUROLOGICAL:  Patient was awake, alert, oriented to time, person and place.                            RECENT LABS:  Lab Results   Component Value Date    WBC 5.71 10/03/2022    HGB 12.9 10/03/2022    HCT 40.1 10/03/2022    MCV 91.1 10/03/2022     10/03/2022     Lab Results   Component Value Date    GLUCOSE 119 10/03/2022    BUN 15 10/03/2022    CREATININE 0.71 10/03/2022    EGFRIFNONA >90 12/22/2021    EGFRIFAFRI >90 12/22/2021    BCR 21.1 10/03/2022    CO2 23.2 10/03/2022    CALCIUM 10.0 10/03/2022    ALBUMIN 4.30 10/03/2022    LABIL2 1.0 02/04/2019    AST 23 10/03/2022    ALT 13 10/03/2022       MR Abdomen without and with IV Contrast  Order: [id number]    Impression      Stable exam. No evidence of recurrent or metastatic disease in the abdomen/pelvis.    Narrative    This result has an attachment that is not available.   EXAM: MR ABDOMEN WITHOUT AND WITH IV CONTRAST, MR PELVIS WITHOUT AND WITH IV CONTRAST     HISTORY: Metastatic colorectal cancer.     TECHNIQUE: MRI of the abdomen/pelvis with and without 7.5 mL of intravenous Gadavist contrast per   the liver and limited pelvis protocol. IV glucagon was administered.     COMPARISONS: Multiple prior studies, most recently MRI dated 12/22/2021.     FINDINGS:     Postsurgical changes from right hepatectomy and nonanatomic left lateral resection without local   recurrence or new suspicious liver lesion. No significant hepatic steatosis or iron deposition.   Patent remnant hepatic vasculature. No intrahepatic or extrahepatic biliary ductal dilation.   Cholecystectomy.     Normal spleen, pancreas, adrenals and kidneys. Normal stomach and small bowel. Postsurgical changes   from low anterior resection without concerning findings at the anastomosis. No lymphadenopathy,   aneurysm, or thrombus. Normal bladder. Normal uterus, with nabothian cysts in the cervix.     Bilateral breast implants. Degenerative changes of the spine. No concerning osseous findings.    Included lungs are clear.  Exam End: 06/14/22 13:16    Specimen Collected: 06/14/22 14:10 Last Resulted: 06/14/22 16:14   Received From: [facility]  Result Received: 09/08/22 15:35     View Encounter      10/2022 4:16 PM     PROCEDURE:  CT ABDOMEN PELVIS W CONTRAST-     INDICATIONS:   Nausea/vomiting and abdominal pain and bloating today. History of rectal  cancer in 2016 with colon cancer cholecystectomy surgery.     COMPARISON:   No Comparisons Available     TECHNIQUE:  Routine transaxial slices were obtained through the abdomen and pelvis  after the intravenous administration of Isovue 370. Reconstructed  coronal and sagittal images were also obtained. Automated exposure  control and iterative construction methods were used.     FINDINGS:  CT the abdomen pelvis utilizing 100 cc of Isovue-370 IV contrast  enhancement reveals that the patient has bilateral surgical breast  implants in the right left anterior chest wall. Heart size is normal. No  evidence of hiatal hernia. No acute disease in the lung bases. There is  a small focal curvilinear area of atelectasis or scarring in the  posterior aspect of the left lower lobe along no evidence of liver mass.  There appear to be postoperative changes in the posterior aspect of the  right lobe the liver and in the roula hepatis. The gallbladder is not  visualized. No evidence of dilatation of the bile ducts. The pancreas  and main pancreatic duct are normal. The spleen is normal. The right and  left adrenal glands are normal. Left kidney is normal. The right kidney  is normal. No evidence of adenopathy or ascites in the abdomen there is  a small amount of free fluid in the right paracolic gutter of uncertain  etiology and there are dilated loops of small bowel in the right side  abdomen that might be caused by small bowel obstruction or small bowel  ileus. Decreased gas is seen in the colon. The appendix is normal no  evidence of free intraperitoneal gas. There is a  small amount of free  fluid in the anterior aspect of the epigastric area of the abdomen lying  anterior to the left lobe the liver     CT the pelvis reveals dilated loops of small bowel in the pelvis. The  uterus and adnexal areas are normal. The right left inguinal regions are  normal there are postoperative changes in the rectum. I see no evidence  of mass or adenopathy or inflammation in the area of surgery.     IMPRESSION:     1. Small amount of ascitic fluid in the abdomen and pelvis.  2. Dilated loops of small bowel with decreased gas in the colon most  consistent with small bowel obstruction. I recommend general surgical  consultation or further evaluate this abnormality. The terminal ileum  does not appear to be dilated. It is difficult to find a transitional  zone in the small bowel loops.  3. Postoperative changes in the area of the rectum. No evidence of mass  or adenopathy or in formation in this area     Electronically Signed By-Brandon Sarmiento MD On:9/10/2022 4:31 PM  This report was finalized on [id number] by  Brandon Sarmiento MD.                  Assessment & Plan      1. Metastatic rectal sigmoid cancer to the liver,  KRAS negative, BRAF negative, MSI stable, NRAS negative. The patient has undergone neoadjuvant chemotherapy with FOLFOX-6 and she subsequently underwent 2 different surgeries for her liver metastasis one including resection and another one including ablation. Also she underwent final removal of the primary tumor that was still PET active after completion of FOLFOX chemotherapy. Subsequently the patient had an ileostomy that was closed and she was placed on Xeloda chronically that she took in September, October, November of 2017 when she discontinued on her own because of side effects. Since then the patient has been seen at the [facility] .I have reviewed laboratory workup from the [facility]. I have reviewed all the information available from the [facility] and her  radiological assessment as well as her laboratory parameters and CEA are all normal. Therefore, it is always amazing to see somebody who has staged IV colon cancer to be treated aggressively and remain in remission after such a dramatic amount of extensive surgery and chemotherapy. 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      The patient returned to the office on 04/16/2021. She is asymptomatic in regard to her previous history of sigmoid colon cancer with liver metastasis. She achieved a complete response after aggressive plan of chemotherapy as stated above and also resection of the primary tumor and her liver metastasis.     Today her clinical assessment is negative normal. She has minimal grade 1 sensory neuropathy in her toes. Has no implications whatsoever for her in regard to functional status. She has minimal if any other comorbidities. She has completed COVID vaccination. She will plan to go to [facility] in the summer. I will review her back in 6 months. She will have a CBC, CMP and a CEA level at that time.    The patient was reviewed in regard to her colorectal cancer on 12/02/2021. She has no symptoms or signs of colon cancer recurrence. Her physical examination today is normal. Her ECOG performance status is 0 and her white count, hemoglobin, platelets, chemistry profile are normal. A CEA level is pending. She is going to be seen by the [facility] in [place], [place] in 12/2021. CT scans will be performed in that institution. I asked her to call us a few days after she returns for me to review the reports of this.     In this regard she will return to see us in 6 months with a CBC, CMP and a CEA level.   On 10/03/2022 the patient has not had any clinical manifestations of recurrence of colon cancer. In fact she was seen [facility] the summer of this year, radiological assessment posted above in the liver and abdominal  anatomy disclosed no obvious alterations. Her CEA level was negative, normal.     Later on, the patient developed something that suggested small bowel obstruction. She improved clinically and was discharged from the hospital. She has not had any other episodes since she discontinued the use of popcorn on a routine basis. Her clinical examination today is very much normal. The radiological assessment at the time that she had the so-called bowel obstruction documented a minimal degree of ascites. She has no symptoms pertinent to this neither clinical examination consistent with that today.    Obviously opens the question in regard to why she had developed this episode of partial bowel obstruction. She wants me to refer her to be seen by Lenore Schuler MD, who saw her a long time ago at the time of her original diagnosis and I think that is very reasonable. I pointed out to the patient that she will require a colonoscopy in the first place and also she will require reassessment of her small intestine with probably a CT enterography of her small intestine.    Besides this, the patient does not have any other issues and I will ask her to return to see us back in 6 months with a CBC, CMP and CEA level. In fact today her white count, hemoglobin, platelets, chemistry profile and CEA level are completely normal.    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 She has not had any symptoms pertinent to this but she has no follow up with anybody in this regard. I went ahead and scheduled her to be seen by Steven More Neurosurgeon at [facility]. She will require radiological assessment at that time obviously.     We went ahead and made the referral to be seen by Lenore Schuler MD.     If the patient has issues in that connection we will be glad to assist her and modify this and get going with  assessment by us.                    Zheng Johnson MD

## 2022-10-07 ENCOUNTER — TELEPHONE (OUTPATIENT)
Dept: NEUROSURGERY | Facility: CLINIC | Age: 57
End: 2022-10-07

## 2022-10-07 DIAGNOSIS — I67.1 CEREBRAL ARTERIAL ANEURYSM: Primary | ICD-10-CM

## 2022-10-07 NOTE — TELEPHONE ENCOUNTER
Pt is scheduled for 5 year f/up on aneurysm but she needs a CTA with and without to be done prior to appt. Pt is aware. Can you enter an order for CTA head with and without? Thank you!

## 2022-10-18 ENCOUNTER — TELEPHONE (OUTPATIENT)
Dept: ONCOLOGY | Facility: CLINIC | Age: 57
End: 2022-10-18

## 2022-10-18 NOTE — TELEPHONE ENCOUNTER
Caller: Isabel Florentino    Relationship: Self    Best call back number: [phone]    What is the best time to reach you: ANYTIME    Who are you requesting to speak with (clinical staff, provider,  specific staff member): DR FERGUSON OR NURSE    What was the call regarding: PT STATED SHE CALL [facility] OFFICE ([facility]) AND THEY SAID THEY WERE STILL WAITING ON REFERRAL AND RECS TO BE SENT. PLEASE F/U AND CALL PT TO ADVISE.     Do you require a callback: YES

## 2022-11-01 ENCOUNTER — HOSPITAL ENCOUNTER (OUTPATIENT)
Dept: CT IMAGING | Facility: HOSPITAL | Age: 57
Discharge: HOME | End: 2022-11-01
Admitting: NEUROLOGICAL SURGERY

## 2022-11-01 DIAGNOSIS — F98.8 ATTENTION DEFICIT DISORDER (ADD) WITHOUT HYPERACTIVITY: ICD-10-CM

## 2022-11-01 DIAGNOSIS — I67.1 CEREBRAL ARTERIAL ANEURYSM: ICD-10-CM

## 2022-11-01 PROCEDURE — 0 IOPAMIDOL PER 1 ML: Performed by: NEUROLOGICAL SURGERY

## 2022-11-01 PROCEDURE — 70496 CT ANGIOGRAPHY HEAD: CPT

## 2022-11-01 RX ORDER — DEXMETHYLPHENIDATE HYDROCHLORIDE 20 MG/1
20 CAPSULE, EXTENDED RELEASE ORAL DAILY
Qty: 30 CAPSULE | Refills: 0 | Status: SHIPPED | OUTPATIENT
Start: 2022-11-01 | End: 2022-11-29 | Stop reason: SDUPTHER

## 2022-11-01 RX ADMIN — IOPAMIDOL 100 ML: 755 INJECTION, SOLUTION INTRAVENOUS at 16:03

## 2022-11-01 NOTE — TELEPHONE ENCOUNTER
Caller: Isabel Florentino    Relationship: Self    Best call back number: [phone]    Requested Prescriptions:   Requested Prescriptions     Pending Prescriptions Disp Refills   • dexmethylphenidate XR (FOCALIN XR) 20 MG 24 hr capsule 30 capsule 0     Sig: Take 1 capsule by mouth Daily Needs appt this month        Pharmacy where request should be sent: [facility] [id number] [place] [place] - [address] - [phone]  - [phone] FX     Does the patient have less than a 3 day supply:  [x] Yes  [] No    [facility] Rep   11/01/22 12:06 EDT

## 2022-11-03 RX ORDER — SCOLOPAMINE TRANSDERMAL SYSTEM 1 MG/1
PATCH, EXTENDED RELEASE TRANSDERMAL
Qty: 5 PATCH | Refills: 0 | Status: SHIPPED | OUTPATIENT
Start: 2022-11-03

## 2022-11-29 DIAGNOSIS — F98.8 ATTENTION DEFICIT DISORDER (ADD) WITHOUT HYPERACTIVITY: ICD-10-CM

## 2022-11-30 RX ORDER — DEXMETHYLPHENIDATE HYDROCHLORIDE 20 MG/1
20 CAPSULE, EXTENDED RELEASE ORAL DAILY
Qty: 30 CAPSULE | Refills: 0 | Status: SHIPPED | OUTPATIENT
Start: 2022-11-30 | End: 2023-01-05 | Stop reason: SDUPTHER

## 2022-12-07 NOTE — PROGRESS NOTES
Subjective   History of Present Illness: Isabel Florentino is a 57 y.o. female is being seen for consultation today at the request of Zheng Johnson MD . CTA done 11/1/22.  History of coil embolization of the right superior hypophyseal artery aneurysm, clip ligation of a left MCA aneurysm and basilar tip aneurysm.  She is doing well today.  No new complaints.  Denies any changes in the frequency or severity of her headaches.  Denies any changes in vision.  Denies any strokelike episodes.  Denies any changes in strength or sensation.      History of Present Illness    The following portions of the patient's history were reviewed and updated as appropriate: allergies, past family history, past medical history, past social history, past surgical history and problem list.    Past Medical History:   Diagnosis Date   • Abnormal Pap smear of cervix    • ADD (attention deficit disorder)    • Anemia    • Cervical dysplasia    • Colon cancer (HCC)     NEW DIAGNOSIS   • Drug therapy    • H/O foreign travel 03/2016    [place], [place]   • Hepatic metastases (HCC) 10/2016    partial right hepatectomy [place] 10/16   • History of transfusion     1999 AFTER TUBAL RUPTURE   • Hyperlipidemia    • Middle cerebral artery aneurysm    • Ruptured ectopic pregnancy 1997   • Status post partial colectomy 04/2017    [facility]        Past Surgical History:   Procedure Laterality Date   • AUGMENTATION MAMMAPLASTY     • COLON RESECTION WITH ILEOSTOMY     • COLONOSCOPY      MAY 2016   • CRANIOTOMY  2004, 2005    Cerebral aneurysm   • ILEOSTOMY CLOSURE     • LAPAROSCOPY FOR ECTOPIC PREGNANCY  09/1997   • LEEP N/A 11/22/2021    Procedure: LOOP ELECTROCAUTERY EXCISION PROCEDURE, ENDOCERVICAL CURETTING;  Surgeon: Xavier Collins MD;  Location: [facility];  Service: Obstetrics/Gynecology;  Laterality: N/A;   • LIVER RESECTION  2016   • LUNG LOBECTOMY Right 2018   • MEDIPORT REMOVAL     • [place] INSJ TUNNELED CVC W/O SUBQ  PORT/ AGE 3 YR/> Right 8/26/2016    Procedure: MEDIPORT PLACEMENT ;  Surgeon: Praful Holden MD;  Location: [facility] MA[facility] OR;  Service: Vascular   • SINUS SURGERY            Current Outpatient Medications:   •  B Complex Vitamins (VITAMIN B COMPLEX PO), Take 1 tablet by mouth Daily., Disp: , Rfl:   •  cholecalciferol (VITAMIN D3) 1000 UNITS tablet, Take 1,000 Units by mouth Daily., Disp: , Rfl:   •  dexmethylphenidate XR (FOCALIN XR) 20 MG 24 hr capsule, Take 1 capsule by mouth Daily Needs appt this month, Disp: 30 capsule, Rfl: 0  •  Finacea 15 % foam, Apply 1 application topically to the appropriate area as directed As Needed., Disp: , Rfl:   •  Multiple Vitamins-Minerals (MULTIVITAMIN WITH MINERALS) tablet tablet, Take 1 tablet by mouth Daily., Disp: , Rfl:   •  Finacea 15 % foam, , Disp: , Rfl:   •  Scopolamine 1 MG/3DAYS patch, , Disp: , Rfl:   •  Scopolamine 1 MG/3DAYS patch, Place 1 patch on the skin as directed by provider Every 72 (Seventy-Two) Hours As Needed., Disp: , Rfl:   •  Scopolamine 1 MG/3DAYS patch, PLACE 1 PATCH ON THE SKIN AS DIRECTED BY PROVIDER EVERY 72 HOURS, Disp: 5 patch, Rfl: 0     No Known Allergies     Social History     Socioeconomic History   • Marital status:      Spouse name: Amos   • Number of children: 1   • Years of education: COLLEGE   Tobacco Use   • Smoking status: Never   • Smokeless tobacco: Never   Vaping Use   • Vaping Use: Never used   Substance and Sexual Activity   • Alcohol use: Yes     Alcohol/week: 5.0 standard drinks     Types: 5 Glasses of wine per week     Comment: Social   • Drug use: No   • Sexual activity: Defer     Partners: Male     Birth control/protection: None        Family History   Problem Relation Age of Onset   • Cerebral aneurysm Mother    • Stroke Mother    • COPD Father    • Malig Hyperthermia Neg Hx         Review of Systems   Constitutional: Negative for chills and fever.   HENT: Negative for ear pain and tinnitus.    Eyes: Negative  "for pain and visual disturbance.   Respiratory: Negative for cough and shortness of breath.    Cardiovascular: Negative for chest pain and palpitations.   Gastrointestinal: Negative for nausea and vomiting.   Genitourinary: Negative for difficulty urinating and enuresis.   Musculoskeletal: Negative for gait problem.   Skin: Negative for rash.   Neurological: Negative for dizziness, seizures, syncope, speech difficulty, light-headedness, numbness and headaches.   Psychiatric/Behavioral: Positive for decreased concentration (ADD). Negative for confusion.       Objective     Vitals:    22 1428   BP: 138/82   Pulse: 88   Resp: 16   Temp: 97.7 °F (36.5 °C)   Weight: 60.3 kg (133 lb)   Height: 162.6 cm (64\")     Body mass index is 22.83 kg/m².      Physical Exam  Neurologic Exam  Awake, alert, oriented x3  Pupils equal round reactive to light  Extraocular muscles intact  Face symmetric  Speech is fluent and clear  No pronator drift  Motor exam  Bilateral deltoids 5/5, bilateral biceps 5/5, bilateral triceps 5/5, bilateral wrist extension 5/5 bilateral hand  5/5  Bilateral hip flexion 5/5, bilateral knee extension 5/5, bilateral DF/PF 5/5  No clonus  No Wilbert's reflex  Steady normal gait  Able to detect  light touch in all 4 extremities        Assessment & Plan   Independent Review of Radiographic Studies:      I personally reviewed the images from the following studies.  CTA head from 2022  The CTA images demonstrate no evidence of recurrent or residual aneurysm.  No new aneurysms are identified.    Medical Decision Makin-year-old female with history of coil embolization of a right superior hypophyseal artery aneurysm, clip ligation of the left MCA aneurysm and basilar tip aneurysm.  -She presents today for a 5-year follow-up.  Her repeat CTA shows no evidence of residual or recurrent aneurysm  -I will plan to have her follow-up in 3 years with a repeat CTA head.  I have asked her to call " or come back sooner if she develops any new neurologic symptoms or changes in frequency or severity of her headaches.  She has a family history of aneurysms/intracranial hemorrhages.  She does not smoke cigarettes and her blood pressure is well controlled    Diagnoses and all orders for this visit:    1. Cerebral arterial aneurysm (Primary)  -     CT Angiogram Head With Contrast; Future      Return in about 3 years (around 12/16/2025).  I spent 35 minutes reviewing the CTA images, discussing the natural history of aneurysms, discussing strategies for aneurysms, discussing the inheritance of aneurysms and screening for family members

## 2022-12-16 ENCOUNTER — OFFICE VISIT (OUTPATIENT)
Dept: NEUROSURGERY | Facility: CLINIC | Age: 57
End: 2022-12-16

## 2022-12-16 VITALS
SYSTOLIC BLOOD PRESSURE: 138 MMHG | HEART RATE: 88 BPM | TEMPERATURE: 97.7 F | DIASTOLIC BLOOD PRESSURE: 82 MMHG | RESPIRATION RATE: 16 BRPM | WEIGHT: 133 LBS | BODY MASS INDEX: 22.71 KG/M2 | HEIGHT: 64 IN

## 2022-12-16 DIAGNOSIS — I67.1 CEREBRAL ARTERIAL ANEURYSM: Primary | ICD-10-CM

## 2022-12-16 PROCEDURE — 99204 OFFICE O/P NEW MOD 45 MIN: CPT | Performed by: NEUROLOGICAL SURGERY

## 2023-01-05 DIAGNOSIS — F98.8 ATTENTION DEFICIT DISORDER (ADD) WITHOUT HYPERACTIVITY: ICD-10-CM

## 2023-01-05 RX ORDER — DEXMETHYLPHENIDATE HYDROCHLORIDE 20 MG/1
20 CAPSULE, EXTENDED RELEASE ORAL DAILY
Qty: 30 CAPSULE | Refills: 0 | Status: SHIPPED | OUTPATIENT
Start: 2023-01-05 | End: 2023-02-08 | Stop reason: SDUPTHER

## 2023-01-05 NOTE — TELEPHONE ENCOUNTER
Caller: Mireille Florentinoen    Relationship: Self    Best call back number: [phone]  Requested Prescriptions:   Requested Prescriptions     Pending Prescriptions Disp Refills   • dexmethylphenidate XR (FOCALIN XR) 20 MG 24 hr capsule 30 capsule 0     Sig: Take 1 capsule by mouth Daily Needs appt this month        Pharmacy where request should be sent: [facility] [id number] - [place], [place] - [address] - [phone]  - [phone] FX         Does the patient have less than a 3 day supply:  [x] Yes  [] No    Would you like a call back once the refill request has been completed: [] Yes [x] No    If the office needs to give you a call back, can they leave a voicemail: [] Yes [x] No    Roberto Butterfield Rep   01/05/23 15:47 EST

## 2023-02-08 ENCOUNTER — TELEPHONE (OUTPATIENT)
Dept: FAMILY MEDICINE CLINIC | Facility: CLINIC | Age: 58
End: 2023-02-08
Payer: COMMERCIAL

## 2023-02-08 DIAGNOSIS — F98.8 ATTENTION DEFICIT DISORDER (ADD) WITHOUT HYPERACTIVITY: ICD-10-CM

## 2023-02-08 RX ORDER — DEXMETHYLPHENIDATE HYDROCHLORIDE 20 MG/1
20 CAPSULE, EXTENDED RELEASE ORAL DAILY
Qty: 30 CAPSULE | Refills: 0 | Status: SHIPPED | OUTPATIENT
Start: 2023-02-08 | End: 2023-03-14 | Stop reason: SDUPTHER

## 2023-02-08 NOTE — TELEPHONE ENCOUNTER
Caller: Isabel Florentino    Relationship: Self    Best call back number: [phone]    Requested Prescriptions:   Requested Prescriptions     Pending Prescriptions Disp Refills   • dexmethylphenidate XR (FOCALIN XR) 20 MG 24 hr capsule 30 capsule 0     Sig: Take 1 capsule by mouth Daily Needs appt this month        Pharmacy where request should be sent: [facility] [id number] [facility], [place] - [address] - [phone]  - [phone] FX     Additional details provided by patient:     Does the patient have less than a 3 day supply:  [] Yes  [x] No      Roberto Domínguez Rep   02/08/23 15:30 EST

## 2023-03-14 DIAGNOSIS — F98.8 ATTENTION DEFICIT DISORDER (ADD) WITHOUT HYPERACTIVITY: ICD-10-CM

## 2023-03-14 NOTE — TELEPHONE ENCOUNTER
Caller: Mireille Florentinoen    Relationship: Self    Best call back number: [phone]    Requested Prescriptions:   Requested Prescriptions     Pending Prescriptions Disp Refills   • dexmethylphenidate XR (FOCALIN XR) 20 MG 24 hr capsule 30 capsule 0     Sig: Take 1 capsule by mouth Daily Needs appt this month        Pharmacy where request should be sent: [facility] [id number] [facility], [place] - [address] - [phone]  - [phone] FX     Additional details provided by patient: PATIENT HAS 3 CAPSULES REMAINING     Does the patient have less than a 3 day supply:  [x] Yes  [] No    Would you like a call back once the refill request has been completed: [] Yes [x] No      Roberto Lomeli Rep   03/14/23 15:54 EDT

## 2023-03-15 RX ORDER — DEXMETHYLPHENIDATE HYDROCHLORIDE 20 MG/1
20 CAPSULE, EXTENDED RELEASE ORAL DAILY
Qty: 21 CAPSULE | Refills: 0 | Status: SHIPPED | OUTPATIENT
Start: 2023-03-15

## 2023-03-15 NOTE — TELEPHONE ENCOUNTER
Pt was due for appt last month and had it written on the Rx bottle- only gave 3 week Rx today, as needs appt before any further refills

## 2023-04-11 ENCOUNTER — TELEPHONE (OUTPATIENT)
Dept: FAMILY MEDICINE CLINIC | Facility: CLINIC | Age: 58
End: 2023-04-11

## 2023-04-13 ENCOUNTER — OFFICE VISIT (OUTPATIENT)
Dept: FAMILY MEDICINE CLINIC | Facility: CLINIC | Age: 58
End: 2023-04-13
Payer: COMMERCIAL

## 2023-04-13 VITALS
BODY MASS INDEX: 22.02 KG/M2 | DIASTOLIC BLOOD PRESSURE: 67 MMHG | SYSTOLIC BLOOD PRESSURE: 116 MMHG | RESPIRATION RATE: 15 BRPM | TEMPERATURE: 96.8 F | HEART RATE: 93 BPM | HEIGHT: 64 IN | WEIGHT: 129 LBS | OXYGEN SATURATION: 97 %

## 2023-04-13 DIAGNOSIS — F98.8 ATTENTION DEFICIT DISORDER (ADD) WITHOUT HYPERACTIVITY: ICD-10-CM

## 2023-04-13 PROCEDURE — 99213 OFFICE O/P EST LOW 20 MIN: CPT | Performed by: NURSE PRACTITIONER

## 2023-04-13 RX ORDER — DEXMETHYLPHENIDATE HYDROCHLORIDE 20 MG/1
20 CAPSULE, EXTENDED RELEASE ORAL DAILY
Qty: 30 CAPSULE | Refills: 0 | Status: SHIPPED | OUTPATIENT
Start: 2023-04-13

## 2023-04-13 NOTE — PROGRESS NOTES
"Chief Complaint  ADD  Subjective        Isabel Florentino presents to [facility]  History of Present Illness  Pt comes in today for follow up on ADD and focalin. Sees Dr. Roque and was due for an appt.   States she was started on this about 5 years after going through cancer and menopause. Has worked well for her and helped her focus. Had tried several SSRI's and this has worked better.   Denies any issues with CP, SOA, palpitations, dizziness, or negative side effects.   ADD         Objective     Vital Signs:   /67   Pulse 93   Temp 96.8 °F (36 °C)   Resp 15   Ht 162.6 cm (64\")   Wt 58.5 kg (129 lb)   SpO2 97%   BMI 22.14 kg/m²       BP Readings from Last 3 Encounters:   04/13/23 116/67   12/16/22 138/82   10/03/22 136/75       Wt Readings from Last 3 Encounters:   04/13/23 58.5 kg (129 lb)   12/16/22 60.3 kg (133 lb)   10/03/22 57.7 kg (127 lb 1.6 oz)     Physical Exam  Constitutional:       Appearance: She is well-developed.   Eyes:      Pupils: Pupils are equal, round, and reactive to light.   Cardiovascular:      Rate and Rhythm: Normal rate and regular rhythm.   Pulmonary:      Effort: Pulmonary effort is normal.      Breath sounds: Normal breath sounds.   Neurological:      Mental Status: She is alert and oriented to person, place, and time.        Result Review :                 Assessment and Plan    Diagnoses and all orders for this visit:    1. Attention deficit disorder (ADD) without hyperactivity  Comments:  continue with current regimen  Orders:  -     dexmethylphenidate XR (FOCALIN XR) 20 MG 24 hr capsule; Take 1 capsule by mouth Daily Needs appt this month  Dispense: 30 capsule; Refill: 0    refill medication  Follow up soon for routine physical  During this office visit, we discussed the pertinent aspects of the visit and treatment recommendations. Pt verbalizes understanding. Follow up was discussed. Patient was given the opportunity to ask questions " and discuss other concerns.         Follow Up   Return for Annual physical.  Patient was given instructions and counseling regarding her condition or for health maintenance advice. Please see specific information pulled into the AVS if appropriate.

## 2023-05-12 ENCOUNTER — TELEPHONE (OUTPATIENT)
Dept: FAMILY MEDICINE CLINIC | Facility: CLINIC | Age: 58
End: 2023-05-12
Payer: COMMERCIAL

## 2023-05-12 DIAGNOSIS — F98.8 ATTENTION DEFICIT DISORDER (ADD) WITHOUT HYPERACTIVITY: ICD-10-CM

## 2023-05-12 RX ORDER — DEXMETHYLPHENIDATE HYDROCHLORIDE 20 MG/1
20 CAPSULE, EXTENDED RELEASE ORAL DAILY
Qty: 30 CAPSULE | Refills: 0 | Status: SHIPPED | OUTPATIENT
Start: 2023-05-12

## 2023-05-12 NOTE — TELEPHONE ENCOUNTER
She has never had an appt with dr. tatum but so I am sending this to dr. estrada since she is still in the office

## 2023-05-12 NOTE — TELEPHONE ENCOUNTER
Caller: Mireille Florentinoen    Relationship: Self    Best call back number:[phone]  Requested Prescriptions:   Requested Prescriptions     Pending Prescriptions Disp Refills   • dexmethylphenidate XR (FOCALIN XR) 20 MG 24 hr capsule 30 capsule 0     Sig: Take 1 capsule by mouth Daily Needs appt this month        Pharmacy where request should be sent: [facility] [id number] [facility], [place] - [address] - [phone]  - [phone] FX     Last office visit with prescribing clinician: Visit date not found   Last telemedicine visit with prescribing clinician: 4/13/2023   Next office visit with prescribing clinician: 10/17/2023     Additional details provided by patient:     Does the patient have less than a 3 day supply:  [] Yes  [] No    Would you like a call back once the refill request has been completed: [] Yes [] No    If the office needs to give you a call back, can they leave a voicemail: [] Yes [] No    Roberto Mckeon Rep   05/12/23 10:05 EDT          0

## 2023-06-07 ENCOUNTER — TELEPHONE (OUTPATIENT)
Dept: FAMILY MEDICINE CLINIC | Facility: CLINIC | Age: 58
End: 2023-06-07
Payer: COMMERCIAL

## 2023-06-07 DIAGNOSIS — F98.8 ATTENTION DEFICIT DISORDER (ADD) WITHOUT HYPERACTIVITY: ICD-10-CM

## 2023-06-07 NOTE — TELEPHONE ENCOUNTER
Caller: Isabel Florentino    Relationship: Self    Best call back number: [phone]    Requested Prescriptions:   Requested Prescriptions     Pending Prescriptions Disp Refills    dexmethylphenidate XR (FOCALIN XR) 20 MG 24 hr capsule 30 capsule 0     Sig: Take 1 capsule by mouth Daily Needs appt this month        Pharmacy where request should be sent: [facility] [id number] - [place], [place] - [address] - [phone]  - [phone] FX     Last office visit with prescribing clinician: Visit date not found   Last telemedicine visit with prescribing clinician: Visit date not found   Next office visit with prescribing clinician: 10/17/2023     Additional details provided by patient:     Does the patient have less than a 3 day supply:  [] Yes  [] No    Would you like a call back once the refill request has been completed: [] Yes [] No    If the office needs to give you a call back, can they leave a voicemail: [] Yes [] No    Roberto Mckeon   06/07/23 11:19 EDT

## 2023-06-09 RX ORDER — DEXMETHYLPHENIDATE HYDROCHLORIDE 20 MG/1
20 CAPSULE, EXTENDED RELEASE ORAL DAILY
Qty: 30 CAPSULE | Refills: 0 | Status: SHIPPED | OUTPATIENT
Start: 2023-06-09

## 2023-06-15 DIAGNOSIS — F98.8 ATTENTION DEFICIT DISORDER (ADD) WITHOUT HYPERACTIVITY: ICD-10-CM

## 2023-06-15 NOTE — TELEPHONE ENCOUNTER
Caller: Isabel Florentino    Relationship: Self    Best call back number: [phone]    Requested Prescriptions:   Requested Prescriptions     Pending Prescriptions Disp Refills    dexmethylphenidate XR (FOCALIN XR) 20 MG 24 hr capsule 30 capsule 0     Sig: Take 1 capsule by mouth Daily Needs appt this month        Pharmacy where request should be sent:  [facility] [id number] - [place], [place] - [address] - [phone]  - [phone] FX     Last office visit with prescribing clinician: Visit date not found   Last telemedicine visit with prescribing clinician: Visit date not found   Next office visit with prescribing clinician: 10/17/2023     Additional details provided by patient: PATIENT IS OUT. SHE LEAVES [facility] SATURDAY 06/17/23 FOR A FEW WEEKS.    SHE SPOKE TO [facility] AND THEY HAVE NOT RECEIVED THIS MEDICATION.    PATIENT IS WORRIED AND WANTS TO BE SURE THIS GETS CALLED IN BEFORE SHE LEAVES [facility]     PLEASE GIVE HER A CALLBACK     Does the patient have less than a 3 day supply:  [x] Yes  [] No    Would you like a call back once the refill request has been completed: [x] Yes [] No    If the office needs to give you a call back, can they leave a voicemail: [x] Yes [] No    Nadia Chamberlain, PCT   06/15/23 11:57 EDT

## 2023-06-16 RX ORDER — DEXMETHYLPHENIDATE HYDROCHLORIDE 20 MG/1
20 CAPSULE, EXTENDED RELEASE ORAL DAILY
Qty: 30 CAPSULE | Refills: 0 | Status: SHIPPED | OUTPATIENT
Start: 2023-06-16 | End: 2023-06-19 | Stop reason: SDUPTHER

## 2023-06-19 ENCOUNTER — TELEPHONE (OUTPATIENT)
Dept: FAMILY MEDICINE CLINIC | Facility: CLINIC | Age: 58
End: 2023-06-19
Payer: COMMERCIAL

## 2023-06-19 DIAGNOSIS — F98.8 ATTENTION DEFICIT DISORDER (ADD) WITHOUT HYPERACTIVITY: ICD-10-CM

## 2023-06-19 RX ORDER — DEXMETHYLPHENIDATE HYDROCHLORIDE 20 MG/1
20 CAPSULE, EXTENDED RELEASE ORAL DAILY
Qty: 30 CAPSULE | Refills: 0 | Status: SHIPPED | OUTPATIENT
Start: 2023-06-19

## 2023-06-19 RX ORDER — DEXMETHYLPHENIDATE HYDROCHLORIDE 20 MG/1
20 CAPSULE, EXTENDED RELEASE ORAL DAILY
Qty: 30 CAPSULE | Refills: 0 | Status: SHIPPED | OUTPATIENT
Start: 2023-06-19 | End: 2023-06-19 | Stop reason: SDUPTHER

## 2023-06-19 NOTE — TELEPHONE ENCOUNTER
HUB ATTEMPTED TO WARM TRANSFER (X2) AND WAS UNSUCCESSFUL    Caller: Isabel Florentino    Relationship: Self    Best call back number: [phone]     Requested Prescriptions:   Requested Prescriptions     Pending Prescriptions Disp Refills    dexmethylphenidate XR (FOCALIN XR) 20 MG 24 hr capsule 30 capsule 0     Sig: Take 1 capsule by mouth Daily Needs appt this month        Pharmacy where request should be sent: [facility][id number] - [place], [place] - [address] [place] [address] - [phone]  - [phone] FX     Last office visit with prescribing clinician: Visit date not found   Last telemedicine visit with prescribing clinician: Visit date not found   Next office visit with prescribing clinician: 10/17/2023     Additional details provided by patient: PATIENT STATES SHE IS GOING OUT OF TOWN AND IS NEEDING THIS MEDICATION. SHE STATES [facility] HAS 8 CAPSULES AND SHE IS REQUESTING FOR IT TO BE CALLED IN THERE, BUT WILL NEED THE REMAINDER, WHEN SHE RETURNS BACK HOME.     Does the patient have less than a 3 day supply:  [x] Yes  [] No    Would you like a call back once the refill request has been completed: [x] Yes [] No    If the office needs to give you a call back, can they leave a voicemail: [x] Yes [] No    Roberto Shields   06/19/23 09:10 EDT

## 2023-06-19 NOTE — TELEPHONE ENCOUNTER
HUB STAFF ORIGINALLY REQUESTED THAT FOCALIN BE SENT TO THE WRONG PHARMACY. PATIENT NEEDS IT TO GO TO [facility] [address] BECAUSE THEY HAVE 8 PILLS IN STOCK AND EVERYONE ELSE IS OUT.

## 2023-07-21 ENCOUNTER — LAB (OUTPATIENT)
Dept: LAB | Facility: HOSPITAL | Age: 58
End: 2023-07-21
Payer: COMMERCIAL

## 2023-07-21 DIAGNOSIS — C20 RECTAL CANCER METASTASIZED TO LIVER: ICD-10-CM

## 2023-07-21 DIAGNOSIS — C78.7 RECTAL CANCER METASTASIZED TO LIVER: ICD-10-CM

## 2023-07-21 LAB
ALBUMIN SERPL-MCNC: 4.8 G/DL (ref 3.5–5.2)
ALBUMIN/GLOB SERPL: 1.8 G/DL
ALP SERPL-CCNC: 63 U/L (ref 39–117)
ALT SERPL W P-5'-P-CCNC: 18 U/L (ref 1–33)
ANION GAP SERPL CALCULATED.3IONS-SCNC: 10.5 MMOL/L (ref 5–15)
AST SERPL-CCNC: 30 U/L (ref 1–32)
BASOPHILS # BLD AUTO: 0.02 10*3/MM3 (ref 0–0.2)
BASOPHILS NFR BLD AUTO: 0.4 % (ref 0–1.5)
BILIRUB SERPL-MCNC: 0.3 MG/DL (ref 0–1.2)
BUN SERPL-MCNC: 13 MG/DL (ref 6–20)
BUN/CREAT SERPL: 18.3 (ref 7–25)
CALCIUM SPEC-SCNC: 10.2 MG/DL (ref 8.6–10.5)
CEA SERPL-MCNC: 1.72 NG/ML
CHLORIDE SERPL-SCNC: 103 MMOL/L (ref 98–107)
CO2 SERPL-SCNC: 25.5 MMOL/L (ref 22–29)
CREAT SERPL-MCNC: 0.71 MG/DL (ref 0.6–1.1)
DEPRECATED RDW RBC AUTO: 43.9 FL (ref 37–54)
EGFRCR SERPLBLD CKD-EPI 2021: 98.1 ML/MIN/1.73
EOSINOPHIL # BLD AUTO: 0.04 10*3/MM3 (ref 0–0.4)
EOSINOPHIL NFR BLD AUTO: 0.8 % (ref 0.3–6.2)
ERYTHROCYTE [DISTWIDTH] IN BLOOD BY AUTOMATED COUNT: 13.1 % (ref 12.3–15.4)
GLOBULIN UR ELPH-MCNC: 2.6 GM/DL
GLUCOSE SERPL-MCNC: 106 MG/DL (ref 65–99)
HCT VFR BLD AUTO: 46.3 % (ref 34–46.6)
HGB BLD-MCNC: 14.9 G/DL (ref 12–15.9)
IMM GRANULOCYTES # BLD AUTO: 0.01 10*3/MM3 (ref 0–0.05)
IMM GRANULOCYTES NFR BLD AUTO: 0.2 % (ref 0–0.5)
LYMPHOCYTES # BLD AUTO: 1.46 10*3/MM3 (ref 0.7–3.1)
LYMPHOCYTES NFR BLD AUTO: 29.3 % (ref 19.6–45.3)
MCH RBC QN AUTO: 29.5 PG (ref 26.6–33)
MCHC RBC AUTO-ENTMCNC: 32.2 G/DL (ref 31.5–35.7)
MCV RBC AUTO: 91.7 FL (ref 79–97)
MONOCYTES # BLD AUTO: 0.3 10*3/MM3 (ref 0.1–0.9)
MONOCYTES NFR BLD AUTO: 6 % (ref 5–12)
NEUTROPHILS NFR BLD AUTO: 3.16 10*3/MM3 (ref 1.7–7)
NEUTROPHILS NFR BLD AUTO: 63.3 % (ref 42.7–76)
NRBC BLD AUTO-RTO: 0 /100 WBC (ref 0–0.2)
PLATELET # BLD AUTO: 272 10*3/MM3 (ref 140–450)
PMV BLD AUTO: 10.1 FL (ref 6–12)
POTASSIUM SERPL-SCNC: 4.6 MMOL/L (ref 3.5–5.2)
PROT SERPL-MCNC: 7.4 G/DL (ref 6–8.5)
RBC # BLD AUTO: 5.05 10*6/MM3 (ref 3.77–5.28)
SODIUM SERPL-SCNC: 139 MMOL/L (ref 136–145)
WBC NRBC COR # BLD: 4.99 10*3/MM3 (ref 3.4–10.8)

## 2023-07-21 PROCEDURE — 36415 COLL VENOUS BLD VENIPUNCTURE: CPT

## 2023-07-21 PROCEDURE — 80053 COMPREHEN METABOLIC PANEL: CPT

## 2023-07-21 PROCEDURE — 85025 COMPLETE CBC W/AUTO DIFF WBC: CPT

## 2023-07-21 PROCEDURE — 82378 CARCINOEMBRYONIC ANTIGEN: CPT | Performed by: INTERNAL MEDICINE

## 2023-07-28 LAB — REF LAB TEST METHOD: NORMAL

## 2023-08-01 ENCOUNTER — TELEPHONE (OUTPATIENT)
Dept: ONCOLOGY | Facility: CLINIC | Age: 58
End: 2023-08-01
Payer: COMMERCIAL

## 2023-08-01 ENCOUNTER — TELEPHONE (OUTPATIENT)
Dept: FAMILY MEDICINE CLINIC | Facility: CLINIC | Age: 58
End: 2023-08-01
Payer: COMMERCIAL

## 2023-08-01 NOTE — TELEPHONE ENCOUNTER
Caller: Isabel Florentino    Relationship: Self    Best call back number: [phone]     What is the best time to reach you: ANY    Who are you requesting to speak with (clinical staff, provider,  specific staff member): CLINICAL    Do you know the name of the person who called:     What was the call regarding: PATIENT HAS BEEN TRYING TO GET HER dexmethylphenidate XR (FOCALIN XR) 20 MG 24 hr capsule BUT SHE CAN'T FIND THEM IN STOCK ANYWHERE. PATIENT WOULD LIKE TO KNOW IF THERE IS SOMETHING ELSE THAT SHE CAN TAKE THAT IS SIMILAR.     Is it okay if the provider responds through MyChart:

## 2023-08-02 ENCOUNTER — TELEPHONE (OUTPATIENT)
Dept: FAMILY MEDICINE CLINIC | Facility: CLINIC | Age: 58
End: 2023-08-02
Payer: COMMERCIAL

## 2023-08-02 DIAGNOSIS — F98.8 ATTENTION DEFICIT DISORDER (ADD) WITHOUT HYPERACTIVITY: ICD-10-CM

## 2023-08-02 RX ORDER — DEXMETHYLPHENIDATE HYDROCHLORIDE 20 MG/1
20 CAPSULE, EXTENDED RELEASE ORAL DAILY
Qty: 30 CAPSULE | Refills: 0 | Status: SHIPPED | OUTPATIENT
Start: 2023-08-02

## 2023-08-30 DIAGNOSIS — F98.8 ATTENTION DEFICIT DISORDER (ADD) WITHOUT HYPERACTIVITY: ICD-10-CM

## 2023-08-30 NOTE — TELEPHONE ENCOUNTER
Caller: Isabel Florentino    Relationship: Self    Best call back number: [phone]    Requested Prescriptions:   Requested Prescriptions     Pending Prescriptions Disp Refills    dexmethylphenidate XR (FOCALIN XR) 20 MG 24 hr capsule 30 capsule 0     Sig: Take 1 capsule by mouth Daily        Pharmacy where request should be sent: [facility] [id number] [facility], [place] - [address] - [phone]  - [phone] FX     Last office visit with prescribing clinician: Visit date not found   Last telemedicine visit with prescribing clinician: Visit date not found   Next office visit with prescribing clinician: 10/17/2023     Additional details provided by patient:     Does the patient have less than a 3 day supply:  [x] Yes  [] No    Would you like a call back once the refill request has been completed: [] Yes [] No    If the office needs to give you a call back, can they leave a voicemail: [] Yes [] No    Scooter Kapadia   08/30/23 15:51 EDT

## 2023-09-01 RX ORDER — DEXMETHYLPHENIDATE HYDROCHLORIDE 20 MG/1
20 CAPSULE, EXTENDED RELEASE ORAL DAILY
Qty: 30 CAPSULE | Refills: 0 | Status: SHIPPED | OUTPATIENT
Start: 2023-09-01

## 2023-09-28 DIAGNOSIS — F98.8 ATTENTION DEFICIT DISORDER (ADD) WITHOUT HYPERACTIVITY: ICD-10-CM

## 2023-09-28 NOTE — TELEPHONE ENCOUNTER
Caller: Isabel Florentino    Relationship: Self    Best call back number: [phone]     Requested Prescriptions:   Requested Prescriptions     Pending Prescriptions Disp Refills    dexmethylphenidate XR (FOCALIN XR) 20 MG 24 hr capsule 30 capsule 0     Sig: Take 1 capsule by mouth Daily        Pharmacy where request should be sent: [facility] [id number] [facility] [place], [place] - [address] - [phone]  - [phone] FX     Last office visit with prescribing clinician: Visit date not found   Last telemedicine visit with prescribing clinician: Visit date not found   Next office visit with prescribing clinician: 10/17/2023     Additional details provided by patient:     Does the patient have less than a 3 day supply:  [] Yes  [x] No    Would you like a call back once the refill request has been completed: [] Yes [x] No    If the office needs to give you a call back, can they leave a voicemail: [] Yes [x] No    Roberto Estrada Rep   09/28/23 08:18 EDT

## 2023-09-29 RX ORDER — DEXMETHYLPHENIDATE HYDROCHLORIDE 20 MG/1
20 CAPSULE, EXTENDED RELEASE ORAL DAILY
Qty: 30 CAPSULE | Refills: 0 | Status: SHIPPED | OUTPATIENT
Start: 2023-09-29

## 2023-10-18 ENCOUNTER — OFFICE VISIT (OUTPATIENT)
Dept: FAMILY MEDICINE CLINIC | Facility: CLINIC | Age: 58
End: 2023-10-18
Payer: COMMERCIAL

## 2023-10-18 ENCOUNTER — LAB (OUTPATIENT)
Dept: FAMILY MEDICINE CLINIC | Facility: CLINIC | Age: 58
End: 2023-10-18
Payer: COMMERCIAL

## 2023-10-18 VITALS
DIASTOLIC BLOOD PRESSURE: 82 MMHG | BODY MASS INDEX: 22.71 KG/M2 | HEART RATE: 88 BPM | HEIGHT: 64 IN | RESPIRATION RATE: 18 BRPM | SYSTOLIC BLOOD PRESSURE: 120 MMHG | WEIGHT: 133 LBS

## 2023-10-18 DIAGNOSIS — M85.80 OSTEOPENIA, UNSPECIFIED LOCATION: ICD-10-CM

## 2023-10-18 DIAGNOSIS — E78.00 PURE HYPERCHOLESTEROLEMIA: ICD-10-CM

## 2023-10-18 DIAGNOSIS — F98.8 ATTENTION DEFICIT DISORDER (ADD) WITHOUT HYPERACTIVITY: ICD-10-CM

## 2023-10-18 DIAGNOSIS — Z00.00 ANNUAL PHYSICAL EXAM: Primary | ICD-10-CM

## 2023-10-18 LAB
HBA1C MFR BLD: 5.5 % (ref 4.8–5.6)
TSH SERPL DL<=0.05 MIU/L-ACNC: 4.26 UIU/ML (ref 0.27–4.2)

## 2023-10-18 PROCEDURE — 99396 PREV VISIT EST AGE 40-64: CPT | Performed by: STUDENT IN AN ORGANIZED HEALTH CARE EDUCATION/TRAINING PROGRAM

## 2023-10-18 PROCEDURE — 83036 HEMOGLOBIN GLYCOSYLATED A1C: CPT | Performed by: STUDENT IN AN ORGANIZED HEALTH CARE EDUCATION/TRAINING PROGRAM

## 2023-10-18 PROCEDURE — 36415 COLL VENOUS BLD VENIPUNCTURE: CPT | Performed by: STUDENT IN AN ORGANIZED HEALTH CARE EDUCATION/TRAINING PROGRAM

## 2023-10-18 PROCEDURE — 84443 ASSAY THYROID STIM HORMONE: CPT | Performed by: STUDENT IN AN ORGANIZED HEALTH CARE EDUCATION/TRAINING PROGRAM

## 2023-10-18 PROCEDURE — 80061 LIPID PANEL: CPT | Performed by: STUDENT IN AN ORGANIZED HEALTH CARE EDUCATION/TRAINING PROGRAM

## 2023-10-18 RX ORDER — DEXMETHYLPHENIDATE HYDROCHLORIDE 20 MG/1
20 CAPSULE, EXTENDED RELEASE ORAL DAILY
Qty: 30 CAPSULE | Refills: 0 | Status: SHIPPED | OUTPATIENT
Start: 2023-10-18

## 2023-10-18 RX ORDER — TRETINOIN 0.5 MG/G
1 CREAM TOPICAL NIGHTLY
COMMUNITY
Start: 2023-10-16

## 2023-10-18 NOTE — PROGRESS NOTES
"Chief Complaint: adhd  Chief Complaint   Patient presents with    Follow-up     Subjective        Isabel Florentino is a 58 y.o. female who presents to [facility].    History of Present Illness  Here to f/u on adult adhd and recent DEXA showing osteopenia.  She eats yogurt and cheese daily.  She takes a womens multivitamin.  She takes vitamin D daily.  She works out regularly through the week.    History of colon cancer, sees oncology locally and goes to [facility] yearly.  Having issues getting focalin XR filled.  It is also out of pocket cost until she meets her deductible.  It does help her a lot.    She has not had cholesterol or thyroid checked in years.  UTD on mammogram, pap, colon screenings.      Objective   /82   Pulse 88   Resp 18   Ht 162.6 cm (64\")   Wt 60.3 kg (133 lb)   BMI 22.83 kg/m²     Estimated body mass index is 22.83 kg/m² as calculated from the following:    Height as of this encounter: 162.6 cm (64\").    Weight as of this encounter: 60.3 kg (133 lb).     Physical Exam   GEN: In no acute distress, non toxic appearing  NEURO: AAO to person, place, and time. CN 2-12 intact grossly.  PSYCH: Affect normal, insight fair      Result Review :              Assessment and Plan     Diagnoses and all orders for this visit:    1. Annual physical exam (Primary)  Overall reassuring exam.  BP at goal today.  Continue healthy diet.  Continue regular physical activity.  Check lipid panel, tsh, A1c.    UTD on cancer screenings.  Next physical in 1 yr, f/u sooner prn.    -     TSH  -     Hemoglobin A1c  -     Lipid panel    2. Attention deficit disorder (ADD) without hyperactivity  Continue focalin xr 20 mg daily.  If unable to get we will switch to IR.    Orders:  -     dexmethylphenidate XR (FOCALIN XR) 20 MG 24 hr capsule; Take 1 capsule by mouth Daily  Dispense: 30 capsule; Refill: 0    3. Pure hypercholesterolemia  -     Lipid panel    4. Osteopenia, unspecified " location  Continue good calcium intake, vitamin D supplementation, regular weight bearing exercise.  Repeat DEXA q2 yrs.         Follow Up     Return in about 1 year (around 10/18/2024) for Annual physical.

## 2023-10-20 LAB
CHOLEST SERPL-MCNC: 178 MG/DL (ref 0–200)
HDLC SERPL-MCNC: 87 MG/DL (ref 40–60)
LDLC SERPL CALC-MCNC: 82 MG/DL (ref 0–100)
LDLC/HDLC SERPL: 0.95 {RATIO}
TRIGL SERPL-MCNC: 40 MG/DL (ref 0–150)
VLDLC SERPL-MCNC: 9 MG/DL (ref 5–40)

## 2023-11-01 ENCOUNTER — TELEPHONE (OUTPATIENT)
Dept: FAMILY MEDICINE CLINIC | Facility: CLINIC | Age: 58
End: 2023-11-01
Payer: COMMERCIAL

## 2023-11-01 DIAGNOSIS — F98.8 ATTENTION DEFICIT DISORDER (ADD) WITHOUT HYPERACTIVITY: Primary | ICD-10-CM

## 2023-11-01 DIAGNOSIS — F98.8 ATTENTION DEFICIT DISORDER (ADD) WITHOUT HYPERACTIVITY: ICD-10-CM

## 2023-11-01 RX ORDER — DEXMETHYLPHENIDATE HYDROCHLORIDE 10 MG/1
10 TABLET ORAL 2 TIMES DAILY
Qty: 60 TABLET | Refills: 0 | Status: SHIPPED | OUTPATIENT
Start: 2023-11-01 | End: 2023-11-03

## 2023-11-01 NOTE — TELEPHONE ENCOUNTER
Caller: Isabel Florentino    Relationship: Self    Best call back number: [phone]     What medication are you requesting: ALTERNATIVE FOR dexmethylphenidate XR (FOCALIN XR) 20 MG 24 hr capsule      If a prescription is needed, what is your preferred pharmacy and phone number: [facility] [id number] - [place], [place] - [address] - [phone]  - [phone] FX     Additional notes: PATIENT STATED THAT [facility] IS OUT OF   dexmethylphenidate XR (FOCALIN XR) 20 MG 24 hr capsule    PATIENT STATED SHE DISCUSSED AN ALTERNATIVE TO THIS MEDICATION WITH DR. HANDY AND IS REQUESTING FOR THAT MEDICATION TO BE SENT IN TO [facility]    PATIENT IS COMPLETELY OUT OF dexmethylphenidate XR (FOCALIN XR) 20 MG 24 hr capsule    PLEASE ADVISE AND LEAVE VOICEMAIL IF UNABLE TO REACH PATIENT

## 2023-11-01 NOTE — TELEPHONE ENCOUNTER
Caller: Isabel Florentino    Relationship: Self    Best call back number: [phone]     What medication are you requesting: dexmethylphenidate (Focalin) 20 MG tablet     If a prescription is needed, what is your preferred pharmacy and phone number: [facility] #[id number] [facility] [place] - [address] AT [address] [phone] [facility] [phone]      Additional notes: PATIENT STATED THAT [facility] IS OUT OF STOCK OF THIS MEDICATION BUT [facility] HAS dexmethylphenidate (Focalin) 20 MG tablet  IN STOCK

## 2023-11-03 RX ORDER — DEXMETHYLPHENIDATE HYDROCHLORIDE 20 MG/1
20 CAPSULE, EXTENDED RELEASE ORAL DAILY
Qty: 30 CAPSULE | Refills: 0 | Status: CANCELLED | OUTPATIENT
Start: 2023-11-03

## 2023-11-03 RX ORDER — DEXMETHYLPHENIDATE HYDROCHLORIDE 20 MG/1
20 CAPSULE, EXTENDED RELEASE ORAL DAILY
Qty: 30 CAPSULE | Refills: 0 | Status: SHIPPED | OUTPATIENT
Start: 2023-11-03

## 2023-11-03 NOTE — TELEPHONE ENCOUNTER
Patient called back because she has not gotten any medicine yet.  She wants the Focalin XR 20mg, the one she has been on for a while.  [facility] [address].

## 2023-11-30 DIAGNOSIS — F98.8 ATTENTION DEFICIT DISORDER (ADD) WITHOUT HYPERACTIVITY: ICD-10-CM

## 2023-11-30 NOTE — TELEPHONE ENCOUNTER
Caller: Isabel Florentino    Relationship: Self    Best call back number: [phone]    Requested Prescriptions:   Requested Prescriptions     Pending Prescriptions Disp Refills    dexmethylphenidate XR (Focalin XR) 20 MG 24 hr capsule 30 capsule 0     Sig: Take 1 capsule by mouth Daily        Pharmacy where request should be sent: [facility] #[id number] - [place], [place] - [address] AT [facility] OF [address] [phone] [facility] [phone] FX     Last office visit with prescribing clinician: 10/18/2023   Last telemedicine visit with prescribing clinician: Visit date not found   Next office visit with prescribing clinician: Visit date not found     Does the patient have less than a 3 day supply:  [x] Yes  [] No    Would you like a call back once the refill request has been completed: [] Yes [x] No    If the office needs to give you a call back, can they leave a voicemail: [] Yes [x] No    Roberto Wade Rep   11/30/23 10:18 EST

## 2023-12-01 ENCOUNTER — TELEPHONE (OUTPATIENT)
Dept: ONCOLOGY | Facility: CLINIC | Age: 58
End: 2023-12-01
Payer: COMMERCIAL

## 2023-12-01 RX ORDER — DEXMETHYLPHENIDATE HYDROCHLORIDE 20 MG/1
20 CAPSULE, EXTENDED RELEASE ORAL DAILY
Qty: 30 CAPSULE | Refills: 0 | Status: SHIPPED | OUTPATIENT
Start: 2023-12-01

## 2023-12-01 NOTE — TELEPHONE ENCOUNTER
Caller: Isabel Florentino    Relationship to patient: Self    Best call back number: [phone]    Patient is needing: TO R/S 12-6-23 LAB AND F/U APPT TO ANOTHER DAY EARLY MORNING OR LATE AFTERNOON.

## 2023-12-26 DIAGNOSIS — F98.8 ATTENTION DEFICIT DISORDER (ADD) WITHOUT HYPERACTIVITY: ICD-10-CM

## 2023-12-26 NOTE — TELEPHONE ENCOUNTER
Caller: Isabel Florentino    Relationship: Self    Best call back number:  [phone]     Requested Prescriptions:   Requested Prescriptions     Pending Prescriptions Disp Refills    dexmethylphenidate XR (Focalin XR) 20 MG 24 hr capsule 30 capsule 0     Sig: Take 1 capsule by mouth Daily        Pharmacy where request should be sent:  [facility] #[id number] - [place], [place] - [address] AT [facility] OF [address] [phone] [facility] [phone] FX     Last office visit with prescribing clinician: 10/18/2023   Last telemedicine visit with prescribing clinician: Visit date not found   Next office visit with prescribing clinician: Visit date not found     Additional details provided by patient: FEW DAYS LEFT ON HAND. SHE FOUND [facility] [address] WITH THIS IN STOCK. HOPING TO HAVE THIS SENT IN ASAP PLEASE     Does the patient have less than a 3 day supply:  [x] Yes  [] No    Would you like a call back once the refill request has been completed: [x] Yes [] No    If the office needs to give you a call back, can they leave a voicemail: [x] Yes [] No    Nadia Chamberlain, PCT   12/26/23 13:42 EST

## 2023-12-27 NOTE — TELEPHONE ENCOUNTER
Caller: Isabel Florentino    Relationship: Self    Best call back number: [phone]      PLEASE GIVE PATIENT A CALLBACK.    SHE IS TRYING TO GET THIS FILLED ASAP WHILE THE PHARMACY HAS IT IN STOCK.

## 2023-12-28 RX ORDER — DEXMETHYLPHENIDATE HYDROCHLORIDE 20 MG/1
20 CAPSULE, EXTENDED RELEASE ORAL DAILY
Qty: 30 CAPSULE | Refills: 0 | Status: SHIPPED | OUTPATIENT
Start: 2023-12-28 | End: 2023-12-29 | Stop reason: SDUPTHER

## 2023-12-28 NOTE — TELEPHONE ENCOUNTER
Patient called back today very upset this rx has not been sent in yet.  No one told her that Dr Soto is out of the office all week.  Patient needs this filled before the end of the year because it will be free this year but cost her over $100 next year.     Sal[facility] - can you please send this in for patient today?

## 2023-12-29 DIAGNOSIS — F98.8 ATTENTION DEFICIT DISORDER (ADD) WITHOUT HYPERACTIVITY: Primary | ICD-10-CM

## 2023-12-29 RX ORDER — DEXMETHYLPHENIDATE HYDROCHLORIDE 20 MG/1
20 CAPSULE, EXTENDED RELEASE ORAL DAILY
Qty: 30 CAPSULE | Refills: 0 | Status: SHIPPED | OUTPATIENT
Start: 2023-12-29

## 2023-12-29 RX ORDER — DEXMETHYLPHENIDATE HYDROCHLORIDE 10 MG/1
10 TABLET ORAL 2 TIMES DAILY
Qty: 60 TABLET | Refills: 0 | Status: SHIPPED | OUTPATIENT
Start: 2023-12-29

## 2023-12-29 NOTE — TELEPHONE ENCOUNTER
I spoke to patient and apologized for all the miscommunication around her Rx refill.  Please send Rx to correct pharmacy.  I will call patient when this has been corrected.

## 2023-12-29 NOTE — TELEPHONE ENCOUNTER
This rx was sent to the wrong pharmacy.  Please send to [facility] on [address] today.  Patient is VERY upset and I transferred the call to Edna.

## 2024-01-16 ENCOUNTER — TELEPHONE (OUTPATIENT)
Dept: ONCOLOGY | Facility: CLINIC | Age: 59
End: 2024-01-16
Payer: COMMERCIAL

## 2024-01-16 DIAGNOSIS — C78.7 RECTAL CANCER METASTASIZED TO LIVER: Primary | ICD-10-CM

## 2024-01-16 DIAGNOSIS — C78.7 METASTATIC CANCER TO LIVER: ICD-10-CM

## 2024-01-16 DIAGNOSIS — C20 RECTAL CANCER METASTASIZED TO LIVER: Primary | ICD-10-CM

## 2024-01-16 NOTE — TELEPHONE ENCOUNTER
[facility]     Called patient to talk about insurance. Mailbox is full and cannot leave a message

## 2024-01-24 ENCOUNTER — TELEPHONE (OUTPATIENT)
Dept: ONCOLOGY | Facility: CLINIC | Age: 59
End: 2024-01-24
Payer: COMMERCIAL

## 2024-01-24 NOTE — TELEPHONE ENCOUNTER
Caller: Isabel Florentino    Relationship to patient: Self    Best call back number: [phone]    Type of visit: LAB AND FOLLOW UP    Requested date: EARLY MORNING OR LATE AFTERNOON     If rescheduling, when is the original appointment: 01/17     Additional notes:PLEASE CALL TO RESCHEDULE.

## 2024-01-29 DIAGNOSIS — F98.8 ATTENTION DEFICIT DISORDER (ADD) WITHOUT HYPERACTIVITY: ICD-10-CM

## 2024-01-29 RX ORDER — DEXMETHYLPHENIDATE HYDROCHLORIDE 20 MG/1
20 CAPSULE, EXTENDED RELEASE ORAL DAILY
Qty: 30 CAPSULE | Refills: 0 | Status: SHIPPED | OUTPATIENT
Start: 2024-01-29

## 2024-01-29 NOTE — TELEPHONE ENCOUNTER
Caller: Isabel Florentino    Relationship: Self    Best call back number: [phone]    Requested Prescriptions:   Requested Prescriptions     Pending Prescriptions Disp Refills    dexmethylphenidate XR (Focalin XR) 20 MG 24 hr capsule 30 capsule 0     Sig: Take 1 capsule by mouth Daily        Pharmacy where request should be sent: [facility] #[id number] - [place], [place] - [address] AT [facility] OF [facility] [facility] [address] [facility] [phone] [facility] [phone] FX     Last office visit with prescribing clinician: 10/18/2023   Last telemedicine visit with prescribing clinician: Visit date not found   Next office visit with prescribing clinician: Visit date not found     Additional details provided by patient:     Does the patient have less than a 3 day supply:  [] Yes  [x] No        Roberto Domínguez Rep   01/29/24 12:23 EST

## 2024-03-01 DIAGNOSIS — F98.8 ATTENTION DEFICIT DISORDER (ADD) WITHOUT HYPERACTIVITY: ICD-10-CM

## 2024-03-01 RX ORDER — DEXMETHYLPHENIDATE HYDROCHLORIDE 20 MG/1
20 CAPSULE, EXTENDED RELEASE ORAL DAILY
Qty: 30 CAPSULE | Refills: 0 | Status: SHIPPED | OUTPATIENT
Start: 2024-03-01

## 2024-03-01 RX ORDER — DEXMETHYLPHENIDATE HYDROCHLORIDE 20 MG/1
20 CAPSULE, EXTENDED RELEASE ORAL DAILY
Qty: 30 CAPSULE | Refills: 0 | Status: CANCELLED | OUTPATIENT
Start: 2024-03-01

## 2024-03-01 NOTE — TELEPHONE ENCOUNTER
Caller: Isabel Florentino    Relationship: Self    Best call back number: [phone]     What was the call regarding: PATIENT IS CALLING FOR AN UPDATE ON THIS REFILL REQUEST. PHARMACY TOLD PATIENT THEY ARE ALMOST OUT OF THIS MEDICATION    PLEASE ADVISE

## 2024-03-01 NOTE — TELEPHONE ENCOUNTER
I see..bad message.   She wants this re-sent to [facility] because [facility] is out until Monday

## 2024-03-01 NOTE — TELEPHONE ENCOUNTER
Caller: Isabel Florentino    Relationship: Self    Best call back number: [phone]    Requested Prescriptions:   Requested Prescriptions     Pending Prescriptions Disp Refills    dexmethylphenidate XR (Focalin XR) 20 MG 24 hr capsule 30 capsule 0     Sig: Take 1 capsule by mouth Daily        [facility]armacy where request should be sent[id number] [place] [facility] [address] - [phone]  - [phone] FX     Last office visit with prescribing clinician: 10/18/2023   Last telemedicine visit with prescribing clinician: Visit date not found   Next office visit with prescribing clinician: Visit date not found     Additional details provided by patient: PATIENT IS OUT... PATIENT STATED THE PHARMACY TOLD HER IT WOULD NOT BE AVAILABLE MONDAY AND IS REQUESTING IF IT COULD BE DONE AS SOON AS POSSIBLE    Does the patient have less than a 3 day supply:  [x] Yes  [] No    Would you like a call back once the refill request has been completed: [] Yes [x] No    If the office needs to give you a call back, can they leave a voicemail: [] Yes [x] No    Roberto Hi Rep   03/01/24 12:21 EST

## 2024-03-01 NOTE — TELEPHONE ENCOUNTER
HUB TO READ    This was sent to [facility]. She needs to call pharmacy.    *has mychart

## 2024-03-06 ENCOUNTER — OFFICE VISIT (OUTPATIENT)
Dept: ONCOLOGY | Facility: CLINIC | Age: 59
End: 2024-03-06
Payer: COMMERCIAL

## 2024-03-06 ENCOUNTER — LAB (OUTPATIENT)
Dept: LAB | Facility: HOSPITAL | Age: 59
End: 2024-03-06
Payer: COMMERCIAL

## 2024-03-06 VITALS
WEIGHT: 132.2 LBS | BODY MASS INDEX: 22.57 KG/M2 | SYSTOLIC BLOOD PRESSURE: 128 MMHG | DIASTOLIC BLOOD PRESSURE: 81 MMHG | HEIGHT: 64 IN | RESPIRATION RATE: 16 BRPM | OXYGEN SATURATION: 100 % | HEART RATE: 74 BPM | TEMPERATURE: 96.8 F

## 2024-03-06 DIAGNOSIS — C78.7 RECTAL CANCER METASTASIZED TO LIVER: Primary | ICD-10-CM

## 2024-03-06 DIAGNOSIS — C78.7 METASTATIC CANCER TO LIVER: ICD-10-CM

## 2024-03-06 DIAGNOSIS — C20 RECTAL CANCER METASTASIZED TO LIVER: Primary | ICD-10-CM

## 2024-03-06 DIAGNOSIS — C20 RECTAL CANCER METASTASIZED TO LIVER: ICD-10-CM

## 2024-03-06 DIAGNOSIS — C78.7 RECTAL CANCER METASTASIZED TO LIVER: ICD-10-CM

## 2024-03-06 LAB
ALBUMIN SERPL-MCNC: 4.5 G/DL (ref 3.5–5.2)
ALBUMIN/GLOB SERPL: 1.6 G/DL
ALP SERPL-CCNC: 59 U/L (ref 39–117)
ALT SERPL W P-5'-P-CCNC: 16 U/L (ref 1–33)
ANION GAP SERPL CALCULATED.3IONS-SCNC: 9.6 MMOL/L (ref 5–15)
AST SERPL-CCNC: 27 U/L (ref 1–32)
BASOPHILS # BLD AUTO: 0.03 10*3/MM3 (ref 0–0.2)
BASOPHILS NFR BLD AUTO: 0.4 % (ref 0–1.5)
BILIRUB SERPL-MCNC: 0.4 MG/DL (ref 0–1.2)
BUN SERPL-MCNC: 15 MG/DL (ref 8–23)
BUN/CREAT SERPL: 19.7 (ref 7–25)
CALCIUM SPEC-SCNC: 10.1 MG/DL (ref 8.6–10.5)
CEA SERPL-MCNC: 1.28 NG/ML
CHLORIDE SERPL-SCNC: 101 MMOL/L (ref 98–107)
CO2 SERPL-SCNC: 26.4 MMOL/L (ref 22–29)
CREAT SERPL-MCNC: 0.76 MG/DL (ref 0.57–1)
DEPRECATED RDW RBC AUTO: 40.7 FL (ref 37–54)
EGFRCR SERPLBLD CKD-EPI 2021: 89.8 ML/MIN/1.73
EOSINOPHIL # BLD AUTO: 0.02 10*3/MM3 (ref 0–0.4)
EOSINOPHIL NFR BLD AUTO: 0.3 % (ref 0.3–6.2)
ERYTHROCYTE [DISTWIDTH] IN BLOOD BY AUTOMATED COUNT: 12.4 % (ref 12.3–15.4)
GLOBULIN UR ELPH-MCNC: 2.9 GM/DL
GLUCOSE SERPL-MCNC: 95 MG/DL (ref 65–99)
HCT VFR BLD AUTO: 42 % (ref 34–46.6)
HGB BLD-MCNC: 14.3 G/DL (ref 12–15.9)
IMM GRANULOCYTES # BLD AUTO: 0.02 10*3/MM3 (ref 0–0.05)
IMM GRANULOCYTES NFR BLD AUTO: 0.3 % (ref 0–0.5)
LYMPHOCYTES # BLD AUTO: 1.58 10*3/MM3 (ref 0.7–3.1)
LYMPHOCYTES NFR BLD AUTO: 22.7 % (ref 19.6–45.3)
MCH RBC QN AUTO: 30.9 PG (ref 26.6–33)
MCHC RBC AUTO-ENTMCNC: 34 G/DL (ref 31.5–35.7)
MCV RBC AUTO: 90.7 FL (ref 79–97)
MONOCYTES # BLD AUTO: 0.57 10*3/MM3 (ref 0.1–0.9)
MONOCYTES NFR BLD AUTO: 8.2 % (ref 5–12)
NEUTROPHILS NFR BLD AUTO: 4.74 10*3/MM3 (ref 1.7–7)
NEUTROPHILS NFR BLD AUTO: 68.1 % (ref 42.7–76)
NRBC BLD AUTO-RTO: 0 /100 WBC (ref 0–0.2)
PLATELET # BLD AUTO: 249 10*3/MM3 (ref 140–450)
PMV BLD AUTO: 9.9 FL (ref 6–12)
POTASSIUM SERPL-SCNC: 4.3 MMOL/L (ref 3.5–5.2)
PROT SERPL-MCNC: 7.4 G/DL (ref 6–8.5)
RBC # BLD AUTO: 4.63 10*6/MM3 (ref 3.77–5.28)
SODIUM SERPL-SCNC: 137 MMOL/L (ref 136–145)
WBC NRBC COR # BLD AUTO: 6.96 10*3/MM3 (ref 3.4–10.8)

## 2024-03-06 PROCEDURE — 36415 COLL VENOUS BLD VENIPUNCTURE: CPT

## 2024-03-06 PROCEDURE — 85025 COMPLETE CBC W/AUTO DIFF WBC: CPT

## 2024-03-06 PROCEDURE — 80053 COMPREHEN METABOLIC PANEL: CPT

## 2024-03-06 PROCEDURE — 82378 CARCINOEMBRYONIC ANTIGEN: CPT | Performed by: INTERNAL MEDICINE

## 2024-03-06 NOTE — PROGRESS NOTES
Subjective .     REASONS FOR FOLLOW UP:  Metastatic SIGMOID UPPER rectal cancer to the liver.  Initiated chemotherapy with FOLFOX-6 9/1/2016. COMPLETED IN SEPT 2017, EXTENSIVE SURGERY ON HER LIVER AND REMOVAL OF PRIMARY TUMOR SURRENDER HER KIRIT.CARE PROVIDED AT [facility]    HISTORY OF PRESENT ILLNESS:    On 03/06/2024, this 58-year-old female who has history of Stage IV rectal cancer with liver metastasis returns to the office after she has undergone many years back aggressive plan of chemotherapy as well as surgery including resection of liver metastasis. The patient achieved a complete remission that she is keeping at this point. Physically she feels terrific, full of energy, lifting weights at the [facility] and continuing to do a lot of physical activity. 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 Her appetite is good. Her bowel function is normal. Urination is normal. No abdominal pain. No jaundice. No cough, sputum production or shortness of breath. Energy level is great. Weight is stable. Still minimal leftover sensory neuropathy in her toes with no implications.     She went to [facility] in [place] in 2023 completely clean bill of health in regard to radiological assessment and endoscopy.                     Past Medical History:   Diagnosis Date    Abnormal Pap smear of cervix     ADD (attention deficit disorder)     Anemia     Cervical dysplasia     Colon cancer     NEW DIAGNOSIS    Drug therapy     H/O foreign travel 03/2016    [place], [place]    Hepatic metastases 10/2016    partial right hepatectomy [place] 10/16    History of transfusion     1999 AFTER TUBAL RUPTURE    Hyperlipidemia     Middle cerebral artery aneurysm     Ruptured ectopic pregnancy 1997    Status post partial colectomy 04/2017    [facility]     Past Surgical History:   Procedure Laterality Date    AUGMENTATION MAMMAPLASTY      COLON RESECTION WITH ILEOSTOMY      COLONOSCOPY       MAY 2016    CRANIOTOMY  2004, 2005    Cerebral aneurysm    ILEOSTOMY CLOSURE      LAPAROSCOPY FOR ECTOPIC PREGNANCY  09/1997    LEEP N/A 11/22/2021    Procedure: LOOP ELECTROCAUTERY EXCISION PROCEDURE, ENDOCERVICAL CURETTING;  Surgeon: Xavier Collins MD;  [place]  [facility] [place] [facility] [place]  Service: Obstetrics/Gynecology;  Laterality: N/A;    LIVER RESECTION  2016    LUNG LOBECTOMY Right 2018    MEDIPORT REMOVAL      CT INSJ TUNNELED CVC W/O SUBQ PORT/ AGE 3 YR/> Right 8/26/2016    Procedure: MEDIPORT PLACEMENT ;  Surgeon: Praful Holden MD;  Location: [facility] OR;  Service: Vascular    SINUS SURGERY         Current Outpatient Medications on File Prior to Visit   Medication Sig Dispense Refill    B Complex Vitamins (VITAMIN B COMPLEX PO) Take 1 tablet by mouth Daily.      cholecalciferol (VITAMIN D3) 1000 UNITS tablet Take 1 tablet by mouth Daily.      dexmethylphenidate (Focalin) 10 MG tablet Take 1 tablet by mouth 2 (Two) Times a Day. 60 tablet 0    dexmethylphenidate XR (Focalin XR) 20 MG 24 hr capsule Take 1 capsule by mouth Daily 30 capsule 0    Finacea 15 % foam Apply 1 Application topically to the appropriate area as directed As Needed.      Multiple Vitamins-Minerals (MULTIVITAMIN WITH MINERALS) tablet tablet Take 1 tablet by mouth Daily.      tretinoin (RETIN-A) 0.05 % cream Apply 1 Application topically to the appropriate area as directed Every Night.       No current facility-administered medications on file prior to visit.     ALLERGIES:   No Known Allergies    SOCIAL HISTORY:       Social History     Tobacco Use    Smoking status: Never    Smokeless tobacco: Never   Substance Use Topics    Alcohol use: Yes     Alcohol/week: 5.0 standard drinks of alcohol     Types: 5 Glasses of wine per week     Comment: Social     FAMILY HISTORY:  Family History   Problem Relation Age of Onset    Cerebral aneurysm Mother     Stroke Mother     COPD Father     Malig Hyperthermia Neg Hx                  Objective   "  Vitals:    03/06/24 1543   BP: 128/81   Pulse: 74   Resp: 16   Temp: 96.8 °F (36 °C)   TempSrc: Temporal   SpO2: 100%   Weight: 60 kg (132 lb 3.2 oz)   Height: 162.6 cm (64.02\")   PainSc: 0-No pain         3/6/2024     3:43 PM   Current Status   ECOG score 0      PHYSICAL EXAM:         GENERAL:  Well-developed, Patient  in no acute distress.   SKIN:  Warm, dry ,NO purpura ,no rash.  HEENT:  Pupils were equal and reactive to light and accomodation, conjunctivae noninjected,  normal visual acuity.   NECK:  Supple with good range of motion; no thyromegaly , no JVD or bruits,.No carotid artery pain, no carotid abnormal pulsation   LYMPHATICS:  No cervical, NO supraclavicular, NO axillary, NO inguinal adenopathies.  CARDIAC   normal rate , regular rhythm, without murmur,NO rubs NO S3 NO S4   LUNGS: normal breath sounds bilateral, no wheezing, NO rhonchi, NO crackles ,NO rubs.  VASCULAR VENOUS: no cyanosis, NO collateral circulation, NO varicosities, NO edema, NO palpable cords, NO pain,NO erythema, NO pigmentation of the skin.  ABDOMEN:  Soft, NO pain,no hepatomegaly, no splenomegaly,no masses, no ascites, no collateral circulation,no distention.  EXTREMITIES  AND SPINE:  No clubbing, no cyanosis ,no deformities , no pain .No kyphosis,  no pain in spine, no pain in ribs , no pain in pelvic bone.  NEUROLOGICAL:  Patient was awake, alert, oriented to time, person and place.                          RECENT LABS:  Lab Results   Component Value Date    WBC 6.96 03/06/2024    HGB 14.3 03/06/2024    HCT 42.0 03/06/2024    MCV 90.7 03/06/2024     03/06/2024     Lab Results   Component Value Date    GLUCOSE 95 03/06/2024    BUN 15 03/06/2024    CREATININE 0.76 03/06/2024    EGFRIFNONA >90 12/22/2021    EGFRIFAFRI >90 12/22/2021    BCR 19.7 03/06/2024    CO2 26.4 03/06/2024    CALCIUM 10.1 03/06/2024    ALBUMIN 4.5 03/06/2024    LABIL2 1.0 02/04/2019    AST 27 03/06/2024    ALT 16 03/06/2024     EXAM: MR ABDOMEN WITHOUT " AND WITH IV CONTRAST    COMPARISON: July 12, 2023    MRI of the abdomen was performed with and without intravenous contrast as per protocol.    FINDINGS:    Right hepatic resection. No suspicious hepatic lesion. Previously noted small focus of diffusion  restriction in the posterior left hepatic lobe has resolved. No biliary dilation. Patent residual  hepatic vasculature.    No suspicious lesion in the spleen, pancreas, adrenals and kidneys. No lymphadenopathy. No ascites.  No bowel dilation or wall thickening. No peritoneal lesion. Normal caliber aorta.     No destructive osseous lesion.  Procedure Note    Spencer Lennon M.D. - 10/07/2023  Formatting of this note might be different from the original.  EXAM: MR ABDOMEN WITHOUT AND WITH IV CONTRAST    COMPARISON: July 12, 2023    MRI of the abdomen was performed with and without intravenous contrast as per protocol.    FINDINGS:    Right hepatic resection. No suspicious hepatic lesion. Previously noted small focus of diffusion  restriction in the posterior left hepatic lobe has resolved. No biliary dilation. Patent residual  hepatic vasculature.    No suspicious lesion in the spleen, pancreas, adrenals and kidneys. No lymphadenopathy. No ascites.  No bowel dilation or wall thickening. No peritoneal lesion. Normal caliber aorta.     No destructive osseous lesion.    IMPRESSION:  No evidence of metastatic disease in the abdomen. Previously noted small focus of  diffusion restriction in the posterior left hepatic lobe has resolved.  Exam End: 10/06/23 20:39    Specimen Collected: 10/07/23 19:20          Assessment & Plan      1. Metastatic rectal sigmoid cancer to the liver,  KRAS negative, BRAF negative, MSI stable, NRAS negative. The patient has undergone neoadjuvant chemotherapy with FOLFOX-6 and she subsequently underwent 2 different surgeries for her liver metastasis one including resection and another one including ablation. Also she underwent final removal of the  primary tumor that was still PET active after completion of FOLFOX chemotherapy. Subsequently the patient had an ileostomy that was closed and she was placed on Xeloda chronically that she took in September, October, November of 2017 when she discontinued on her own because of side effects. Since then the patient has been seen at the [facility] .I have reviewed laboratory workup from the [facility]. I have reviewed all the information available from the [facility] and her radiological assessment as well as her laboratory parameters and CEA are all normal. Therefore, it is always amazing to see somebody who has staged IV colon cancer to be treated aggressively and remain in remission after such a dramatic amount of extensive surgery and chemotherapy. 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      The patient returned to the office on 04/16/2021. She is asymptomatic in regard to her previous history of sigmoid colon cancer with liver metastasis. She achieved a complete response after aggressive plan of chemotherapy as stated above and also resection of the primary tumor and her liver metastasis.     Today her clinical assessment is negative normal. She has minimal grade 1 sensory neuropathy in her toes. Has no implications whatsoever for her in regard to functional status. She has minimal if any other comorbidities. She has completed COVID vaccination. She will plan to go to [facility] in the summer. I will review her back in 6 months. She will have a CBC, CMP and a CEA level at that time.    The patient was reviewed in regard to her colorectal cancer on 12/02/2021. She has no symptoms or signs of colon cancer recurrence. Her physical examination today is normal. Her ECOG performance status is 0 and her white count, hemoglobin, platelets, chemistry profile are normal. A CEA level is pending. She is going to be seen by the [facility] in [place], [place]  in 12/2021. CT scans will be performed in that institution. I asked her to call us a few days after she returns for me to review the reports of this.     In this regard she will return to see us in 6 months with a CBC, CMP and a CEA level.   On 10/03/2022 the patient has not had any clinical manifestations of recurrence of colon cancer. In fact she was seen at [facility] in the summer of this year, radiological assessment posted above in the liver and abdominal anatomy disclosed no obvious alterations. Her CEA level was negative, normal.     Later on, the patient developed something that suggested small bowel obstruction. She improved clinically and was discharged from the hospital. She has not had any other episodes since she discontinued the use of popcorn on a routine basis. Her clinical examination today is very much normal. The radiological assessment at the time that she had the so-called bowel obstruction documented a minimal degree of ascites. She has no symptoms pertinent to this neither clinical examination consistent with that today.    Obviously opens the question in regard to why she had developed this episode of partial bowel obstruction. She wants me to refer her to be seen by Lenore Schuler MD, who saw her a long time ago at the time of her original diagnosis and I think that is very reasonable. I pointed out to the patient that she will require a colonoscopy in the first place and also she will require reassessment of her small intestine with probably a CT enterography of her small intestine.    Besides this, the patient does not have any other issues and I will ask her to return to see us back in 6 months with a CBC, CMP and CEA level. In fact today her white count, hemoglobin, platelets, chemistry profile and CEA level are completely normal.    On 03/06/2024, the patient has been seen in regard to her previous history of Stage IV rectal cancer. She has not had any clinical  recurrence. She is up to date in her endoscopies and she is also up to date in her mammogram. Her clinical examination today is completely benign. She looks terrific. 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 Her clinical exam is normal. Her white count, hemoglobin and platelets are normal. Her chemistry profile is normal. Her CEA level is pending. She typically goes to [facility] to have radiological assessment. I asked her to go one more time this year and thereafter she can follow up with us. It has been already many years since her diagnosis and therapy and in my opinion very likely this patient is going to be cured from this.     Plan to review her back here in the office in a year with repeat CBC, CMP and a CEA level.        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 She has not had any symptoms pertinent to this but she has no follow up with anybody in this regard. I went ahead and scheduled her to be seen by Steven More Neurosurgeon at [facility]. She will require radiological assessment at that time obviously.     We went ahead and made the referral to be seen by Lenore Schuler MD.     If the patient has issues in that connection we will be glad to assist her and modify this and get going with assessment by us.     On 03/06/2024, she has followed up in regard to this issue with no symptomatology and no new problems in this regard.                   Zheng Johnson MD

## 2024-03-07 ENCOUNTER — TELEPHONE (OUTPATIENT)
Dept: ONCOLOGY | Facility: CLINIC | Age: 59
End: 2024-03-07
Payer: COMMERCIAL

## 2024-03-07 NOTE — TELEPHONE ENCOUNTER
Called and left message above via voicemail. Left call back number should she have any questions. [phone]. Etelvina Mcnally RN

## 2024-04-01 DIAGNOSIS — F98.8 ATTENTION DEFICIT DISORDER (ADD) WITHOUT HYPERACTIVITY: ICD-10-CM

## 2024-04-01 RX ORDER — DEXMETHYLPHENIDATE HYDROCHLORIDE 10 MG/1
10 TABLET ORAL 2 TIMES DAILY
Qty: 60 TABLET | Refills: 0 | Status: SHIPPED | OUTPATIENT
Start: 2024-04-01

## 2024-04-01 NOTE — TELEPHONE ENCOUNTER
Caller: Mireille Florentinoen    Relationship: Self    Best call back number: [phone]     Requested Prescriptions:   Requested Prescriptions     Pending Prescriptions Disp Refills    dexmethylphenidate (Focalin) 10 MG tablet 60 tablet 0     Sig: Take 1 tablet by mouth 2 (Two) Times a Day.        Pharmacy where request should be sent: [facility]Y #[id number] - [place], [place] - [address] - [phone]  - [phone] FX     Last office visit with prescribing clinician: 10/18/2023   Last telemedicine visit with prescribing clinician: Visit date not found   Next office visit with prescribing clinician: Visit date not found     Additional details provided by patient: PATIENT STATES THE PHARMACY ONLY HAS 30 IN STOCK     Does the patient have less than a 3 day supply:  [x] Yes  [] No

## 2024-04-02 ENCOUNTER — TELEPHONE (OUTPATIENT)
Dept: FAMILY MEDICINE CLINIC | Facility: CLINIC | Age: 59
End: 2024-04-02
Payer: COMMERCIAL

## 2024-04-02 NOTE — TELEPHONE ENCOUNTER
Caller: [id number] - [place], [place] - [address] - [phone] [place] [phone] FX    Relationship: Pharmacy    Best call back number: [phone]     What medication are you requesting: FOCALIN NAME BRAND     What are your current symptoms:     How long have you been experiencing symptoms:     Have you had these symptoms before:    [x] Yes  [] No    Have you been treated for these symptoms before:   [x] Yes  [] No    If a prescription is needed, what is your preferred pharmacy and phone number: [id number] - [place], [place] - [address] - [phone] [place] [phone] FX     Additional notes:    AS SOON AS THIS CAN BE ORDERED, THEY WILL OVERRIDE TO GET THE NAME BRAND COVERED.

## 2024-04-03 NOTE — TELEPHONE ENCOUNTER
PHARMACY STATES XR WAS THE LAST ONE FILLED. CURRENTLY OUT OF STOCK; PHARMACY IS REORDERING. WE CAN PROCEED WITH P.A. FOR XR 20MG

## 2024-04-05 ENCOUNTER — TELEPHONE (OUTPATIENT)
Dept: FAMILY MEDICINE CLINIC | Facility: CLINIC | Age: 59
End: 2024-04-05
Payer: COMMERCIAL

## 2024-04-05 DIAGNOSIS — F98.8 ATTENTION DEFICIT DISORDER (ADD) WITHOUT HYPERACTIVITY: ICD-10-CM

## 2024-04-05 NOTE — TELEPHONE ENCOUNTER
Caller: Isabel Florentino    Relationship: Self    Best call back number: [phone]     What was the call regarding: PATIENT IS REQUESTING TO KNOW IF THE NAME BRAND OF FOCALIN WAS CALLED INTO [facility]. PATIENT HAS BEEN WITHOUT THIS MEDICATION FOR 5 DAYS    PLEASE CALL PATIENT TO ADVISE

## 2024-04-06 NOTE — TELEPHONE ENCOUNTER
I do not see that a Rx was ever sent to [facility].  4/1/24 Rx was sent to [facility].  Please double check me.

## 2024-04-08 RX ORDER — DEXMETHYLPHENIDATE HCL 20 MG
20 CAPSULE,EXTENDED RELEASE BIPHASIC 50-50 ORAL DAILY
Qty: 30 CAPSULE | Refills: 0 | Status: SHIPPED | OUTPATIENT
Start: 2024-04-08

## 2024-04-08 RX ORDER — DEXMETHYLPHENIDATE HYDROCHLORIDE 10 MG/1
10 TABLET ORAL 2 TIMES DAILY
Qty: 60 TABLET | Refills: 0 | Status: CANCELLED | OUTPATIENT
Start: 2024-04-08

## 2024-04-08 NOTE — TELEPHONE ENCOUNTER
Focalin XR 20 mg daily IESHA sent to [facility] as asked for.  If she is upset this was never sent to [facility] it is because the initial request was to send it to [facility] on 4/1 which I completed on the same day the request was sent in.

## 2024-04-08 NOTE — TELEPHONE ENCOUNTER
20mg  and 10 mg was sent to [facility]    Pending for Name Brand to be sent to [facility]

## 2024-04-30 DIAGNOSIS — F98.8 ATTENTION DEFICIT DISORDER (ADD) WITHOUT HYPERACTIVITY: ICD-10-CM

## 2024-04-30 RX ORDER — DEXMETHYLPHENIDATE HCL 20 MG
20 CAPSULE,EXTENDED RELEASE BIPHASIC 50-50 ORAL DAILY
Qty: 30 CAPSULE | Refills: 0 | Status: SHIPPED | OUTPATIENT
Start: 2024-04-30 | End: 2024-05-01 | Stop reason: SDUPTHER

## 2024-04-30 NOTE — TELEPHONE ENCOUNTER
"  Caller: Isabel Florentino    Relationship: Self    Best call back number:     [phone] (Mobile)       Requested Prescriptions:   Requested Prescriptions     Pending Prescriptions Disp Refills    Focalin XR 20 MG 24 hr capsule 30 capsule 0     Sig: Take 1 capsule by mouth Daily        Pharmacy where request should be sent: [facility] #[id number] [address] AT [facility] [phone] [place] [phone]      Last office visit with prescribing clinician: 10/18/2023   Last telemedicine visit with prescribing clinician: Visit date not found   Next office visit with prescribing clinician: Visit date not found     Additional details provided by patient: \"NEEDS IT SENT AS URGENT BECAUSE IT HAS BEEN DIFFICULT TO FIND.\"     Does the patient have less than a 3 day supply:  [] Yes  [x] No    Would you like a call back once the refill request has been completed: [x] Yes [] No    If the office needs to give you a call back, can they leave a voicemail: [x] Yes [] No    Roberto Hale Rep   04/30/24 10:54 EDT     "

## 2024-05-01 DIAGNOSIS — F98.8 ATTENTION DEFICIT DISORDER (ADD) WITHOUT HYPERACTIVITY: ICD-10-CM

## 2024-05-01 RX ORDER — DEXMETHYLPHENIDATE HCL 20 MG
20 CAPSULE,EXTENDED RELEASE BIPHASIC 50-50 ORAL DAILY
Qty: 30 CAPSULE | Refills: 0 | Status: SHIPPED | OUTPATIENT
Start: 2024-05-01 | End: 2024-05-02 | Stop reason: SDUPTHER

## 2024-05-01 NOTE — TELEPHONE ENCOUNTER
Caller: Isabel Florentino    Relationship: Self    Best call back number: [phone]    Requested Prescriptions:   Requested Prescriptions     Pending Prescriptions Disp Refills    Focalin XR 20 MG 24 hr capsule 30 capsule 0     Sig: Take 1 capsule by mouth Daily      Pharmacy where request should be sent: [facility] #[id number] [place], [place] - [address] AT [address] [place] - [phone] PH - [phone] FX     Last office visit with prescribing clinician: 10/18/2023   Last telemedicine visit with prescribing clinician: Visit date not found   Next office visit with prescribing clinician: Visit date not found     Additional details provided by patient: PT STATES THAT THIS WAS SENT TO A DIFFERENT [facility] YESTERDAY, BUT THEY ARE OUT OF STOCK. STATES SHE SPENT HOURS TODAY TRYING TO LOCATE STOCK AND IF THIS IS NOT SENT IN TODAY IT WILL BE GONE.     Does the patient have less than a 3 day supply:  [x] Yes  [] No    Would you like a call back once the refill request has been completed: [] Yes [x] No    Roberto Olvera Rep   05/01/24 16:01 EDT

## 2024-05-02 DIAGNOSIS — F98.8 ATTENTION DEFICIT DISORDER (ADD) WITHOUT HYPERACTIVITY: ICD-10-CM

## 2024-05-02 RX ORDER — DEXMETHYLPHENIDATE HYDROCHLORIDE 20 MG/1
20 CAPSULE, EXTENDED RELEASE ORAL DAILY
Qty: 30 CAPSULE | Refills: 0 | Status: SHIPPED | OUTPATIENT
Start: 2024-05-02

## 2024-05-02 NOTE — TELEPHONE ENCOUNTER
Caller: Isabel Florentino    Relationship: Self    Best call back number: [phone]    What medication are you requesting: dexmethylphenidate XR 20 MG CAPSULES QUANTITY 30     What are your current symptoms:     How long have you been experiencing symptoms:     Have you had these symptoms before:    [x] Yes  [] No    Have you been treated for these symptoms before:   [x] Yes  [] No    If a prescription is needed, what is your preferred pharmacy and phone number: [facility] #[id number] [place], [place] - [address] AT [address] [facility] [address] - [phone]  - [phone] FX     Additional notes: WILL NEED ALL THE OTHER REQUESTS TO BE CLOSED OUT FOR FOCALIN NAME BRAND

## 2024-05-14 DIAGNOSIS — F98.8 ATTENTION DEFICIT DISORDER (ADD) WITHOUT HYPERACTIVITY: ICD-10-CM

## 2024-05-14 RX ORDER — DEXMETHYLPHENIDATE HYDROCHLORIDE 20 MG/1
20 CAPSULE, EXTENDED RELEASE ORAL DAILY
Qty: 30 CAPSULE | Refills: 0 | Status: CANCELLED | OUTPATIENT
Start: 2024-05-14

## 2024-05-14 NOTE — TELEPHONE ENCOUNTER
Caller: Isabel Florentino    Relationship: Self    Best call back number:     Requested Prescriptions:   Requested Prescriptions     Pending Prescriptions Disp Refills    dexmethylphenidate XR (Focalin XR) 20 MG 24 hr capsule 30 capsule 0     Sig: Take 1 capsule by mouth Daily        Pharmacy where request should be sent:      Last office visit with prescribing clinician: 10/18/2023   Last telemedicine visit with prescribing clinician: Visit date not found   Next office visit with prescribing clinician: Visit date not found     Additional details provided by patient: PATIENT SAYS SHE WILL BE GOING OUT OF THE COUNTRY ON JUNE 1, 2024 FOR 3 WEEKS AND SHE WILL NEED TO HAVE THIS REFILLED EARLY BEFORE SHE LEAVES.  SHE WANTS TO MAKE SURE THAT THIS WILL BE REFILLED FOR HER AND READY FOR HER TO  BEFORE SHE LEAVES THE COUNTRY. SHE SAYS THAT SHE HAS TO CALL DIFFERENT PHARMACIES TO SEE IF THEY HAVE IT IN STOCK.  SHE WANTS TO KNOW HOW SOON SHE NEEDS TO START CALLING THE PHARMACIES SO THAT THERE WONT BE A HOLD UP FOR HER GETTING THIS BEFORE SHE TRAVELS.  SHE ALSO WANTS TO DISCUSS GETTING A PRESCRIPTION FOR PATCHES FOR HER TRAVEL     Does the patient have less than a 3 day supply:  [] Yes  [x] No    Would you like a call back once the refill request has been completed: [x] Yes [] No    If the office needs to give you a call back, can they leave a voicemail: [] Yes [] No    Roberto Phan Rep   05/14/24 14:25 EDT

## 2024-05-22 NOTE — TELEPHONE ENCOUNTER
Caller: Isabel Florentino    Relationship: Self    Best call back number: [phone]     PATIENT IS CALLING TO CHECK THE STATUS OF THIS REQUEST. PLEASE CALL PATIENT BACK.

## 2024-05-23 NOTE — TELEPHONE ENCOUNTER
Gave complete message to patient at 12:25pm when she called back in.  She voiced understanding and will call us back at the appropriate time.

## 2024-05-28 DIAGNOSIS — F98.8 ATTENTION DEFICIT DISORDER (ADD) WITHOUT HYPERACTIVITY: ICD-10-CM

## 2024-05-28 RX ORDER — DEXMETHYLPHENIDATE HYDROCHLORIDE 20 MG/1
20 CAPSULE, EXTENDED RELEASE ORAL DAILY
Qty: 30 CAPSULE | Refills: 0 | Status: SHIPPED | OUTPATIENT
Start: 2024-05-28

## 2024-05-28 NOTE — TELEPHONE ENCOUNTER
Caller: Mishel Isabel    Relationship: Self    Best call back number: [phone]     Requested Prescriptions:   Requested Prescriptions     Pending Prescriptions Disp Refills    dexmethylphenidate XR (Focalin XR) 20 MG 24 hr capsule 30 capsule 0     Sig: Take 1 capsule by mouth Daily        Pharmacy where request should be sent: [facility]/PHARMACY #[id number] - [place] - [address] - [phone]  - [phone] FX     Last office visit with prescribing clinician: 10/18/2023   Last telemedicine visit with prescribing clinician: Visit date not found   Next office visit with prescribing clinician: Visit date not found     Additional details provided by patient: PATIENT STATES SHE NEEDS THE GENERIC    PATIENT STATES SHE IS LEAVING TO GO OUT OF THE COUNTRY 5/31/24 AND WAS INFORMED THAT SHE WOULD GET AN EARLY LONNY PERIOD     Does the patient have less than a 3 day supply:  [] Yes  [x] No

## 2024-05-29 ENCOUNTER — TELEPHONE (OUTPATIENT)
Dept: FAMILY MEDICINE CLINIC | Facility: CLINIC | Age: 59
End: 2024-05-29
Payer: COMMERCIAL

## 2024-05-29 DIAGNOSIS — Z87.898 H/O MOTION SICKNESS: Primary | ICD-10-CM

## 2024-05-29 RX ORDER — SCOLOPAMINE TRANSDERMAL SYSTEM 1 MG/1
1 PATCH, EXTENDED RELEASE TRANSDERMAL
Qty: 24 EACH | Refills: 0 | Status: SHIPPED | OUTPATIENT
Start: 2024-05-29

## 2024-05-29 NOTE — TELEPHONE ENCOUNTER
Caller: Isabel Florentino    Relationship: Self    Best call back number: [phone]    What medication are you requesting: SCOPOLAMINE PATCHES     What are your current symptoms: MOTION SICKNESS     If a prescription is needed, what is your preferred pharmacy and phone number: [id number] - [place], [place] - [address] [phone] [facility] [phone]      Additional notes: PATIENT STATES SHE IS TRAVELING IN 2 DAYS AND WOULD LIKE TO GET A PRESCRIPTION FOR THE MOTION SICKNESS PATCHES.     PATIENT IS REQUESTING A CALL BACK TO LET HER KNOW.

## 2024-07-02 DIAGNOSIS — F98.8 ATTENTION DEFICIT DISORDER (ADD) WITHOUT HYPERACTIVITY: ICD-10-CM

## 2024-07-02 RX ORDER — DEXMETHYLPHENIDATE HYDROCHLORIDE 20 MG/1
20 CAPSULE, EXTENDED RELEASE ORAL DAILY
Qty: 30 CAPSULE | Refills: 0 | Status: SHIPPED | OUTPATIENT
Start: 2024-07-02

## 2024-07-02 NOTE — TELEPHONE ENCOUNTER
Caller: Isabel Florentino    Relationship: Self    Requested Prescriptions:   Requested Prescriptions     Pending Prescriptions Disp Refills    dexmethylphenidate XR (Focalin XR) 20 MG 24 hr capsule 30 capsule 0     Sig: Take 1 capsule by mouth Daily      Pharmacy where request should be sent: [facility]/PHARMACY #[id number] - [place], [place] - [address] [facility] 311 - [phone] PH - [phone] FX     Last office visit with prescribing clinician: 10/18/2023   Last telemedicine visit with prescribing clinician: Visit date not found   Next office visit with prescribing clinician: Visit date not found     Additional details provided by patient: PATIENT STATES SHE THERE IS ONLY A SHORT TERM SUPPLY AT THIS PHARMACY AS THEY ONLY HAD 33.        Does the patient have less than a 3 day supply:  [x] Yes  [] No    Would you like a call back once the refill request has been completed: [] Yes [x] No    If the office needs to give you a call back, can they leave a voicemail: [] Yes [x] No    Roberto Jeffries Rep   07/02/24 13:19 EDT

## 2024-07-25 ENCOUNTER — OFFICE VISIT (OUTPATIENT)
Dept: FAMILY MEDICINE CLINIC | Facility: CLINIC | Age: 59
End: 2024-07-25
Payer: COMMERCIAL

## 2024-07-25 ENCOUNTER — LAB (OUTPATIENT)
Dept: FAMILY MEDICINE CLINIC | Facility: CLINIC | Age: 59
End: 2024-07-25
Payer: COMMERCIAL

## 2024-07-25 VITALS
SYSTOLIC BLOOD PRESSURE: 132 MMHG | DIASTOLIC BLOOD PRESSURE: 70 MMHG | HEIGHT: 64 IN | OXYGEN SATURATION: 100 % | HEART RATE: 81 BPM | BODY MASS INDEX: 22.09 KG/M2 | WEIGHT: 129.4 LBS | RESPIRATION RATE: 18 BRPM

## 2024-07-25 DIAGNOSIS — N39.0 URINARY TRACT INFECTION WITH HEMATURIA, SITE UNSPECIFIED: ICD-10-CM

## 2024-07-25 DIAGNOSIS — R31.9 URINARY TRACT INFECTION WITH HEMATURIA, SITE UNSPECIFIED: ICD-10-CM

## 2024-07-25 DIAGNOSIS — R31.9 URINARY TRACT INFECTION WITH HEMATURIA, SITE UNSPECIFIED: Primary | ICD-10-CM

## 2024-07-25 DIAGNOSIS — N39.0 URINARY TRACT INFECTION WITH HEMATURIA, SITE UNSPECIFIED: Primary | ICD-10-CM

## 2024-07-25 LAB
BACTERIA UR QL AUTO: ABNORMAL /HPF
BILIRUB UR QL STRIP: NEGATIVE
CLARITY UR: CLEAR
COLOR UR: YELLOW
GLUCOSE UR STRIP-MCNC: NEGATIVE MG/DL
HGB UR QL STRIP.AUTO: ABNORMAL
HOLD SPECIMEN: NORMAL
HYALINE CASTS UR QL AUTO: ABNORMAL /LPF
KETONES UR QL STRIP: NEGATIVE
LEUKOCYTE ESTERASE UR QL STRIP.AUTO: ABNORMAL
NITRITE UR QL STRIP: NEGATIVE
PH UR STRIP.AUTO: 7 [PH] (ref 5–8)
PROT UR QL STRIP: NEGATIVE
RBC # UR STRIP: ABNORMAL /HPF
REF LAB TEST METHOD: ABNORMAL
SP GR UR STRIP: 1.01 (ref 1–1.03)
SQUAMOUS #/AREA URNS HPF: ABNORMAL /HPF
UROBILINOGEN UR QL STRIP: ABNORMAL
WBC # UR STRIP: ABNORMAL /HPF

## 2024-07-25 PROCEDURE — 87077 CULTURE AEROBIC IDENTIFY: CPT | Performed by: NURSE PRACTITIONER

## 2024-07-25 PROCEDURE — 81001 URINALYSIS AUTO W/SCOPE: CPT | Performed by: NURSE PRACTITIONER

## 2024-07-25 PROCEDURE — 99213 OFFICE O/P EST LOW 20 MIN: CPT | Performed by: NURSE PRACTITIONER

## 2024-07-25 PROCEDURE — 87086 URINE CULTURE/COLONY COUNT: CPT | Performed by: NURSE PRACTITIONER

## 2024-07-25 PROCEDURE — 87186 SC STD MICRODIL/AGAR DIL: CPT | Performed by: NURSE PRACTITIONER

## 2024-07-25 RX ORDER — SULFAMETHOXAZOLE AND TRIMETHOPRIM 800; 160 MG/1; MG/1
1 TABLET ORAL 2 TIMES DAILY
Qty: 10 TABLET | Refills: 0 | Status: SHIPPED | OUTPATIENT
Start: 2024-07-25

## 2024-07-25 NOTE — PROGRESS NOTES
"Chief Complaint  Urinary Frequency (Burning while urinating ) and Vaginal Itching  Drew Florentino presents to [facility]  History of Present Illness  Pt comes in today with c/o dysuria, frequency. Symptoms started about 3-4 days.   No fever or chills.  No hematuria.  No N/V/D.   Urinary Frequency   Associated symptoms include frequency.   Vaginal Itching  Associated symptoms include frequency.     Review of Systems   Genitourinary:  Positive for frequency.     Objective     Vital Signs:   /70   Pulse 81   Resp 18   Ht 162.6 cm (64.02\")   Wt 58.7 kg (129 lb 6.4 oz)   SpO2 100%   BMI 22.20 kg/m²       BP Readings from Last 3 Encounters:   07/25/24 132/70   03/06/24 128/81   10/18/23 120/82       Wt Readings from Last 3 Encounters:   07/25/24 58.7 kg (129 lb 6.4 oz)   03/06/24 60 kg (132 lb 3.2 oz)   10/18/23 60.3 kg (133 lb)     Physical Exam  Constitutional:       Appearance: She is well-developed.   Eyes:      Pupils: Pupils are equal, round, and reactive to light.   Cardiovascular:      Rate and Rhythm: Normal rate and regular rhythm.   Pulmonary:      Effort: Pulmonary effort is normal.      Breath sounds: Normal breath sounds.   Neurological:      Mental Status: She is alert and oriented to person, place, and time.        Result Review :                 Assessment and Plan    Diagnoses and all orders for this visit:    1. Urinary tract infection with hematuria, site unspecified (Primary)  -     Urine Culture - Urine, Urine, Clean Catch; Future  -     sulfamethoxazole-trimethoprim (Bactrim DS) 800-160 MG per tablet; Take 1 tablet by mouth 2 (Two) Times a Day.  Dispense: 10 tablet; Refill: 0  -     Urinalysis With Culture If Indicated - Urine, Clean Catch; Future    Cutlure urine  Start anbx  Push fluids  During this office visit, we discussed the pertinent aspects of the visit and treatment recommendations. Pt verbalizes understanding. Follow up was " discussed. Patient was given the opportunity to ask questions and discuss other concerns.         Follow Up   No follow-ups on file.  Patient was given instructions and counseling regarding her condition or for health maintenance advice. Please see specific information pulled into the AVS if appropriate.

## 2024-07-27 LAB — BACTERIA SPEC AEROBE CULT: ABNORMAL

## 2024-07-29 ENCOUNTER — TELEPHONE (OUTPATIENT)
Dept: FAMILY MEDICINE CLINIC | Facility: CLINIC | Age: 59
End: 2024-07-29
Payer: COMMERCIAL

## 2024-07-29 RX ORDER — CEPHALEXIN 500 MG/1
500 CAPSULE ORAL 3 TIMES DAILY
Qty: 21 CAPSULE | Refills: 0 | Status: SHIPPED | OUTPATIENT
Start: 2024-07-29

## 2024-07-29 NOTE — PROGRESS NOTES
Please let pt know that her urine culture was positive, but also resistant to the bactrim that I have started her on. I will need to change anbx. I have called in rx for keflex. Please stop the bactrim and start new anbx.

## 2024-07-29 NOTE — TELEPHONE ENCOUNTER
Relay: Called patient, unable to reach. Left detailed voice message for patient. Results were seen in [facility].     ----- Message from Breanna Moran sent at 7/29/2024  7:54 AM EDT -----  Please let pt know that her urine culture was positive, but also resistant to the bactrim that I have started her on. I will need to change anbx. I have called in rx for keflex. Please stop the bactrim and start new anbx.

## 2024-07-30 DIAGNOSIS — F98.8 ATTENTION DEFICIT DISORDER (ADD) WITHOUT HYPERACTIVITY: ICD-10-CM

## 2024-07-30 RX ORDER — DEXMETHYLPHENIDATE HYDROCHLORIDE 20 MG/1
20 CAPSULE, EXTENDED RELEASE ORAL DAILY
Qty: 30 CAPSULE | Refills: 0 | Status: SHIPPED | OUTPATIENT
Start: 2024-07-30

## 2024-07-30 NOTE — TELEPHONE ENCOUNTER
Caller: Isabel Florentino    Relationship: Self    Best call back number:[phone]     Requested Prescriptions:   Requested Prescriptions     Pending Prescriptions Disp Refills    dexmethylphenidate XR (Focalin XR) 20 MG 24 hr capsule 30 capsule 0     Sig: Take 1 capsule by mouth Daily        Pharmacy where request should be sent: [facility]/PHARMACY #[id number] [place], [place] - [address] [facility] 311 - [phone]  - [phone] FX     Last office visit with prescribing clinician: 10/18/2023   Last telemedicine visit with prescribing clinician: Visit date not found   Next office visit with prescribing clinician: Visit date not found     Additional details provided by patient: PATIENT HAS 3 DAYS LEFT    Does the patient have less than a 3 day supply:  [] Yes  [x] No    Would you like a call back once the refill request has been completed: [] Yes [x] No    If the office needs to give you a call back, can they leave a voicemail: [] Yes [x] No    Roberto Estrada Rep   07/30/24 13:12 EDT

## 2024-09-04 DIAGNOSIS — F98.8 ATTENTION DEFICIT DISORDER (ADD) WITHOUT HYPERACTIVITY: ICD-10-CM

## 2024-09-04 RX ORDER — DEXMETHYLPHENIDATE HYDROCHLORIDE 20 MG/1
20 CAPSULE, EXTENDED RELEASE ORAL DAILY
Qty: 30 CAPSULE | Refills: 0 | Status: SHIPPED | OUTPATIENT
Start: 2024-09-04

## 2024-09-04 NOTE — TELEPHONE ENCOUNTER
Caller: Isabel Florentino    Relationship: Self    Requested Prescriptions:   Requested Prescriptions     Pending Prescriptions Disp Refills    dexmethylphenidate XR (Focalin XR) 20 MG 24 hr capsule 30 capsule 0     Sig: Take 1 capsule by mouth Daily      Pharmacy where request should be sent: [facility]/PHARMACY #[id number] - [place], [place] - [address] [facility] [address] - [phone] PH - [phone] FX     Last office visit with prescribing clinician: 10/18/2023   Last telemedicine visit with prescribing clinician: Visit date not found   Next office visit with prescribing clinician: Visit date not found     Would you like a call back once the refill request has been completed: [] Yes [x] No    If the office needs to give you a call back, can they leave a voicemail: [] Yes [x] No    Roberto Jeffries Rep   09/04/24 10:46 EDT

## 2024-10-01 DIAGNOSIS — F98.8 ATTENTION DEFICIT DISORDER (ADD) WITHOUT HYPERACTIVITY: ICD-10-CM

## 2024-10-01 RX ORDER — DEXMETHYLPHENIDATE HYDROCHLORIDE 20 MG/1
20 CAPSULE, EXTENDED RELEASE ORAL DAILY
Qty: 30 CAPSULE | Refills: 0 | Status: SHIPPED | OUTPATIENT
Start: 2024-10-01

## 2024-10-01 NOTE — TELEPHONE ENCOUNTER
Caller: Mireille Florentinoen    Relationship: Self    Best call back number: [phone]     Requested Prescriptions:   Requested Prescriptions     Pending Prescriptions Disp Refills    dexmethylphenidate XR (Focalin XR) 20 MG 24 hr capsule 30 capsule 0     Sig: Take 1 capsule by mouth Daily        Pharmacy where request should be sent: [facility]/PHARMACY #[id number] [place], [place] - [address] [facility] 311 - [phone]  - [phone] FX     Last office visit with prescribing clinician: 10/18/2023   Last telemedicine visit with prescribing clinician: Visit date not found   Next office visit with prescribing clinician: Visit date not found     Additional details provided by patient: OUT    Does the patient have less than a 3 day supply:  [x] Yes  [] No    Would you like a call back once the refill request has been completed: [] Yes [] No    If the office needs to give you a call back, can they leave a voicemail: [] Yes [] No    Scooter Kapadia   10/01/24 13:00 EDT

## 2024-10-08 ENCOUNTER — OFFICE VISIT (OUTPATIENT)
Dept: FAMILY MEDICINE CLINIC | Facility: CLINIC | Age: 59
End: 2024-10-08
Payer: COMMERCIAL

## 2024-10-08 VITALS
WEIGHT: 133.6 LBS | DIASTOLIC BLOOD PRESSURE: 79 MMHG | BODY MASS INDEX: 22.81 KG/M2 | HEIGHT: 64 IN | RESPIRATION RATE: 18 BRPM | SYSTOLIC BLOOD PRESSURE: 131 MMHG

## 2024-10-08 DIAGNOSIS — J34.89 SORE IN NOSE: Primary | ICD-10-CM

## 2024-10-08 DIAGNOSIS — Z86.14 HISTORY OF MRSA INFECTION: ICD-10-CM

## 2024-10-08 DIAGNOSIS — F98.8 ATTENTION DEFICIT DISORDER (ADD) WITHOUT HYPERACTIVITY: ICD-10-CM

## 2024-10-08 PROCEDURE — 99213 OFFICE O/P EST LOW 20 MIN: CPT | Performed by: STUDENT IN AN ORGANIZED HEALTH CARE EDUCATION/TRAINING PROGRAM

## 2024-10-08 RX ORDER — MUPIROCIN CALCIUM 20 MG/G
1 CREAM TOPICAL 2 TIMES DAILY
Qty: 30 G | Refills: 1 | Status: SHIPPED | OUTPATIENT
Start: 2024-10-08

## 2024-10-08 RX ORDER — SULFAMETHOXAZOLE/TRIMETHOPRIM 800-160 MG
1 TABLET ORAL 2 TIMES DAILY
Qty: 14 TABLET | Refills: 0 | Status: SHIPPED | OUTPATIENT
Start: 2024-10-08 | End: 2024-10-15

## 2024-10-08 RX ORDER — MUPIROCIN CALCIUM 20 MG/G
1 CREAM TOPICAL 3 TIMES DAILY
Qty: 30 G | Refills: 1 | Status: SHIPPED | OUTPATIENT
Start: 2024-10-08 | End: 2024-10-08

## 2024-10-08 NOTE — PROGRESS NOTES
"Chief Complaint  Chief Complaint   Patient presents with    Med Management     Subjective        Isabel Florentino is a 59 y.o. female who presents to [facility].    History of Present Illness  ADHD on focalin XR 20 mg daily.    Doing well with this at current dosage.     She had an issue w/ MRSA facial infection years ago.  She had to be hospitalized for about 5 days or so.  They think it started from a sore in her L nostril.  Last week the sore in her L nostril returned and she is worried it will progress again.     She also has tick bite on R posterior thigh.    Red dot still present and she wonders if this is okay.  No discomfort, no bullseye rash, no itching.    She did have colonoscopy last year, will send me date.      Objective   /79   Resp 18   Ht 162.6 cm (64.02\")   Wt 60.6 kg (133 lb 9.6 oz)   BMI 22.92 kg/m²     Estimated body mass index is 22.92 kg/m² as calculated from the following:    Height as of this encounter: 162.6 cm (64.02\").    Weight as of this encounter: 60.6 kg (133 lb 9.6 oz).     Physical Exam   GEN: In no acute distress, non toxic appearing  NEURO: AAO to person, place, and time. CN 2-12 intact grossly.  PSYCH: Affect normal, insight fair      Result Review :              Assessment and Plan     Diagnoses and all orders for this visit:    1. Sore in nose (Primary)  2. History of MRSA infection  Treat w/ topical mupirocin bid x 7 days as ppx.    If worsening start bactrim bid x 7 days.  If progresses to severe as last time go to ER.    -     mupirocin (BACTROBAN) 2 % cream; Apply 1 Application topically to the appropriate area as directed 3 (Three) Times a Day.  Dispense: 30 g; Refill: 1  -     sulfamethoxazole-trimethoprim (Bactrim DS) 800-160 MG per tablet; Take 1 tablet by mouth 2 (Two) Times a Day for 7 days.  Dispense: 14 tablet; Refill: 0    3. Attention deficit disorder (ADD) without hyperactivity  Continue focalin XR 20 mg daily.     "     Follow Up   As needed

## 2024-10-09 ENCOUNTER — TELEPHONE (OUTPATIENT)
Dept: FAMILY MEDICINE CLINIC | Facility: CLINIC | Age: 59
End: 2024-10-09

## 2024-10-09 NOTE — TELEPHONE ENCOUNTER
Caller: Isabel Florentino    Relationship: Self    Best call back number: [phone]    What medication are you requesting: CREAM     If a prescription is needed, what is your preferred pharmacy and phone number: [facility] #[id number] - [place], [place] - [address] [facility] AT [address] [facility] [address] [facility] [phone] [facility] [phone] FX     Additional notes:PATIENT STATES CREAM THAT WAS PRESCRIBED 10-8-24 IS NOT COVERED BY INSURANCE.   PATIENT ASKS IF SOMETHING ELSE CAN BE PRESCRIBED?   PLEASE ADVISE

## 2024-11-01 DIAGNOSIS — F98.8 ATTENTION DEFICIT DISORDER (ADD) WITHOUT HYPERACTIVITY: ICD-10-CM

## 2024-11-01 NOTE — TELEPHONE ENCOUNTER
PATIENT CALLED FOR MEDICATION REFILL OF    dexmethylphenidate XR (Focalin XR) 20 MG 24 hr capsule   HAS 1-2 TABLETS LEFT    [facility]/pharmacy #[id number] - [place], [place] - [address] - [phone] [facility] [phone]  [phone]     CALL BACK NUMBER [phone]

## 2024-11-02 RX ORDER — DEXMETHYLPHENIDATE HYDROCHLORIDE 20 MG/1
20 CAPSULE, EXTENDED RELEASE ORAL DAILY
Qty: 30 CAPSULE | Refills: 0 | Status: SHIPPED | OUTPATIENT
Start: 2024-11-02

## 2024-11-20 ENCOUNTER — OFFICE VISIT (OUTPATIENT)
Dept: FAMILY MEDICINE CLINIC | Facility: CLINIC | Age: 59
End: 2024-11-20
Payer: COMMERCIAL

## 2024-11-20 VITALS
WEIGHT: 134.6 LBS | SYSTOLIC BLOOD PRESSURE: 132 MMHG | RESPIRATION RATE: 18 BRPM | DIASTOLIC BLOOD PRESSURE: 64 MMHG | HEIGHT: 64 IN | BODY MASS INDEX: 22.98 KG/M2

## 2024-11-20 DIAGNOSIS — J01.00 ACUTE NON-RECURRENT MAXILLARY SINUSITIS: Primary | ICD-10-CM

## 2024-11-20 PROCEDURE — 99213 OFFICE O/P EST LOW 20 MIN: CPT | Performed by: NURSE PRACTITIONER

## 2024-11-20 NOTE — PROGRESS NOTES
"Chief Complaint  Nasal Congestion (Green mucus ), Sinusitis (Started a week ago, patient says she only had a cold at the moment.), Sore Throat, and Headache  Subjective        Isabel Florentino presents to [facility]  History of Present Illness  Pt comes in today with c/o sinus pressure and pain.  Symptoms started about 2 weeks ago and improved slightly, but then worsened.  Has pain in sinuses.  Has drainage.  Has congestion.  Has had sinus surgery in the past.   Unable to blow her nose  Has drainage that is thick  No fever or chills.  +HA's  +ST  Not tried anything otc for symptoms.    Sinusitis  Associated symptoms include headaches and a sore throat.   Sore Throat   Associated symptoms include headaches.   Headache    Review of Systems   HENT:  Positive for sore throat.      Objective     Vital Signs:   /64   Resp 18   Ht 162.6 cm (64.02\")   Wt 61.1 kg (134 lb 9.6 oz)   BMI 23.09 kg/m²       BP Readings from Last 3 Encounters:   11/20/24 132/64   10/08/24 131/79   07/25/24 132/70       Wt Readings from Last 3 Encounters:   11/20/24 61.1 kg (134 lb 9.6 oz)   10/08/24 60.6 kg (133 lb 9.6 oz)   07/25/24 58.7 kg (129 lb 6.4 oz)     Physical Exam  Constitutional:       Appearance: She is well-developed.   HENT:      Nose: Congestion present.      Right Sinus: Maxillary sinus tenderness present.      Left Sinus: Maxillary sinus tenderness present.   Eyes:      Pupils: Pupils are equal, round, and reactive to light.   Cardiovascular:      Rate and Rhythm: Normal rate and regular rhythm.   Pulmonary:      Effort: Pulmonary effort is normal.      Breath sounds: Normal breath sounds.   Neurological:      Mental Status: She is alert and oriented to person, place, and time.        Result Review :                 Assessment and Plan    Diagnoses and all orders for this visit:    1. Acute non-recurrent maxillary sinusitis (Primary)  -     amoxicillin-clavulanate (AUGMENTIN) 875-125 MG " per tablet; Take 1 tablet by mouth Every 12 (Twelve) Hours for 7 days.  Dispense: 14 tablet; Refill: 0    Start anbx  Advil cold and sinus  During this office visit, we discussed the pertinent aspects of the visit and treatment recommendations. Pt verbalizes understanding. Follow up was discussed. Patient was given the opportunity to ask questions and discuss other concerns.         Follow Up   No follow-ups on file.  Patient was given instructions and counseling regarding her condition or for health maintenance advice. Please see specific information pulled into the AVS if appropriate.

## 2024-12-02 DIAGNOSIS — F98.8 ATTENTION DEFICIT DISORDER (ADD) WITHOUT HYPERACTIVITY: ICD-10-CM

## 2024-12-02 RX ORDER — DEXMETHYLPHENIDATE HYDROCHLORIDE 20 MG/1
20 CAPSULE, EXTENDED RELEASE ORAL DAILY
Qty: 30 CAPSULE | Refills: 0 | Status: SHIPPED | OUTPATIENT
Start: 2024-12-02

## 2024-12-02 NOTE — TELEPHONE ENCOUNTER
Caller: Isabel Florentino    Relationship: Self    Best call back number:     [phone]       Requested Prescriptions:   Requested Prescriptions     Pending Prescriptions Disp Refills    dexmethylphenidate XR (Focalin XR) 20 MG 24 hr capsule 30 capsule 0     Sig: Take 1 capsule by mouth Daily        Pharmacy where request should be sent: [facility]/PHARMACY #[id number] - [place], [place] - [address] - [phone]  - [phone] FX     Last office visit with prescribing clinician: 10/8/2024   Last telemedicine visit with prescribing clinician: Visit date not found   Next office visit with prescribing clinician: Visit date not found     Additional details provided by patient:     Does the patient have less than a 3 day supply:  [x] Yes  [] No    Would you like a call back once the refill request has been completed: [] Yes [] No    If the office needs to give you a call back, can they leave a voicemail: [] Yes [] No    Roberto Norwood Rep   12/02/24 14:58 EST

## 2024-12-10 ENCOUNTER — LAB (OUTPATIENT)
Dept: FAMILY MEDICINE CLINIC | Facility: CLINIC | Age: 59
End: 2024-12-10
Payer: COMMERCIAL

## 2024-12-10 ENCOUNTER — OFFICE VISIT (OUTPATIENT)
Dept: FAMILY MEDICINE CLINIC | Facility: CLINIC | Age: 59
End: 2024-12-10
Payer: COMMERCIAL

## 2024-12-10 VITALS
RESPIRATION RATE: 18 BRPM | BODY MASS INDEX: 23.63 KG/M2 | HEIGHT: 64 IN | WEIGHT: 138.4 LBS | SYSTOLIC BLOOD PRESSURE: 132 MMHG | DIASTOLIC BLOOD PRESSURE: 82 MMHG

## 2024-12-10 DIAGNOSIS — F98.8 ATTENTION DEFICIT DISORDER (ADD) WITHOUT HYPERACTIVITY: ICD-10-CM

## 2024-12-10 DIAGNOSIS — Z00.00 ANNUAL PHYSICAL EXAM: Primary | ICD-10-CM

## 2024-12-10 PROCEDURE — 80050 GENERAL HEALTH PANEL: CPT | Performed by: STUDENT IN AN ORGANIZED HEALTH CARE EDUCATION/TRAINING PROGRAM

## 2024-12-10 PROCEDURE — 36415 COLL VENOUS BLD VENIPUNCTURE: CPT | Performed by: STUDENT IN AN ORGANIZED HEALTH CARE EDUCATION/TRAINING PROGRAM

## 2024-12-10 PROCEDURE — 83036 HEMOGLOBIN GLYCOSYLATED A1C: CPT | Performed by: STUDENT IN AN ORGANIZED HEALTH CARE EDUCATION/TRAINING PROGRAM

## 2024-12-10 PROCEDURE — 99396 PREV VISIT EST AGE 40-64: CPT | Performed by: STUDENT IN AN ORGANIZED HEALTH CARE EDUCATION/TRAINING PROGRAM

## 2024-12-10 PROCEDURE — 80061 LIPID PANEL: CPT | Performed by: STUDENT IN AN ORGANIZED HEALTH CARE EDUCATION/TRAINING PROGRAM

## 2024-12-10 NOTE — PROGRESS NOTES
"Chief Complaint  Chief Complaint   Patient presents with    Annual Exam     Subjective        Isabel Florentino is a 59 y.o. female who presents to [facility].    History of Present Illness  Here for annual physical.    Diet: good intake of fruits and vegetables.  Drinks some water.  Does have some diet soft drinks.  Espresso in the am.    Exercise: 3-4 days / wk.  Goes to a body pump class.    Sleep: no concerns, 6 hrs / night    No specific concerns today.      Objective   /82   Resp 18   Ht 162.6 cm (64.02\")   Wt 62.8 kg (138 lb 6.4 oz)   BMI 23.74 kg/m²     Estimated body mass index is 23.74 kg/m² as calculated from the following:    Height as of this encounter: 162.6 cm (64.02\").    Weight as of this encounter: 62.8 kg (138 lb 6.4 oz).     Physical Exam   GEN: In no acute distress, non toxic appearing  HEENT: Pupils equal and reactive to light, sclera clear. Mucous membranes moist. Oropharynx without erythema or exudate. No cervical or submandibular lymphadenopathy.  Bilateral TM's wnl.    CV: Regular rate and rhythm, no murmurs, 2+ peripheral pulses, No extremity edema.   RESP: Lungs clear to auscultation anteriorly and posteriorly in all lung fields bilaterally.  NEURO: AAO to person, place, and time. CN 2-12 intact grossly.   PSYCH: Affect normal, insight fair      Result Review :              Assessment and Plan     Diagnoses and all orders for this visit:    1. Annual physical exam (Primary)  Overall reassuring exam.  Blood pressure goal today.  Check blood work as below.  Encourage continued regular physical activity.  Encouraged healthy diet with plenty of fruits, vegetables, water.  Continue follow-up with specialists.  Next visit: 1 year, follow-up sooner if needed.  -     CBC Auto Differential  -     Comprehensive Metabolic Panel  -     Lipid Panel  -     TSH  -     Hemoglobin A1c    2. Attention deficit disorder (ADD) without hyperactivity  Continue Focalin " XR 20 mg daily.       Follow Up     Return in about 1 year (around 12/10/2025) for Annual physical.

## 2024-12-11 LAB
ALBUMIN SERPL-MCNC: 4.2 G/DL (ref 3.5–5.2)
ALBUMIN/GLOB SERPL: 1.2 G/DL
ALP SERPL-CCNC: 54 U/L (ref 39–117)
ALT SERPL W P-5'-P-CCNC: 23 U/L (ref 1–33)
ANION GAP SERPL CALCULATED.3IONS-SCNC: 9.8 MMOL/L (ref 5–15)
AST SERPL-CCNC: 30 U/L (ref 1–32)
BASOPHILS # BLD AUTO: 0.03 10*3/MM3 (ref 0–0.2)
BASOPHILS NFR BLD AUTO: 0.4 % (ref 0–1.5)
BILIRUB SERPL-MCNC: 0.2 MG/DL (ref 0–1.2)
BUN SERPL-MCNC: 15 MG/DL (ref 8–23)
BUN/CREAT SERPL: 18.1 (ref 7–25)
CALCIUM SPEC-SCNC: 10 MG/DL (ref 8.6–10.5)
CHLORIDE SERPL-SCNC: 105 MMOL/L (ref 98–107)
CHOLEST SERPL-MCNC: 162 MG/DL (ref 0–200)
CO2 SERPL-SCNC: 23.2 MMOL/L (ref 22–29)
CREAT SERPL-MCNC: 0.83 MG/DL (ref 0.57–1)
DEPRECATED RDW RBC AUTO: 38.7 FL (ref 37–54)
EGFRCR SERPLBLD CKD-EPI 2021: 80.3 ML/MIN/1.73
EOSINOPHIL # BLD AUTO: 0.02 10*3/MM3 (ref 0–0.4)
EOSINOPHIL NFR BLD AUTO: 0.2 % (ref 0.3–6.2)
ERYTHROCYTE [DISTWIDTH] IN BLOOD BY AUTOMATED COUNT: 12.6 % (ref 12.3–15.4)
GLOBULIN UR ELPH-MCNC: 3.4 GM/DL
GLUCOSE SERPL-MCNC: 105 MG/DL (ref 65–99)
HBA1C MFR BLD: 5.3 % (ref 4.8–5.6)
HCT VFR BLD AUTO: 36 % (ref 34–46.6)
HDLC SERPL-MCNC: 86 MG/DL (ref 40–60)
HGB BLD-MCNC: 12.6 G/DL (ref 12–15.9)
IMM GRANULOCYTES # BLD AUTO: 0.02 10*3/MM3 (ref 0–0.05)
IMM GRANULOCYTES NFR BLD AUTO: 0.2 % (ref 0–0.5)
LDLC SERPL CALC-MCNC: 62 MG/DL (ref 0–100)
LDLC/HDLC SERPL: 0.72 {RATIO}
LYMPHOCYTES # BLD AUTO: 1.6 10*3/MM3 (ref 0.7–3.1)
LYMPHOCYTES NFR BLD AUTO: 19.6 % (ref 19.6–45.3)
MCH RBC QN AUTO: 30.1 PG (ref 26.6–33)
MCHC RBC AUTO-ENTMCNC: 35 G/DL (ref 31.5–35.7)
MCV RBC AUTO: 86.1 FL (ref 79–97)
MONOCYTES # BLD AUTO: 0.68 10*3/MM3 (ref 0.1–0.9)
MONOCYTES NFR BLD AUTO: 8.3 % (ref 5–12)
NEUTROPHILS NFR BLD AUTO: 5.8 10*3/MM3 (ref 1.7–7)
NEUTROPHILS NFR BLD AUTO: 71.3 % (ref 42.7–76)
NRBC BLD AUTO-RTO: 0 /100 WBC (ref 0–0.2)
PLATELET # BLD AUTO: 262 10*3/MM3 (ref 140–450)
PMV BLD AUTO: 10.8 FL (ref 6–12)
POTASSIUM SERPL-SCNC: 4.2 MMOL/L (ref 3.5–5.2)
PROT SERPL-MCNC: 7.6 G/DL (ref 6–8.5)
RBC # BLD AUTO: 4.18 10*6/MM3 (ref 3.77–5.28)
SODIUM SERPL-SCNC: 138 MMOL/L (ref 136–145)
TRIGL SERPL-MCNC: 71 MG/DL (ref 0–150)
TSH SERPL DL<=0.05 MIU/L-ACNC: 2.36 UIU/ML (ref 0.27–4.2)
VLDLC SERPL-MCNC: 14 MG/DL (ref 5–40)
WBC NRBC COR # BLD AUTO: 8.15 10*3/MM3 (ref 3.4–10.8)

## 2025-01-14 DIAGNOSIS — F98.8 ATTENTION DEFICIT DISORDER (ADD) WITHOUT HYPERACTIVITY: ICD-10-CM

## 2025-01-14 RX ORDER — DEXMETHYLPHENIDATE HYDROCHLORIDE 20 MG/1
20 CAPSULE, EXTENDED RELEASE ORAL DAILY
Qty: 30 CAPSULE | Refills: 0 | Status: SHIPPED | OUTPATIENT
Start: 2025-01-14

## 2025-01-14 NOTE — TELEPHONE ENCOUNTER
Caller: Isabel Florentino    Relationship: Self    Best call back number:     [phone]       Requested Prescriptions:   Requested Prescriptions     Pending Prescriptions Disp Refills    dexmethylphenidate XR (Focalin XR) 20 MG 24 hr capsule 30 capsule 0     Sig: Take 1 capsule by mouth Daily        Pharmacy where request should be sent: [facility]/PHARMACY #[id number] - [place], [place] - [address] [facility] [address] - [phone]  - [phone] FX     Last office visit with prescribing clinician: 12/10/2024   Last telemedicine visit with prescribing clinician: Visit date not found   Next office visit with prescribing clinician: Visit date not found     Additional details provided by patient:     Does the patient have less than a 3 day supply:  [x] Yes  [] No    Would you like a call back once the refill request has been completed: [] Yes [] No    If the office needs to give you a call back, can they leave a voicemail: [] Yes [] No    Roberto Norwood Rep   01/14/25 08:34 EST

## 2025-02-17 DIAGNOSIS — F98.8 ATTENTION DEFICIT DISORDER (ADD) WITHOUT HYPERACTIVITY: ICD-10-CM

## 2025-02-17 RX ORDER — DEXMETHYLPHENIDATE HYDROCHLORIDE 20 MG/1
20 CAPSULE, EXTENDED RELEASE ORAL DAILY
Qty: 30 CAPSULE | Refills: 0 | Status: SHIPPED | OUTPATIENT
Start: 2025-02-17

## 2025-02-17 NOTE — TELEPHONE ENCOUNTER
Caller: Isabel Florentino    Relationship: Self    Best call back number: [phone]     Requested Prescriptions:   Requested Prescriptions     Pending Prescriptions Disp Refills    dexmethylphenidate XR (Focalin XR) 20 MG 24 hr capsule 30 capsule 0     Sig: Take 1 capsule by mouth Daily        Pharmacy where request should be sent: [facility]/PHARMACY #[id number] - [place] - [address] [facility] [address] - [phone]  - [phone] FX     Last office visit with prescribing clinician: 12/10/2024   Last telemedicine visit with prescribing clinician: Visit date not found   Next office visit with prescribing clinician: Visit date not found     Additional details provided by patient:     Does the patient have less than a 3 day supply:  [x] Yes  [] No    Would you like a call back once the refill request has been completed: [] Yes [x] No    If the office needs to give you a call back, can they leave a voicemail: [] Yes [x] No    Roberto Ledezma Rep   02/17/25 09:41 EST

## 2025-03-19 DIAGNOSIS — F98.8 ATTENTION DEFICIT DISORDER (ADD) WITHOUT HYPERACTIVITY: ICD-10-CM

## 2025-03-19 RX ORDER — DEXMETHYLPHENIDATE HYDROCHLORIDE 20 MG/1
20 CAPSULE, EXTENDED RELEASE ORAL DAILY
Qty: 30 CAPSULE | Refills: 0 | Status: SHIPPED | OUTPATIENT
Start: 2025-03-19

## 2025-03-19 NOTE — TELEPHONE ENCOUNTER
Caller: Isabel Florentino    Relationship: Self    Best call back number: [phone]     Requested Prescriptions:   Requested Prescriptions     Pending Prescriptions Disp Refills    dexmethylphenidate XR (Focalin XR) 20 MG 24 hr capsule 30 capsule 0     Sig: Take 1 capsule by mouth Daily        Pharmacy where request should be sent: [facility]/PHARMACY #[id number] [place], [place] - [address] [facility] [address] - [phone]  - [phone] FX     Last office visit with prescribing clinician: 12/10/2024   Last telemedicine visit with prescribing clinician: Visit date not found   Next office visit with prescribing clinician: Visit date not found     Does the patient have less than a 3 day supply:  [x] Yes  [] No    Would you like a call back once the refill request has been completed: [] Yes [x] No    If the office needs to give you a call back, can they leave a voicemail: [] Yes [x] No    Hailey Garza, Roberto Rep   03/19/25 10:16 EDT

## 2025-04-21 DIAGNOSIS — F98.8 ATTENTION DEFICIT DISORDER (ADD) WITHOUT HYPERACTIVITY: ICD-10-CM

## 2025-04-21 RX ORDER — DEXMETHYLPHENIDATE HYDROCHLORIDE 20 MG/1
20 CAPSULE, EXTENDED RELEASE ORAL DAILY
Qty: 30 CAPSULE | Refills: 0 | Status: SHIPPED | OUTPATIENT
Start: 2025-04-21

## 2025-04-21 NOTE — TELEPHONE ENCOUNTER
Caller: Isabel Florentino    Relationship: Self    Best call back number: [phone]     Requested Prescriptions:   Requested Prescriptions     Pending Prescriptions Disp Refills    dexmethylphenidate XR (Focalin XR) 20 MG 24 hr capsule 30 capsule 0     Sig: Take 1 capsule by mouth Daily        Pharmacy where request should be sent: [facility]/PHARMACY #[id number] - [place], [place] - [address] [facility] [address] - [phone]  - [phone] FX     Last office visit with prescribing clinician: 12/10/2024   Last telemedicine visit with prescribing clinician: Visit date not found   Next office visit with prescribing clinician: Visit date not found     Additional details provided by patient:     Does the patient have less than a 3 day supply:  [x] Yes  [] No      Roberto Estrada Rep   04/21/25 15:39 EDT

## 2025-05-19 DIAGNOSIS — F98.8 ATTENTION DEFICIT DISORDER (ADD) WITHOUT HYPERACTIVITY: ICD-10-CM

## 2025-05-19 RX ORDER — DEXMETHYLPHENIDATE HYDROCHLORIDE 20 MG/1
20 CAPSULE, EXTENDED RELEASE ORAL DAILY
Qty: 30 CAPSULE | Refills: 0 | Status: SHIPPED | OUTPATIENT
Start: 2025-05-19

## 2025-05-19 NOTE — TELEPHONE ENCOUNTER
Caller: Isabel Florentino    Relationship: Self    Best call back number: [phone]     Requested Prescriptions:   Requested Prescriptions     Pending Prescriptions Disp Refills    dexmethylphenidate XR (Focalin XR) 20 MG 24 hr capsule 30 capsule 0     Sig: Take 1 capsule by mouth Daily        Pharmacy where request should be sent: [facility]/PHARMACY #[id number] - [place], [place] - [address] [facility] [address] - [phone]  - [phone] FX     Last office visit with prescribing clinician: 12/10/2024   Last telemedicine visit with prescribing clinician: Visit date not found   Next office visit with prescribing clinician: Visit date not found     Additional details provided by patient:     Does the patient have less than a 3 day supply:  [x] Yes  [] No    Would you like a call back once the refill request has been completed: [] Yes [] No    If the office needs to give you a call back, can they leave a voicemail: [] Yes [] No    Roberto Norwood Rep   05/19/25 15:27 EDT

## 2025-06-19 DIAGNOSIS — F98.8 ATTENTION DEFICIT DISORDER (ADD) WITHOUT HYPERACTIVITY: ICD-10-CM

## 2025-06-19 NOTE — TELEPHONE ENCOUNTER
Caller: Mishel Isabel    Relationship: Self    Best call back number: [phone]     Requested Prescriptions:   Requested Prescriptions     Pending Prescriptions Disp Refills    dexmethylphenidate XR (Focalin XR) 20 MG 24 hr capsule 30 capsule 0     Sig: Take 1 capsule by mouth Daily        Pharmacy where request should be sent: [facility] #[id number] - [place], [place] - [address] [facility] [address] [facility] [address] [facility] [address] [facility] [phone] [facility] [phone] FX     Last office visit with prescribing clinician: 12/10/2024   Last telemedicine visit with prescribing clinician: Visit date not found   Next office visit with prescribing clinician: Visit date not found     Additional details provided by patient: 4-5 DAYS    Does the patient have less than a 3 day supply:  [] Yes  [x] No        Roberto Murray Rep   06/19/25 11:25 EDT

## 2025-06-20 RX ORDER — DEXMETHYLPHENIDATE HYDROCHLORIDE 20 MG/1
20 CAPSULE, EXTENDED RELEASE ORAL DAILY
Qty: 30 CAPSULE | Refills: 0 | Status: SHIPPED | OUTPATIENT
Start: 2025-06-20

## 2025-07-18 ENCOUNTER — TELEPHONE (OUTPATIENT)
Dept: FAMILY MEDICINE CLINIC | Facility: CLINIC | Age: 60
End: 2025-07-18
Payer: COMMERCIAL

## 2025-07-18 DIAGNOSIS — F98.8 ATTENTION DEFICIT DISORDER (ADD) WITHOUT HYPERACTIVITY: ICD-10-CM

## 2025-07-18 RX ORDER — DEXMETHYLPHENIDATE HYDROCHLORIDE 20 MG/1
20 CAPSULE, EXTENDED RELEASE ORAL DAILY
Qty: 30 CAPSULE | Refills: 0 | Status: SHIPPED | OUTPATIENT
Start: 2025-07-18

## 2025-07-18 NOTE — TELEPHONE ENCOUNTER
Caller: Deepti Florentino    Relationship: Self    Best call back number: [phone]     What is the best time to reach you: ANY    Who are you requesting to speak with (clinical staff, provider,  specific staff member): CLINICAL    Do you know the name of the person who called: DEEPTI    What was the call regarding: PATIENT GETS MRI EVERY 5 YEARS TO CHECK ON COILS,DUE TO ANEURYSM, AND NEEDS TO DISCUSS ISSUE OF GETTING MRI ORDERED. PLEASE CALL

## 2025-07-18 NOTE — TELEPHONE ENCOUNTER
Caller: Isabel Florentino    Relationship: Self    Best call back number: [phone]     Requested Prescriptions:   Requested Prescriptions     Pending Prescriptions Disp Refills    dexmethylphenidate XR (Focalin XR) 20 MG 24 hr capsule 30 capsule 0     Sig: Take 1 capsule by mouth Daily        Pharmacy where request should be sent: [facility] #[id number] - [place], [place] - [address] [facility] AT [facility] [address] [facility] [phone] [facility] [phone] FX     Last office visit with prescribing clinician: 12/10/2024   Last telemedicine visit with prescribing clinician: Visit date not found   Next office visit with prescribing clinician: Visit date not found         Does the patient have less than a 3 day supply:  [x] Yes  [] No    Would you like a call back once the refill request     Roberto Murray Rep   07/18/25 14:05 EDT

## 2025-07-22 NOTE — TELEPHONE ENCOUNTER
I had left a detailed message in regards to a ct being ordered and not a mri. I let her know that the neurosurgeon doctor had ordered this to be done this December and let her know she can call the radiology department to get that scheduled.

## 2025-08-18 DIAGNOSIS — F98.8 ATTENTION DEFICIT DISORDER (ADD) WITHOUT HYPERACTIVITY: ICD-10-CM

## 2025-08-18 RX ORDER — DEXMETHYLPHENIDATE HYDROCHLORIDE 20 MG/1
20 CAPSULE, EXTENDED RELEASE ORAL DAILY
Qty: 30 CAPSULE | Refills: 0 | Status: SHIPPED | OUTPATIENT
Start: 2025-08-18

## 2025-08-27 ENCOUNTER — TELEPHONE (OUTPATIENT)
Dept: NEUROSURGERY | Facility: CLINIC | Age: 60
End: 2025-08-27
Payer: COMMERCIAL

## 2025-08-27 DIAGNOSIS — I72.9 ANEURYSM: Primary | ICD-10-CM

## (undated) DEVICE — OSC HYSTEROSCOPY: Brand: MEDLINE INDUSTRIES, INC.

## (undated) DEVICE — Device

## (undated) DEVICE — MEDI-VAC YANKAUER SUCTION HANDLE W/BULBOUS TIP: Brand: CARDINAL HEALTH

## (undated) DEVICE — NEEDLE, QUINCKE, 20GX3.5": Brand: MEDLINE

## (undated) DEVICE — GLV SURG BIOGEL LTX PF 7 1/2

## (undated) DEVICE — PATIENT RETURN ELECTRODE, SINGLE-USE, CONTACT QUALITY MONITORING, ADULT, WITH 9FT CORD, FOR PATIENTS WEIGING OVER 33LBS. (15KG): Brand: MEGADYNE

## (undated) DEVICE — PENCL ES MEGADINE EZ/CLEAN BUTN W/HOLSTR 10FT